# Patient Record
Sex: FEMALE | Race: BLACK OR AFRICAN AMERICAN | NOT HISPANIC OR LATINO | Employment: OTHER | URBAN - METROPOLITAN AREA
[De-identification: names, ages, dates, MRNs, and addresses within clinical notes are randomized per-mention and may not be internally consistent; named-entity substitution may affect disease eponyms.]

---

## 2019-02-27 ENCOUNTER — OFFICE VISIT (OUTPATIENT)
Dept: URGENT CARE | Facility: CLINIC | Age: 84
End: 2019-02-27

## 2019-02-27 VITALS
TEMPERATURE: 98 F | WEIGHT: 156 LBS | RESPIRATION RATE: 18 BRPM | BODY MASS INDEX: 24.48 KG/M2 | OXYGEN SATURATION: 97 % | HEART RATE: 74 BPM | HEIGHT: 67 IN

## 2019-02-27 DIAGNOSIS — B96.89 BACTERIAL SINUSITIS: Primary | ICD-10-CM

## 2019-02-27 DIAGNOSIS — J32.9 BACTERIAL SINUSITIS: Primary | ICD-10-CM

## 2019-02-27 LAB
CTP QC/QA: YES
FLUAV AG NPH QL: NEGATIVE
FLUBV AG NPH QL: NEGATIVE

## 2019-02-27 PROCEDURE — 99203 OFFICE O/P NEW LOW 30 MIN: CPT | Mod: S$GLB,,, | Performed by: NURSE PRACTITIONER

## 2019-02-27 PROCEDURE — 99203 PR OFFICE/OUTPT VISIT, NEW, LEVL III, 30-44 MIN: ICD-10-PCS | Mod: S$GLB,,, | Performed by: NURSE PRACTITIONER

## 2019-02-27 PROCEDURE — 87804 POCT INFLUENZA A/B: ICD-10-PCS | Mod: 59,QW,S$GLB, | Performed by: NURSE PRACTITIONER

## 2019-02-27 PROCEDURE — 87804 INFLUENZA ASSAY W/OPTIC: CPT | Mod: QW,S$GLB,, | Performed by: NURSE PRACTITIONER

## 2019-02-27 RX ORDER — AMLODIPINE BESYLATE 10 MG/1
10 TABLET ORAL DAILY
COMMUNITY
End: 2020-01-14 | Stop reason: SDUPTHER

## 2019-02-27 RX ORDER — VENLAFAXINE 75 MG/1
75 TABLET ORAL 3 TIMES DAILY
COMMUNITY
End: 2019-11-29 | Stop reason: SDUPTHER

## 2019-02-27 RX ORDER — LISINOPRIL 40 MG/1
40 TABLET ORAL DAILY
COMMUNITY
End: 2020-01-14 | Stop reason: SDUPTHER

## 2019-02-27 RX ORDER — METOPROLOL SUCCINATE 100 MG/1
TABLET, EXTENDED RELEASE ORAL
COMMUNITY
End: 2020-01-14 | Stop reason: SDUPTHER

## 2019-02-27 RX ORDER — CLOPIDOGREL BISULFATE 75 MG/1
75 TABLET ORAL DAILY
COMMUNITY
End: 2020-01-14 | Stop reason: SDUPTHER

## 2019-02-27 RX ORDER — FLUTICASONE PROPIONATE 50 MCG
1 SPRAY, SUSPENSION (ML) NASAL DAILY PRN
Status: ON HOLD | COMMUNITY
End: 2022-06-29 | Stop reason: HOSPADM

## 2019-02-27 RX ORDER — ALLOPURINOL 300 MG/1
300 TABLET ORAL DAILY
COMMUNITY
End: 2020-01-14 | Stop reason: SDUPTHER

## 2019-02-27 RX ORDER — ALENDRONATE SODIUM 70 MG/1
70 TABLET ORAL
COMMUNITY
End: 2020-01-14 | Stop reason: SDUPTHER

## 2019-02-27 RX ORDER — ONDANSETRON 4 MG/1
4 TABLET, FILM COATED ORAL EVERY 8 HOURS PRN
Qty: 28 TABLET | Refills: 0 | Status: SHIPPED | OUTPATIENT
Start: 2019-02-27 | End: 2019-03-06

## 2019-02-27 RX ORDER — METFORMIN HYDROCHLORIDE 500 MG/1
500 TABLET ORAL DAILY
COMMUNITY
End: 2020-01-14

## 2019-02-27 RX ORDER — AMOXICILLIN AND CLAVULANATE POTASSIUM 875; 125 MG/1; MG/1
1 TABLET, FILM COATED ORAL 2 TIMES DAILY
Qty: 14 TABLET | Refills: 0 | Status: SHIPPED | OUTPATIENT
Start: 2019-02-27 | End: 2019-03-06

## 2019-02-27 RX ORDER — PRAVASTATIN SODIUM 80 MG/1
80 TABLET ORAL DAILY
COMMUNITY
End: 2019-11-25

## 2019-02-28 NOTE — PATIENT INSTRUCTIONS
Start taking your antibiotics and take for the full duration.     Zyrtec, Claritin, or Allegra OTC as directed for the next 7 days  Add a decongestant to your antihistamine for congestion- like Zyrtec-D, Claritin D, Allegra D-this may increase your blood pressure.   If high blood pressure, an alternative decongestant is Coricidin HBP   Flonase OTC as directed for the next 7 days  [place] Nasal Spray OTC 3x/day for the next 7 days   You can try breathe right strips at night to help you breathe.  A cool mist humidifier in bedroom may help with cough and relieve stuffy nose.      Follow up with Primary Care or ENT if not improved in 7-10 Days  Follow up with your PCP or specialty clinic within the week if not improved or as needed.  You can call [phone] to schedule an appointment with the appropriate provider.  You must understand that you've received an Urgent Care treatment only and that you may be released before all your medical problems are known or treated. You, the patient, will arrange for follow up care as instructed.  If your condition worsens we recommend that you receive another evaluation at the emergency room immediately or contact your primary medical clinics after hours call service to discuss your concerns.  Please return here or go to the Emergency Department for any concerns or worsening of condition.            Sinusitis (Antibiotic Treatment)    The sinuses are air-filled spaces within the bones of the face. They connect to the inside of the nose. Sinusitis is an inflammation of the tissue lining the sinus cavity. Sinus inflammation can occur during a cold. It can also be due to allergies to pollens and other particles in the air. Sinusitis can cause symptoms of sinus congestion and fullness. A sinus infection causes fever, headache and facial pain. There is often green or yellow drainage from the nose or into the back of the throat (post-nasal drip). You have been given antibiotics to  "treat this condition.    Please return here or go to the Emergency Department for any concerns or worsening of condition.    Home care:  · Take the full course of antibiotics as instructed. Do not stop taking them, even if you feel better.  · Get rest!  · Drink plenty of water, hot tea, and other liquids. This may help thin mucus. It also may promote sinus drainage.  · Heat may help soothe painful areas of the face. Use a towel soaked in hot water. Or,  the shower and direct the hot spray onto your face. Using a vaporizer along with a menthol rub at night may also help.   · An expectorant containing guaifenesin may help thin the mucus and promote drainage from the sinuses.  · If you are a female and on birth control pills and take the antibiotics, use additional methods to prevent pregnancy while on the antibiotics and for one cycle after.  · Flonase (fluticasone) is an over the counter nasal spray which may help with your symptoms.  · Zyrtec D, Claritin D, or Allegra D can also help with symptoms of congestion and drainage  · If you have hypertesion, avoid using the "D" which is a decongestant.  · If clear drainage, you can take plain zyrtec, claritin, allegra.  · If congested, you can take pseudoephedrine-you need to ask for this behind the pharmacy counter-do not take if you have high blood pressure. Pheylephrine is on the shelf and is not effective.  · Tylenol or ibuprofen can be used as directed for pain, unless you have allergies. Avoid ibuprofen if medical conditions such as stomach ulcers, kidney or liver disease, or blood thinners  · Afrin is effective if flying in the next few days. Take as directed for the airplane flight upon taking off and landing. Do not continue to use Afrin as it will cause rebound congestion.  · Don't smoke. This can worsen symptoms.  Follow-up care  Follow up with your healthcare provider or our staff if you are not improving within the next week.    When to seek medical " advice  Call your healthcare provider if any of these occur:  · Facial pain or headache becoming more severe  · Stiff neck  · Unusual drowsiness or confusion  · Swelling of the forehead or eyelids  · Vision problems, including blurred or double vision  · Fever of 100.4ºF (38ºC) or higher, or as directed by your healthcare provider  · Seizure  · Breathing problems  · Symptoms not resolving within 10 days

## 2019-02-28 NOTE — PROGRESS NOTES
"Subjective:       Patient ID: Farheen Soares is a 83 y.o. female.    Vitals:  height is 5' 7" (1.702 m) and weight is 70.8 kg (156 lb). Her temperature is 98.4 °F (36.9 °C). Her pulse is 74. Her respiration is 18 and oxygen saturation is 97%.     Chief Complaint: Sinus Problem    Nausea, vomiting, post nasal drip, congestion, and headache for 1 week       Sinus Problem   This is a new problem. The current episode started in the past 7 days. The problem is unchanged. There has been no fever. Associated symptoms include congestion, headaches and sinus pressure. Pertinent negatives include no chills, coughing, diaphoresis, ear pain, shortness of breath or sore throat. Treatments tried: Mucinex  The treatment provided mild relief.       Constitution: Negative for chills, sweating, fatigue and fever.   HENT: Positive for congestion and sinus pressure. Negative for ear pain, sinus pain, sore throat and voice change.    Neck: Negative for painful lymph nodes.   Eyes: Negative for eye redness.   Respiratory: Negative for chest tightness, cough, sputum production, bloody sputum, COPD, shortness of breath, stridor, wheezing and asthma.    Gastrointestinal: Positive for nausea and vomiting.   Musculoskeletal: Negative for muscle ache.   Skin: Negative for rash.   Allergic/Immunologic: Negative for seasonal allergies and asthma.   Neurological: Positive for headaches.   Hematologic/Lymphatic: Negative for swollen lymph nodes.       Objective:      Physical Exam   Constitutional: She is oriented to person, place, and time. Vital signs are normal. She appears well-developed and well-nourished. She is active and cooperative.  Non-toxic appearance. She does not have a sickly appearance. She does not appear ill. No distress.   HENT:   Head: Normocephalic and atraumatic.   Right Ear: Hearing, tympanic membrane, external ear and ear canal normal.   Left Ear: Hearing, tympanic membrane, external ear and ear canal normal.   Nose: " Mucosal edema and rhinorrhea present. No nasal deformity. No epistaxis. Right sinus exhibits maxillary sinus tenderness. Right sinus exhibits no frontal sinus tenderness. Left sinus exhibits maxillary sinus tenderness. Left sinus exhibits no frontal sinus tenderness.   Mouth/Throat: Uvula is midline and mucous membranes are normal. No trismus in the jaw. Normal dentition. No uvula swelling. No oropharyngeal exudate, posterior oropharyngeal edema, posterior oropharyngeal erythema or tonsillar abscesses. No tonsillar exudate.   Eyes: Conjunctivae, EOM and lids are normal. Pupils are equal, round, and reactive to light. No scleral icterus.   Sclera clear bilat   Neck: Trachea normal, full passive range of motion without pain and phonation normal. Neck supple.   Cardiovascular: Normal rate, regular rhythm, normal heart sounds and intact distal pulses. Exam reveals no decreased pulses.   Pulses:       Radial pulses are 2+ on the right side, and 2+ on the left side.   Pulmonary/Chest: Effort normal and breath sounds normal. No respiratory distress.   Abdominal: Soft. Normal appearance and bowel sounds are normal. There is no tenderness. There is no rigidity, no rebound, no guarding, no CVA tenderness, no tenderness at McBurney's point and negative Weber's sign.   Musculoskeletal: Normal range of motion. She exhibits no edema or deformity.   Neurological: She is alert and oriented to person, place, and time. She has normal strength. Gait normal.   Skin: Skin is warm, dry and intact. Capillary refill takes less than 2 seconds. No rash noted. She is not diaphoretic. No pallor.   Psychiatric: She has a normal mood and affect. Her speech is normal and behavior is normal. Judgment and thought content normal. Cognition and memory are normal.   Nursing note and vitals reviewed.      Assessment:       1. Bacterial sinusitis        Results for orders placed or performed in visit on 02/27/19   POCT Influenza A/B   Result Value Ref  Range    Rapid Influenza A Ag Negative Negative    Rapid Influenza B Ag Negative Negative     Acceptable Yes        Plan:         Bacterial sinusitis  -     POCT Influenza A/B  -     amoxicillin-clavulanate 875-125mg (AUGMENTIN) 875-125 mg per tablet; Take 1 tablet by mouth 2 (two) times daily. for 7 days  Dispense: 14 tablet; Refill: 0  -     ondansetron (ZOFRAN) 4 MG tablet; Take 1 tablet (4 mg total) by mouth every 8 (eight) hours as needed for Nausea.  Dispense: 28 tablet; Refill: 0

## 2019-10-10 ENCOUNTER — TELEPHONE (OUTPATIENT)
Dept: PRIMARY CARE CLINIC | Facility: CLINIC | Age: 84
End: 2019-10-10

## 2019-10-10 NOTE — TELEPHONE ENCOUNTER
Spoke with pt daughter and advised per provider;My panel is closed I am not accepting any patients. I will going to part time next year. Pt daughter verbalized understanding.

## 2019-10-10 NOTE — TELEPHONE ENCOUNTER
Please see message and advise     ----- Message from Heather Wolf sent at 10/10/2019  4:03 PM CDT -----  Contact: Sherin Knight/Daughter/[phone]  Sherin called in regards needing to find out if is possible for Dr Graves to accept patient Farheen Soares as her new patient. (unable to find an open appointment). Please call and advise. Thank you!!!

## 2019-11-25 ENCOUNTER — OFFICE VISIT (OUTPATIENT)
Dept: PRIMARY CARE CLINIC | Facility: CLINIC | Age: 84
End: 2019-11-25
Payer: MEDICARE

## 2019-11-25 ENCOUNTER — IMMUNIZATION (OUTPATIENT)
Dept: PHARMACY | Facility: CLINIC | Age: 84
End: 2019-11-25
Payer: MEDICARE

## 2019-11-25 VITALS
DIASTOLIC BLOOD PRESSURE: 70 MMHG | TEMPERATURE: 98 F | BODY MASS INDEX: 25.38 KG/M2 | RESPIRATION RATE: 14 BRPM | OXYGEN SATURATION: 95 % | SYSTOLIC BLOOD PRESSURE: 132 MMHG | HEART RATE: 87 BPM | WEIGHT: 152.31 LBS | HEIGHT: 65 IN

## 2019-11-25 DIAGNOSIS — M54.41 CHRONIC BILATERAL LOW BACK PAIN WITH BILATERAL SCIATICA: ICD-10-CM

## 2019-11-25 DIAGNOSIS — M54.42 CHRONIC BILATERAL LOW BACK PAIN WITH BILATERAL SCIATICA: ICD-10-CM

## 2019-11-25 DIAGNOSIS — I10 HYPERTENSION, UNSPECIFIED TYPE: ICD-10-CM

## 2019-11-25 DIAGNOSIS — K21.9 GASTROESOPHAGEAL REFLUX DISEASE, ESOPHAGITIS PRESENCE NOT SPECIFIED: ICD-10-CM

## 2019-11-25 DIAGNOSIS — M81.0 OSTEOPOROSIS, UNSPECIFIED OSTEOPOROSIS TYPE, UNSPECIFIED PATHOLOGICAL FRACTURE PRESENCE: ICD-10-CM

## 2019-11-25 DIAGNOSIS — E78.5 HYPERLIPIDEMIA, UNSPECIFIED HYPERLIPIDEMIA TYPE: ICD-10-CM

## 2019-11-25 DIAGNOSIS — Z86.73 HISTORY OF TRANSIENT ISCHEMIC ATTACK (TIA): ICD-10-CM

## 2019-11-25 DIAGNOSIS — M10.9 GOUT, UNSPECIFIED CAUSE, UNSPECIFIED CHRONICITY, UNSPECIFIED SITE: ICD-10-CM

## 2019-11-25 DIAGNOSIS — E11.9 TYPE 2 DIABETES MELLITUS WITHOUT COMPLICATION, WITHOUT LONG-TERM CURRENT USE OF INSULIN: Primary | ICD-10-CM

## 2019-11-25 DIAGNOSIS — F32.A DEPRESSION, UNSPECIFIED DEPRESSION TYPE: ICD-10-CM

## 2019-11-25 DIAGNOSIS — D33.2 BENIGN NEOPLASM OF BRAIN, UNSPECIFIED BRAIN REGION: ICD-10-CM

## 2019-11-25 DIAGNOSIS — G89.29 CHRONIC BILATERAL LOW BACK PAIN WITH BILATERAL SCIATICA: ICD-10-CM

## 2019-11-25 DIAGNOSIS — Z01.00 ENCOUNTER FOR VISION SCREENING: ICD-10-CM

## 2019-11-25 DIAGNOSIS — Z23 FLU VACCINE NEED: ICD-10-CM

## 2019-11-25 PROCEDURE — 99499 UNLISTED E&M SERVICE: CPT | Mod: S$GLB,,, | Performed by: INTERNAL MEDICINE

## 2019-11-25 PROCEDURE — 1159F PR MEDICATION LIST DOCUMENTED IN MEDICAL RECORD: ICD-10-PCS | Mod: S$GLB,,, | Performed by: INTERNAL MEDICINE

## 2019-11-25 PROCEDURE — 1125F PR PAIN SEVERITY QUANTIFIED, PAIN PRESENT: ICD-10-PCS | Mod: S$GLB,,, | Performed by: INTERNAL MEDICINE

## 2019-11-25 PROCEDURE — 99204 PR OFFICE/OUTPT VISIT, NEW, LEVL IV, 45-59 MIN: ICD-10-PCS | Mod: S$GLB,,, | Performed by: INTERNAL MEDICINE

## 2019-11-25 PROCEDURE — 99999 PR PBB SHADOW E&M-EST. PATIENT-LVL V: ICD-10-PCS | Mod: PBBFAC,,, | Performed by: INTERNAL MEDICINE

## 2019-11-25 PROCEDURE — 1101F PR PT FALLS ASSESS DOC 0-1 FALLS W/OUT INJ PAST YR: ICD-10-PCS | Mod: CPTII,S$GLB,, | Performed by: INTERNAL MEDICINE

## 2019-11-25 PROCEDURE — 1125F AMNT PAIN NOTED PAIN PRSNT: CPT | Mod: S$GLB,,, | Performed by: INTERNAL MEDICINE

## 2019-11-25 PROCEDURE — 1159F MED LIST DOCD IN RCRD: CPT | Mod: S$GLB,,, | Performed by: INTERNAL MEDICINE

## 2019-11-25 PROCEDURE — 99499 RISK ADDL DX/OHS AUDIT: ICD-10-PCS | Mod: S$GLB,,, | Performed by: INTERNAL MEDICINE

## 2019-11-25 PROCEDURE — 99999 PR PBB SHADOW E&M-EST. PATIENT-LVL V: CPT | Mod: PBBFAC,,, | Performed by: INTERNAL MEDICINE

## 2019-11-25 PROCEDURE — 1101F PT FALLS ASSESS-DOCD LE1/YR: CPT | Mod: CPTII,S$GLB,, | Performed by: INTERNAL MEDICINE

## 2019-11-25 PROCEDURE — 99204 OFFICE O/P NEW MOD 45 MIN: CPT | Mod: S$GLB,,, | Performed by: INTERNAL MEDICINE

## 2019-11-25 RX ORDER — METFORMIN HYDROCHLORIDE 500 MG/1
500 TABLET, EXTENDED RELEASE ORAL
Qty: 90 TABLET | Refills: 3
Start: 2019-11-25 | End: 2020-01-14 | Stop reason: SDUPTHER

## 2019-11-25 RX ORDER — DULOXETIN HYDROCHLORIDE 30 MG/1
30 CAPSULE, DELAYED RELEASE ORAL DAILY
COMMUNITY
End: 2019-12-01

## 2019-11-25 RX ORDER — MINERAL OIL
180 ENEMA (ML) RECTAL DAILY PRN
COMMUNITY
End: 2021-02-08 | Stop reason: SDUPTHER

## 2019-11-25 RX ORDER — ATORVASTATIN CALCIUM 10 MG/1
10 TABLET, FILM COATED ORAL DAILY
Qty: 90 TABLET | Refills: 3 | Status: SHIPPED | OUTPATIENT
Start: 2019-11-25 | End: 2020-01-14 | Stop reason: SDUPTHER

## 2019-11-25 RX ORDER — HYDROCODONE BITARTRATE AND ACETAMINOPHEN 5; 325 MG/1; MG/1
1 TABLET ORAL EVERY 6 HOURS PRN
COMMUNITY
End: 2020-08-14

## 2019-11-25 NOTE — PROGRESS NOTES
[facility] Note    Chief Complaint      Chief Complaint   Patient presents with    Establish Care       History of Present Illness      Farheen Soares is a 84 y.o. AAF with DM - II, HTN, osteoposis, Gout, HLD, Hearing loss, Depression, Chronic ow back pain, GERd, Ar, Benign brain tumor, h/o TIA presents to est PCPA dn to fu chronic issues.    DM - II - Pt on Metformin  mg/d. She does not check her glc.     HTN - controlled Amlodipine 10 mg/d, Lisinopril 40 mg/d, Toprol  mg/d    Osteoporosis - Pt on Fosamax 70 mg/wk.      Gout - Pt is on Allopurinol 300 mg/d..     HLD - Pt was prev on Atorvastatin 10 mg QHS but was having hallucinations and this resolved with stopping it. She did have a UTI at the time. Rec resume.     Hearing loss - [employer] Hearing aids.    Depression - Pt is on Venlafaxine 75 mg TID. Pt was Rx Duloxetine by Pain Mgmnt this AM - I do not rec taking with her current dose of Venlafaxine.     Chronic Low back pain - Pt just saw Pain Mgmt this AM.  She has had prev Back surgery (Lumbar Laminectomy) and has received epidural inj in past.  She was Rx Duloxetine and Norco.  I do not rec she take the Duloxetine on top of her current Venlafaxine.     GERD - Pt is on Ranitidine 150 mg/d.  Rec Pepcid.  Rec Reflux prec.     AR - Pt on allegra daily.     Benign brain mass - Per daughter, it has been stable.  ? Meningioma.  Will get records to review.     H/o TIA - Pt on Plavix 75 mg/d.    Acc by rommel - Sherin Zaldivar - lives in [place] - [phone].  Other daughter she lives with has not acc her today - Colleen Soares - [phone] (she is a nurse).  I have permission to speak to both    HCM - Flu - 11/25/19; Tdap - ?;  PCV 13 - ?;  PVX 23 - ?; Shingrix - ?; MGM - '18?;  DEXA - ?;  C-scope - ?; Prev PCP - Dr. Norman Haddad in [place]; Pain Mgmnt - Dr. Harjinder Rose    Past Medical History:  Past Medical History:   Diagnosis Date    Allergic rhinitis     Chronic low  back pain with bilateral sciatica     Depression     Diabetes mellitus, type 2     Diverticulosis     Gout     Hearing loss     History of TIA (transient ischemic attack)     Hyperlipidemia     Hypertension     Meningioma     Osteoporosis        Past Surgical History:   has a past surgical history that includes Epidural steroid injection into lumbar spine; Lumbar laminectomy (2009); Total abdominal hysterectomy w/ bilateral salpingoophorectomy; and Cataract extraction.    Family History:  family history includes Diabetes in her mother; Hyperlipidemia in her son; Hypertension in her daughter, father, mother, and son; Hypothyroidism in her daughter; Stroke in her father.     Social History:  Social History     Tobacco Use    Smoking status: Never Smoker   Substance Use Topics    Alcohol use: Yes     Frequency: Monthly or less     Drinks per session: 1 or 2    Drug use: Never       Review of Systems   Constitutional: Negative for chills, diaphoresis and fever.   HENT: Positive for hearing loss. Negative for congestion, sore throat and tinnitus.    Eyes: Positive for blurred vision. Negative for double vision.   Respiratory: Negative for cough, shortness of breath and wheezing.    Cardiovascular: Negative for chest pain and palpitations.   Gastrointestinal: Negative for abdominal pain, blood in stool, constipation, diarrhea, heartburn, melena, nausea and vomiting.   Genitourinary: Negative for dysuria and frequency.   Musculoskeletal: Positive for back pain. Negative for joint pain and myalgias.        Chronic low back pain   Skin: Negative for itching and rash.   Neurological: Positive for weakness. Negative for dizziness, tingling and headaches.        In legs when her back hurts   Endo/Heme/Allergies: Negative for polydipsia. Does not bruise/bleed easily.   Psychiatric/Behavioral: Positive for depression. The patient is not nervous/anxious.         Considering weaning Venlafacine BID after the holiaays.   This will be her first holiday without her daugher whom she lived with the last 14 yrs        Medications:  Outpatient Encounter Medications as of 11/25/2019   Medication Sig Dispense Refill    alendronate (FOSAMAX) 70 MG tablet Take 70 mg by mouth every 7 days.      allopurinol (ZYLOPRIM) 300 MG tablet Take 300 mg by mouth once daily.      amLODIPine (NORVASC) 10 MG tablet Take 10 mg by mouth once daily.      clopidogrel (PLAVIX) 75 mg tablet Take 75 mg by mouth once daily.      DULoxetine (CYMBALTA) 30 MG capsule Take 30 mg by mouth once daily.      fexofenadine (ALLEGRA) 180 MG tablet Take 180 mg by mouth daily as needed.      fluticasone (FLONASE) 50 mcg/actuation nasal spray 1 spray by Each Nare route once daily.      HYDROcodone-acetaminophen (NORCO) 5-325 mg per tablet Take 1 tablet by mouth every 6 (six) hours as needed for Pain.      lisinopril (PRINIVIL,ZESTRIL) 40 MG tablet Take 40 mg by mouth once daily.      metFORMIN (GLUCOPHAGE) 500 MG tablet Take 500 mg by mouth once daily.       metoprolol succinate (TOPROL-XL) 100 MG 24 hr tablet Take 1 1/2 daily      ranitidine (ZANTAC) 150 MG tablet Take 150 mg by mouth 2 (two) times daily.      venlafaxine (EFFEXOR) 75 MG tablet Take 75 mg by mouth 3 (three) times daily.       atorvastatin (LIPITOR) 10 MG tablet Take 1 tablet (10 mg total) by mouth once daily. 90 tablet 3    metFORMIN (GLUCOPHAGE-XR) 500 MG 24 hr tablet Take 1 tablet (500 mg total) by mouth daily with breakfast. 90 tablet 3    [DISCONTINUED] pravastatin (PRAVACHOL) 80 MG tablet Take 80 mg by mouth once daily.       No facility-administered encounter medications on file as of 11/25/2019.        Current Outpatient Medications:     alendronate (FOSAMAX) 70 MG tablet, Take 70 mg by mouth every 7 days., Disp: , Rfl:     allopurinol (ZYLOPRIM) 300 MG tablet, Take 300 mg by mouth once daily., Disp: , Rfl:     amLODIPine (NORVASC) 10 MG tablet, Take 10 mg by mouth once daily., Disp: ,  Rfl:     clopidogrel (PLAVIX) 75 mg tablet, Take 75 mg by mouth once daily., Disp: , Rfl:     DULoxetine (CYMBALTA) 30 MG capsule, Take 30 mg by mouth once daily., Disp: , Rfl:     fexofenadine (ALLEGRA) 180 MG tablet, Take 180 mg by mouth daily as needed., Disp: , Rfl:     fluticasone (FLONASE) 50 mcg/actuation nasal spray, 1 spray by Each Nare route once daily., Disp: , Rfl:     HYDROcodone-acetaminophen (NORCO) 5-325 mg per tablet, Take 1 tablet by mouth every 6 (six) hours as needed for Pain., Disp: , Rfl:     lisinopril (PRINIVIL,ZESTRIL) 40 MG tablet, Take 40 mg by mouth once daily., Disp: , Rfl:     metFORMIN (GLUCOPHAGE) 500 MG tablet, Take 500 mg by mouth once daily. , Disp: , Rfl:     metoprolol succinate (TOPROL-XL) 100 MG 24 hr tablet, Take 1 1/2 daily, Disp: , Rfl:     ranitidine (ZANTAC) 150 MG tablet, Take 150 mg by mouth 2 (two) times daily., Disp: , Rfl:     venlafaxine (EFFEXOR) 75 MG tablet, Take 75 mg by mouth 3 (three) times daily. , Disp: , Rfl:     atorvastatin (LIPITOR) 10 MG tablet, Take 1 tablet (10 mg total) by mouth once daily., Disp: 90 tablet, Rfl: 3    flu vacc [id number],65yr up,PF (FLUZONE HIGH-DOSE 2019-20, PF,) 180 mcg/0.5 mL Syrg, Inject 0.5 mLs into the muscle once. for 1 dose, Disp: 0.5 mL, Rfl: 0    metFORMIN (GLUCOPHAGE-XR) 500 MG 24 hr tablet, Take 1 tablet (500 mg total) by mouth daily with breakfast., Disp: 90 tablet, Rfl: 3    Allergies:  Review of patient's allergies indicates:   Allergen Reactions    Baclofen      AMS and distorted dremas       Health Maintenance:  Immunization History   Administered Date(s) Administered    Influenza - High Dose - PF (65 years and older) 11/25/2019      Health Maintenance   Topic Date Due    Lipid Panel  1935    TETANUS VACCINE  03/03/1955    DEXA SCAN  03/03/1977    Pneumococcal Vaccine (65+ Low/Medium Risk) (1 of 2 - PCV13) 03/03/2002      Objective:      Vital Signs  Temp: 97.6 °F (36.4 °C)  Temp src:  "Oral  Pulse: 87  Resp: 14  SpO2: 95 %  BP: 132/70  BP Location: Left arm  Patient Position: Sitting  Pain Score:   8  Pain Loc: Back  Height and Weight  Height: 5' 4.5" (163.8 cm)  Weight: 69.1 kg (152 lb 5.4 oz)  BSA (Calculated - sq m): 1.77 sq meters  BMI (Calculated): 25.8  Weight in (lb) to have BMI = 25: 147.6]    Laboratory:    Physical Exam   Constitutional: She is oriented to person, place, and time. She appears well-developed and well-nourished. No distress.   HENT:   Head: Normocephalic and atraumatic.   Right Ear: External ear normal.   Left Ear: External ear normal.   Mouth/Throat: Oropharynx is clear and moist.   [employer] hearing aids   Eyes: Pupils are equal, round, and reactive to light. Conjunctivae and EOM are normal.   Neck: Normal range of motion. Neck supple. No thyromegaly present.   Cardiovascular: Normal rate, regular rhythm, normal heart sounds and intact distal pulses.   No murmur heard.  Pulmonary/Chest: Effort normal and breath sounds normal. No respiratory distress.   Abdominal: Soft. Bowel sounds are normal. She exhibits distension. She exhibits no mass. There is no tenderness. There is no rebound and no guarding.   Musculoskeletal:   Shuffling wide-based gait; scoliosis   Lymphadenopathy:     She has no cervical adenopathy.   Neurological: She is alert and oriented to person, place, and time.   Skin: Skin is warm and dry. She is not diaphoretic.   Hyperpigmented macules to malar region   Psychiatric: She has a normal mood and affect.   Nursing note and vitals reviewed.          Assessment:       1. Type 2 diabetes mellitus without complication, without long-term current use of insulin    2. Hyperlipidemia, unspecified hyperlipidemia type    3. Osteoporosis, unspecified osteoporosis type, unspecified pathological fracture presence    4. Gout, unspecified cause, unspecified chronicity, unspecified site    5. Chronic bilateral low back pain with bilateral sciatica    6. Depression, unspecified " depression type    7. History of transient ischemic attack (TIA)    8. Encounter for vision screening    9. Flu vaccine need        Farheen Soares is a 84 y.o.female with:    1. Type 2 diabetes mellitus without complication, without long-term current use of insulin  - metFORMIN (GLUCOPHAGE-XR) 500 MG 24 hr tablet; Take 1 tablet (500 mg total) by mouth daily with breakfast.  Dispense: 90 tablet; Refill: 3  - Hemoglobin A1c; Future  - Comprehensive metabolic panel; Future  - Microalbumin/creatinine urine ratio; Future  - CBC auto differential; Future  - T4, free; Future  - TSH; Future  - Ambulatory referral to Ophthalmology  -refer to new Ophtho.  Cont current regimen.  Will repeat labs. rec check glc a few times/wk.    2. Hyperlipidemia, unspecified hyperlipidemia type  - Lipid panel; Future  - CBC auto differential; Future  - T4, free; Future  - TSH; Future  -Repeat FLP. Resume Atorvastatin 10 mg QHS.     3. Osteoporosis, unspecified osteoporosis type, unspecified pathological fracture presence  -Cont Alendronate.  Will get old records to review.     4. Gout, unspecified cause, unspecified chronicity, unspecified site  - Uric acid; Future  -Cont current regimen.    5. Chronic bilateral low back pain with bilateral sciatica  -Pt fu with new Pain Mgmnt.    6. Depression, unspecified depression type  -Cont Venlafaxine at current dose.  Pt will not start Duloxetine on top of current regimen. She prev stopped Duloxetine.    7. Hypertension, unspecified type  -Controlled. Cont current regimen.    8. Benign neoplasm of brain, unspecified brain region  -Will get old records to review.    9. Gastroesophageal reflux disease, esophagitis presence not specified  -Rec Reflux prec.  Change to pepcid prn.     10. History of transient ischemic attack (TIA)  - CBC auto differential; Future  - T4, free; Future  - TSH; Future  -Will check Labs.  Cont Plavix and rec resume Atorvastatin 10 mg QHS.     11. Encounter for vision  screening  - Ambulatory referral to Ophthalmology    12. Flu vaccine need  - Admin today       Chronic conditions status updated as per HPI.  Other than changes above, cont current medications and maintain follow up with specialists.  Return to clinic in 6 wks.    Vonda Chung MD  [facility]

## 2019-11-29 NOTE — TELEPHONE ENCOUNTER
----- Message from Hailey Leger sent at 11/29/2019 12:02 PM CST -----  Contact: [phone]  Type: Rx    Name of medication(s):  venlafaxine (EFFEXOR) 75 MG tablet       Is this a refill? New rx? refill    Who prescribed medication?    Pharmacy Name, Phone, & Location: [facility]/pharmacy #[id number] - [place] - [address] [phone]     Comments: please call and advise, Thanks

## 2019-12-01 RX ORDER — VENLAFAXINE 75 MG/1
75 TABLET ORAL 3 TIMES DAILY
Qty: 270 TABLET | Refills: 0 | Status: SHIPPED | OUTPATIENT
Start: 2019-12-01 | End: 2020-01-14 | Stop reason: SDUPTHER

## 2019-12-11 ENCOUNTER — TELEPHONE (OUTPATIENT)
Dept: PRIMARY CARE CLINIC | Facility: CLINIC | Age: 84
End: 2019-12-11

## 2019-12-11 DIAGNOSIS — E11.9 TYPE 2 DIABETES MELLITUS WITHOUT COMPLICATION, WITHOUT LONG-TERM CURRENT USE OF INSULIN: ICD-10-CM

## 2019-12-11 DIAGNOSIS — E11.9 TYPE 2 DIABETES MELLITUS WITHOUT COMPLICATION, WITHOUT LONG-TERM CURRENT USE OF INSULIN: Primary | ICD-10-CM

## 2019-12-11 RX ORDER — LANCETS
EACH MISCELLANEOUS
Qty: 100 EACH | Refills: 0 | Status: ON HOLD | OUTPATIENT
Start: 2019-12-11 | End: 2022-06-29 | Stop reason: HOSPADM

## 2019-12-11 RX ORDER — INSULIN PUMP SYRINGE, 3 ML
EACH MISCELLANEOUS
Qty: 1 EACH | Refills: 0 | OUTPATIENT
Start: 2019-12-11 | End: 2022-06-29

## 2019-12-11 NOTE — TELEPHONE ENCOUNTER
----- Message from Joanna Ramirez sent at 12/11/2019  8:10 AM CST -----  Contact: Amelie/[facility]/ [phone]  Diabetic or Medical Supplies.  What supplies are needed: Glucometer and testing supplies  What is the brand name of the supplies: True Metrix  Is this a refill or new prescription:  New  Who prescribed the supplies:    Pharmacy or company name, phone # and location:  Fax# [phone]  Comments:

## 2019-12-13 ENCOUNTER — TELEPHONE (OUTPATIENT)
Dept: PRIMARY CARE CLINIC | Facility: CLINIC | Age: 84
End: 2019-12-13

## 2019-12-13 NOTE — TELEPHONE ENCOUNTER
I wanted her to d/w pain management that I did not rec starting Duloxetine on top of her Venlafaxine.  I wasn't chaning her regimen unitl I got records to review but did not understand why she would be taking Venlafaxine TID and/or with Duloxetine. I rec she take 2 QAM and 1 QHS rather than TID. I may possibly change her regimen but need to get her records to review and was not planning to change at our first visit. I apologize for the misunderstanding. Please expalan to jeanna  This is also why she has a fu in another few wks.

## 2019-12-13 NOTE — TELEPHONE ENCOUNTER
----- Message from Laura Trammell sent at 12/13/2019 11:44 AM CST -----  Contact: daughter Sherin [phone]  Patient is returning a phone call.  Who left a message for the patient: Haileej carlos  Does patient know what this is regarding:    Comments: please advise,thanks

## 2019-12-13 NOTE — TELEPHONE ENCOUNTER
Spoke with pt daughter jay. Informed her that Dr. Chung would not want to change dosage of medication at this time until she receive pt old records. Pt she recommend she take the Effexor 2 Qam and 1 QHS. The patient verbalized understanding and had no further questions or concerns.  Toni Gonzalez LPN

## 2019-12-13 NOTE — TELEPHONE ENCOUNTER
Pt daughter Sherin called regarding pt medication Effexor 75 mg. Daughter stated in pt last visit medication discussed would be sent in as extended release. Would like to know if the correct medication picked up from pharmacy , is medication she should be taking.  Please advise and authorize.   Toni Gonzalez LPN

## 2019-12-14 ENCOUNTER — LAB VISIT (OUTPATIENT)
Dept: LAB | Facility: HOSPITAL | Age: 84
End: 2019-12-14
Attending: INTERNAL MEDICINE
Payer: MEDICARE

## 2019-12-14 DIAGNOSIS — M10.9 GOUT, UNSPECIFIED CAUSE, UNSPECIFIED CHRONICITY, UNSPECIFIED SITE: ICD-10-CM

## 2019-12-14 DIAGNOSIS — E78.5 HYPERLIPIDEMIA, UNSPECIFIED HYPERLIPIDEMIA TYPE: ICD-10-CM

## 2019-12-14 DIAGNOSIS — Z86.73 HISTORY OF TRANSIENT ISCHEMIC ATTACK (TIA): ICD-10-CM

## 2019-12-14 DIAGNOSIS — E11.9 TYPE 2 DIABETES MELLITUS WITHOUT COMPLICATION, WITHOUT LONG-TERM CURRENT USE OF INSULIN: ICD-10-CM

## 2019-12-14 LAB
ALBUMIN SERPL BCP-MCNC: 4.3 G/DL (ref 3.5–5.2)
ALP SERPL-CCNC: 56 U/L (ref 55–135)
ALT SERPL W/O P-5'-P-CCNC: 13 U/L (ref 10–44)
ANION GAP SERPL CALC-SCNC: 12 MMOL/L (ref 8–16)
AST SERPL-CCNC: 29 U/L (ref 10–40)
BASOPHILS # BLD AUTO: 0.04 K/UL (ref 0–0.2)
BASOPHILS NFR BLD: 0.6 % (ref 0–1.9)
BILIRUB SERPL-MCNC: 0.6 MG/DL (ref 0.1–1)
BUN SERPL-MCNC: 14 MG/DL (ref 8–23)
CALCIUM SERPL-MCNC: 9.9 MG/DL (ref 8.7–10.5)
CHLORIDE SERPL-SCNC: 106 MMOL/L (ref 95–110)
CHOLEST SERPL-MCNC: 105 MG/DL (ref 120–199)
CHOLEST/HDLC SERPL: 2.8 {RATIO} (ref 2–5)
CO2 SERPL-SCNC: 21 MMOL/L (ref 23–29)
CREAT SERPL-MCNC: 0.9 MG/DL (ref 0.5–1.4)
DIFFERENTIAL METHOD: ABNORMAL
EOSINOPHIL # BLD AUTO: 0.1 K/UL (ref 0–0.5)
EOSINOPHIL NFR BLD: 1.4 % (ref 0–8)
ERYTHROCYTE [DISTWIDTH] IN BLOOD BY AUTOMATED COUNT: 14.3 % (ref 11.5–14.5)
EST. GFR  (AFRICAN AMERICAN): >60 ML/MIN/1.73 M^2
EST. GFR  (NON AFRICAN AMERICAN): 60 ML/MIN/1.73 M^2
ESTIMATED AVG GLUCOSE: 148 MG/DL (ref 68–131)
GLUCOSE SERPL-MCNC: 147 MG/DL (ref 70–110)
HBA1C MFR BLD HPLC: 6.8 % (ref 4–5.6)
HCT VFR BLD AUTO: 42.4 % (ref 37–48.5)
HDLC SERPL-MCNC: 38 MG/DL (ref 40–75)
HDLC SERPL: 36.2 % (ref 20–50)
HGB BLD-MCNC: 14.7 G/DL (ref 12–16)
LDLC SERPL CALC-MCNC: 33.6 MG/DL (ref 63–159)
LYMPHOCYTES # BLD AUTO: 2 K/UL (ref 1–4.8)
LYMPHOCYTES NFR BLD: 30.2 % (ref 18–48)
MCH RBC QN AUTO: 33.4 PG (ref 27–31)
MCHC RBC AUTO-ENTMCNC: 34.7 G/DL (ref 32–36)
MCV RBC AUTO: 96 FL (ref 82–98)
MONOCYTES # BLD AUTO: 0.6 K/UL (ref 0.3–1)
MONOCYTES NFR BLD: 9 % (ref 4–15)
NEUTROPHILS # BLD AUTO: 3.8 K/UL (ref 1.8–7.7)
NEUTROPHILS NFR BLD: 58.8 % (ref 38–73)
NONHDLC SERPL-MCNC: 67 MG/DL
PLATELET # BLD AUTO: 263 K/UL (ref 150–350)
PMV BLD AUTO: 10.1 FL (ref 9.2–12.9)
POTASSIUM SERPL-SCNC: 4.3 MMOL/L (ref 3.5–5.1)
PROT SERPL-MCNC: 7.8 G/DL (ref 6–8.4)
RBC # BLD AUTO: 4.4 M/UL (ref 4–5.4)
SODIUM SERPL-SCNC: 139 MMOL/L (ref 136–145)
T4 FREE SERPL-MCNC: 1.1 NG/DL (ref 0.71–1.51)
TRIGL SERPL-MCNC: 167 MG/DL (ref 30–150)
TSH SERPL DL<=0.005 MIU/L-ACNC: 2.08 UIU/ML (ref 0.4–4)
URATE SERPL-MCNC: 3.1 MG/DL (ref 2.4–5.7)
WBC # BLD AUTO: 6.46 K/UL (ref 3.9–12.7)

## 2019-12-14 PROCEDURE — 80061 LIPID PANEL: CPT

## 2019-12-14 PROCEDURE — 85025 COMPLETE CBC W/AUTO DIFF WBC: CPT

## 2019-12-14 PROCEDURE — 84443 ASSAY THYROID STIM HORMONE: CPT

## 2019-12-14 PROCEDURE — 84550 ASSAY OF BLOOD/URIC ACID: CPT

## 2019-12-14 PROCEDURE — 36415 COLL VENOUS BLD VENIPUNCTURE: CPT

## 2019-12-14 PROCEDURE — 84439 ASSAY OF FREE THYROXINE: CPT

## 2019-12-14 PROCEDURE — 83036 HEMOGLOBIN GLYCOSYLATED A1C: CPT

## 2019-12-14 PROCEDURE — 80053 COMPREHEN METABOLIC PANEL: CPT

## 2019-12-20 ENCOUNTER — TELEPHONE (OUTPATIENT)
Dept: PRIMARY CARE CLINIC | Facility: CLINIC | Age: 84
End: 2019-12-20

## 2019-12-20 NOTE — TELEPHONE ENCOUNTER
Call placed to pt regarding lab results. Left voice message for pt to call the office with any questions or concerns.  Toni Gonzalez LPN

## 2019-12-20 NOTE — PROGRESS NOTES
Please call the patient regarding her labs.  Patient's thyroid functions are within normal limits.  Her kidney function looks good.  Liver function looks good.  Uric acid-3.1-within normal limits on allopurinol.  HGB A1c 6.8-controlled on metformin.  Patient is not anemic.  Patient's cholesterol looks okay.  Her HDL is low but her LDL is also low.  She previously did not tolerate Lipitor due to hallucinations.  No further recommendations at this time.

## 2019-12-20 NOTE — TELEPHONE ENCOUNTER
----- Message from Vonda Chung MD sent at 12/20/2019 12:57 PM CST -----  Please call the patient regarding her labs.  Patient's thyroid functions are within normal limits.  Her kidney function looks good.  Liver function looks good.  Uric acid-3.1-within normal limits on allopurinol.  HGB A1c 6.8-controlled on metformin.  Patient is not anemic.  Patient's cholesterol looks okay.  Her HDL is low but her LDL is also low.  She previously did not tolerate Lipitor due to hallucinations.  No further recommendations at this time.

## 2020-01-13 ENCOUNTER — TELEPHONE (OUTPATIENT)
Dept: PRIMARY CARE CLINIC | Facility: CLINIC | Age: 85
End: 2020-01-13

## 2020-01-13 NOTE — TELEPHONE ENCOUNTER
----- Message from Amelie Holcomb sent at 1/13/2020 10:51 AM CST -----  Contact: pt's daughter-- [phone]  Type:  Test Results    Who Called:  Pt's daughter    Name of Test (Lab/Mammo/Etc): labs and refill of medications(unsure of which ones)    Would the patient rather a call back or a response via [facility]? Call    Best Call Back Number: [phone] or [phone]    Additional Information:  Pt's daughter called to speak with nurse regarding pt's lab results as well as request refills. She is requesting a call back.

## 2020-01-13 NOTE — TELEPHONE ENCOUNTER
Call placed to pt daughter ( Colleen ) regarding lab results and request for medication refill. No answer unable to leave voice message , mailbox full.  Toni Gonzalez LPN

## 2020-01-14 DIAGNOSIS — Z86.73 HISTORY OF TRANSIENT ISCHEMIC ATTACK (TIA): ICD-10-CM

## 2020-01-14 DIAGNOSIS — F32.A DEPRESSION, UNSPECIFIED DEPRESSION TYPE: ICD-10-CM

## 2020-01-14 DIAGNOSIS — M10.9 GOUT, UNSPECIFIED CAUSE, UNSPECIFIED CHRONICITY, UNSPECIFIED SITE: ICD-10-CM

## 2020-01-14 DIAGNOSIS — E78.5 HYPERLIPIDEMIA, UNSPECIFIED HYPERLIPIDEMIA TYPE: ICD-10-CM

## 2020-01-14 DIAGNOSIS — I10 HYPERTENSION, UNSPECIFIED TYPE: ICD-10-CM

## 2020-01-14 DIAGNOSIS — M81.0 OSTEOPOROSIS, UNSPECIFIED OSTEOPOROSIS TYPE, UNSPECIFIED PATHOLOGICAL FRACTURE PRESENCE: Primary | ICD-10-CM

## 2020-01-14 DIAGNOSIS — E11.9 TYPE 2 DIABETES MELLITUS WITHOUT COMPLICATION, WITHOUT LONG-TERM CURRENT USE OF INSULIN: ICD-10-CM

## 2020-01-14 RX ORDER — CLOPIDOGREL BISULFATE 75 MG/1
75 TABLET ORAL DAILY
Qty: 90 TABLET | Refills: 1 | Status: SHIPPED | OUTPATIENT
Start: 2020-01-14 | End: 2020-09-09 | Stop reason: SDUPTHER

## 2020-01-14 RX ORDER — VENLAFAXINE 75 MG/1
TABLET ORAL
Qty: 270 TABLET | Refills: 0 | Status: SHIPPED | OUTPATIENT
Start: 2020-01-14 | End: 2021-02-08 | Stop reason: DRUGHIGH

## 2020-01-14 RX ORDER — METOPROLOL SUCCINATE 100 MG/1
100 TABLET, EXTENDED RELEASE ORAL DAILY
Qty: 135 TABLET | Refills: 1 | Status: SHIPPED | OUTPATIENT
Start: 2020-01-14 | End: 2020-09-09 | Stop reason: SDUPTHER

## 2020-01-14 RX ORDER — METFORMIN HYDROCHLORIDE 500 MG/1
500 TABLET, EXTENDED RELEASE ORAL
Qty: 90 TABLET | Refills: 1
Start: 2020-01-14 | End: 2020-09-09 | Stop reason: SDUPTHER

## 2020-01-14 RX ORDER — LISINOPRIL 40 MG/1
40 TABLET ORAL DAILY
Qty: 90 TABLET | Refills: 1 | Status: SHIPPED | OUTPATIENT
Start: 2020-01-14 | End: 2020-09-09 | Stop reason: SDUPTHER

## 2020-01-14 RX ORDER — ATORVASTATIN CALCIUM 10 MG/1
10 TABLET, FILM COATED ORAL DAILY
Qty: 90 TABLET | Refills: 1 | Status: SHIPPED | OUTPATIENT
Start: 2020-01-14 | End: 2021-01-07 | Stop reason: SDUPTHER

## 2020-01-14 RX ORDER — ALLOPURINOL 300 MG/1
300 TABLET ORAL DAILY
Qty: 90 TABLET | Refills: 1 | Status: SHIPPED | OUTPATIENT
Start: 2020-01-14 | End: 2020-09-09 | Stop reason: SDUPTHER

## 2020-01-14 RX ORDER — AMLODIPINE BESYLATE 10 MG/1
10 TABLET ORAL DAILY
Qty: 90 TABLET | Refills: 1 | Status: SHIPPED | OUTPATIENT
Start: 2020-01-14 | End: 2020-09-09 | Stop reason: SDUPTHER

## 2020-01-14 RX ORDER — ALENDRONATE SODIUM 70 MG/1
70 TABLET ORAL
Qty: 12 TABLET | Refills: 1 | Status: SHIPPED | OUTPATIENT
Start: 2020-01-14 | End: 2020-09-09 | Stop reason: SDUPTHER

## 2020-01-14 NOTE — TELEPHONE ENCOUNTER
Pt daughter mazin returned call regarding lab results. Results reviewed with daughter. Daughter requested medications refill for pt. Daughter stated she started giving pt lipitor , and she has been tolerating it well . No hallucinations has been noted.Please authorize. Medication pended.  Toni Gonzalez LPN

## 2020-01-15 NOTE — TELEPHONE ENCOUNTER
I refilled meds.  Please make sure her Metformin was sent in correctly.  It looks like we sent in Metformin ER and perhaps she was on regular Metformin because I got a pop up      Dr. COHEN

## 2020-03-02 ENCOUNTER — TELEPHONE (OUTPATIENT)
Dept: PRIMARY CARE CLINIC | Facility: CLINIC | Age: 85
End: 2020-03-02

## 2020-03-02 DIAGNOSIS — G89.29 CHRONIC BILATERAL LOW BACK PAIN WITH BILATERAL SCIATICA: Primary | ICD-10-CM

## 2020-03-02 DIAGNOSIS — M54.41 CHRONIC BILATERAL LOW BACK PAIN WITH BILATERAL SCIATICA: Primary | ICD-10-CM

## 2020-03-02 DIAGNOSIS — M54.42 CHRONIC BILATERAL LOW BACK PAIN WITH BILATERAL SCIATICA: Primary | ICD-10-CM

## 2020-03-02 NOTE — TELEPHONE ENCOUNTER
----- Message from Jesica Anthony sent at 3/2/2020 10:11 AM CST -----  Contact:  Sherin Knight (daughter)  [phone]  Patient would like to get a referral.  Referral to what specialty:  Pain Management  Does the patient want the referral with a specific physician:  No  [facility] the [facility]  Reason (be specific):  Back pain   Does the patient already have the specialty clinic appointment scheduled:  No  If yes, what date is the appointment scheduled:   N/a    Is the insurance listed in Epic correct? (this is important for a referral):  Yes       Comments:  Can you please call in an Rx for her pain to  [facility] [phone] (Phone)  [phone] (Fax)

## 2020-03-08 ENCOUNTER — HOSPITAL ENCOUNTER (OUTPATIENT)
Facility: OTHER | Age: 85
Discharge: HOME | End: 2020-03-13
Attending: EMERGENCY MEDICINE | Admitting: EMERGENCY MEDICINE
Payer: MEDICARE

## 2020-03-08 DIAGNOSIS — R50.9 FEVER, UNSPECIFIED FEVER CAUSE: ICD-10-CM

## 2020-03-08 DIAGNOSIS — R50.9 FEVER, UNKNOWN ORIGIN: Primary | ICD-10-CM

## 2020-03-08 DIAGNOSIS — G89.29 CHRONIC BILATERAL LOW BACK PAIN WITH BILATERAL SCIATICA: ICD-10-CM

## 2020-03-08 DIAGNOSIS — R50.9 FEVER: ICD-10-CM

## 2020-03-08 DIAGNOSIS — E78.2 MIXED HYPERLIPIDEMIA: ICD-10-CM

## 2020-03-08 DIAGNOSIS — E11.9 TYPE 2 DIABETES MELLITUS WITHOUT COMPLICATION, WITHOUT LONG-TERM CURRENT USE OF INSULIN: ICD-10-CM

## 2020-03-08 DIAGNOSIS — M54.42 CHRONIC BILATERAL LOW BACK PAIN WITH BILATERAL SCIATICA: ICD-10-CM

## 2020-03-08 DIAGNOSIS — B34.9 VIRAL ILLNESS: ICD-10-CM

## 2020-03-08 DIAGNOSIS — Z86.73 HISTORY OF TRANSIENT ISCHEMIC ATTACK (TIA): ICD-10-CM

## 2020-03-08 DIAGNOSIS — M54.41 CHRONIC BILATERAL LOW BACK PAIN WITH BILATERAL SCIATICA: ICD-10-CM

## 2020-03-08 DIAGNOSIS — I10 ESSENTIAL HYPERTENSION: ICD-10-CM

## 2020-03-08 LAB
ALBUMIN SERPL BCP-MCNC: 4.5 G/DL (ref 3.5–5.2)
ALP SERPL-CCNC: 57 U/L (ref 55–135)
ALT SERPL W/O P-5'-P-CCNC: 15 U/L (ref 10–44)
ANION GAP SERPL CALC-SCNC: 13 MMOL/L (ref 8–16)
AST SERPL-CCNC: 24 U/L (ref 10–40)
BACTERIA #/AREA URNS HPF: NORMAL /HPF
BASOPHILS # BLD AUTO: 0.05 K/UL (ref 0–0.2)
BASOPHILS NFR BLD: 0.3 % (ref 0–1.9)
BILIRUB SERPL-MCNC: 0.5 MG/DL (ref 0.1–1)
BILIRUB UR QL STRIP: NEGATIVE
BUN SERPL-MCNC: 8 MG/DL (ref 8–23)
CALCIUM SERPL-MCNC: 10.3 MG/DL (ref 8.7–10.5)
CHLORIDE SERPL-SCNC: 101 MMOL/L (ref 95–110)
CLARITY UR: CLEAR
CO2 SERPL-SCNC: 22 MMOL/L (ref 23–29)
COLOR UR: YELLOW
CREAT SERPL-MCNC: 0.8 MG/DL (ref 0.5–1.4)
CTP QC/QA: YES
DIFFERENTIAL METHOD: ABNORMAL
EOSINOPHIL # BLD AUTO: 0 K/UL (ref 0–0.5)
EOSINOPHIL NFR BLD: 0 % (ref 0–8)
ERYTHROCYTE [DISTWIDTH] IN BLOOD BY AUTOMATED COUNT: 13.8 % (ref 11.5–14.5)
EST. GFR  (AFRICAN AMERICAN): >60 ML/MIN/1.73 M^2
EST. GFR  (NON AFRICAN AMERICAN): >60 ML/MIN/1.73 M^2
GLUCOSE SERPL-MCNC: 187 MG/DL (ref 70–110)
GLUCOSE UR QL STRIP: NEGATIVE
HCT VFR BLD AUTO: 42.8 % (ref 37–48.5)
HGB BLD-MCNC: 14.6 G/DL (ref 12–16)
HGB UR QL STRIP: ABNORMAL
HYALINE CASTS #/AREA URNS LPF: 0 /LPF
IMM GRANULOCYTES # BLD AUTO: 0.07 K/UL (ref 0–0.04)
IMM GRANULOCYTES NFR BLD AUTO: 0.5 % (ref 0–0.5)
KETONES UR QL STRIP: ABNORMAL
LACTATE SERPL-SCNC: 2.2 MMOL/L (ref 0.5–2.2)
LEUKOCYTE ESTERASE UR QL STRIP: NEGATIVE
LIPASE SERPL-CCNC: 57 U/L (ref 4–60)
LYMPHOCYTES # BLD AUTO: 1 K/UL (ref 1–4.8)
LYMPHOCYTES NFR BLD: 6.3 % (ref 18–48)
MCH RBC QN AUTO: 33.3 PG (ref 27–31)
MCHC RBC AUTO-ENTMCNC: 34.1 G/DL (ref 32–36)
MCV RBC AUTO: 98 FL (ref 82–98)
MICROSCOPIC COMMENT: NORMAL
MONOCYTES # BLD AUTO: 1.1 K/UL (ref 0.3–1)
MONOCYTES NFR BLD: 7.6 % (ref 4–15)
NEUTROPHILS # BLD AUTO: 12.8 K/UL (ref 1.8–7.7)
NEUTROPHILS NFR BLD: 85.3 % (ref 38–73)
NITRITE UR QL STRIP: NEGATIVE
NRBC BLD-RTO: 0 /100 WBC
PH UR STRIP: 6 [PH] (ref 5–8)
PLATELET # BLD AUTO: 234 K/UL (ref 150–350)
PMV BLD AUTO: 10.9 FL (ref 9.2–12.9)
POC MOLECULAR INFLUENZA A AGN: NEGATIVE
POC MOLECULAR INFLUENZA B AGN: NEGATIVE
POTASSIUM SERPL-SCNC: 4 MMOL/L (ref 3.5–5.1)
PROT SERPL-MCNC: 8.3 G/DL (ref 6–8.4)
PROT UR QL STRIP: ABNORMAL
RBC # BLD AUTO: 4.39 M/UL (ref 4–5.4)
RBC #/AREA URNS HPF: 2 /HPF (ref 0–4)
SODIUM SERPL-SCNC: 136 MMOL/L (ref 136–145)
SP GR UR STRIP: 1.02 (ref 1–1.03)
SQUAMOUS #/AREA URNS HPF: 19 /HPF
TROPONIN I SERPL DL<=0.01 NG/ML-MCNC: <0.006 NG/ML (ref 0–0.03)
URN SPEC COLLECT METH UR: ABNORMAL
UROBILINOGEN UR STRIP-ACNC: NEGATIVE EU/DL
WBC # BLD AUTO: 15.05 K/UL (ref 3.9–12.7)
WBC #/AREA URNS HPF: 0 /HPF (ref 0–5)
WBC CLUMPS URNS QL MICRO: NORMAL

## 2020-03-08 PROCEDURE — 96375 TX/PRO/DX INJ NEW DRUG ADDON: CPT

## 2020-03-08 PROCEDURE — 87040 BLOOD CULTURE FOR BACTERIA: CPT

## 2020-03-08 PROCEDURE — 96365 THER/PROPH/DIAG IV INF INIT: CPT | Mod: 59

## 2020-03-08 PROCEDURE — G0378 HOSPITAL OBSERVATION PER HR: HCPCS

## 2020-03-08 PROCEDURE — 99285 EMERGENCY DEPT VISIT HI MDM: CPT | Mod: 25

## 2020-03-08 PROCEDURE — 84484 ASSAY OF TROPONIN QUANT: CPT

## 2020-03-08 PROCEDURE — 93010 EKG 12-LEAD: ICD-10-PCS | Mod: ,,, | Performed by: INTERNAL MEDICINE

## 2020-03-08 PROCEDURE — 96361 HYDRATE IV INFUSION ADD-ON: CPT

## 2020-03-08 PROCEDURE — 93005 ELECTROCARDIOGRAM TRACING: CPT

## 2020-03-08 PROCEDURE — 85025 COMPLETE CBC W/AUTO DIFF WBC: CPT

## 2020-03-08 PROCEDURE — 80053 COMPREHEN METABOLIC PANEL: CPT

## 2020-03-08 PROCEDURE — 93010 ELECTROCARDIOGRAM REPORT: CPT | Mod: ,,, | Performed by: INTERNAL MEDICINE

## 2020-03-08 PROCEDURE — 81000 URINALYSIS NONAUTO W/SCOPE: CPT

## 2020-03-08 PROCEDURE — 63600175 PHARM REV CODE 636 W HCPCS: Performed by: EMERGENCY MEDICINE

## 2020-03-08 PROCEDURE — 25500020 PHARM REV CODE 255: Performed by: EMERGENCY MEDICINE

## 2020-03-08 PROCEDURE — 83605 ASSAY OF LACTIC ACID: CPT

## 2020-03-08 PROCEDURE — 25000003 PHARM REV CODE 250: Performed by: EMERGENCY MEDICINE

## 2020-03-08 PROCEDURE — 83690 ASSAY OF LIPASE: CPT

## 2020-03-08 PROCEDURE — 36415 COLL VENOUS BLD VENIPUNCTURE: CPT

## 2020-03-08 RX ORDER — ACETAMINOPHEN 500 MG
1000 TABLET ORAL
Status: COMPLETED | OUTPATIENT
Start: 2020-03-08 | End: 2020-03-08

## 2020-03-08 RX ADMIN — ACETAMINOPHEN 1000 MG: 500 TABLET ORAL at 11:03

## 2020-03-08 RX ADMIN — PIPERACILLIN AND TAZOBACTAM 4.5 G: 4; .5 INJECTION, POWDER, LYOPHILIZED, FOR SOLUTION INTRAVENOUS; PARENTERAL at 11:03

## 2020-03-08 RX ADMIN — SODIUM CHLORIDE 1974 ML: 0.9 INJECTION, SOLUTION INTRAVENOUS at 07:03

## 2020-03-08 RX ADMIN — IOHEXOL 75 ML: 350 INJECTION, SOLUTION INTRAVENOUS at 09:03

## 2020-03-09 LAB
ALBUMIN SERPL BCP-MCNC: 4.2 G/DL (ref 3.5–5.2)
ALP SERPL-CCNC: 57 U/L (ref 55–135)
ALT SERPL W/O P-5'-P-CCNC: 13 U/L (ref 10–44)
ANION GAP SERPL CALC-SCNC: 11 MMOL/L (ref 8–16)
AST SERPL-CCNC: 23 U/L (ref 10–40)
BASOPHILS # BLD AUTO: 0.04 K/UL (ref 0–0.2)
BASOPHILS NFR BLD: 0.3 % (ref 0–1.9)
BILIRUB SERPL-MCNC: 0.5 MG/DL (ref 0.1–1)
BUN SERPL-MCNC: 6 MG/DL (ref 8–23)
CALCIUM SERPL-MCNC: 10 MG/DL (ref 8.7–10.5)
CHLORIDE SERPL-SCNC: 105 MMOL/L (ref 95–110)
CLARITY CSF: ABNORMAL
CO2 SERPL-SCNC: 24 MMOL/L (ref 23–29)
COLOR CSF: ABNORMAL
CREAT SERPL-MCNC: 0.8 MG/DL (ref 0.5–1.4)
CRP SERPL-MCNC: 77.5 MG/L (ref 0–8.2)
DIFFERENTIAL METHOD: ABNORMAL
EOSINOPHIL # BLD AUTO: 0.1 K/UL (ref 0–0.5)
EOSINOPHIL NFR BLD: 0.3 % (ref 0–8)
ERYTHROCYTE [DISTWIDTH] IN BLOOD BY AUTOMATED COUNT: 13.9 % (ref 11.5–14.5)
ERYTHROCYTE [SEDIMENTATION RATE] IN BLOOD: 24 MM/HR (ref 0–20)
EST. GFR  (AFRICAN AMERICAN): >60 ML/MIN/1.73 M^2
EST. GFR  (NON AFRICAN AMERICAN): >60 ML/MIN/1.73 M^2
GLUCOSE CSF-MCNC: 112 MG/DL (ref 40–70)
GLUCOSE SERPL-MCNC: 157 MG/DL (ref 70–110)
HCT VFR BLD AUTO: 42.8 % (ref 37–48.5)
HGB BLD-MCNC: 14.2 G/DL (ref 12–16)
IMM GRANULOCYTES # BLD AUTO: 0.06 K/UL (ref 0–0.04)
IMM GRANULOCYTES NFR BLD AUTO: 0.4 % (ref 0–0.5)
INR PPP: 1 (ref 0.8–1.2)
LYMPHOCYTES # BLD AUTO: 1.4 K/UL (ref 1–4.8)
LYMPHOCYTES NFR BLD: 9.3 % (ref 18–48)
LYMPHOCYTES NFR CSF MANUAL: 9 % (ref 40–80)
MAGNESIUM SERPL-MCNC: 1.4 MG/DL (ref 1.6–2.6)
MCH RBC QN AUTO: 32.9 PG (ref 27–31)
MCHC RBC AUTO-ENTMCNC: 33.2 G/DL (ref 32–36)
MCV RBC AUTO: 99 FL (ref 82–98)
MONOCYTES # BLD AUTO: 1.5 K/UL (ref 0.3–1)
MONOCYTES NFR BLD: 10.1 % (ref 4–15)
MONOS+MACROS NFR CSF MANUAL: 9 % (ref 15–45)
NEUTROPHILS # BLD AUTO: 12.1 K/UL (ref 1.8–7.7)
NEUTROPHILS NFR BLD: 79.6 % (ref 38–73)
NEUTROPHILS NFR CSF MANUAL: 82 % (ref 0–6)
NRBC BLD-RTO: 0 /100 WBC
PHOSPHATE SERPL-MCNC: 2.5 MG/DL (ref 2.7–4.5)
PLATELET # BLD AUTO: 217 K/UL (ref 150–350)
PMV BLD AUTO: 10.7 FL (ref 9.2–12.9)
POCT GLUCOSE: 175 MG/DL (ref 70–110)
POCT GLUCOSE: 216 MG/DL (ref 70–110)
POTASSIUM SERPL-SCNC: 3.6 MMOL/L (ref 3.5–5.1)
PROCALCITONIN SERPL IA-MCNC: 0.09 NG/ML
PROT CSF-MCNC: 48 MG/DL (ref 15–40)
PROT SERPL-MCNC: 8 G/DL (ref 6–8.4)
PROTHROMBIN TIME: 10.6 SEC (ref 9–12.5)
RBC # BLD AUTO: 4.31 M/UL (ref 4–5.4)
RBC # CSF: ABNORMAL /CU MM
SODIUM SERPL-SCNC: 140 MMOL/L (ref 136–145)
SPECIMEN VOL CSF: 2 ML
WBC # BLD AUTO: 15.22 K/UL (ref 3.9–12.7)
WBC # CSF: 92 /CU MM (ref 0–5)

## 2020-03-09 PROCEDURE — 82945 GLUCOSE OTHER FLUID: CPT

## 2020-03-09 PROCEDURE — 99220 PR INITIAL OBSERVATION CARE,LEVL III: CPT | Mod: ,,, | Performed by: PHYSICIAN ASSISTANT

## 2020-03-09 PROCEDURE — 63600175 PHARM REV CODE 636 W HCPCS: Performed by: PHYSICIAN ASSISTANT

## 2020-03-09 PROCEDURE — 85025 COMPLETE CBC W/AUTO DIFF WBC: CPT

## 2020-03-09 PROCEDURE — G0378 HOSPITAL OBSERVATION PER HR: HCPCS

## 2020-03-09 PROCEDURE — 25000003 PHARM REV CODE 250: Performed by: PHYSICIAN ASSISTANT

## 2020-03-09 PROCEDURE — 87529 HSV DNA AMP PROBE: CPT

## 2020-03-09 PROCEDURE — 87632 RESP VIRUS 6-11 TARGETS: CPT

## 2020-03-09 PROCEDURE — 89051 BODY FLUID CELL COUNT: CPT

## 2020-03-09 PROCEDURE — 84100 ASSAY OF PHOSPHORUS: CPT

## 2020-03-09 PROCEDURE — 86140 C-REACTIVE PROTEIN: CPT

## 2020-03-09 PROCEDURE — 85651 RBC SED RATE NONAUTOMATED: CPT

## 2020-03-09 PROCEDURE — 83615 LACTATE (LD) (LDH) ENZYME: CPT

## 2020-03-09 PROCEDURE — 99220 PR INITIAL OBSERVATION CARE,LEVL III: ICD-10-PCS | Mod: ,,, | Performed by: PHYSICIAN ASSISTANT

## 2020-03-09 PROCEDURE — 36415 COLL VENOUS BLD VENIPUNCTURE: CPT

## 2020-03-09 PROCEDURE — 87798 DETECT AGENT NOS DNA AMP: CPT

## 2020-03-09 PROCEDURE — 83735 ASSAY OF MAGNESIUM: CPT

## 2020-03-09 PROCEDURE — 94761 N-INVAS EAR/PLS OXIMETRY MLT: CPT

## 2020-03-09 PROCEDURE — 99204 OFFICE O/P NEW MOD 45 MIN: CPT | Mod: ,,, | Performed by: INTERNAL MEDICINE

## 2020-03-09 PROCEDURE — 99204 PR OFFICE/OUTPT VISIT, NEW, LEVL IV, 45-59 MIN: ICD-10-PCS | Mod: ,,, | Performed by: INTERNAL MEDICINE

## 2020-03-09 PROCEDURE — 84157 ASSAY OF PROTEIN OTHER: CPT

## 2020-03-09 PROCEDURE — 96372 THER/PROPH/DIAG INJ SC/IM: CPT

## 2020-03-09 PROCEDURE — 85610 PROTHROMBIN TIME: CPT

## 2020-03-09 PROCEDURE — 96361 HYDRATE IV INFUSION ADD-ON: CPT

## 2020-03-09 PROCEDURE — 25000003 PHARM REV CODE 250: Performed by: RADIOLOGY

## 2020-03-09 PROCEDURE — 84145 PROCALCITONIN (PCT): CPT

## 2020-03-09 PROCEDURE — 80053 COMPREHEN METABOLIC PANEL: CPT

## 2020-03-09 RX ORDER — METOPROLOL SUCCINATE 50 MG/1
100 TABLET, EXTENDED RELEASE ORAL DAILY
Status: DISCONTINUED | OUTPATIENT
Start: 2020-03-09 | End: 2020-03-13 | Stop reason: HOSPADM

## 2020-03-09 RX ORDER — CEFTRIAXONE 2 G/50ML
2 INJECTION, SOLUTION INTRAVENOUS
Status: DISCONTINUED | OUTPATIENT
Start: 2020-03-09 | End: 2020-03-09

## 2020-03-09 RX ORDER — ONDANSETRON 2 MG/ML
4 INJECTION INTRAMUSCULAR; INTRAVENOUS EVERY 8 HOURS PRN
Status: DISCONTINUED | OUTPATIENT
Start: 2020-03-09 | End: 2020-03-13 | Stop reason: HOSPADM

## 2020-03-09 RX ORDER — LISINOPRIL 20 MG/1
40 TABLET ORAL DAILY
Status: DISCONTINUED | OUTPATIENT
Start: 2020-03-09 | End: 2020-03-11

## 2020-03-09 RX ORDER — GLUCAGON 1 MG
1 KIT INJECTION
Status: DISCONTINUED | OUTPATIENT
Start: 2020-03-09 | End: 2020-03-13 | Stop reason: HOSPADM

## 2020-03-09 RX ORDER — AMLODIPINE BESYLATE 5 MG/1
10 TABLET ORAL DAILY
Status: DISCONTINUED | OUTPATIENT
Start: 2020-03-09 | End: 2020-03-11

## 2020-03-09 RX ORDER — MAGNESIUM SULFATE HEPTAHYDRATE 40 MG/ML
2 INJECTION, SOLUTION INTRAVENOUS ONCE
Status: COMPLETED | OUTPATIENT
Start: 2020-03-09 | End: 2020-03-09

## 2020-03-09 RX ORDER — INSULIN ASPART 100 [IU]/ML
0-5 INJECTION, SOLUTION INTRAVENOUS; SUBCUTANEOUS
Status: DISCONTINUED | OUTPATIENT
Start: 2020-03-09 | End: 2020-03-13 | Stop reason: HOSPADM

## 2020-03-09 RX ORDER — ACETAMINOPHEN 325 MG/1
650 TABLET ORAL EVERY 4 HOURS PRN
Status: DISCONTINUED | OUTPATIENT
Start: 2020-03-09 | End: 2020-03-13 | Stop reason: HOSPADM

## 2020-03-09 RX ORDER — ATORVASTATIN CALCIUM 10 MG/1
10 TABLET, FILM COATED ORAL DAILY
Status: DISCONTINUED | OUTPATIENT
Start: 2020-03-09 | End: 2020-03-13 | Stop reason: HOSPADM

## 2020-03-09 RX ORDER — SODIUM CHLORIDE 0.9 % (FLUSH) 0.9 %
10 SYRINGE (ML) INJECTION
Status: DISCONTINUED | OUTPATIENT
Start: 2020-03-09 | End: 2020-03-13 | Stop reason: HOSPADM

## 2020-03-09 RX ORDER — VANCOMYCIN HCL IN 5 % DEXTROSE 1.25 G/25
1250 PLASTIC BAG, INJECTION (ML) INTRAVENOUS
Status: DISCONTINUED | OUTPATIENT
Start: 2020-03-09 | End: 2020-03-09

## 2020-03-09 RX ORDER — IBUPROFEN 200 MG
24 TABLET ORAL
Status: DISCONTINUED | OUTPATIENT
Start: 2020-03-09 | End: 2020-03-13 | Stop reason: HOSPADM

## 2020-03-09 RX ORDER — IBUPROFEN 200 MG
16 TABLET ORAL
Status: DISCONTINUED | OUTPATIENT
Start: 2020-03-09 | End: 2020-03-13 | Stop reason: HOSPADM

## 2020-03-09 RX ORDER — HEPARIN SODIUM 5000 [USP'U]/ML
5000 INJECTION, SOLUTION INTRAVENOUS; SUBCUTANEOUS EVERY 8 HOURS
Status: DISCONTINUED | OUTPATIENT
Start: 2020-03-09 | End: 2020-03-09

## 2020-03-09 RX ORDER — ALLOPURINOL 300 MG/1
300 TABLET ORAL DAILY
Status: DISCONTINUED | OUTPATIENT
Start: 2020-03-09 | End: 2020-03-13 | Stop reason: HOSPADM

## 2020-03-09 RX ORDER — CEFTRIAXONE 2 G/50ML
2 INJECTION, SOLUTION INTRAVENOUS
Status: DISCONTINUED | OUTPATIENT
Start: 2020-03-10 | End: 2020-03-11

## 2020-03-09 RX ORDER — VENLAFAXINE 37.5 MG/1
75 TABLET ORAL 2 TIMES DAILY
Status: DISCONTINUED | OUTPATIENT
Start: 2020-03-09 | End: 2020-03-13 | Stop reason: HOSPADM

## 2020-03-09 RX ORDER — CLOPIDOGREL BISULFATE 75 MG/1
75 TABLET ORAL DAILY
Status: DISCONTINUED | OUTPATIENT
Start: 2020-03-09 | End: 2020-03-09

## 2020-03-09 RX ORDER — LIDOCAINE HYDROCHLORIDE 10 MG/ML
INJECTION INFILTRATION; PERINEURAL
Status: DISCONTINUED | OUTPATIENT
Start: 2020-03-09 | End: 2020-03-09 | Stop reason: HOSPADM

## 2020-03-09 RX ORDER — SODIUM CHLORIDE 9 MG/ML
INJECTION, SOLUTION INTRAVENOUS CONTINUOUS
Status: DISCONTINUED | OUTPATIENT
Start: 2020-03-09 | End: 2020-03-10

## 2020-03-09 RX ADMIN — AMLODIPINE BESYLATE 10 MG: 5 TABLET ORAL at 08:03

## 2020-03-09 RX ADMIN — HEPARIN SODIUM 5000 UNITS: 5000 INJECTION, SOLUTION INTRAVENOUS; SUBCUTANEOUS at 05:03

## 2020-03-09 RX ADMIN — VENLAFAXINE 75 MG: 37.5 TABLET ORAL at 08:03

## 2020-03-09 RX ADMIN — METOPROLOL SUCCINATE 100 MG: 50 TABLET, EXTENDED RELEASE ORAL at 08:03

## 2020-03-09 RX ADMIN — LISINOPRIL 40 MG: 20 TABLET ORAL at 08:03

## 2020-03-09 RX ADMIN — SODIUM CHLORIDE: 0.9 INJECTION, SOLUTION INTRAVENOUS at 04:03

## 2020-03-09 RX ADMIN — ACETAMINOPHEN 650 MG: 325 TABLET ORAL at 05:03

## 2020-03-09 RX ADMIN — MAGNESIUM SULFATE 2 G: 2 INJECTION INTRAVENOUS at 11:03

## 2020-03-09 RX ADMIN — VENLAFAXINE 75 MG: 37.5 TABLET ORAL at 09:03

## 2020-03-09 RX ADMIN — ALLOPURINOL 300 MG: 300 TABLET ORAL at 08:03

## 2020-03-09 RX ADMIN — ATORVASTATIN CALCIUM 10 MG: 10 TABLET, FILM COATED ORAL at 08:03

## 2020-03-09 NOTE — SUBJECTIVE & OBJECTIVE
Past Medical History:   Diagnosis Date    Allergic rhinitis     Carotid atherosclerosis, bilateral     Chronic low back pain with bilateral sciatica     Depression     Diabetes mellitus, type 2     Diverticulosis     Facet arthropathy     Gout     Hearing loss     History of TIA (transient ischemic attack)     Hyperlipidemia     Hypertension     IBS (irritable bowel syndrome)     Insomnia     Leg cramps     Lumbar stenosis     Meningioma     Osteoporosis     Paroxysmal SVT (supraventricular tachycardia)     Primary open angle glaucoma (POAG) of both eyes     PVD (peripheral vascular disease)     30% prox stenosis of celiac trunk and 20% stnosis Prox SMA - per Abd/SMA Arteriogram 4/3/08       Past Surgical History:   Procedure Laterality Date    CATARACT EXTRACTION      EPIDURAL STEROID INJECTION INTO LUMBAR SPINE      LUMBAR LAMINECTOMY  2009    TOTAL ABDOMINAL HYSTERECTOMY W/ BILATERAL SALPINGOOPHORECTOMY         Review of patient's allergies indicates:   Allergen Reactions    Baclofen      AMS and distorted dremas       No current facility-administered medications on file prior to encounter.      Current Outpatient Medications on File Prior to Encounter   Medication Sig    alendronate (FOSAMAX) 70 MG tablet Take 1 tablet (70 mg total) by mouth every 7 days.    allopurinol (ZYLOPRIM) 300 MG tablet Take 1 tablet (300 mg total) by mouth once daily.    amLODIPine (NORVASC) 10 MG tablet Take 1 tablet (10 mg total) by mouth once daily.    atorvastatin (LIPITOR) 10 MG tablet Take 1 tablet (10 mg total) by mouth once daily.    clopidogrel (PLAVIX) 75 mg tablet Take 1 tablet (75 mg total) by mouth once daily.    fexofenadine (ALLEGRA) 180 MG tablet Take 180 mg by mouth daily as needed.    fluticasone (FLONASE) 50 mcg/actuation nasal spray 1 spray by Each Nare route once daily.    lisinopril (PRINIVIL,ZESTRIL) 40 MG tablet Take 1 tablet (40 mg total) by mouth once daily.    metFORMIN  (GLUCOPHAGE-XR) 500 MG 24 hr tablet Take 1 tablet (500 mg total) by mouth daily with breakfast.    metoprolol succinate (TOPROL-XL) 100 MG 24 hr tablet Take 1 tablet (100 mg total) by mouth once daily. Take 1 1/2 daily    ranitidine (ZANTAC) 150 MG tablet Take 150 mg by mouth 2 (two) times daily.    venlafaxine (EFFEXOR) 75 MG tablet 2 tabs po QAM and 1 po QHS    blood sugar diagnostic Strp To check BG three times daily, to use with insurance preferred meter    blood-glucose meter kit To check BG three times daily, to use with insurance preferred meter    HYDROcodone-acetaminophen (NORCO) 5-325 mg per tablet Take 1 tablet by mouth every 6 (six) hours as needed for Pain.    lancets Misc To check BG three times daily, to use with insurance preferred meter     Family History     Problem Relation (Age of Onset)    Diabetes Mother    Hyperlipidemia Son    Hypertension Mother, Father, Daughter, Son    Hypothyroidism Daughter    Stroke Father        Tobacco Use    Smoking status: Never Smoker   Substance and Sexual Activity    Alcohol use: Yes     Frequency: Monthly or less     Drinks per session: 1 or 2    Drug use: Never    Sexual activity: Not Currently     Review of Systems   Constitutional: Positive for activity change, appetite change, chills, fatigue and fever.   HENT: Negative for congestion, rhinorrhea and sore throat.    Eyes: Negative.  Negative for photophobia and visual disturbance.   Respiratory: Negative for cough, shortness of breath and wheezing.    Cardiovascular: Negative for chest pain, palpitations and leg swelling.   Gastrointestinal: Negative for abdominal distention, abdominal pain, constipation, diarrhea, nausea and vomiting.   Endocrine: Negative for polydipsia and polyuria.   Genitourinary: Negative for difficulty urinating, frequency and hematuria.   Musculoskeletal: Positive for back pain (chronic). Negative for neck pain and neck stiffness.   Skin: Negative.    Neurological:  Positive for weakness (genaeralized). Negative for dizziness, syncope, light-headedness, numbness and headaches.   Psychiatric/Behavioral: Negative for confusion and decreased concentration.     Objective:     Vital Signs (Most Recent):  Temp: (!) 102 °F (38.9 °C) (03/09/20 0525)  Pulse: 102 (03/09/20 0506)  Resp: 18 (03/09/20 0506)  BP: 130/68 (03/09/20 0506)  SpO2: (!) 94 % (03/09/20 0506) Vital Signs (24h Range):  Temp:  [97.6 °F (36.4 °C)-102.6 °F (39.2 °C)] 102 °F (38.9 °C)  Pulse:  [] 102  Resp:  [16-18] 18  SpO2:  [93 %-98 %] 94 %  BP: (130-192)/(60-87) 130/68     Weight: 67 kg (147 lb 11.3 oz)  Body mass index is 25.35 kg/m².    Physical Exam   Constitutional: She is oriented to person, place, and time. She appears well-developed and well-nourished.   Appears ill, diaphoretic   HENT:   Head: Normocephalic and atraumatic.   Mouth/Throat: Oropharynx is clear and moist.   Eyes: Pupils are equal, round, and reactive to light. Conjunctivae are normal. No scleral icterus.   Neck: Normal range of motion. Neck supple.   Cardiovascular:   No murmur heard.  tachycardic   Pulmonary/Chest: Effort normal and breath sounds normal. No respiratory distress. She has no wheezes.   Abdominal: Soft. Bowel sounds are normal. She exhibits no distension. There is no tenderness. There is no guarding.   Musculoskeletal: Normal range of motion. She exhibits no edema.   Neurological: She is alert and oriented to person, place, and time. No cranial nerve deficit.   Skin: Skin is warm. Capillary refill takes less than 2 seconds.   Psychiatric: She has a normal mood and affect.   Nursing note and vitals reviewed.        CRANIAL NERVES     CN III, IV, VI   Pupils are equal, round, and reactive to light.       Significant Labs:   CBC:   Recent Labs   Lab 03/08/20 2005   WBC 15.05*   HGB 14.6   HCT 42.8        CMP:   Recent Labs   Lab 03/08/20  1934      K 4.0      CO2 22*   *   BUN 8   CREATININE 0.8    CALCIUM 10.3   PROT 8.3   ALBUMIN 4.5   BILITOT 0.5   ALKPHOS 57   AST 24   ALT 15   ANIONGAP 13   EGFRNONAA >60     All pertinent labs within the past 24 hours have been reviewed.    Significant Imaging: I have reviewed all pertinent imaging results/findings within the past 24 hours.   Imaging Results          CT Abdomen Pelvis With Contrast (Final result)  Result time 03/08/20 22:40:25    Final result by Hortensia Anderson MD (03/08/20 22:40:25)                 Impression:      No acute abdominal or pelvic pathology CT of the abdomen and pelvis with contrast.    Advanced vascular calcifications.    Colonic diverticulosis.      Electronically signed by: Hortensia Anderson  Date:    03/08/2020  Time:    22:40             Narrative:    EXAMINATION:  CT OF ABDOMEN PELVIS WITH    CLINICAL HISTORY:  Infection, abdomen-pelvis;    TECHNIQUE:  5 mm enhanced axial images were obtained from the lung bases through the greater trochanters.   mL of Omnipaque 350 was injected.    COMPARISON:  None.    FINDINGS:  The liver, spleen, pancreas, and adrenal glands are unremarkable. The gallbladder contains no calcified gallstones.    There are bilateral too small to characterize low attenuation lesions seen in both renal cortices.    There is no definite evidence for abdominal adenopathy or ascites. Advanced vascular calcifications are present.    There are no pelvic masses or adenopathy.  There is colonic diverticulosis.  There are tiny bilateral fat containing inguinal hernias.  A discrete appendix is not visualized.    There is no free fluid in the pelvis.    There is mild bibasilar atelectasis.    There is moderate dextroscoliosis.  There is severe multilevel degenerative changes spine.                               X-Ray Chest AP Portable (Final result)  Result time 03/08/20 19:49:21    Final result by Hortensia Anderson MD (03/08/20 19:49:21)                 Impression:      No acute intrathoracic abnormality  detected.      Electronically signed by: Hortensia Anderson  Date:    03/08/2020  Time:    19:49             Narrative:    EXAMINATION:  AP PORTABLE CHEST    CLINICAL HISTORY:  Sepsis;    TECHNIQUE:  AP portable chest radiograph was submitted.    COMPARISON:  None.    FINDINGS:  AP portable chest radiograph demonstrates a cardiac silhouette within normal limits.  Vascular calcification is seen in the aortic knob.  There is biapical pleural thickening.  There is no focal consolidation, pneumothorax, or pleural effusion. Asymmetrical elevation the right hemidiaphragm is noted.

## 2020-03-09 NOTE — BRIEF OP NOTE
Radiology Post-Procedure Note    Pre Op Diagnosis: FUO with HA  Post Op Diagnosis: Same    Procedure: Fluoroscopic-guided diagnostic LP    Procedure performed by: Mitch Tillman MD    Written Informed Consent Obtained: Yes  Specimen Removed: YES, bloody tap with 10-cc of blood-tinged CSF progressively clearing from vial #1 to vial #4.  Estimated Blood Loss: Minimal    Findings:   Successful fluoroscopic-guided diagnostic LP with local anesthetic only. Patient tolerated the procedure well. No immediate post-procedural complications noted.     Thank you for considering IR for the care of your patient.     Mitch Tillman MD  Interventional Radiology

## 2020-03-09 NOTE — PLAN OF CARE
SW met with pt's daughter, Colleen to complete discharge assessment, [facility] [address]  Pt lives with disabled son and daughter, Colleen lives down [address] form pt in same [facility] building.  Pt doesn't have a POA or LW.  PT has WC, RW, straight cane and SC.  Pt's daughter will provide transportation home.  Pt has a  once a week.  Daughter requested HH w/ PT upon discharge.  No other needs identified at this time.     03/09/20 1423   Discharge Assessment   Assessment Type Discharge Planning Assessment   Confirmed/corrected address and phone number on facesheet? Yes   Assessment information obtained from? Caregiver   Communicated expected length of stay with patient/caregiver no   Prior to hospitilization cognitive status: Alert/Oriented;Not Oriented to Time   Prior to hospitalization functional status: Independent   Current cognitive status: Alert/Oriented;Not Oriented to Time   Current Functional Status: Needs Assistance   Lives With child(sammy), adult   Able to Return to Prior Arrangements yes   Is patient able to care for self after discharge? Unable to determine at this time (comments)   Who are your caregiver(s) and their phone number(s)? deborah Macias   Readmission Within the Last 30 Days no previous admission in last 30 days   Patient currently being followed by outpatient case management? No   Patient currently receives any other outside agency services? No   Equipment Currently Used at Home wheelchair;walker, rolling;cane, straight;shower chair   Do you have any problems affording any of your prescribed medications? No   Is the patient taking medications as prescribed? yes   Does the patient have transportation home? Yes   Transportation Anticipated family or friend will provide   Does the patient receive services at the [facility]? No   Discharge Plan A Home Health   DME Needed Upon Discharge  none   Patient/Family in Agreement with Plan yes

## 2020-03-09 NOTE — CONSULTS
[facility]  Infectious Disease  Consult Note    Patient Name: Farheen Soares  MRN: [id number]  Admission Date: 3/8/2020  Hospital Length of Stay: 0 days  Attending Physician: Flory Christianson, *  Primary Care Provider: Vonda Chung MD     Isolation Status: No active isolations        Inpatient consult to Infectious Diseases  Consult performed by: Carlota Moreno MD  Consult ordered by: Gege Teague PA-C      Consult received, full consult to follow

## 2020-03-09 NOTE — NURSING
Pt transferred to IR via stretcher aaox4; side rails up x 2; pt denies any pain or discomfort; will continue to monitor.  JOSE R Bautista

## 2020-03-09 NOTE — NURSING
Pt arrived to ER via Stretcher. Pt is non ambulatory, max assist transfer to hospital bed. Pure Wick in place, telle monitor on.

## 2020-03-09 NOTE — H&P
[facility] Medicine  History & Physical    Patient Name: Farheen Soares  MRN: [id number]  Admission Date: 3/8/2020  Attending Physician: Flory Christianson, *   Primary Care Provider: Vonda Chung MD         Patient information was obtained from patient, past medical records and ER records.     Subjective:     Principal Problem:Fever, unknown origin    Chief Complaint:   Chief Complaint   Patient presents with    Fever     with weakness x 14 days and vomiting starting today.         HPI: 84 y.o.  female with PNH with DMII, HTN, osteoposis, Gout, HLD, Hearing loss, Depression, Chronic low back pain, GERD, h/o TIA presents to to ED with c/o fever associated with weakness, HA, generalized body aches, N/V for the last week. Patient reports too weak to eat. Per ED, daughter states patient slumped to the floor as she was too weak to walk to the bathroom. Patient denies cough, sore throat, SOB, abdominal pain, new back pain, numbness, urinary symptoms, neck stiffness, recent travel or toney loss. ED evaluation elevated WBC, febrile, flu negative, otherwise labs unremarkable. CT A/P and CXR unrevealing for an acute process. Patient placed in Observation for further evaluation.     Addendum: 3/9/2020 1315 daughter states symptoms came on acutely 2 days ago    Past Medical History:   Diagnosis Date    Allergic rhinitis     Carotid atherosclerosis, bilateral     Chronic low back pain with bilateral sciatica     Depression     Diabetes mellitus, type 2     Diverticulosis     Facet arthropathy     Gout     Hearing loss     History of TIA (transient ischemic attack)     Hyperlipidemia     Hypertension     IBS (irritable bowel syndrome)     Insomnia     Leg cramps     Lumbar stenosis     Meningioma     Osteoporosis     Paroxysmal SVT (supraventricular tachycardia)     Primary open angle glaucoma (POAG) of both eyes     PVD (peripheral vascular disease)     30% prox  stenosis of celiac trunk and 20% stnosis Prox SMA - per Abd/SMA Arteriogram 4/3/08       Past Surgical History:   Procedure Laterality Date    CATARACT EXTRACTION      EPIDURAL STEROID INJECTION INTO LUMBAR SPINE      LUMBAR LAMINECTOMY  2009    TOTAL ABDOMINAL HYSTERECTOMY W/ BILATERAL SALPINGOOPHORECTOMY         Review of patient's allergies indicates:   Allergen Reactions    Baclofen      AMS and distorted dremas       No current facility-administered medications on file prior to encounter.      Current Outpatient Medications on File Prior to Encounter   Medication Sig    alendronate (FOSAMAX) 70 MG tablet Take 1 tablet (70 mg total) by mouth every 7 days.    allopurinol (ZYLOPRIM) 300 MG tablet Take 1 tablet (300 mg total) by mouth once daily.    amLODIPine (NORVASC) 10 MG tablet Take 1 tablet (10 mg total) by mouth once daily.    atorvastatin (LIPITOR) 10 MG tablet Take 1 tablet (10 mg total) by mouth once daily.    clopidogrel (PLAVIX) 75 mg tablet Take 1 tablet (75 mg total) by mouth once daily.    fexofenadine (ALLEGRA) 180 MG tablet Take 180 mg by mouth daily as needed.    fluticasone (FLONASE) 50 mcg/actuation nasal spray 1 spray by Each Nare route once daily.    lisinopril (PRINIVIL,ZESTRIL) 40 MG tablet Take 1 tablet (40 mg total) by mouth once daily.    metFORMIN (GLUCOPHAGE-XR) 500 MG 24 hr tablet Take 1 tablet (500 mg total) by mouth daily with breakfast.    metoprolol succinate (TOPROL-XL) 100 MG 24 hr tablet Take 1 tablet (100 mg total) by mouth once daily. Take 1 1/2 daily    ranitidine (ZANTAC) 150 MG tablet Take 150 mg by mouth 2 (two) times daily.    venlafaxine (EFFEXOR) 75 MG tablet 2 tabs po QAM and 1 po QHS    blood sugar diagnostic Strp To check BG three times daily, to use with insurance preferred meter    blood-glucose meter kit To check BG three times daily, to use with insurance preferred meter    HYDROcodone-acetaminophen (NORCO) 5-325 mg per tablet Take 1 tablet by  mouth every 6 (six) hours as needed for Pain.    lancets Misc To check BG three times daily, to use with insurance preferred meter     Family History     Problem Relation (Age of Onset)    Diabetes Mother    Hyperlipidemia Son    Hypertension Mother, Father, Daughter, Son    Hypothyroidism Daughter    Stroke Father        Tobacco Use    Smoking status: Never Smoker   Substance and Sexual Activity    Alcohol use: Yes     Frequency: Monthly or less     Drinks per session: 1 or 2    Drug use: Never    Sexual activity: Not Currently     Review of Systems   Constitutional: Positive for activity change, appetite change, chills, fatigue and fever.   HENT: Negative for congestion, rhinorrhea and sore throat.    Eyes: Negative.  Negative for photophobia and visual disturbance.   Respiratory: Negative for cough, shortness of breath and wheezing.    Cardiovascular: Negative for chest pain, palpitations and leg swelling.   Gastrointestinal: Negative for abdominal distention, abdominal pain, constipation, diarrhea, nausea and vomiting.   Endocrine: Negative for polydipsia and polyuria.   Genitourinary: Negative for difficulty urinating, frequency and hematuria.   Musculoskeletal: Positive for back pain (chronic). Negative for neck pain and neck stiffness.   Skin: Negative.    Neurological: Positive for weakness (genaeralized). Negative for dizziness, syncope, light-headedness, numbness and headaches.   Psychiatric/Behavioral: Negative for confusion and decreased concentration.     Objective:     Vital Signs (Most Recent):  Temp: (!) 102 °F (38.9 °C) (03/09/20 0525)  Pulse: 102 (03/09/20 0506)  Resp: 18 (03/09/20 0506)  BP: 130/68 (03/09/20 0506)  SpO2: (!) 94 % (03/09/20 0506) Vital Signs (24h Range):  Temp:  [97.6 °F (36.4 °C)-102.6 °F (39.2 °C)] 102 °F (38.9 °C)  Pulse:  [] 102  Resp:  [16-18] 18  SpO2:  [93 %-98 %] 94 %  BP: (130-192)/(60-87) 130/68     Weight: 67 kg (147 lb 11.3 oz)  Body mass index is 25.35  kg/m².    Physical Exam   Constitutional: She is oriented to person, place, and time. She appears well-developed and well-nourished.   Appears ill, diaphoretic   HENT:   Head: Normocephalic and atraumatic.   Mouth/Throat: Oropharynx is clear and moist.   Eyes: Pupils are equal, round, and reactive to light. Conjunctivae are normal. No scleral icterus.   Neck: Normal range of motion. Neck supple.   Cardiovascular:   No murmur heard.  tachycardic   Pulmonary/Chest: Effort normal and breath sounds normal. No respiratory distress. She has no wheezes.   Abdominal: Soft. Bowel sounds are normal. She exhibits no distension. There is no tenderness. There is no guarding.   Musculoskeletal: Normal range of motion. She exhibits no edema.   Neurological: She is alert and oriented to person, place, and time. No cranial nerve deficit.   Skin: Skin is warm. Capillary refill takes less than 2 seconds.   Psychiatric: She has a normal mood and affect.   Nursing note and vitals reviewed.        CRANIAL NERVES     CN III, IV, VI   Pupils are equal, round, and reactive to light.       Significant Labs:   CBC:   Recent Labs   Lab 03/08/20 2005   WBC 15.05*   HGB 14.6   HCT 42.8        CMP:   Recent Labs   Lab 03/08/20  1934      K 4.0      CO2 22*   *   BUN 8   CREATININE 0.8   CALCIUM 10.3   PROT 8.3   ALBUMIN 4.5   BILITOT 0.5   ALKPHOS 57   AST 24   ALT 15   ANIONGAP 13   EGFRNONAA >60     All pertinent labs within the past 24 hours have been reviewed.    Significant Imaging: I have reviewed all pertinent imaging results/findings within the past 24 hours.   Imaging Results          CT Abdomen Pelvis With Contrast (Final result)  Result time 03/08/20 22:40:25    Final result by Hortensia Anderson MD (03/08/20 22:40:25)                 Impression:      No acute abdominal or pelvic pathology CT of the abdomen and pelvis with contrast.    Advanced vascular calcifications.    Colonic  diverticulosis.      Electronically signed by: Hortensia Anderson  Date:    03/08/2020  Time:    22:40             Narrative:    EXAMINATION:  CT OF ABDOMEN PELVIS WITH    CLINICAL HISTORY:  Infection, abdomen-pelvis;    TECHNIQUE:  5 mm enhanced axial images were obtained from the lung bases through the greater trochanters.   mL of Omnipaque 350 was injected.    COMPARISON:  None.    FINDINGS:  The liver, spleen, pancreas, and adrenal glands are unremarkable. The gallbladder contains no calcified gallstones.    There are bilateral too small to characterize low attenuation lesions seen in both renal cortices.    There is no definite evidence for abdominal adenopathy or ascites. Advanced vascular calcifications are present.    There are no pelvic masses or adenopathy.  There is colonic diverticulosis.  There are tiny bilateral fat containing inguinal hernias.  A discrete appendix is not visualized.    There is no free fluid in the pelvis.    There is mild bibasilar atelectasis.    There is moderate dextroscoliosis.  There is severe multilevel degenerative changes spine.                               X-Ray Chest AP Portable (Final result)  Result time 03/08/20 19:49:21    Final result by Hortensia Anderson MD (03/08/20 19:49:21)                 Impression:      No acute intrathoracic abnormality detected.      Electronically signed by: Hortensia Anderson  Date:    03/08/2020  Time:    19:49             Narrative:    EXAMINATION:  AP PORTABLE CHEST    CLINICAL HISTORY:  Sepsis;    TECHNIQUE:  AP portable chest radiograph was submitted.    COMPARISON:  None.    FINDINGS:  AP portable chest radiograph demonstrates a cardiac silhouette within normal limits.  Vascular calcification is seen in the aortic knob.  There is biapical pleural thickening.  There is no focal consolidation, pneumothorax, or pleural effusion. Asymmetrical elevation the right hemidiaphragm is noted.                                  Assessment/Plan:     *  Fever, unknown origin  - patient appears acutely ill, reports fever, nausea, poor po, body aches, HA, too weak to walk; no travel, no new skin rashes  - CT A/P, CXR no acute findings, mildly elevated WBC, flu neg, lactic normal, UA benign; BC drawn  - unclear etiology was given Vanco and Zosyn in ED  - LP, check CSF labs, culture  - RVP, CRP, ESR  - IVF's  - antiemetics PRN  - ID consulted        Hypertension  - reasonably stable  - cont home meds amlodipine 10 mg, metoprolol 100mg and lisinopril 40 mg daily  - monitor      History of transient ischemic attack (TIA)  - on statin and plavix at home, hold plavix for LP      Type 2 diabetes mellitus without complication, without long-term current use of insulin  -A1c 6.8 12/2019  -SS/accuchecks  - monitor          Hyperlipidemia  - on statin    Chronic low back pain with bilateral sciatica  - on norco at home  PRN      VTE Risk Mitigation (From admission, onward)         Ordered     IP VTE HIGH RISK PATIENT  Once      03/09/20 0209     Place sequential compression device  Until discontinued      03/09/20 0209     Place sequential compression device  Until discontinued      03/09/20 0043                   Gege Teague PA-C  Department of Hospital Medicine   [facility]

## 2020-03-09 NOTE — ED PROVIDER NOTES
Encounter Date: 3/8/2020    SCRIBE #1 NOTE: I, Dr. Watson, am scribing for, and in the presence of, Peterson Delatorre.       History     Chief Complaint   Patient presents with    Fever     with weakness x 14 days and vomiting starting today.      Time seen by provider: 7:21 PM    This is a 85 y.o. female, with a hx of diabetes and HTN, who presents via EMS with complaint of fever. Per daughter, the pt woke up at 10 this morning to use the restroom, but sunk too the floor as she was too weak. Daughter states she has not been hospitalized since 2018 for ulcerative colitis. Per daughter, she does experience reoccurring UTI's. She reports abdominal cramping, nausea, and vomiting. She denies chest pain or generalized body aches. The patient admits that she has obtained an influenza vaccination this year.     The history is provided by the patient, a relative and medical records.     Review of patient's allergies indicates:   Allergen Reactions    Baclofen      AMS and distorted dremas     Past Medical History:   Diagnosis Date    Allergic rhinitis     Carotid atherosclerosis, bilateral     Chronic low back pain with bilateral sciatica     Depression     Diabetes mellitus, type 2     Diverticulosis     Facet arthropathy     Gout     Hearing loss     History of TIA (transient ischemic attack)     Hyperlipidemia     Hypertension     IBS (irritable bowel syndrome)     Insomnia     Leg cramps     Lumbar stenosis     Meningioma     Osteoporosis     Paroxysmal SVT (supraventricular tachycardia)     Primary open angle glaucoma (POAG) of both eyes     PVD (peripheral vascular disease)     30% prox stenosis of celiac trunk and 20% stnosis Prox SMA - per Abd/SMA Arteriogram 4/3/08     Past Surgical History:   Procedure Laterality Date    CATARACT EXTRACTION      EPIDURAL STEROID INJECTION INTO LUMBAR SPINE      LUMBAR LAMINECTOMY  2009    TOTAL ABDOMINAL HYSTERECTOMY W/ BILATERAL SALPINGOOPHORECTOMY        Family History   Problem Relation Age of Onset    Hypertension Mother     Diabetes Mother     Hypertension Father     Stroke Father         60's    Hypertension Daughter     Hypothyroidism Daughter     Hypertension Son     Hyperlipidemia Son      Social History     Tobacco Use    Smoking status: Never Smoker   Substance Use Topics    Alcohol use: Yes     Frequency: Monthly or less     Drinks per session: 1 or 2    Drug use: Never     Review of Systems   Constitutional: Positive for fever. Negative for chills.   HENT: Negative for congestion, rhinorrhea and sore throat.    Eyes: Negative for visual disturbance.   Respiratory: Negative for cough and shortness of breath.    Cardiovascular: Negative for chest pain.   Gastrointestinal: Positive for abdominal pain (cramping), nausea and vomiting. Negative for diarrhea.   Genitourinary: Negative for dysuria.   Musculoskeletal: Negative for back pain.   Skin: Negative for rash.   Neurological: Positive for weakness (generalized). Negative for dizziness and light-headedness.   Psychiatric/Behavioral: Negative for confusion.       Physical Exam     Initial Vitals [03/08/20 1853]   BP Pulse Resp Temp SpO2   (!) 192/87 97 18 97.6 °F (36.4 °C) 97 %      MAP       --         Physical Exam    Nursing note and vitals reviewed.  Constitutional: She appears well-developed and well-nourished. She is not diaphoretic. No distress.   HENT:   Head: Normocephalic and atraumatic.   Eyes: Conjunctivae and EOM are normal. Pupils are equal, round, and reactive to light. No scleral icterus.   Neck: Normal range of motion. Neck supple. No JVD present.   Cardiovascular: Normal rate, regular rhythm and normal heart sounds. Exam reveals no gallop and no friction rub.    No murmur heard.  Pulmonary/Chest: Breath sounds normal. No respiratory distress. She has no wheezes. She has no rhonchi. She has no rales.   Abdominal: Soft. Bowel sounds are normal. She exhibits no distension. There  is no tenderness. There is no rebound and no guarding.   Musculoskeletal: Normal range of motion. She exhibits no edema or tenderness.   No leg edema.   Neurological: She is alert and oriented to person, place, and time.   Skin: Skin is warm and dry.         ED Course   Procedures  Labs Reviewed   COMPREHENSIVE METABOLIC PANEL - Abnormal; Notable for the following components:       Result Value    CO2 22 (*)     Glucose 187 (*)     All other components within normal limits   URINALYSIS, REFLEX TO URINE CULTURE - Abnormal; Notable for the following components:    Protein, UA 1+ (*)     Ketones, UA 1+ (*)     Occult Blood UA Trace (*)     All other components within normal limits    Narrative:     Preferred Collection Type->Urine, Clean Catch   CBC W/ AUTO DIFFERENTIAL - Abnormal; Notable for the following components:    WBC 15.05 (*)     Mean Corpuscular Hemoglobin 33.3 (*)     Gran # (ANC) 12.8 (*)     Immature Grans (Abs) 0.07 (*)     Mono # 1.1 (*)     Gran% 85.3 (*)     Lymph% 6.3 (*)     All other components within normal limits   CULTURE, BLOOD   CULTURE, BLOOD   LACTIC ACID, PLASMA   LIPASE   TROPONIN I   URINALYSIS MICROSCOPIC    Narrative:     Preferred Collection Type->Urine, Clean Catch   LACTIC ACID, PLASMA   POCT INFLUENZA A/B MOLECULAR     EKG Readings: (Independently Interpreted)   Normal Sinus Rhythm at a rate of 98. No ST-T wave changes.       Imaging Results          CT Abdomen Pelvis With Contrast (Final result)  Result time 03/08/20 22:40:25    Final result by Hortensia Anderson MD (03/08/20 22:40:25)                 Impression:      No acute abdominal or pelvic pathology CT of the abdomen and pelvis with contrast.    Advanced vascular calcifications.    Colonic diverticulosis.      Electronically signed by: Hortensia Anderson  Date:    03/08/2020  Time:    22:40             Narrative:    EXAMINATION:  CT OF ABDOMEN PELVIS WITH    CLINICAL HISTORY:  Infection, abdomen-pelvis;    TECHNIQUE:  5 mm  enhanced axial images were obtained from the lung bases through the greater trochanters.   mL of Omnipaque 350 was injected.    COMPARISON:  None.    FINDINGS:  The liver, spleen, pancreas, and adrenal glands are unremarkable. The gallbladder contains no calcified gallstones.    There are bilateral too small to characterize low attenuation lesions seen in both renal cortices.    There is no definite evidence for abdominal adenopathy or ascites. Advanced vascular calcifications are present.    There are no pelvic masses or adenopathy.  There is colonic diverticulosis.  There are tiny bilateral fat containing inguinal hernias.  A discrete appendix is not visualized.    There is no free fluid in the pelvis.    There is mild bibasilar atelectasis.    There is moderate dextroscoliosis.  There is severe multilevel degenerative changes spine.                               X-Ray Chest AP Portable (Final result)  Result time 03/08/20 19:49:21    Final result by Hortensia Anderson MD (03/08/20 19:49:21)                 Impression:      No acute intrathoracic abnormality detected.      Electronically signed by: Hortensia Anderson  Date:    03/08/2020  Time:    19:49             Narrative:    EXAMINATION:  AP PORTABLE CHEST    CLINICAL HISTORY:  Sepsis;    TECHNIQUE:  AP portable chest radiograph was submitted.    COMPARISON:  None.    FINDINGS:  AP portable chest radiograph demonstrates a cardiac silhouette within normal limits.  Vascular calcification is seen in the aortic knob.  There is biapical pleural thickening.  There is no focal consolidation, pneumothorax, or pleural effusion. Asymmetrical elevation the right hemidiaphragm is noted.                              X-Rays:   Independently Interpreted Readings:   Chest X-Ray: No large effusion or consolidations.      Medical Decision Making:   History:   Old Medical Records: I decided to obtain old medical records.  Initial Assessment:   85-year-old female presents with  complaint of generalized weakness since this morning with associated nausea and vomiting. Physical exam unremarkable. Per EMS patient febrile on scene, however, she is afebrile on arrival.  Remainder of vital signs within normal limits.  Independently Interpreted Test(s):   I have ordered and independently interpreted X-rays - see prior notes.  I have ordered and independently interpreted EKG Reading(s) - see prior notes  Clinical Tests:   Lab Tests: Ordered and Reviewed  Radiological Study: Ordered and Reviewed  Medical Tests: Ordered and Reviewed  ED Management:  EKG without significant changes compared to previous.  Troponin negative. Sepsis protocol initiated.  Chest x-ray shows no evidence of an infectious process.  UA negative. Flu swab negative. CBC is significant for leukocytosis with a left shift.  Given history of ulcerative colitis, CT abdomen pelvis with contrast was also obtained and negative for any acute process.  Etiology of fever unclear at this time.  Patient covered with broad-spectrum antibiotics.  Case discussed with the moonlighter patient will be admitted to the hospitalist service.            Scribe Attestation:   Scribe #1: I performed the above scribed service and the documentation accurately describes the services I performed. I attest to the accuracy of the note.    Attending Attestation:           Physician Attestation for Scribe:  Physician Attestation Statement for Scribe #1: I, Dr. Watson, reviewed documentation, as scribed by Peterson Delatorre in my presence, and it is both accurate and complete.                 ED Course as of Mar 08 2300   Sun Mar 08, 2020   7100 Case discussed with hospitalist. Will admit to Dr. Christianson.    [DM]      ED Course User Index  [DM] Cori Delatorre                Clinical Impression:     1. Fever, unknown origin    2. Fever                ED Disposition Condition    Observation                           Caren Watson MD  03/08/20 2300

## 2020-03-09 NOTE — CONSULTS
Inpatient Radiology Pre-procedure Note    History of Present Illness:  Farheen Soares is a 85 y.o. female with pertinent h/o 14 days of fever, HA, generalized body ache, N/V and severe generalized weakness with concerns for CNS infection.     New inpatient IR consult for image-guided diagnostic LP placed.    Admission H&P reviewed.  Past Medical History:   Diagnosis Date    Allergic rhinitis     Carotid atherosclerosis, bilateral     Chronic low back pain with bilateral sciatica     Depression     Diabetes mellitus, type 2     Diverticulosis     Facet arthropathy     Gout     Hearing loss     History of TIA (transient ischemic attack)     Hyperlipidemia     Hypertension     IBS (irritable bowel syndrome)     Insomnia     Leg cramps     Lumbar stenosis     Meningioma     Osteoporosis     Paroxysmal SVT (supraventricular tachycardia)     Primary open angle glaucoma (POAG) of both eyes     PVD (peripheral vascular disease)     30% prox stenosis of celiac trunk and 20% stnosis Prox SMA - per Abd/SMA Arteriogram 4/3/08     Past Surgical History:   Procedure Laterality Date    CATARACT EXTRACTION      EPIDURAL STEROID INJECTION INTO LUMBAR SPINE      LUMBAR LAMINECTOMY  2009    TOTAL ABDOMINAL HYSTERECTOMY W/ BILATERAL SALPINGOOPHORECTOMY       Review of Systems:   As documented in primary team H&P    Home Meds:   Prior to Admission medications    Medication Sig Start Date End Date Taking? Authorizing Provider   alendronate (FOSAMAX) 70 MG tablet Take 1 tablet (70 mg total) by mouth every 7 days. 1/14/20  Yes Vonda Chung MD   allopurinol (ZYLOPRIM) 300 MG tablet Take 1 tablet (300 mg total) by mouth once daily. 1/14/20  Yes Vonda Chung MD   amLODIPine (NORVASC) 10 MG tablet Take 1 tablet (10 mg total) by mouth once daily. 1/14/20  Yes Vonda Chung MD   atorvastatin (LIPITOR) 10 MG tablet Take 1 tablet (10 mg total) by mouth once daily. 1/14/20 1/13/21 Yes Vonda WADDELL  MD Juliet   clopidogrel (PLAVIX) 75 mg tablet Take 1 tablet (75 mg total) by mouth once daily. 1/14/20  Yes Vonda Chung MD   fexofenadine (ALLEGRA) 180 MG tablet Take 180 mg by mouth daily as needed.   Yes Historical Provider, MD   fluticasone (FLONASE) 50 mcg/actuation nasal spray 1 spray by Each Nare route once daily.   Yes Historical Provider, MD   lisinopril (PRINIVIL,ZESTRIL) 40 MG tablet Take 1 tablet (40 mg total) by mouth once daily. 1/14/20  Yes Vonda Chung MD   metFORMIN (GLUCOPHAGE-XR) 500 MG 24 hr tablet Take 1 tablet (500 mg total) by mouth daily with breakfast. 1/14/20 1/13/21 Yes Vonda Chung MD   metoprolol succinate (TOPROL-XL) 100 MG 24 hr tablet Take 1 tablet (100 mg total) by mouth once daily. Take 1 1/2 daily 1/14/20  Yes Vonda Chung MD   ranitidine (ZANTAC) 150 MG tablet Take 150 mg by mouth 2 (two) times daily.   Yes Historical Provider, MD   venlafaxine (EFFEXOR) 75 MG tablet 2 tabs po QAM and 1 po QHS 1/14/20  Yes Vonda Chung MD   blood sugar diagnostic Strp To check BG three times daily, to use with insurance preferred meter 12/11/19   Vonda Chung MD   blood-glucose meter kit To check BG three times daily, to use with insurance preferred meter 12/11/19   Vonda Chung MD   HYDROcodone-acetaminophen (NORCO) 5-325 mg per tablet Take 1 tablet by mouth every 6 (six) hours as needed for Pain.    Historical Provider, MD   lancets Misc To check BG three times daily, to use with insurance preferred meter 12/11/19   Vonda Chung MD     Scheduled Meds:    allopurinoL  300 mg Oral Daily    amLODIPine  10 mg Oral Daily    atorvastatin  10 mg Oral Daily    lisinopril  40 mg Oral Daily    metoprolol succinate  100 mg Oral Daily    venlafaxine  75 mg Oral BID     Continuous Infusions:    sodium chloride 0.9% 125 mL/hr at 03/09/20 0404     PRN Meds:acetaminophen, dextrose 50%, dextrose 50%, glucagon (human recombinant), glucose,  glucose, insulin aspart U-100, ondansetron, sodium chloride 0.9%     Anticoagulants/Antiplatelets: aspirin, Plavix and Heparin    Allergies:   Review of patient's allergies indicates:   Allergen Reactions    Baclofen      AMS and distorted dremas     Sedation Hx: have not been any systemic reactions    Labs:  No results for input(s): INR in the last 168 hours.    Invalid input(s):  PT,  PTT    Recent Labs   Lab 03/09/20 0546   WBC 15.22*   HGB 14.2   HCT 42.8   MCV 99*         Recent Labs   Lab 03/09/20 0546   *      K 3.6      CO2 24   BUN 6*   CREATININE 0.8   CALCIUM 10.0   MG 1.4*   ALT 13   AST 23   ALBUMIN 4.2   BILITOT 0.5     Vitals:  Temp: 98.6 °F (37 °C) (03/09/20 0723)  Pulse: 87 (03/09/20 0800)  Resp: (!) 22 (03/09/20 0723)  BP: (!) 172/73 (03/09/20 0723)  SpO2: (!) 93 % (03/09/20 0723)     Physical Exam:  ASA: III  Mallampati: II    General: no acute distress  Mental Status: alert and oriented to person, place and time  HEENT: normocephalic, atraumatic  Chest: unlabored breathing  Heart: regular heart rate  Abdomen: nondistended  Extremity: moves all extremities    A/P:  85 y.o. female with pertinent h/o 14 days of fever, HA, generalized body ache, N/V and severe generalized weakness with concerns for CNS infection.     1. FUO with HA - Will attempt fluoroscopic-guided diagnostic LP with local anesthetic only. Of note, after personal review of pts available images, anticipate very difficult procedure with significant chance of inability to gain access into the spinal canal given marked scoliotic curvature of spine and advanced degenerative changes with very limited/narrowed intervertebral spaces.     Also, pt on ASA/Plavix and received 5K U of Heparin at 0530 this AM. Given this, procedure will have to be delayed until this afternoon to allow for time for dissipation of heparin.    Risks (including, but not limited to, pain, bleeding, infection, damage to nearby structures,  failure to obtain sufficient material for a diagnosis, the need for additional procedures, and death), benefits, and alternatives were discussed with the patient. All questions were answered to the best of my abilities. The patient wishes to proceed with the procedure. Written informed consent was obtained.    Thank you for considering IR for the care of your patient.     Mitch Tillman MD  Interventional Radiology

## 2020-03-09 NOTE — ASSESSMENT & PLAN NOTE
- patient appears acutely ill, reports fever, nausea, poor po, body aches, HA, too weak to walk; no travel, no new skin rashes  - CT A/P, CXR no acute findings, mildly elevated WBC, flu neg, lactic normal, UA benign; BC drawn  - unclear etiology was given Vanco and Zosyn in ED  - LP, check CSF labs, culture  - RVP, CRP, ESR  - IVF's  - antiemetics PRN  - ID consulted

## 2020-03-09 NOTE — ASSESSMENT & PLAN NOTE
- reasonably stable  - cont home meds amlodipine 10 mg, metoprolol 100mg and lisinopril 40 mg daily  - monitor

## 2020-03-09 NOTE — ED TRIAGE NOTES
Pt arrived by EMS with c/o generalized weakness, n/v, and fever that started today.  Daughter reports pt had diarrhea a couple days ago, but not today.  Pt [place], aao x 4.  Answering questions appropriately.  Denies any pain at this time.

## 2020-03-09 NOTE — HPI
Per REGINE Ervin:  84 y.o. female with PNH with DMII, HTN, osteoposis, Gout, HLD, Hearing loss, Depression, Chronic low back pain, GERD, h/o TIA presents to to ED with c/o fever associated with weakness, HA, generalized body aches, N/V for the last week. Patient reports too weak to eat. Per ED, daughter states patient slumped to the floor as she was too weak to walk to the bathroom. Patient denies cough, sore throat, SOB, abdominal pain, new back pain, numbness, urinary symptoms, neck stiffness, recent travel or toney loss. ED evaluation elevated WBC, febrile, flu negative, otherwise labs unremarkable. CT A/P and CXR unrevealing for an acute process. Patient placed in Observation for further evaluation.

## 2020-03-09 NOTE — PROGRESS NOTES
Pharmacokinetic Initial Assessment: IV Vancomycin    Assessment/Plan:    Initiate intravenous vancomycin with loading dose of 1750 mg once ( previously given)  followed by a maintenance dose of vancomycin 1250 mg IV every 24 hours.  Desired empiric serum trough concentration is 10 to 20 mcg/mL  Draw vancomycin trough level 30 min prior to third dose on 3/10/10 at approximately 2330.   Pharmacy will continue to follow and monitor vancomycin.      Please contact pharmacy at extension [phone] with any questions regarding this assessment.     Thank you for the consult,   Janna Peace       Patient brief summary:  Farheen Soares is a 85 y.o. female initiated on antimicrobial therapy with IV Vancomycin for treatment of suspected bacteremia    Drug Allergies:   Review of patient's allergies indicates:   Allergen Reactions    Baclofen      AMS and distorted dremas       Actual Body Weight:   67 kg    Renal Function:   Estimated Creatinine Clearance: 50.1 mL/min (based on SCr of 0.8 mg/dL).,     Dialysis Method (if applicable):  N/A    CBC (last 72 hours):  Recent Labs   Lab Result Units 03/08/20 2005 03/09/20  0546   WBC K/uL 15.05* 15.22*   Hemoglobin g/dL 14.6 14.2   Hematocrit % 42.8 42.8   Platelets K/uL 234 217   Gran% % 85.3* 79.6*   Lymph% % 6.3* 9.3*   Mono% % 7.6 10.1   Eosinophil% % 0.0 0.3   Basophil% % 0.3 0.3   Differential Method  Automated Automated       Metabolic Panel (last 72 hours):  Recent Labs   Lab Result Units 03/08/20  1934 03/08/20 2031 03/09/20  0546   Sodium mmol/L 136  --  140   Potassium mmol/L 4.0  --  3.6   Chloride mmol/L 101  --  105   CO2 mmol/L 22*  --  24   Glucose mg/dL 187*  --  157*   Glucose, UA   --  Negative  --    BUN, Bld mg/dL 8  --  6*   Creatinine mg/dL 0.8  --  0.8   Albumin g/dL 4.5  --  4.2   Total Bilirubin mg/dL 0.5  --  0.5   Alkaline Phosphatase U/L 57  --  57   AST U/L 24  --  23   ALT U/L 15  --  13   Magnesium mg/dL  --   --  1.4*   Phosphorus mg/dL  --   --   2.5*       Drug levels (last 3 results):  No results for input(s): VANCOMYCINRA, VANCOMYCINPE, VANCOMYCINTR in the last 72 hours.    Microbiologic Results:  Microbiology Results (last 7 days)       Procedure Component Value Units Date/Time    Respiratory Infection Panel, Nasopharyngeal [[id number]]     Order Status:  No result Specimen:  Nasopharyngeal Swab     Blood culture x two cultures. Draw prior to antibiotics. [[id number]] Collected:  03/08/20 1933    Order Status:  Completed Specimen:  Blood from Peripheral, Antecubital, Right Updated:  03/09/20 0745     Blood Culture, Routine No Growth to date    Narrative:       Aerobic and anaerobic    Blood culture x two cultures. Draw prior to antibiotics. [[id number]] Collected:  03/08/20 1945    Order Status:  Completed Specimen:  Blood from Peripheral, Forearm, Left Updated:  03/09/20 0745     Blood Culture, Routine No Growth to date    Narrative:       Aerobic and anaerobic    Respiratory Viral Panel by PCR [facility]; Nasal Swab [[id number]] Collected:  03/09/20 0534    Order Status:  Sent Specimen:  Respiratory Updated:  03/09/20 0551    CSF culture [[id number]]     Order Status:  No result Specimen:  CSF (Spinal Fluid) from CSF Tap, Tube 3     Influenza A & B by Molecular [[id number]]     Order Status:  Canceled Specimen:  Nasopharyngeal Swab

## 2020-03-10 LAB
ADENOVIRUS: NOT DETECTED
ALBUMIN SERPL BCP-MCNC: 3.6 G/DL (ref 3.5–5.2)
ALP SERPL-CCNC: 55 U/L (ref 55–135)
ALT SERPL W/O P-5'-P-CCNC: 14 U/L (ref 10–44)
ANION GAP SERPL CALC-SCNC: 9 MMOL/L (ref 8–16)
AST SERPL-CCNC: 25 U/L (ref 10–40)
BASOPHILS # BLD AUTO: 0.04 K/UL (ref 0–0.2)
BASOPHILS NFR BLD: 0.3 % (ref 0–1.9)
BILIRUB SERPL-MCNC: 0.4 MG/DL (ref 0.1–1)
BORDETELLA PARAPERTUSSIS (IS1001): NOT DETECTED
BORDETELLA PERTUSSIS (PTXP): NOT DETECTED
BUN SERPL-MCNC: 5 MG/DL (ref 8–23)
CALCIUM SERPL-MCNC: 9.5 MG/DL (ref 8.7–10.5)
CHLAMYDIA PNEUMONIAE: NOT DETECTED
CHLORIDE SERPL-SCNC: 104 MMOL/L (ref 95–110)
CO2 SERPL-SCNC: 24 MMOL/L (ref 23–29)
CORONAVIRUS 229E, COMMON COLD VIRUS: NOT DETECTED
CORONAVIRUS HKU1, COMMON COLD VIRUS: NOT DETECTED
CORONAVIRUS NL63, COMMON COLD VIRUS: NOT DETECTED
CORONAVIRUS OC43, COMMON COLD VIRUS: NOT DETECTED
CREAT SERPL-MCNC: 0.7 MG/DL (ref 0.5–1.4)
DIFFERENTIAL METHOD: ABNORMAL
EOSINOPHIL # BLD AUTO: 0 K/UL (ref 0–0.5)
EOSINOPHIL NFR BLD: 0.3 % (ref 0–8)
ERYTHROCYTE [DISTWIDTH] IN BLOOD BY AUTOMATED COUNT: 13.9 % (ref 11.5–14.5)
EST. GFR  (AFRICAN AMERICAN): >60 ML/MIN/1.73 M^2
EST. GFR  (NON AFRICAN AMERICAN): >60 ML/MIN/1.73 M^2
FLUBV RNA NPH QL NAA+NON-PROBE: NOT DETECTED
GLUCOSE SERPL-MCNC: 142 MG/DL (ref 70–110)
HCT VFR BLD AUTO: 39.5 % (ref 37–48.5)
HGB BLD-MCNC: 13.3 G/DL (ref 12–16)
HPIV1 RNA NPH QL NAA+NON-PROBE: NOT DETECTED
HPIV2 RNA NPH QL NAA+NON-PROBE: NOT DETECTED
HPIV3 RNA NPH QL NAA+NON-PROBE: NOT DETECTED
HPIV4 RNA NPH QL NAA+NON-PROBE: NOT DETECTED
HUMAN METAPNEUMOVIRUS: NOT DETECTED
IMM GRANULOCYTES # BLD AUTO: 0.04 K/UL (ref 0–0.04)
IMM GRANULOCYTES NFR BLD AUTO: 0.3 % (ref 0–0.5)
INFLUENZA A (SUBTYPES H1,H1-2009,H3): NOT DETECTED
LYMPHOCYTES # BLD AUTO: 2.1 K/UL (ref 1–4.8)
LYMPHOCYTES NFR BLD: 14.5 % (ref 18–48)
MAGNESIUM SERPL-MCNC: 1.6 MG/DL (ref 1.6–2.6)
MCH RBC QN AUTO: 33.3 PG (ref 27–31)
MCHC RBC AUTO-ENTMCNC: 33.7 G/DL (ref 32–36)
MCV RBC AUTO: 99 FL (ref 82–98)
MONOCYTES # BLD AUTO: 1.3 K/UL (ref 0.3–1)
MONOCYTES NFR BLD: 9.1 % (ref 4–15)
MYCOPLASMA PNEUMONIAE: NOT DETECTED
NEUTROPHILS # BLD AUTO: 11.2 K/UL (ref 1.8–7.7)
NEUTROPHILS NFR BLD: 75.5 % (ref 38–73)
NRBC BLD-RTO: 0 /100 WBC
PLATELET # BLD AUTO: 190 K/UL (ref 150–350)
PMV BLD AUTO: 10.4 FL (ref 9.2–12.9)
POCT GLUCOSE: 147 MG/DL (ref 70–110)
POCT GLUCOSE: 150 MG/DL (ref 70–110)
POCT GLUCOSE: 160 MG/DL (ref 70–110)
POCT GLUCOSE: 166 MG/DL (ref 70–110)
POCT GLUCOSE: 183 MG/DL (ref 70–110)
POTASSIUM SERPL-SCNC: 3.6 MMOL/L (ref 3.5–5.1)
PROT SERPL-MCNC: 7.4 G/DL (ref 6–8.4)
RBC # BLD AUTO: 3.99 M/UL (ref 4–5.4)
RESPIRATORY INFECTION PANEL SOURCE: NORMAL
RSV RNA NPH QL NAA+NON-PROBE: NOT DETECTED
RV+EV RNA NPH QL NAA+NON-PROBE: NOT DETECTED
SODIUM SERPL-SCNC: 137 MMOL/L (ref 136–145)
WBC # BLD AUTO: 14.75 K/UL (ref 3.9–12.7)

## 2020-03-10 PROCEDURE — 85025 COMPLETE CBC W/AUTO DIFF WBC: CPT

## 2020-03-10 PROCEDURE — G0378 HOSPITAL OBSERVATION PER HR: HCPCS

## 2020-03-10 PROCEDURE — 83735 ASSAY OF MAGNESIUM: CPT

## 2020-03-10 PROCEDURE — 94761 N-INVAS EAR/PLS OXIMETRY MLT: CPT

## 2020-03-10 PROCEDURE — 25000003 PHARM REV CODE 250: Performed by: NURSE PRACTITIONER

## 2020-03-10 PROCEDURE — 99226 PR SUBSEQUENT OBSERVATION CARE,LEVEL III: ICD-10-PCS | Mod: ,,, | Performed by: PHYSICIAN ASSISTANT

## 2020-03-10 PROCEDURE — 99226 PR SUBSEQUENT OBSERVATION CARE,LEVEL III: CPT | Mod: ,,, | Performed by: PHYSICIAN ASSISTANT

## 2020-03-10 PROCEDURE — 87081 CULTURE SCREEN ONLY: CPT

## 2020-03-10 PROCEDURE — 63600175 PHARM REV CODE 636 W HCPCS: Performed by: INTERNAL MEDICINE

## 2020-03-10 PROCEDURE — 25000003 PHARM REV CODE 250: Performed by: PHYSICIAN ASSISTANT

## 2020-03-10 PROCEDURE — 96361 HYDRATE IV INFUSION ADD-ON: CPT

## 2020-03-10 PROCEDURE — 36415 COLL VENOUS BLD VENIPUNCTURE: CPT

## 2020-03-10 PROCEDURE — 80053 COMPREHEN METABOLIC PANEL: CPT

## 2020-03-10 PROCEDURE — 96375 TX/PRO/DX INJ NEW DRUG ADDON: CPT

## 2020-03-10 PROCEDURE — 27000221 HC OXYGEN, UP TO 24 HOURS

## 2020-03-10 RX ORDER — TALC
6 POWDER (GRAM) TOPICAL NIGHTLY PRN
Status: DISCONTINUED | OUTPATIENT
Start: 2020-03-10 | End: 2020-03-13 | Stop reason: HOSPADM

## 2020-03-10 RX ADMIN — LISINOPRIL 40 MG: 20 TABLET ORAL at 08:03

## 2020-03-10 RX ADMIN — VENLAFAXINE 75 MG: 37.5 TABLET ORAL at 08:03

## 2020-03-10 RX ADMIN — AMLODIPINE BESYLATE 10 MG: 5 TABLET ORAL at 06:03

## 2020-03-10 RX ADMIN — ATORVASTATIN CALCIUM 10 MG: 10 TABLET, FILM COATED ORAL at 08:03

## 2020-03-10 RX ADMIN — ALLOPURINOL 300 MG: 300 TABLET ORAL at 08:03

## 2020-03-10 RX ADMIN — VENLAFAXINE 75 MG: 37.5 TABLET ORAL at 10:03

## 2020-03-10 RX ADMIN — CEFTRIAXONE 2 G: 2 INJECTION, SOLUTION INTRAVENOUS at 10:03

## 2020-03-10 RX ADMIN — MELATONIN 6 MG: at 10:03

## 2020-03-10 RX ADMIN — METOPROLOL SUCCINATE 100 MG: 50 TABLET, EXTENDED RELEASE ORAL at 08:03

## 2020-03-10 RX ADMIN — ACETAMINOPHEN 650 MG: 325 TABLET ORAL at 06:03

## 2020-03-10 NOTE — PROGRESS NOTES
[facility] Hospital Medicine  Progress Note    Patient Name: Farheen Soares  MRN: [id number]  Patient Class: OP- Observation   Admission Date: 3/8/2020  Length of Stay: 0 days  Attending Physician: Maggie Khan MD  Primary Care Provider: Vonda Chung MD        Subjective:     Principal Problem:Fever        HPI:  84 y.o.  female with PNH with DMII, HTN, osteoposis, Gout, HLD, Hearing loss, Depression, Chronic low back pain, GERD, h/o TIA presents to to ED with c/o fever associated with weakness, HA, generalized body aches, N/V for the last week. Patient reports too weak to eat. Per ED, daughter states patient slumped to the floor as she was too weak to walk to the bathroom. Patient denies cough, sore throat, SOB, abdominal pain, new back pain, numbness, urinary symptoms, neck stiffness, recent travel or toney loss. ED evaluation elevated WBC, febrile, flu negative, otherwise labs unremarkable. CT A/P and CXR unrevealing for an acute process. Patient placed in Observation for further evaluation.     Overview/Hospital Course:  LP performed today.  Infectious Disease consulted.  Etiology of symptomatology appears to be viral.  Patient will be continued on Rocephin.    Interval History: starting to feel better, poor appetite + cough    Review of Systems   Constitutional: Positive for appetite change, fatigue and fever.   HENT: Negative for congestion, rhinorrhea and sore throat.    Eyes: Negative.  Negative for photophobia and visual disturbance.   Respiratory: Negative for cough, shortness of breath and wheezing.    Cardiovascular: Negative for chest pain, palpitations and leg swelling.   Gastrointestinal: Negative for abdominal distention, abdominal pain, constipation, diarrhea, nausea and vomiting.   Endocrine: Negative for polydipsia and polyuria.   Genitourinary: Negative for difficulty urinating, frequency and hematuria.   Musculoskeletal: Positive for back pain (chronic).  Negative for neck pain and neck stiffness.   Skin: Negative.    Neurological: Positive for weakness (genaeralized). Negative for dizziness, syncope, light-headedness, numbness and headaches.   Psychiatric/Behavioral: Negative for confusion and decreased concentration.     Objective:     Vital Signs (Most Recent):  Temp: 98.2 °F (36.8 °C) (03/10/20 1213)  Pulse: 65 (03/10/20 1400)  Resp: 16 (03/10/20 1213)  BP: (!) 126/58 (03/10/20 1213)  SpO2: (!) 92 % (03/10/20 1213) Vital Signs (24h Range):  Temp:  [98.2 °F (36.8 °C)-101.2 °F (38.4 °C)] 98.2 °F (36.8 °C)  Pulse:  [65-92] 65  Resp:  [16-20] 16  SpO2:  [92 %-96 %] 92 %  BP: (126-185)/(58-84) 126/58     Weight: 67 kg (147 lb 11.3 oz)  Body mass index is 25.35 kg/m².    Intake/Output Summary (Last 24 hours) at 3/10/2020 8879  Last data filed at 3/10/2020 0532  Gross per 24 hour   Intake --   Output 1000 ml   Net -1000 ml      Physical Exam   Constitutional: She is oriented to person, place, and time. She appears well-developed and well-nourished.   Appears ill   HENT:   Head: Normocephalic and atraumatic.   Eyes: No scleral icterus.   Neck: Normal range of motion. Neck supple.   Cardiovascular: Normal rate, regular rhythm, normal heart sounds and intact distal pulses.   No murmur heard.  Pulmonary/Chest: Effort normal and breath sounds normal. No respiratory distress. She has no wheezes.   Abdominal: Soft. Bowel sounds are normal. She exhibits no distension. There is no tenderness. There is no guarding.   Musculoskeletal: Normal range of motion. She exhibits no edema.   Neurological: She is alert and oriented to person, place, and time. No cranial nerve deficit.   Skin: Skin is warm. Capillary refill takes less than 2 seconds.   Psychiatric: She has a normal mood and affect.   Nursing note and vitals reviewed.      Significant Labs:   CBC:   Recent Labs   Lab 03/08/20 2005 03/09/20  0546 03/10/20  0512   WBC 15.05* 15.22* 14.75*   HGB 14.6 14.2 13.3   HCT 42.8 42.8  39.5    217 190     CMP:   Recent Labs   Lab 03/08/20  1934 03/09/20  0546 03/10/20  0512    140 137   K 4.0 3.6 3.6    105 104   CO2 22* 24 24   * 157* 142*   BUN 8 6* 5*   CREATININE 0.8 0.8 0.7   CALCIUM 10.3 10.0 9.5   PROT 8.3 8.0 7.4   ALBUMIN 4.5 4.2 3.6   BILITOT 0.5 0.5 0.4   ALKPHOS 57 57 55   AST 24 23 25   ALT 15 13 14   ANIONGAP 13 11 9   EGFRNONAA >60 >60 >60     All pertinent labs within the past 24 hours have been reviewed.    Significant Imaging: I have reviewed all pertinent imaging results/findings within the past 24 hours.   Imaging Results          CT Abdomen Pelvis With Contrast (Final result)  Result time 03/08/20 22:40:25    Final result by Hortensia Anderson MD (03/08/20 22:40:25)                 Impression:      No acute abdominal or pelvic pathology CT of the abdomen and pelvis with contrast.    Advanced vascular calcifications.    Colonic diverticulosis.      Electronically signed by: Hortensia Anderson  Date:    03/08/2020  Time:    22:40             Narrative:    EXAMINATION:  CT OF ABDOMEN PELVIS WITH    CLINICAL HISTORY:  Infection, abdomen-pelvis;    TECHNIQUE:  5 mm enhanced axial images were obtained from the lung bases through the greater trochanters.   mL of Omnipaque 350 was injected.    COMPARISON:  None.    FINDINGS:  The liver, spleen, pancreas, and adrenal glands are unremarkable. The gallbladder contains no calcified gallstones.    There are bilateral too small to characterize low attenuation lesions seen in both renal cortices.    There is no definite evidence for abdominal adenopathy or ascites. Advanced vascular calcifications are present.    There are no pelvic masses or adenopathy.  There is colonic diverticulosis.  There are tiny bilateral fat containing inguinal hernias.  A discrete appendix is not visualized.    There is no free fluid in the pelvis.    There is mild bibasilar atelectasis.    There is moderate dextroscoliosis.  There is severe  multilevel degenerative changes spine.                               X-Ray Chest AP Portable (Final result)  Result time 03/08/20 19:49:21    Final result by Hortensia Anderson MD (03/08/20 19:49:21)                 Impression:      No acute intrathoracic abnormality detected.      Electronically signed by: Hortensia Anderson  Date:    03/08/2020  Time:    19:49             Narrative:    EXAMINATION:  AP PORTABLE CHEST    CLINICAL HISTORY:  Sepsis;    TECHNIQUE:  AP portable chest radiograph was submitted.    COMPARISON:  None.    FINDINGS:  AP portable chest radiograph demonstrates a cardiac silhouette within normal limits.  Vascular calcification is seen in the aortic knob.  There is biapical pleural thickening.  There is no focal consolidation, pneumothorax, or pleural effusion. Asymmetrical elevation the right hemidiaphragm is noted.                                    Assessment/Plan:      * Fever  - patient appears acutely ill, reports fever, nausea, poor po, body aches, HA, too weak to walk; no travel, no new skin rashes  - CT A/P, CXR no acute findings, mildly elevated WBC, flu neg, lactic normal, UA benign; BC drawn  - unclear etiology, suspect viral, was given Vanco and Zosyn in ED  - s/p LP, traumatic tap, culture  - CRP 77.5  - RVP pending  - antiemetics RVP  - appreciate ID recs Start ceftriaxone 2g IV q24 hours  - Throat culture          Hypertension  - reasonably stable  - cont home meds amlodipine 10 mg, metoprolol 100mg and lisinopril 40 mg daily  - monitor      History of transient ischemic attack (TIA)  - on statin and plavix at home, hold plavix for LP      Type 2 diabetes mellitus without complication, without long-term current use of insulin  -A1c 6.8 12/2019  -SS/accuchecks  - monitor          Hyperlipidemia  - on statin    Chronic low back pain with bilateral sciatica  - on norco at home  PRN      VTE Risk Mitigation (From admission, onward)         Ordered     IP VTE HIGH RISK PATIENT  Once       03/09/20 0209     Place sequential compression device  Until discontinued      03/09/20 0209     Place sequential compression device  Until discontinued      03/09/20 0043                      Gege Teague PA-C  Department of Hospital Medicine   [facility]

## 2020-03-10 NOTE — SUBJECTIVE & OBJECTIVE
Past Medical History:   Diagnosis Date    Allergic rhinitis     Carotid atherosclerosis, bilateral     Chronic low back pain with bilateral sciatica     Depression     Diabetes mellitus, type 2     Diverticulosis     Facet arthropathy     Gout     Hearing loss     History of TIA (transient ischemic attack)     Hyperlipidemia     Hypertension     IBS (irritable bowel syndrome)     Insomnia     Leg cramps     Lumbar stenosis     Meningioma     Osteoporosis     Paroxysmal SVT (supraventricular tachycardia)     Primary open angle glaucoma (POAG) of both eyes     PVD (peripheral vascular disease)     30% prox stenosis of celiac trunk and 20% stnosis Prox SMA - per Abd/SMA Arteriogram 4/3/08       Past Surgical History:   Procedure Laterality Date    CATARACT EXTRACTION      EPIDURAL STEROID INJECTION INTO LUMBAR SPINE      LUMBAR LAMINECTOMY  2009    TOTAL ABDOMINAL HYSTERECTOMY W/ BILATERAL SALPINGOOPHORECTOMY         Review of patient's allergies indicates:   Allergen Reactions    Baclofen      AMS and distorted dremas       Medications:  Medications Prior to Admission   Medication Sig    alendronate (FOSAMAX) 70 MG tablet Take 1 tablet (70 mg total) by mouth every 7 days.    allopurinol (ZYLOPRIM) 300 MG tablet Take 1 tablet (300 mg total) by mouth once daily.    amLODIPine (NORVASC) 10 MG tablet Take 1 tablet (10 mg total) by mouth once daily.    atorvastatin (LIPITOR) 10 MG tablet Take 1 tablet (10 mg total) by mouth once daily.    clopidogrel (PLAVIX) 75 mg tablet Take 1 tablet (75 mg total) by mouth once daily.    fexofenadine (ALLEGRA) 180 MG tablet Take 180 mg by mouth daily as needed.    fluticasone (FLONASE) 50 mcg/actuation nasal spray 1 spray by Each Nare route once daily.    lisinopril (PRINIVIL,ZESTRIL) 40 MG tablet Take 1 tablet (40 mg total) by mouth once daily.    metFORMIN (GLUCOPHAGE-XR) 500 MG 24 hr tablet Take 1 tablet (500 mg total) by mouth daily with breakfast.     metoprolol succinate (TOPROL-XL) 100 MG 24 hr tablet Take 1 tablet (100 mg total) by mouth once daily. Take 1 1/2 daily    ranitidine (ZANTAC) 150 MG tablet Take 150 mg by mouth 2 (two) times daily.    venlafaxine (EFFEXOR) 75 MG tablet 2 tabs po QAM and 1 po QHS    blood sugar diagnostic Strp To check BG three times daily, to use with insurance preferred meter    blood-glucose meter kit To check BG three times daily, to use with insurance preferred meter    HYDROcodone-acetaminophen (NORCO) 5-325 mg per tablet Take 1 tablet by mouth every 6 (six) hours as needed for Pain.    lancets Misc To check BG three times daily, to use with insurance preferred meter     Antibiotics (From admission, onward)    Start     Stop Route Frequency Ordered    03/10/20 1615  cefTRIAXone 2 gram/50 mL IVPB 2 g      -- IV Every 24 hours (non-standard times) 03/09/20 1633        Antifungals (From admission, onward)    None        Antivirals (From admission, onward)    None           Immunization History   Administered Date(s) Administered    Influenza - High Dose - PF (65 years and older) 11/25/2019    Pneumococcal Conjugate - 13 Valent 12/23/2014    Pneumococcal Polysaccharide - 23 Valent 01/01/2003    Tdap 02/09/2017    Zoster 10/09/2012       Family History     Problem Relation (Age of Onset)    Diabetes Mother    Hyperlipidemia Son    Hypertension Mother, Father, Daughter, Son    Hypothyroidism Daughter    Stroke Father        Social History     Socioeconomic History    Marital status:      Spouse name: Not on file    Number of children: Not on file    Years of education: Not on file    Highest education level: Not on file   Occupational History    Not on file   Social Needs    Financial resource strain: Not on file    Food insecurity:     Worry: Not on file     Inability: Not on file    Transportation needs:     Medical: Not on file     Non-medical: Not on file   Tobacco Use    Smoking status: Never  Smoker   Substance and Sexual Activity    Alcohol use: Yes     Frequency: Monthly or less     Drinks per session: 1 or 2    Drug use: Never    Sexual activity: Not Currently   Lifestyle    Physical activity:     Days per week: Not on file     Minutes per session: Not on file    Stress: Not on file   Relationships    Social connections:     Talks on phone: Not on file     Gets together: Not on file     Attends [employer] service: Not on file     Active member of club or organization: Not on file     Attends meetings of clubs or organizations: Not on file     Relationship status: Not on file   Other Topics Concern    Not on file   Social History Narrative    Not on file     Review of Systems   Constitutional: Positive for activity change and chills. Negative for diaphoresis and fever.   HENT: Positive for congestion, sinus pain and sore throat. Negative for rhinorrhea.    Respiratory: Negative for cough and shortness of breath.    Cardiovascular: Negative for chest pain and leg swelling.   Gastrointestinal: Positive for abdominal pain, diarrhea and nausea. Negative for vomiting.   Genitourinary: Negative for dysuria and hematuria.   Musculoskeletal: Negative for arthralgias and myalgias.   Skin: Negative for rash.   Neurological: Negative for headaches.     Objective:     Vital Signs (Most Recent):  Temp: 99 °F (37.2 °C) (03/09/20 2000)  Pulse: 80 (03/09/20 2000)  Resp: 20 (03/09/20 2000)  BP: 136/65 (03/09/20 2000)  SpO2: (!) 94 % (03/09/20 2000) Vital Signs (24h Range):  Temp:  [98.6 °F (37 °C)-102.6 °F (39.2 °C)] 99 °F (37.2 °C)  Pulse:  [] 80  Resp:  [16-22] 20  SpO2:  [92 %-96 %] 94 %  BP: (130-181)/(60-79) 136/65     Weight: 67 kg (147 lb 11.3 oz)  Body mass index is 25.35 kg/m².    Estimated Creatinine Clearance: 50.1 mL/min (based on SCr of 0.8 mg/dL).    Physical Exam   Constitutional: She is oriented to person, place, and time. She appears well-developed and well-nourished. No distress.    Elderly female, lying in bed with eyes closed, appears fatigued, hard of hearing   HENT:   Head: Normocephalic and atraumatic.   Mouth/Throat: Oropharynx is clear and moist. No oropharyngeal exudate.   Eyes: Conjunctivae and EOM are normal.   Neck: Normal range of motion. Neck supple.   Cardiovascular: Normal rate, regular rhythm and normal heart sounds.   Pulmonary/Chest: Effort normal and breath sounds normal. No stridor. No respiratory distress. She has no wheezes.   Abdominal: Soft. Bowel sounds are normal. She exhibits no distension and no mass. There is no guarding.   Musculoskeletal: Normal range of motion. She exhibits no edema.   Neurological: She is alert and oriented to person, place, and time.   Skin: Skin is warm and dry. No rash noted. She is not diaphoretic. No erythema.   Psychiatric: She has a normal mood and affect. Her behavior is normal.       Significant Labs: All pertinent labs within the past 24 hours have been reviewed.    Significant Imaging: I have reviewed all pertinent imaging results/findings within the past 24 hours.

## 2020-03-10 NOTE — HOSPITAL COURSE
LP performed today.  Infectious Disease consulted.  Etiology of symptomatology appears to be viral.  Patient was initially treated with intraventous ceftriaxone.  Infectious Disease consulted and agree with transition to oral antibiotics given clinical improvement.  The patient was started on doxycyline 100 mg twice daily for now.  Physical and occupational therapy recommended skilled nursing.  Daughter made aware and appreciate case management involvement of for discharge planning.

## 2020-03-10 NOTE — HPI
85-year-old female with history of DM2, HTN, HLD, gout, presents with fevers and generalized weakness. On 3/6/2020, patient had an episode of diarrhea. Over the weekend, patient started feeling generalized weakness. She reported associated sinus pain, sore throat, nausea, stomach cramps, and headache.  Patient denied any photophobia, noted neck stiffness.  Patient was having difficulty with getting to the bathroom due to weakness, therefore, daughter called EMS.  Patient denied any rhinorrhea, cough, SOB, CP, vomiting, hematuria, dysuria. Per daughter, patient's family came to visit about 14 days ago. Patient lives with son, neither patient nor son have left the apartment in a month.  No pets, travel, outdoor hobbies, new foods. In ED, patient was febrile with leukocytosis. CXR negative, CT abd/pelvis negative for acute process, UA bland, influenza screen negative.  Patient was given a dose of vanc and pip-tazo.  Patient reports feeling improved compared to admission.  LP performed today.

## 2020-03-10 NOTE — ASSESSMENT & PLAN NOTE
85-year-old female with history of DM2, HTN, HLD, gout, presents with generalized weakness associated with nausea, abdominal cramping, diarrhea, sinus pressure, and sore throat.  On admission, found to be febrile with leukocytosis. Extensive work-up has been negative including influenza screen, urinalysis, CXR, CT abd/pelvis, blood cultures.  Traumatic LP with 40K RBC - adjusted WBC within normal range    Given constellation of symptoms, likely viral syndrome, also consider sinusitis.  Patient with visitors about 2 weeks ago, otherwise no recent travel, pets, outdoor hobbies.    Recommendations:  - Start ceftriaxone 2g IV q24 hours  - Resp infection panel sent  - Throat culture / strep A sent

## 2020-03-10 NOTE — CONSULTS
[facility]  Infectious Disease  Consult Note    Patient Name: Farheen Soares  MRN: [id number]  Admission Date: 3/8/2020  Hospital Length of Stay: 0 days  Attending Physician: Flory Christianson, *  Primary Care Provider: Vonda Chung MD     Isolation Status: No active isolations    Patient information was obtained from patient, relative(s), past medical records and ER records.      Consults  Assessment/Plan:     * Fever  85-year-old female with history of DM2, HTN, HLD, gout, presents with generalized weakness associated with nausea, abdominal cramping, diarrhea, sinus pressure, and sore throat.  On admission, found to be febrile with leukocytosis. Extensive work-up has been negative including influenza screen, urinalysis, CXR, CT abd/pelvis, blood cultures.  Traumatic LP with 40K RBC - adjusted WBC within normal range    Given constellation of symptoms, likely viral syndrome, also consider sinusitis.  Patient with visitors about 2 weeks ago, otherwise no recent travel, pets, outdoor hobbies.    Recommendations:  - Start ceftriaxone 2g IV q24 hours  - Resp infection panel sent  - Throat culture / strep A sent        Thank you for your consult. I will follow-up with patient. Please contact us if you have any additional questions.    Carlota Moreno MD  Infectious Disease  [facility]    Subjective:     Principal Problem: Fever    HPI: 85-year-old female with history of DM2, HTN, HLD, gout, presents with fevers and generalized weakness. On 3/6/2020, patient had an episode of diarrhea. Over the weekend, patient started feeling generalized weakness. She reported associated sinus pain, sore throat, nausea, stomach cramps, and headache.  Patient denied any photophobia, noted neck stiffness.  Patient was having difficulty with getting to the bathroom due to weakness, therefore, daughter called EMS.  Patient denied any rhinorrhea, cough, SOB, CP, vomiting, hematuria,  dysuria. Per daughter, patient's family came to visit about 14 days ago. Patient lives with son, neither patient nor son have left the apartment in a month.  No pets, travel, outdoor hobbies, new foods. In ED, patient was febrile with leukocytosis. CXR negative, CT abd/pelvis negative for acute process, UA bland, influenza screen negative.  Patient was given a dose of vanc and pip-tazo.  Patient reports feeling improved compared to admission.  LP performed today.    Past Medical History:   Diagnosis Date    Allergic rhinitis     Carotid atherosclerosis, bilateral     Chronic low back pain with bilateral sciatica     Depression     Diabetes mellitus, type 2     Diverticulosis     Facet arthropathy     Gout     Hearing loss     History of TIA (transient ischemic attack)     Hyperlipidemia     Hypertension     IBS (irritable bowel syndrome)     Insomnia     Leg cramps     Lumbar stenosis     Meningioma     Osteoporosis     Paroxysmal SVT (supraventricular tachycardia)     Primary open angle glaucoma (POAG) of both eyes     PVD (peripheral vascular disease)     30% prox stenosis of celiac trunk and 20% stnosis Prox SMA - per Abd/SMA Arteriogram 4/3/08       Past Surgical History:   Procedure Laterality Date    CATARACT EXTRACTION      EPIDURAL STEROID INJECTION INTO LUMBAR SPINE      LUMBAR LAMINECTOMY  2009    TOTAL ABDOMINAL HYSTERECTOMY W/ BILATERAL SALPINGOOPHORECTOMY         Review of patient's allergies indicates:   Allergen Reactions    Baclofen      AMS and distorted dremas       Medications:  Medications Prior to Admission   Medication Sig    alendronate (FOSAMAX) 70 MG tablet Take 1 tablet (70 mg total) by mouth every 7 days.    allopurinol (ZYLOPRIM) 300 MG tablet Take 1 tablet (300 mg total) by mouth once daily.    amLODIPine (NORVASC) 10 MG tablet Take 1 tablet (10 mg total) by mouth once daily.    atorvastatin (LIPITOR) 10 MG tablet Take 1 tablet (10 mg total) by mouth once  daily.    clopidogrel (PLAVIX) 75 mg tablet Take 1 tablet (75 mg total) by mouth once daily.    fexofenadine (ALLEGRA) 180 MG tablet Take 180 mg by mouth daily as needed.    fluticasone (FLONASE) 50 mcg/actuation nasal spray 1 spray by Each Nare route once daily.    lisinopril (PRINIVIL,ZESTRIL) 40 MG tablet Take 1 tablet (40 mg total) by mouth once daily.    metFORMIN (GLUCOPHAGE-XR) 500 MG 24 hr tablet Take 1 tablet (500 mg total) by mouth daily with breakfast.    metoprolol succinate (TOPROL-XL) 100 MG 24 hr tablet Take 1 tablet (100 mg total) by mouth once daily. Take 1 1/2 daily    ranitidine (ZANTAC) 150 MG tablet Take 150 mg by mouth 2 (two) times daily.    venlafaxine (EFFEXOR) 75 MG tablet 2 tabs po QAM and 1 po QHS    blood sugar diagnostic Strp To check BG three times daily, to use with insurance preferred meter    blood-glucose meter kit To check BG three times daily, to use with insurance preferred meter    HYDROcodone-acetaminophen (NORCO) 5-325 mg per tablet Take 1 tablet by mouth every 6 (six) hours as needed for Pain.    lancets Misc To check BG three times daily, to use with insurance preferred meter     Antibiotics (From admission, onward)    Start     Stop Route Frequency Ordered    03/10/20 1615  cefTRIAXone 2 gram/50 mL IVPB 2 g      -- IV Every 24 hours (non-standard times) 03/09/20 1633        Antifungals (From admission, onward)    None        Antivirals (From admission, onward)    None           Immunization History   Administered Date(s) Administered    Influenza - High Dose - PF (65 years and older) 11/25/2019    Pneumococcal Conjugate - 13 Valent 12/23/2014    Pneumococcal Polysaccharide - 23 Valent 01/01/2003    Tdap 02/09/2017    Zoster 10/09/2012       Family History     Problem Relation (Age of Onset)    Diabetes Mother    Hyperlipidemia Son    Hypertension Mother, Father, Daughter, Son    Hypothyroidism Daughter    Stroke Father        Social History      Socioeconomic History    Marital status:      Spouse name: Not on file    Number of children: Not on file    Years of education: Not on file    Highest education level: Not on file   Occupational History    Not on file   Social Needs    Financial resource strain: Not on file    Food insecurity:     Worry: Not on file     Inability: Not on file    Transportation needs:     Medical: Not on file     Non-medical: Not on file   Tobacco Use    Smoking status: Never Smoker   Substance and Sexual Activity    Alcohol use: Yes     Frequency: Monthly or less     Drinks per session: 1 or 2    Drug use: Never    Sexual activity: Not Currently   Lifestyle    Physical activity:     Days per week: Not on file     Minutes per session: Not on file    Stress: Not on file   Relationships    Social connections:     Talks on phone: Not on file     Gets together: Not on file     Attends [employer] service: Not on file     Active member of club or organization: Not on file     Attends meetings of clubs or organizations: Not on file     Relationship status: Not on file   Other Topics Concern    Not on file   Social History Narrative    Not on file     Review of Systems   Constitutional: Positive for activity change and chills. Negative for diaphoresis and fever.   HENT: Positive for congestion, sinus pain and sore throat. Negative for rhinorrhea.    Respiratory: Negative for cough and shortness of breath.    Cardiovascular: Negative for chest pain and leg swelling.   Gastrointestinal: Positive for abdominal pain, diarrhea and nausea. Negative for vomiting.   Genitourinary: Negative for dysuria and hematuria.   Musculoskeletal: Negative for arthralgias and myalgias.   Skin: Negative for rash.   Neurological: Negative for headaches.     Objective:     Vital Signs (Most Recent):  Temp: 99 °F (37.2 °C) (03/09/20 2000)  Pulse: 80 (03/09/20 2000)  Resp: 20 (03/09/20 2000)  BP: 136/65 (03/09/20 2000)  SpO2: (!) 94 %  (03/09/20 2000) Vital Signs (24h Range):  Temp:  [98.6 °F (37 °C)-102.6 °F (39.2 °C)] 99 °F (37.2 °C)  Pulse:  [] 80  Resp:  [16-22] 20  SpO2:  [92 %-96 %] 94 %  BP: (130-181)/(60-79) 136/65     Weight: 67 kg (147 lb 11.3 oz)  Body mass index is 25.35 kg/m².    Estimated Creatinine Clearance: 50.1 mL/min (based on SCr of 0.8 mg/dL).    Physical Exam   Constitutional: She is oriented to person, place, and time. She appears well-developed and well-nourished. No distress.   Elderly female, lying in bed with eyes closed, appears fatigued, hard of hearing   HENT:   Head: Normocephalic and atraumatic.   Mouth/Throat: Oropharynx is clear and moist. No oropharyngeal exudate.   Eyes: Conjunctivae and EOM are normal.   Neck: Normal range of motion. Neck supple.   Cardiovascular: Normal rate, regular rhythm and normal heart sounds.   Pulmonary/Chest: Effort normal and breath sounds normal. No stridor. No respiratory distress. She has no wheezes.   Abdominal: Soft. Bowel sounds are normal. She exhibits no distension and no mass. There is no guarding.   Musculoskeletal: Normal range of motion. She exhibits no edema.   Neurological: She is alert and oriented to person, place, and time.   Skin: Skin is warm and dry. No rash noted. She is not diaphoretic. No erythema.   Psychiatric: She has a normal mood and affect. Her behavior is normal.       Significant Labs: All pertinent labs within the past 24 hours have been reviewed.    Significant Imaging: I have reviewed all pertinent imaging results/findings within the past 24 hours.

## 2020-03-10 NOTE — SUBJECTIVE & OBJECTIVE
Interval History: starting to feel better, poor appetite + cough    Review of Systems   Constitutional: Positive for appetite change, fatigue and fever.   HENT: Negative for congestion, rhinorrhea and sore throat.    Eyes: Negative.  Negative for photophobia and visual disturbance.   Respiratory: Negative for cough, shortness of breath and wheezing.    Cardiovascular: Negative for chest pain, palpitations and leg swelling.   Gastrointestinal: Negative for abdominal distention, abdominal pain, constipation, diarrhea, nausea and vomiting.   Endocrine: Negative for polydipsia and polyuria.   Genitourinary: Negative for difficulty urinating, frequency and hematuria.   Musculoskeletal: Positive for back pain (chronic). Negative for neck pain and neck stiffness.   Skin: Negative.    Neurological: Positive for weakness (genaeralized). Negative for dizziness, syncope, light-headedness, numbness and headaches.   Psychiatric/Behavioral: Negative for confusion and decreased concentration.     Objective:     Vital Signs (Most Recent):  Temp: 98.2 °F (36.8 °C) (03/10/20 1213)  Pulse: 65 (03/10/20 1400)  Resp: 16 (03/10/20 1213)  BP: (!) 126/58 (03/10/20 1213)  SpO2: (!) 92 % (03/10/20 1213) Vital Signs (24h Range):  Temp:  [98.2 °F (36.8 °C)-101.2 °F (38.4 °C)] 98.2 °F (36.8 °C)  Pulse:  [65-92] 65  Resp:  [16-20] 16  SpO2:  [92 %-96 %] 92 %  BP: (126-185)/(58-84) 126/58     Weight: 67 kg (147 lb 11.3 oz)  Body mass index is 25.35 kg/m².    Intake/Output Summary (Last 24 hours) at 3/10/2020 8482  Last data filed at 3/10/2020 0532  Gross per 24 hour   Intake --   Output 1000 ml   Net -1000 ml      Physical Exam   Constitutional: She is oriented to person, place, and time. She appears well-developed and well-nourished.   Appears ill   HENT:   Head: Normocephalic and atraumatic.   Eyes: No scleral icterus.   Neck: Normal range of motion. Neck supple.   Cardiovascular: Normal rate, regular rhythm, normal heart sounds and intact  distal pulses.   No murmur heard.  Pulmonary/Chest: Effort normal and breath sounds normal. No respiratory distress. She has no wheezes.   Abdominal: Soft. Bowel sounds are normal. She exhibits no distension. There is no tenderness. There is no guarding.   Musculoskeletal: Normal range of motion. She exhibits no edema.   Neurological: She is alert and oriented to person, place, and time. No cranial nerve deficit.   Skin: Skin is warm. Capillary refill takes less than 2 seconds.   Psychiatric: She has a normal mood and affect.   Nursing note and vitals reviewed.      Significant Labs:   CBC:   Recent Labs   Lab 03/08/20  2005 03/09/20  0546 03/10/20  0512   WBC 15.05* 15.22* 14.75*   HGB 14.6 14.2 13.3   HCT 42.8 42.8 39.5    217 190     CMP:   Recent Labs   Lab 03/08/20  1934 03/09/20  0546 03/10/20  0512    140 137   K 4.0 3.6 3.6    105 104   CO2 22* 24 24   * 157* 142*   BUN 8 6* 5*   CREATININE 0.8 0.8 0.7   CALCIUM 10.3 10.0 9.5   PROT 8.3 8.0 7.4   ALBUMIN 4.5 4.2 3.6   BILITOT 0.5 0.5 0.4   ALKPHOS 57 57 55   AST 24 23 25   ALT 15 13 14   ANIONGAP 13 11 9   EGFRNONAA >60 >60 >60     All pertinent labs within the past 24 hours have been reviewed.    Significant Imaging: I have reviewed all pertinent imaging results/findings within the past 24 hours.   Imaging Results          CT Abdomen Pelvis With Contrast (Final result)  Result time 03/08/20 22:40:25    Final result by Hortensia Anderson MD (03/08/20 22:40:25)                 Impression:      No acute abdominal or pelvic pathology CT of the abdomen and pelvis with contrast.    Advanced vascular calcifications.    Colonic diverticulosis.      Electronically signed by: Hortnesia Anderson  Date:    03/08/2020  Time:    22:40             Narrative:    EXAMINATION:  CT OF ABDOMEN PELVIS WITH    CLINICAL HISTORY:  Infection, abdomen-pelvis;    TECHNIQUE:  5 mm enhanced axial images were obtained from the lung bases through the greater  trochanters.   mL of Omnipaque 350 was injected.    COMPARISON:  None.    FINDINGS:  The liver, spleen, pancreas, and adrenal glands are unremarkable. The gallbladder contains no calcified gallstones.    There are bilateral too small to characterize low attenuation lesions seen in both renal cortices.    There is no definite evidence for abdominal adenopathy or ascites. Advanced vascular calcifications are present.    There are no pelvic masses or adenopathy.  There is colonic diverticulosis.  There are tiny bilateral fat containing inguinal hernias.  A discrete appendix is not visualized.    There is no free fluid in the pelvis.    There is mild bibasilar atelectasis.    There is moderate dextroscoliosis.  There is severe multilevel degenerative changes spine.                               X-Ray Chest AP Portable (Final result)  Result time 03/08/20 19:49:21    Final result by Hortensia Anderson MD (03/08/20 19:49:21)                 Impression:      No acute intrathoracic abnormality detected.      Electronically signed by: Hortensia Anderson  Date:    03/08/2020  Time:    19:49             Narrative:    EXAMINATION:  AP PORTABLE CHEST    CLINICAL HISTORY:  Sepsis;    TECHNIQUE:  AP portable chest radiograph was submitted.    COMPARISON:  None.    FINDINGS:  AP portable chest radiograph demonstrates a cardiac silhouette within normal limits.  Vascular calcification is seen in the aortic knob.  There is biapical pleural thickening.  There is no focal consolidation, pneumothorax, or pleural effusion. Asymmetrical elevation the right hemidiaphragm is noted.

## 2020-03-11 PROBLEM — B34.9 VIRAL ILLNESS: Status: ACTIVE | Noted: 2020-03-11

## 2020-03-11 PROBLEM — R50.9 FEVER: Status: RESOLVED | Noted: 2020-03-08 | Resolved: 2020-03-11

## 2020-03-11 LAB
ALBUMIN SERPL BCP-MCNC: 3.4 G/DL (ref 3.5–5.2)
ALP SERPL-CCNC: 60 U/L (ref 55–135)
ALT SERPL W/O P-5'-P-CCNC: 14 U/L (ref 10–44)
ANION GAP SERPL CALC-SCNC: 10 MMOL/L (ref 8–16)
AST SERPL-CCNC: 29 U/L (ref 10–40)
BASOPHILS # BLD AUTO: 0.03 K/UL (ref 0–0.2)
BASOPHILS NFR BLD: 0.4 % (ref 0–1.9)
BILIRUB SERPL-MCNC: 0.3 MG/DL (ref 0.1–1)
BUN SERPL-MCNC: 9 MG/DL (ref 8–23)
CALCIUM SERPL-MCNC: 9.8 MG/DL (ref 8.7–10.5)
CHLORIDE SERPL-SCNC: 105 MMOL/L (ref 95–110)
CO2 SERPL-SCNC: 24 MMOL/L (ref 23–29)
CREAT SERPL-MCNC: 0.7 MG/DL (ref 0.5–1.4)
DIFFERENTIAL METHOD: ABNORMAL
EOSINOPHIL # BLD AUTO: 0.1 K/UL (ref 0–0.5)
EOSINOPHIL NFR BLD: 1.6 % (ref 0–8)
ERYTHROCYTE [DISTWIDTH] IN BLOOD BY AUTOMATED COUNT: 13.8 % (ref 11.5–14.5)
EST. GFR  (AFRICAN AMERICAN): >60 ML/MIN/1.73 M^2
EST. GFR  (NON AFRICAN AMERICAN): >60 ML/MIN/1.73 M^2
GLUCOSE SERPL-MCNC: 142 MG/DL (ref 70–110)
HCT VFR BLD AUTO: 42.1 % (ref 37–48.5)
HGB BLD-MCNC: 14.1 G/DL (ref 12–16)
HSV1, PCR, CSF: NEGATIVE
HSV2, PCR, CSF: NEGATIVE
IMM GRANULOCYTES # BLD AUTO: 0.03 K/UL (ref 0–0.04)
IMM GRANULOCYTES NFR BLD AUTO: 0.4 % (ref 0–0.5)
LYMPHOCYTES # BLD AUTO: 2.1 K/UL (ref 1–4.8)
LYMPHOCYTES NFR BLD: 28.3 % (ref 18–48)
MCH RBC QN AUTO: 33.2 PG (ref 27–31)
MCHC RBC AUTO-ENTMCNC: 33.5 G/DL (ref 32–36)
MCV RBC AUTO: 99 FL (ref 82–98)
MONOCYTES # BLD AUTO: 0.7 K/UL (ref 0.3–1)
MONOCYTES NFR BLD: 9.3 % (ref 4–15)
NEUTROPHILS # BLD AUTO: 4.5 K/UL (ref 1.8–7.7)
NEUTROPHILS NFR BLD: 60 % (ref 38–73)
NRBC BLD-RTO: 0 /100 WBC
PLATELET # BLD AUTO: 188 K/UL (ref 150–350)
PMV BLD AUTO: 10.9 FL (ref 9.2–12.9)
POCT GLUCOSE: 152 MG/DL (ref 70–110)
POCT GLUCOSE: 189 MG/DL (ref 70–110)
POCT GLUCOSE: 194 MG/DL (ref 70–110)
POTASSIUM SERPL-SCNC: 4 MMOL/L (ref 3.5–5.1)
PROT SERPL-MCNC: 7.4 G/DL (ref 6–8.4)
RBC # BLD AUTO: 4.25 M/UL (ref 4–5.4)
SODIUM SERPL-SCNC: 139 MMOL/L (ref 136–145)
WBC # BLD AUTO: 7.45 K/UL (ref 3.9–12.7)

## 2020-03-11 PROCEDURE — 25000003 PHARM REV CODE 250: Performed by: NURSE PRACTITIONER

## 2020-03-11 PROCEDURE — 99226 PR SUBSEQUENT OBSERVATION CARE,LEVEL III: CPT | Mod: ,,, | Performed by: NURSE PRACTITIONER

## 2020-03-11 PROCEDURE — 99213 OFFICE O/P EST LOW 20 MIN: CPT | Mod: ,,, | Performed by: INTERNAL MEDICINE

## 2020-03-11 PROCEDURE — 80053 COMPREHEN METABOLIC PANEL: CPT

## 2020-03-11 PROCEDURE — 96374 THER/PROPH/DIAG INJ IV PUSH: CPT | Mod: 59

## 2020-03-11 PROCEDURE — 99213 PR OFFICE/OUTPT VISIT, EST, LEVL III, 20-29 MIN: ICD-10-PCS | Mod: ,,, | Performed by: INTERNAL MEDICINE

## 2020-03-11 PROCEDURE — 85025 COMPLETE CBC W/AUTO DIFF WBC: CPT

## 2020-03-11 PROCEDURE — 99226 PR SUBSEQUENT OBSERVATION CARE,LEVEL III: ICD-10-PCS | Mod: ,,, | Performed by: NURSE PRACTITIONER

## 2020-03-11 PROCEDURE — 97530 THERAPEUTIC ACTIVITIES: CPT

## 2020-03-11 PROCEDURE — G0378 HOSPITAL OBSERVATION PER HR: HCPCS

## 2020-03-11 PROCEDURE — 97166 OT EVAL MOD COMPLEX 45 MIN: CPT

## 2020-03-11 PROCEDURE — 97162 PT EVAL MOD COMPLEX 30 MIN: CPT

## 2020-03-11 PROCEDURE — 36415 COLL VENOUS BLD VENIPUNCTURE: CPT

## 2020-03-11 PROCEDURE — 25000003 PHARM REV CODE 250: Performed by: PHYSICIAN ASSISTANT

## 2020-03-11 PROCEDURE — 63600175 PHARM REV CODE 636 W HCPCS: Performed by: INTERNAL MEDICINE

## 2020-03-11 RX ORDER — AMLODIPINE BESYLATE 5 MG/1
10 TABLET ORAL DAILY
Status: DISCONTINUED | OUTPATIENT
Start: 2020-03-11 | End: 2020-03-13 | Stop reason: HOSPADM

## 2020-03-11 RX ORDER — CEFTRIAXONE 2 G/50ML
2 INJECTION, SOLUTION INTRAVENOUS
Status: DISCONTINUED | OUTPATIENT
Start: 2020-03-11 | End: 2020-03-12

## 2020-03-11 RX ORDER — DOXYCYCLINE HYCLATE 100 MG
100 TABLET ORAL EVERY 12 HOURS
Status: DISCONTINUED | OUTPATIENT
Start: 2020-03-12 | End: 2020-03-13 | Stop reason: HOSPADM

## 2020-03-11 RX ORDER — LISINOPRIL 20 MG/1
40 TABLET ORAL DAILY
Status: DISCONTINUED | OUTPATIENT
Start: 2020-03-11 | End: 2020-03-13 | Stop reason: HOSPADM

## 2020-03-11 RX ADMIN — METOPROLOL SUCCINATE 100 MG: 50 TABLET, EXTENDED RELEASE ORAL at 10:03

## 2020-03-11 RX ADMIN — MELATONIN 6 MG: at 10:03

## 2020-03-11 RX ADMIN — VENLAFAXINE 75 MG: 37.5 TABLET ORAL at 10:03

## 2020-03-11 RX ADMIN — AMLODIPINE BESYLATE 10 MG: 5 TABLET ORAL at 06:03

## 2020-03-11 RX ADMIN — ATORVASTATIN CALCIUM 10 MG: 10 TABLET, FILM COATED ORAL at 10:03

## 2020-03-11 RX ADMIN — LISINOPRIL 40 MG: 20 TABLET ORAL at 06:03

## 2020-03-11 RX ADMIN — ACETAMINOPHEN 650 MG: 325 TABLET ORAL at 10:03

## 2020-03-11 RX ADMIN — ALLOPURINOL 300 MG: 300 TABLET ORAL at 10:03

## 2020-03-11 RX ADMIN — CEFTRIAXONE 2 G: 2 INJECTION, SOLUTION INTRAVENOUS at 10:03

## 2020-03-11 NOTE — PLAN OF CARE
Problem: Physical Therapy Goal  Goal: Physical Therapy Goal  Description  Goals to be met by: 20    Patient will increase functional independence with mobility by performin. Sit<>stand with supervision with RW.  2. Gait x 100 feet with supervision with RW.  3. Ascend/descend 3 step(s) with least restrictive assistive device and BHR with CGA.   4. Activity x5 min with SpO2 >90% on appropriate supplemental O2.      Outcome: Ongoing, Progressing     Patient evaluated by PT and goals established. Patient found on bed pan and assisted off and with ezequiel hygiene. Sit<>stand with no Ad with modA, sit<>stand with RW with CGA. Gait x10 ft with RW and CGA, cues for RW management. Supine<>sit with minimal use of hospital bed features and SBA. PT will continue to follow and progress as tolerated. White board updated with rec for stand step transfer to commode with RW and assist. Please see progress note for detailed plan of care and recommendations.

## 2020-03-11 NOTE — PT/OT/SLP EVAL
Occupational Therapy   Evaluation    Name: Farheen Soares  MRN: [id number]  Admitting Diagnosis:  Viral illness 2 Days Post-Op    Recommendations:     Discharge Recommendations: nursing facility, skilled  Discharge Equipment Recommendations:  3-in-1 commode  Barriers to discharge:  Decreased caregiver support    Assessment:     Farheen Soares is a 85 y.o. female with a medical diagnosis of Viral illness.  Pt has a history of DMII, HTN, chronic low back pain, TIA, lumbar stenosis, gout, osteoporosis, gout and significant hearing loss.  She presents agreeable to OT evaluation and stating she is hoping to go home tomorrow.  Pt declined getting OOB for transfers.   Performance deficits affecting function: weakness, impaired endurance, impaired self care skills, impaired functional mobilty, gait instability, impaired balance, decreased safety awareness, impaired cardiopulmonary response to activity, decreased coordination.   Recommend SNF with OT and PT.     Rehab Prognosis: Good to return to PLOF; patient would benefit from acute skilled OT services to address these deficits and reach maximum level of function.       Plan:     Patient to be seen 5 x/week to address the above listed problems via self-care/home management, therapeutic activities, therapeutic exercises  · Plan of Care Expires: 04/10/20  · Plan of Care Reviewed with: patient    Subjective     Chief Complaint: Pt reports that when she blew her nose, so much green stuff came out  Patient/Family Comments/goals: Pt is hoping to go home tomorrow.    Occupational Profile:  Living Environment: Lives in single story home with son who has MS. Pt has 3-4 steps to enter and bilateral handrails.    Previous level of function: Ambulates with RW and has a shower chair.    Roles and Routines: Pt reports she has a  that comes 1-2 x's week  Equipment Used at Home:  shower chair, walker, rolling  Assistance upon Discharge: Pt's son that she lives with  has MS, her daughter is a nurse at [employer] and she has a  1-2 x's a week.    Pain/Comfort:  · Pain Rating 1: 0/10  · Pain Rating Post-Intervention 1: 0/10    Patients cultural, spiritual, Yazidi conflicts given the current situation: (None stated)    Objective:     Communicated with: Wendy mcclelland prior to session.  Patient found HOB elevated with telemetry, peripheral IV upon OT entry to room.    General Precautions: Standard, fall, diabetic, droplet, hearing impaired   Orthopedic Precautions:N/A   Braces: N/A     Occupational Performance:    Bed Mobility:    · Patient completed Supine to Sit with stand by assistance  · Patient completed Sit to Supine with stand by assistance    Functional Mobility/Transfers: Pt declined getting OOB    Activities of Daily Living:  · Grooming: Set up     · Lower Body Dressing: Doff/don socks at SBA sitting EOB; crosses foot over opposite knee to reach feet.      Cognitive/Visual Perceptual:  Cognitive/Psychosocial Skills:     -       Oriented to: Person, Place and Situation; Pt not oriented to day of week or date.   -       Follows Commands/attention: Follows one-step commands with cues and repeated cues due to being very Lummi  -       Communication: clear/fluent  -       Memory: Needs further assessement  -       Safety awareness/insight to disability: Impaired - pt asking why she needs to call for assistance when needing to get OOB to use BSC   -       Mood/Affect/Coping skills/emotional control: Cooperative    Physical Exam:  Upper Extremity Range of Motion:  WFL for self care tasks  Upper Extremity Strength: Shoulders 3+/5; Elbow <>Hand 4/5  Fine Motor Coordination: WFL for self care tasks  Gross motor coordination: WFL for self care tasks    AMPAC 6 Click ADL:  AMPAC Total Score: 16    Treatment & Education:  Role of OT, POC, safety strategies to reduce fall risk, use of call button  Education:    Patient left HOB elevated with all lines intact, call button  in reach and nurse notified    GOALS:   Multidisciplinary Problems     Occupational Therapy Goals        Problem: Occupational Therapy Goal    Goal Priority Disciplines Outcome Interventions   Occupational Therapy Goal     OT, PT/OT Ongoing, Progressing    Description:  Goals to be met by: 3/18/20    Patient will increase functional independence with ADLs by performing:    UB dressing at Set up level.  LB dressing at SBA level without LOB.  Participate in toilet/BSC transfer evaluation.  Toileting at SBA level.  Grooming standing at sink at SBA level.                      History:     Past Medical History:   Diagnosis Date    Allergic rhinitis     Carotid atherosclerosis, bilateral     Chronic low back pain with bilateral sciatica     Depression     Diabetes mellitus, type 2     Diverticulosis     Facet arthropathy     Gout     Hearing loss     History of TIA (transient ischemic attack)     Hyperlipidemia     Hypertension     IBS (irritable bowel syndrome)     Insomnia     Leg cramps     Lumbar stenosis     Meningioma     Osteoporosis     Paroxysmal SVT (supraventricular tachycardia)     Primary open angle glaucoma (POAG) of both eyes     PVD (peripheral vascular disease)     30% prox stenosis of celiac trunk and 20% stnosis Prox SMA - per Abd/SMA Arteriogram 4/3/08       Past Surgical History:   Procedure Laterality Date    CATARACT EXTRACTION      EPIDURAL STEROID INJECTION INTO LUMBAR SPINE      LUMBAR LAMINECTOMY  2009    TOTAL ABDOMINAL HYSTERECTOMY W/ BILATERAL SALPINGOOPHORECTOMY         Time Tracking:     OT Date of Treatment: 03/11/20  OT Start Time: 1458  OT Stop Time: 1525  OT Total Time (min): 27 min    Billable Minutes:Evaluation 27    TORI Jean  3/11/2020

## 2020-03-11 NOTE — PLAN OF CARE
Problem: Occupational Therapy Goal  Goal: Occupational Therapy Goal  Description  Goals to be met by: 3/18/20    Patient will increase functional independence with ADLs by performing:    UB dressing at Set up level.  LB dressing at SBA level without LOB.  Participate in toilet/BSC transfer evaluation.  Toileting at SBA level.  Grooming standing at sink at SBA level.     Outcome: Ongoing, Progressing   Evaluation complete and goals established.  Recommend SNF with OT and PT.

## 2020-03-11 NOTE — PT/OT/SLP EVAL
Physical Therapy Evaluation and Treatment    Patient Name:  Farheen Soares   MRN:  [id number]    Recommendations:     Discharge Recommendations:  nursing facility, skilled   Discharge Equipment Recommendations: (TBD, may require 3-in-1 commode)   Barriers to discharge: Inaccessible home    Assessment:     Farheen Soares is a 85 y.o. female admitted with a medical diagnosis of Fever. Pmhx significant for DM-T2, HTN, osteoporosis, gout, hearing loss, chronic LBP, h/o TIA, and lumbar stenosis. She presents with the following impairments/functional limitations:  weakness, impaired endurance, impaired self care skills, impaired functional mobilty, gait instability, impaired balance, decreased coordination, impaired cardiopulmonary response to activity.    Patient evaluated by PT and goals established. Due to the above impairments, the patient is limited in her ability to modified independently ambulate, transfer, and participate in her chosen activities.    Patient found on bed pan and assisted off and with ezequiel hygiene. Sit<>stand with no Ad with modA, sit<>stand with RW with CGA. Gait x10 ft with RW and CGA, cues for RW management. Supine<>sit with minimal use of hospital bed features and SBA. PT will continue to follow and progress as tolerated. White board updated with rec for stand step transfer to commode with RW and assist.     Rehab Prognosis: Good; patient would benefit from acute skilled PT services to address these deficits and reach maximum level of function.    Recent Surgery: Procedure(s) (LRB):  LUMBAR PUNCTURE (N/A) 2 Days Post-Op    Plan:     During this hospitalization, patient to be seen 5 x/week to address the identified rehab impairments via gait training, therapeutic activities, therapeutic exercises, neuromuscular re-education and progress toward the following goals:    · Plan of Care Expires:  04/01/20    Subjective     Chief Complaint: Can't hear, feeling tired  Patient/Family  Comments/goals: To get a good nights sleep; Patient agreeable to evaluation.  Pain/Comfort:  · Pain Rating 1: 0/10  · Pain Rating Post-Intervention 1: 0/10    Patients cultural, spiritual, Taoism conflicts given the current situation: no    Living Environment:  · Pt lives with her son in a single story home with 3-4 steps to enter and B handrail(s).   · Until 6 months ago, was living alone in apartment  · Pt's son has MS and is unable to provide physical assistance  · Pt has a walk in shower with a regular toilet.   · Upon discharge, patient will have limited assistance from son (has MS) and  2x/2k (for house cleaning). Daughter is nurse at [employer] and is unable to assist during day.  Prior level of function:  · Ambulation: Mod(I) with use of RW for household mobility  · Pt unclear how long she has been using RW   · ADL's: [place] seated on shower chair  · IADLs: Has assistance from son and   · Falls: Denies in past 6 months   Equipment used at home: walker, rolling, shower chair.      Objective:     Communicated with RN Wendy prior to session.  Patient found on bed pan with telemetry, peripheral IV, oxygen(1.5 L NC)  upon PT entry to room.    General Precautions: Standard, fall, diabetic, hearing impaired, droplet   Orthopedic Precautions:N/A   Braces: N/A     Patient donned red socks and gait belt for OOB mobility.    Exams:  · Cognition:   · Patient is oriented to person, , place, general situation.  · Pt follows approximately 80% of one and two step commands.   · Limited in command following d/t [place]   · Mood: Pleasant and cooperative.  · Musculoskeletal:  · Posture:  Forward head, rounded shoulders  · LE ROM/Strength:   · R ROM: WFL  · L ROM: WFL  · R Strength:   · Hip flexion: 4/5  · Knee extension: 5/5  · Dorsiflexion: 5/5   · L Strength:   · Hip flexion: 4/5  · Knee extension: 5/5  · Dorsiflexion: 5/5   · Neuromuscular:  · Sensation: Pt denies paresthesias.   · Tone/Reflexes: No  impairments identified with functional mobility. No formal testing performed.   · Coordination:  · Heel to shin: Impaired, unable to maintain contact on shin   · Balance:   · Static sitting: Good  · Static standing: Fair(-), without UE support requires min-modA to achieve upright upstanding due to retropulsion  · Dynamic standing: Fair(-), requires use of BUE support and [place]reezy for ambulation  · Visual-vestibular: No impairments identified with functional mobility. No formal testing performed.  · Integument: Visible skin intact  · Cardiopulmonary:  · On 1.5 L NC, SpO2 monitor not present in isolation room to assess SpO2  · Edema: No pitting edema noted      Functional Mobility:  · Bed Mobility:     · Rolling Right: supervision, pt uses bed rail (I)  · Rolled to get off bed pan, PT assisted with ezequiel hygiene after loose BM  · Supine to Sit: stand by assistance. HOB slightly elevated and use of bed rails  · Sit to Supine: stand by assistance, HOB slightly elevated  · Transfers:     · Sit to Stand:  contact guard assistance and moderate assistance with no AD and rolling walker  · With no AD modA with pt maintaining posterior LE support on EOB   · With RW CGA  · Cues for safe placement of hands  · Gait: x10 ft with RW and CGA-Breezy.  · Gait deviations noted: decreased agnes, increased time in double stance and decreased stride length   · Notably slow gait speed with increased forward flexion of trunk      Therapeutic Activities and Exercises:  ·  PT educated patient re:   · PT plan of care/role of PT  · Safety with OOB mobility  · Use of RW and bedside commode  · Discharge disposition    · Pt verbalized understanding       AM-PAC 6 CLICK MOBILITY  Total Score:18     Patient left HOB elevated with all lines intact, call button in reach, RN Wendy notified and whote board updated. GEMS 3 with use of RW and BSC.    GOALS:   Multidisciplinary Problems     Physical Therapy Goals        Problem: Physical Therapy Goal    Goal  Priority Disciplines Outcome Goal Variances Interventions   Physical Therapy Goal     PT, PT/OT Ongoing, Progressing     Description:  Goals to be met by: 20    Patient will increase functional independence with mobility by performin. Sit<>stand with supervision with RW.  2. Gait x 100 feet with supervision with RW.  3. Ascend/descend 3 step(s) with least restrictive assistive device and BHR with CGA.   4. Activity x5 min with SpO2 >90% on appropriate supplemental O2.                       History:     Past Medical History:   Diagnosis Date    Allergic rhinitis     Carotid atherosclerosis, bilateral     Chronic low back pain with bilateral sciatica     Depression     Diabetes mellitus, type 2     Diverticulosis     Facet arthropathy     Gout     Hearing loss     History of TIA (transient ischemic attack)     Hyperlipidemia     Hypertension     IBS (irritable bowel syndrome)     Insomnia     Leg cramps     Lumbar stenosis     Meningioma     Osteoporosis     Paroxysmal SVT (supraventricular tachycardia)     Primary open angle glaucoma (POAG) of both eyes     PVD (peripheral vascular disease)     30% prox stenosis of celiac trunk and 20% stnosis Prox SMA - per Abd/SMA Arteriogram 4/3/08       Past Surgical History:   Procedure Laterality Date    CATARACT EXTRACTION      EPIDURAL STEROID INJECTION INTO LUMBAR SPINE      LUMBAR LAMINECTOMY      TOTAL ABDOMINAL HYSTERECTOMY W/ BILATERAL SALPINGOOPHORECTOMY         Time Tracking:     PT Received On: 20  PT Start Time: 1341     PT Stop Time: 1407  PT Total Time (min): 26 min     Billable Minutes: Evaluation 15 and Therapeutic Activity 10      Wendy Marino, PT  2020

## 2020-03-11 NOTE — PT/OT/SLP PROGRESS
Physical Therapy  Not Seen    Patient Name:  Farheen Soares   MRN:  [id number]    Patient not seen today secondary to Patient fatigue - pt reports many interruptions to sleep over night. Will follow-up in PM with JOSE R Nguyen to minimize interruptions.    Wendy Marino, PT, DPT

## 2020-03-11 NOTE — PLAN OF CARE
Pt remains free of falls and injury this shift. Pt denies any pains or discomfort this shift. Incontinent of bowel and bladder. Purewick in place. Cardiac monitoring in place, sinus rhythm on monitor. Continued droplet precautions in place and maintained. No distress noted. Will continue to monitor.

## 2020-03-11 NOTE — SUBJECTIVE & OBJECTIVE
Interval History:  Feels somewhat better today. Afebrile and WBC trend down from 14.75K to 7.45K.  No clear source for initial fevers or elevated WBC.   Lumbar puncture negative. Viral respiratory and Strep A negative.  Blood cultures without growth to date.     Review of Systems   Constitutional: Positive for fatigue (feels somewhat better). Negative for appetite change and fever.   HENT: Negative for congestion, rhinorrhea, sore throat and trouble swallowing.    Eyes: Negative.  Negative for photophobia and visual disturbance.   Respiratory: Negative for cough and shortness of breath.    Cardiovascular: Negative for chest pain and palpitations.   Gastrointestinal: Negative for abdominal pain, nausea and vomiting.   Endocrine: Negative.    Genitourinary: Negative for difficulty urinating, frequency and hematuria.   Musculoskeletal: Negative for back pain (chronic), myalgias and neck pain.   Skin: Negative.    Neurological: Positive for weakness (genaeralized). Negative for dizziness, syncope, light-headedness and headaches.   Hematological: Does not bruise/bleed easily.   Psychiatric/Behavioral: Negative.      Objective:     Vital Signs (Most Recent):  Temp: 97.1 °F (36.2 °C) (03/11/20 1226)  Pulse: 69 (03/11/20 1600)  Resp: 18 (03/11/20 1226)  BP: (!) 174/74 (03/11/20 1226)  SpO2: (!) 94 % (03/11/20 1226) Vital Signs (24h Range):  Temp:  [97.1 °F (36.2 °C)-98.6 °F (37 °C)] 97.1 °F (36.2 °C)  Pulse:  [65-82] 69  Resp:  [16-19] 18  SpO2:  [94 %-96 %] 94 %  BP: (159-185)/(73-84) 174/74     Weight: 67 kg (147 lb 11.3 oz)  Body mass index is 25.35 kg/m².    Intake/Output Summary (Last 24 hours) at 3/11/2020 1736  Last data filed at 3/11/2020 0700  Gross per 24 hour   Intake 320 ml   Output 250 ml   Net 70 ml      Physical Exam   Constitutional: She is oriented to person, place, and time. She appears well-developed and well-nourished.   Appears ill   HENT:   Head: Normocephalic and atraumatic.   Eyes: No scleral  icterus.   Neck: Normal range of motion. Neck supple.   Cardiovascular: Normal rate, regular rhythm, normal heart sounds and intact distal pulses.   No murmur heard.  Pulmonary/Chest: Effort normal and breath sounds normal. No respiratory distress. She has no wheezes.   Abdominal: Soft. Bowel sounds are normal. She exhibits no distension. There is no tenderness. There is no guarding.   Musculoskeletal: Normal range of motion. She exhibits no edema.   Neurological: She is alert and oriented to person, place, and time. No cranial nerve deficit.   Skin: Skin is warm. Capillary refill takes less than 2 seconds.   Psychiatric: She has a normal mood and affect.   Nursing note and vitals reviewed.      Significant Labs:   CBC:   Recent Labs   Lab 03/10/20  0512 03/11/20  0535   WBC 14.75* 7.45   HGB 13.3 14.1   HCT 39.5 42.1    188     CMP:   Recent Labs   Lab 03/10/20  0512 03/11/20  0535    139   K 3.6 4.0    105   CO2 24 24   * 142*   BUN 5* 9   CREATININE 0.7 0.7   CALCIUM 9.5 9.8   PROT 7.4 7.4   ALBUMIN 3.6 3.4*   BILITOT 0.4 0.3   ALKPHOS 55 60   AST 25 29   ALT 14 14   ANIONGAP 9 10   EGFRNONAA >60 >60     All pertinent labs within the past 24 hours have been reviewed.    Significant Imaging: I have reviewed all pertinent imaging results/findings within the past 24 hours.     Imaging Results          CT Abdomen Pelvis With Contrast (Final result)  Result time 03/08/20 22:40:25    Final result by Hortensia Anderson MD (03/08/20 22:40:25)                 Impression:      No acute abdominal or pelvic pathology CT of the abdomen and pelvis with contrast.    Advanced vascular calcifications.    Colonic diverticulosis.      Electronically signed by: Hortensia Anderson  Date:    03/08/2020  Time:    22:40             Narrative:    EXAMINATION:  CT OF ABDOMEN PELVIS WITH    CLINICAL HISTORY:  Infection, abdomen-pelvis;    TECHNIQUE:  5 mm enhanced axial images were obtained from the lung bases  through the greater trochanters.   mL of Omnipaque 350 was injected.    COMPARISON:  None.    FINDINGS:  The liver, spleen, pancreas, and adrenal glands are unremarkable. The gallbladder contains no calcified gallstones.    There are bilateral too small to characterize low attenuation lesions seen in both renal cortices.    There is no definite evidence for abdominal adenopathy or ascites. Advanced vascular calcifications are present.    There are no pelvic masses or adenopathy.  There is colonic diverticulosis.  There are tiny bilateral fat containing inguinal hernias.  A discrete appendix is not visualized.    There is no free fluid in the pelvis.    There is mild bibasilar atelectasis.    There is moderate dextroscoliosis.  There is severe multilevel degenerative changes spine.                               X-Ray Chest AP Portable (Final result)  Result time 03/08/20 19:49:21    Final result by Hortensia Anderson MD (03/08/20 19:49:21)                 Impression:      No acute intrathoracic abnormality detected.      Electronically signed by: Hortensia Anderson  Date:    03/08/2020  Time:    19:49             Narrative:    EXAMINATION:  AP PORTABLE CHEST    CLINICAL HISTORY:  Sepsis;    TECHNIQUE:  AP portable chest radiograph was submitted.    COMPARISON:  None.    FINDINGS:  AP portable chest radiograph demonstrates a cardiac silhouette within normal limits.  Vascular calcification is seen in the aortic knob.  There is biapical pleural thickening.  There is no focal consolidation, pneumothorax, or pleural effusion. Asymmetrical elevation the right hemidiaphragm is noted.

## 2020-03-12 PROBLEM — R50.9 FEVER: Status: RESOLVED | Noted: 2020-03-08 | Resolved: 2020-03-12

## 2020-03-12 LAB
ALBUMIN SERPL BCP-MCNC: 3.4 G/DL (ref 3.5–5.2)
ALP SERPL-CCNC: 66 U/L (ref 55–135)
ALT SERPL W/O P-5'-P-CCNC: 20 U/L (ref 10–44)
ANION GAP SERPL CALC-SCNC: 12 MMOL/L (ref 8–16)
AST SERPL-CCNC: 43 U/L (ref 10–40)
BACTERIA THROAT CULT: NORMAL
BASOPHILS # BLD AUTO: 0.03 K/UL (ref 0–0.2)
BASOPHILS NFR BLD: 0.4 % (ref 0–1.9)
BILIRUB SERPL-MCNC: 0.3 MG/DL (ref 0.1–1)
BUN SERPL-MCNC: 15 MG/DL (ref 8–23)
CALCIUM SERPL-MCNC: 9.9 MG/DL (ref 8.7–10.5)
CHLORIDE SERPL-SCNC: 105 MMOL/L (ref 95–110)
CO2 SERPL-SCNC: 23 MMOL/L (ref 23–29)
CREAT SERPL-MCNC: 0.7 MG/DL (ref 0.5–1.4)
DIFFERENTIAL METHOD: ABNORMAL
ENTEROVIRUS: NOT DETECTED
EOSINOPHIL # BLD AUTO: 0.1 K/UL (ref 0–0.5)
EOSINOPHIL NFR BLD: 1.5 % (ref 0–8)
ERYTHROCYTE [DISTWIDTH] IN BLOOD BY AUTOMATED COUNT: 13.6 % (ref 11.5–14.5)
EST. GFR  (AFRICAN AMERICAN): >60 ML/MIN/1.73 M^2
EST. GFR  (NON AFRICAN AMERICAN): >60 ML/MIN/1.73 M^2
GLUCOSE SERPL-MCNC: 154 MG/DL (ref 70–110)
HCT VFR BLD AUTO: 39.6 % (ref 37–48.5)
HGB BLD-MCNC: 13.4 G/DL (ref 12–16)
HUMAN BOCAVIRUS: NOT DETECTED
HUMAN CORONAVIRUS, COMMON COLD VIRUS: NOT DETECTED
IMM GRANULOCYTES # BLD AUTO: 0.02 K/UL (ref 0–0.04)
IMM GRANULOCYTES NFR BLD AUTO: 0.3 % (ref 0–0.5)
INFLUENZA A - H1N1-09: NOT DETECTED
LDH FLD L TO P-CCNC: 20 U/L
LYMPHOCYTES # BLD AUTO: 2.6 K/UL (ref 1–4.8)
LYMPHOCYTES NFR BLD: 34.5 % (ref 18–48)
MCH RBC QN AUTO: 33 PG (ref 27–31)
MCHC RBC AUTO-ENTMCNC: 33.8 G/DL (ref 32–36)
MCV RBC AUTO: 98 FL (ref 82–98)
MONOCYTES # BLD AUTO: 0.9 K/UL (ref 0.3–1)
MONOCYTES NFR BLD: 12 % (ref 4–15)
NEUTROPHILS # BLD AUTO: 3.8 K/UL (ref 1.8–7.7)
NEUTROPHILS NFR BLD: 51.3 % (ref 38–73)
NRBC BLD-RTO: 0 /100 WBC
PARAINFLUENZA: NOT DETECTED
PLATELET # BLD AUTO: 234 K/UL (ref 150–350)
PMV BLD AUTO: 10.3 FL (ref 9.2–12.9)
POCT GLUCOSE: 152 MG/DL (ref 70–110)
POCT GLUCOSE: 192 MG/DL (ref 70–110)
POCT GLUCOSE: 228 MG/DL (ref 70–110)
POTASSIUM SERPL-SCNC: 3.7 MMOL/L (ref 3.5–5.1)
PROT SERPL-MCNC: 7.3 G/DL (ref 6–8.4)
RBC # BLD AUTO: 4.06 M/UL (ref 4–5.4)
RVP - ADENOVIRUS: NOT DETECTED
RVP - HUMAN METAPNEUMOVIRUS (HMPV): NOT DETECTED
RVP - INFLUENZA A: NOT DETECTED
RVP - INFLUENZA B: NOT DETECTED
RVP - RESPIRATORY SYNCTIAL VIRUS (RSV) A: NOT DETECTED
RVP - RESPIRATORY VIRAL PANEL, SOURCE: NORMAL
RVP - RHINOVIRUS: NOT DETECTED
SODIUM SERPL-SCNC: 140 MMOL/L (ref 136–145)
SPECIMEN SOURCE: NORMAL
WBC # BLD AUTO: 7.42 K/UL (ref 3.9–12.7)

## 2020-03-12 PROCEDURE — G0378 HOSPITAL OBSERVATION PER HR: HCPCS

## 2020-03-12 PROCEDURE — 99226 PR SUBSEQUENT OBSERVATION CARE,LEVEL III: CPT | Mod: ,,, | Performed by: NURSE PRACTITIONER

## 2020-03-12 PROCEDURE — 85025 COMPLETE CBC W/AUTO DIFF WBC: CPT

## 2020-03-12 PROCEDURE — 63600175 PHARM REV CODE 636 W HCPCS: Performed by: PHYSICIAN ASSISTANT

## 2020-03-12 PROCEDURE — 25000003 PHARM REV CODE 250: Performed by: NURSE PRACTITIONER

## 2020-03-12 PROCEDURE — 25000003 PHARM REV CODE 250: Performed by: PHYSICIAN ASSISTANT

## 2020-03-12 PROCEDURE — 96372 THER/PROPH/DIAG INJ SC/IM: CPT

## 2020-03-12 PROCEDURE — 36415 COLL VENOUS BLD VENIPUNCTURE: CPT

## 2020-03-12 PROCEDURE — 94761 N-INVAS EAR/PLS OXIMETRY MLT: CPT

## 2020-03-12 PROCEDURE — 27000221 HC OXYGEN, UP TO 24 HOURS

## 2020-03-12 PROCEDURE — 97116 GAIT TRAINING THERAPY: CPT

## 2020-03-12 PROCEDURE — 99226 PR SUBSEQUENT OBSERVATION CARE,LEVEL III: ICD-10-PCS | Mod: ,,, | Performed by: NURSE PRACTITIONER

## 2020-03-12 PROCEDURE — 80053 COMPREHEN METABOLIC PANEL: CPT

## 2020-03-12 PROCEDURE — 97530 THERAPEUTIC ACTIVITIES: CPT

## 2020-03-12 RX ORDER — CLOPIDOGREL BISULFATE 75 MG/1
75 TABLET ORAL DAILY
Status: DISCONTINUED | OUTPATIENT
Start: 2020-03-13 | End: 2020-03-13 | Stop reason: HOSPADM

## 2020-03-12 RX ADMIN — INSULIN ASPART 2 UNITS: 100 INJECTION, SOLUTION INTRAVENOUS; SUBCUTANEOUS at 12:03

## 2020-03-12 RX ADMIN — DOXYCYCLINE HYCLATE 100 MG: 100 TABLET, COATED ORAL at 09:03

## 2020-03-12 RX ADMIN — LISINOPRIL 40 MG: 20 TABLET ORAL at 09:03

## 2020-03-12 RX ADMIN — VENLAFAXINE 75 MG: 37.5 TABLET ORAL at 09:03

## 2020-03-12 RX ADMIN — ALLOPURINOL 300 MG: 300 TABLET ORAL at 09:03

## 2020-03-12 RX ADMIN — AMLODIPINE BESYLATE 10 MG: 5 TABLET ORAL at 09:03

## 2020-03-12 RX ADMIN — VENLAFAXINE 75 MG: 37.5 TABLET ORAL at 10:03

## 2020-03-12 RX ADMIN — METOPROLOL SUCCINATE 100 MG: 50 TABLET, EXTENDED RELEASE ORAL at 09:03

## 2020-03-12 RX ADMIN — DOXYCYCLINE HYCLATE 100 MG: 100 TABLET, COATED ORAL at 10:03

## 2020-03-12 RX ADMIN — ATORVASTATIN CALCIUM 10 MG: 10 TABLET, FILM COATED ORAL at 09:03

## 2020-03-12 NOTE — PT/OT/SLP PROGRESS
Physical Therapy Treatment    Patient Name:  Farheen Soares   MRN:  [id number]    Recommendations:     Discharge Recommendations:  nursing facility, skilled   Discharge Equipment Recommendations: 3-in-1 commode   Barriers to discharge: Inaccessible home and Decreased caregiver support    Assessment:     Farheen Soares is a 85 y.o. female admitted with a medical diagnosis of Viral illness.  She presents with the following impairments/functional limitations:  weakness, impaired endurance, impaired self care skills, impaired functional mobilty, gait instability, impaired balance, decreased safety awareness, decreased coordination.    Patient found with O2 doffed, SpO2 96% supine. Pt kept on RA for further mobility with SpO2 remaining 93-98% >10 min activity. Supine<.sit with SBA, sit<>stand with SBA with RW, and amb x25 ft with RW and CGA-SBA. Patient reports feeling much better and stronger, rec for SNF but with further progress and increased assistance at home after initial d/c may be appropriate for home with HH PT/OT. White board updated with rec for amb and use of RW/BSC.     Oxygen Assessment: SpO2 on room air: 95% at rest, 94% with activity, 97% recovery after 2 minutes      Rehab Prognosis: Good; patient would benefit from acute skilled PT services to address these deficits and reach maximum level of function.    Recent Surgery: Procedure(s) (LRB):  LUMBAR PUNCTURE (N/A) 3 Days Post-Op    Plan:     During this hospitalization, patient to be seen 5 x/week to address the identified rehab impairments via gait training, therapeutic activities, therapeutic exercises, neuromuscular re-education and progress toward the following goals:    · Plan of Care Expires:  04/01/20    Subjective     Patient/Family Comments/goals: Somewhat tired, but feeling better and stronger today  Pain/Comfort:  · Pain Rating 1: 0/10  · Pain Rating Post-Intervention 1: 0/10      Objective:     Communicated with JOSE R Travis prior  "to session.  Patient found HOB elevated with telemetry, peripheral IV upon PT entry to room.     General Precautions: Standard, diabetic, fall, droplet, hearing impaired   Orthopedic Precautions:N/A   Braces: N/A     Patient donned yellow socks and gait belt for OOB mobility.    Functional Mobility:  · Bed Mobility:     · Bridging: stand by assistance  · Supine to Sit: stand by assistance  · Sit to Supine: stand by assistance  · Transfers:     · Sit to Stand:  stand by assistance with rolling walker  · Demo's placement of hands on RW to pull to standing  Five Times Sit to Stand Test:  How long the patient takes to completed 5 sit to stand form chair with arms folded across chest: 24 sec (maintains UE on RW)  "Inability to perform test in less than 13.6 seconds indicates increased disability and Morbidity." (Lynette 2000).  Normative values for community dwelling elderly:   60-70y/o: 11.4 seconds  70-78y/o: 12.6 seconds  80-90y/o: 14.8 seconds  · Gait: x25 ft with RW and CGA-SBA.   · Continued decreased foot clearance with slow gait speed      AM-PAC 6 CLICK MOBILITY  Turning over in bed (including adjusting bedclothes, sheets and blankets)?: 4  Sitting down on and standing up from a chair with arms (e.g., wheelchair, bedside commode, etc.): 3  Moving from lying on back to sitting on the side of the bed?: 3  Moving to and from a bed to a chair (including a wheelchair)?: 3  Need to walk in hospital room?: 3  Climbing 3-5 steps with a railing?: 3  Basic Mobility Total Score: 19       Therapeutic Activities and Exercises:  ·  Gait training with SpO2 monitoring  SpO2 on room air: 95% at rest, 94% with activity, 97% recovery after 2 minutes    Patient left HOB elevated with all lines intact, call button in reach, RN Angy notified and NP Asha present. RN notified of wet linens d/t uninary incontinence and pt deferring changing of sheets at this time.     GOALS:   Multidisciplinary Problems     Physical Therapy Goals  "       Problem: Physical Therapy Goal    Goal Priority Disciplines Outcome Goal Variances Interventions   Physical Therapy Goal     PT, PT/OT Ongoing, Progressing     Description:  Goals to be met by: 20    Patient will increase functional independence with mobility by performin. Sit<>stand with supervision with RW.  2. Gait x 100 feet with supervision with RW.  3. Ascend/descend 3 step(s) with least restrictive assistive device and BHR with CGA.   4. Activity x5 min with SpO2 >90% on appropriate supplemental O2. MET 3/12                       Time Tracking:     PT Received On: 20  PT Start Time: 1051     PT Stop Time: 1114  PT Total Time (min): 23 min     Billable Minutes: Gait Training 10 and Therapeutic Activity 13    Treatment Type: Treatment  PT/PTA: PT     PTA Visit Number: 0     Wedny Marino, PT  2020

## 2020-03-12 NOTE — PROGRESS NOTES
[facility]  Infectious Disease  Progress Note    Patient Name: Farheen Soares  MRN: [id number]  Admission Date: 3/8/2020  Length of Stay: 0 days  Attending Physician: Maggie Khan MD  Primary Care Provider: Vonda Chung MD    Isolation Status: Droplet  Assessment/Plan:      Fever  85-year-old female with history of DM2, HTN, HLD, gout, presents with generalized weakness associated with nausea, abdominal cramping, diarrhea, sinus pressure, and sore throat.  On admission, found to be febrile with leukocytosis. Extensive work-up has been negative including influenza screen, urinalysis, CXR, CT abd/pelvis, blood cultures.  Traumatic LP with 40K RBC - adjusted WBC within normal range    Given constellation of symptoms, likely viral syndrome, also consider sinusitis.  Patient with visitors about 2 weeks ago, otherwise no recent travel, pets, outdoor hobbies.  Resp infection panel negative.    Recommendations:  - Continue ceftriaxone 2g IV q24 hours while inpatient  - On discharge, doxycycline 100 mg PO BID, plan for total 7 day course antibiotics, last day 3/15/2020        Thank you for your consult. I will sign off. Please contact us if you have any additional questions.    Carlota Moreno MD  Infectious Disease  [facility]    Subjective:     Principal Problem:Viral illness    HPI: 85-year-old female with history of DM2, HTN, HLD, gout, presents with fevers and generalized weakness. On 3/6/2020, patient had an episode of diarrhea. Over the weekend, patient started feeling generalized weakness. She reported associated sinus pain, sore throat, nausea, stomach cramps, and headache.  Patient denied any photophobia, noted neck stiffness.  Patient was having difficulty with getting to the bathroom due to weakness, therefore, daughter called EMS.  Patient denied any rhinorrhea, cough, SOB, CP, vomiting, hematuria, dysuria. Per daughter, patient's family came to visit about  14 days ago. Patient lives with son, neither patient nor son have left the apartment in a month.  No pets, travel, outdoor hobbies, new foods. In ED, patient was febrile with leukocytosis. CXR negative, CT abd/pelvis negative for acute process, UA bland, influenza screen negative.  Patient was given a dose of vanc and pip-tazo.  Patient reports feeling improved compared to admission.  LP performed today.  Interval History:  No fevers  Patient reports feeling better and stronger compared to on admission  Main complaint is congestion and sore throat  Still feels weak on her feet.    Review of Systems   Constitutional: Positive for activity change and fatigue. Negative for chills, diaphoresis and fever.   HENT: Positive for congestion, sinus pain and sore throat. Negative for rhinorrhea.    Respiratory: Negative for cough and shortness of breath.    Cardiovascular: Negative for chest pain and leg swelling.   Gastrointestinal: Negative for abdominal pain, diarrhea, nausea and vomiting.   Genitourinary: Negative for dysuria and hematuria.   Musculoskeletal: Negative for arthralgias and myalgias.   Skin: Negative for rash.   Neurological: Negative for headaches.     Objective:     Vital Signs (Most Recent):  Temp: 98.6 °F (37 °C) (03/11/20 2018)  Pulse: 82 (03/11/20 2018)  Resp: 20 (03/11/20 2018)  BP: (!) 161/71 (03/11/20 2018)  SpO2: 96 % (03/11/20 2018) Vital Signs (24h Range):  Temp:  [97.1 °F (36.2 °C)-98.6 °F (37 °C)] 98.6 °F (37 °C)  Pulse:  [65-82] 82  Resp:  [16-20] 20  SpO2:  [92 %-96 %] 96 %  BP: (161-185)/(71-84) 161/71     Weight: 67 kg (147 lb 11.3 oz)  Body mass index is 25.35 kg/m².    Estimated Creatinine Clearance: 57.3 mL/min (based on SCr of 0.7 mg/dL).    Physical Exam   Constitutional: She is oriented to person, place, and time. She appears well-developed and well-nourished. No distress.   Elderly female, hard of hearing, more alert compared to prior visit   HENT:   Head: Normocephalic and  atraumatic.   Mouth/Throat: Oropharynx is clear and moist. No oropharyngeal exudate.   Eyes: Conjunctivae and EOM are normal.   Neck: Normal range of motion. Neck supple.   Cardiovascular: Normal rate, regular rhythm and normal heart sounds.   Pulmonary/Chest: Effort normal and breath sounds normal. No stridor. No respiratory distress. She has no wheezes.   Abdominal: Soft. Bowel sounds are normal. She exhibits no distension and no mass. There is no guarding.   Musculoskeletal: Normal range of motion. She exhibits no edema.   Neurological: She is alert and oriented to person, place, and time.   Skin: Skin is warm and dry. No rash noted. She is not diaphoretic. No erythema.   Psychiatric: She has a normal mood and affect. Her behavior is normal.       Significant Labs: All pertinent labs within the past 24 hours have been reviewed.    Significant Imaging: I have reviewed all pertinent imaging results/findings within the past 24 hours.

## 2020-03-12 NOTE — SUBJECTIVE & OBJECTIVE
Interval History:  No fevers  Patient reports feeling better and stronger compared to on admission  Main complaint is congestion and sore throat  Still feels weak on her feet.    Review of Systems   Constitutional: Positive for activity change and fatigue. Negative for chills, diaphoresis and fever.   HENT: Positive for congestion, sinus pain and sore throat. Negative for rhinorrhea.    Respiratory: Negative for cough and shortness of breath.    Cardiovascular: Negative for chest pain and leg swelling.   Gastrointestinal: Negative for abdominal pain, diarrhea, nausea and vomiting.   Genitourinary: Negative for dysuria and hematuria.   Musculoskeletal: Negative for arthralgias and myalgias.   Skin: Negative for rash.   Neurological: Negative for headaches.     Objective:     Vital Signs (Most Recent):  Temp: 98.6 °F (37 °C) (03/11/20 2018)  Pulse: 82 (03/11/20 2018)  Resp: 20 (03/11/20 2018)  BP: (!) 161/71 (03/11/20 2018)  SpO2: 96 % (03/11/20 2018) Vital Signs (24h Range):  Temp:  [97.1 °F (36.2 °C)-98.6 °F (37 °C)] 98.6 °F (37 °C)  Pulse:  [65-82] 82  Resp:  [16-20] 20  SpO2:  [92 %-96 %] 96 %  BP: (161-185)/(71-84) 161/71     Weight: 67 kg (147 lb 11.3 oz)  Body mass index is 25.35 kg/m².    Estimated Creatinine Clearance: 57.3 mL/min (based on SCr of 0.7 mg/dL).    Physical Exam   Constitutional: She is oriented to person, place, and time. She appears well-developed and well-nourished. No distress.   Elderly female, hard of hearing, more alert compared to prior visit   HENT:   Head: Normocephalic and atraumatic.   Mouth/Throat: Oropharynx is clear and moist. No oropharyngeal exudate.   Eyes: Conjunctivae and EOM are normal.   Neck: Normal range of motion. Neck supple.   Cardiovascular: Normal rate, regular rhythm and normal heart sounds.   Pulmonary/Chest: Effort normal and breath sounds normal. No stridor. No respiratory distress. She has no wheezes.   Abdominal: Soft. Bowel sounds are normal. She exhibits no  distension and no mass. There is no guarding.   Musculoskeletal: Normal range of motion. She exhibits no edema.   Neurological: She is alert and oriented to person, place, and time.   Skin: Skin is warm and dry. No rash noted. She is not diaphoretic. No erythema.   Psychiatric: She has a normal mood and affect. Her behavior is normal.       Significant Labs: All pertinent labs within the past 24 hours have been reviewed.    Significant Imaging: I have reviewed all pertinent imaging results/findings within the past 24 hours.

## 2020-03-12 NOTE — SUBJECTIVE & OBJECTIVE
Interval History: ***    Review of Systems  Objective:     Vital Signs (Most Recent):  Temp: 98.6 °F (37 °C) (03/11/20 2018)  Pulse: 82 (03/11/20 2018)  Resp: 20 (03/11/20 2018)  BP: (!) 161/71 (03/11/20 2018)  SpO2: 96 % (03/11/20 2018) Vital Signs (24h Range):  Temp:  [97.1 °F (36.2 °C)-98.6 °F (37 °C)] 98.6 °F (37 °C)  Pulse:  [65-82] 82  Resp:  [16-20] 20  SpO2:  [92 %-96 %] 96 %  BP: (161-185)/(71-84) 161/71     Weight: 67 kg (147 lb 11.3 oz)  Body mass index is 25.35 kg/m².    Estimated Creatinine Clearance: 57.3 mL/min (based on SCr of 0.7 mg/dL).    Physical Exam    Significant Labs: {Results:62552}    Significant Imaging: {Imaging Review:51967}

## 2020-03-12 NOTE — PLAN OF CARE
Problem: Physical Therapy Goal  Goal: Physical Therapy Goal  Description  Goals to be met by: 20    Patient will increase functional independence with mobility by performin. Sit<>stand with supervision with RW.  2. Gait x 100 feet with supervision with RW.  3. Ascend/descend 3 step(s) with least restrictive assistive device and BHR with CGA.   4. Activity x5 min with SpO2 >90% on appropriate supplemental O2. MET 3/12      Outcome: Ongoing, Progressing     Patient found with O2 doffed, SpO2 96% supine. Pt kept on RA for further mobility with SpO2 remaining 93-98% >10 min activity. Supine<.sit with SBA, sit<>stand with SBA with RW, and amb x25 ft with RW and CGA-SBA. Patient reports feeling much better and stronger, rec for SNF but with further progress and increased assistance at home after initial d/c may be appropriate for home with HH PT/OT.

## 2020-03-12 NOTE — ASSESSMENT & PLAN NOTE
85-year-old female with history of DM2, HTN, HLD, gout, presents with generalized weakness associated with nausea, abdominal cramping, diarrhea, sinus pressure, and sore throat.  On admission, found to be febrile with leukocytosis. Extensive work-up has been negative including influenza screen, urinalysis, CXR, CT abd/pelvis, blood cultures.  Traumatic LP with 40K RBC - adjusted WBC within normal range    Given constellation of symptoms, likely viral syndrome, also consider sinusitis.  Patient with visitors about 2 weeks ago, otherwise no recent travel, pets, outdoor hobbies.  Resp infection panel negative.    Recommendations:  - Continue ceftriaxone 2g IV q24 hours while inpatient  - On discharge, doxycycline 100 mg PO BID, plan for total 7 day course antibiotics, last day 3/15/2020

## 2020-03-12 NOTE — PROGRESS NOTES
[facility] Hospital Medicine  Progress Note    Patient Name: Farheen Soares  MRN: [id number]  Patient Class: OP- Observation   Admission Date: 3/8/2020  Length of Stay: 0 days  Attending Physician: Maggie Khan MD  Primary Care Provider: Vonda Chung MD        Subjective:     Principal Problem:Viral illness        HPI:  Per CLAUDIA Teague, PA:  84 y.o.  female with PNH with DMII, HTN, osteoposis, Gout, HLD, Hearing loss, Depression, Chronic low back pain, GERD, h/o TIA presents to to ED with c/o fever associated with weakness, HA, generalized body aches, N/V for the last week. Patient reports too weak to eat. Per ED, daughter states patient slumped to the floor as she was too weak to walk to the bathroom. Patient denies cough, sore throat, SOB, abdominal pain, new back pain, numbness, urinary symptoms, neck stiffness, recent travel or toney loss. ED evaluation elevated WBC, febrile, flu negative, otherwise labs unremarkable. CT A/P and CXR unrevealing for an acute process. Patient placed in Observation for further evaluation.     Overview/Hospital Course:  LP performed today.  Infectious Disease consulted.  Etiology of symptomatology appears to be viral.  Patient will be continued on Rocephin.    Interval History:  Feels somewhat better today. Afebrile and WBC trend down from 14.75K to 7.45K.  No clear source for initial fevers or elevated WBC.   Lumbar puncture negative. Viral respiratory and Strep A negative.  Blood cultures without growth to date.     Review of Systems   Constitutional: Positive for fatigue (feels somewhat better). Negative for appetite change and fever.   HENT: Negative for congestion, rhinorrhea, sore throat and trouble swallowing.    Eyes: Negative.  Negative for photophobia and visual disturbance.   Respiratory: Negative for cough and shortness of breath.    Cardiovascular: Negative for chest pain and palpitations.   Gastrointestinal: Negative for abdominal  pain, nausea and vomiting.   Endocrine: Negative.    Genitourinary: Negative for difficulty urinating, frequency and hematuria.   Musculoskeletal: Negative for back pain (chronic), myalgias and neck pain.   Skin: Negative.    Neurological: Positive for weakness (genaeralized). Negative for dizziness, syncope, light-headedness and headaches.   Hematological: Does not bruise/bleed easily.   Psychiatric/Behavioral: Negative.      Objective:     Vital Signs (Most Recent):  Temp: 97.1 °F (36.2 °C) (03/11/20 1226)  Pulse: 69 (03/11/20 1600)  Resp: 18 (03/11/20 1226)  BP: (!) 174/74 (03/11/20 1226)  SpO2: (!) 94 % (03/11/20 1226) Vital Signs (24h Range):  Temp:  [97.1 °F (36.2 °C)-98.6 °F (37 °C)] 97.1 °F (36.2 °C)  Pulse:  [65-82] 69  Resp:  [16-19] 18  SpO2:  [94 %-96 %] 94 %  BP: (159-185)/(73-84) 174/74     Weight: 67 kg (147 lb 11.3 oz)  Body mass index is 25.35 kg/m².    Intake/Output Summary (Last 24 hours) at 3/11/2020 1736  Last data filed at 3/11/2020 0700  Gross per 24 hour   Intake 320 ml   Output 250 ml   Net 70 ml      Physical Exam   Constitutional: She is oriented to person, place, and time. She appears well-developed and well-nourished.   Appears ill   HENT:   Head: Normocephalic and atraumatic.   Eyes: No scleral icterus.   Neck: Normal range of motion. Neck supple.   Cardiovascular: Normal rate, regular rhythm, normal heart sounds and intact distal pulses.   No murmur heard.  Pulmonary/Chest: Effort normal and breath sounds normal. No respiratory distress. She has no wheezes.   Abdominal: Soft. Bowel sounds are normal. She exhibits no distension. There is no tenderness. There is no guarding.   Musculoskeletal: Normal range of motion. She exhibits no edema.   Neurological: She is alert and oriented to person, place, and time. No cranial nerve deficit.   Skin: Skin is warm. Capillary refill takes less than 2 seconds.   Psychiatric: She has a normal mood and affect.   Nursing note and vitals  reviewed.      Significant Labs:   CBC:   Recent Labs   Lab 03/10/20  0512 03/11/20  0535   WBC 14.75* 7.45   HGB 13.3 14.1   HCT 39.5 42.1    188     CMP:   Recent Labs   Lab 03/10/20  0512 03/11/20  0535    139   K 3.6 4.0    105   CO2 24 24   * 142*   BUN 5* 9   CREATININE 0.7 0.7   CALCIUM 9.5 9.8   PROT 7.4 7.4   ALBUMIN 3.6 3.4*   BILITOT 0.4 0.3   ALKPHOS 55 60   AST 25 29   ALT 14 14   ANIONGAP 9 10   EGFRNONAA >60 >60     All pertinent labs within the past 24 hours have been reviewed.    Significant Imaging: I have reviewed all pertinent imaging results/findings within the past 24 hours.     Imaging Results          CT Abdomen Pelvis With Contrast (Final result)  Result time 03/08/20 22:40:25    Final result by Hortensia Anderson MD (03/08/20 22:40:25)                 Impression:      No acute abdominal or pelvic pathology CT of the abdomen and pelvis with contrast.    Advanced vascular calcifications.    Colonic diverticulosis.      Electronically signed by: Hortensia Anderson  Date:    03/08/2020  Time:    22:40             Narrative:    EXAMINATION:  CT OF ABDOMEN PELVIS WITH    CLINICAL HISTORY:  Infection, abdomen-pelvis;    TECHNIQUE:  5 mm enhanced axial images were obtained from the lung bases through the greater trochanters.   mL of Omnipaque 350 was injected.    COMPARISON:  None.    FINDINGS:  The liver, spleen, pancreas, and adrenal glands are unremarkable. The gallbladder contains no calcified gallstones.    There are bilateral too small to characterize low attenuation lesions seen in both renal cortices.    There is no definite evidence for abdominal adenopathy or ascites. Advanced vascular calcifications are present.    There are no pelvic masses or adenopathy.  There is colonic diverticulosis.  There are tiny bilateral fat containing inguinal hernias.  A discrete appendix is not visualized.    There is no free fluid in the pelvis.    There is mild bibasilar  atelectasis.    There is moderate dextroscoliosis.  There is severe multilevel degenerative changes spine.                               X-Ray Chest AP Portable (Final result)  Result time 03/08/20 19:49:21    Final result by Hortensia Anderson MD (03/08/20 19:49:21)                 Impression:      No acute intrathoracic abnormality detected.      Electronically signed by: Hortensia Anderson  Date:    03/08/2020  Time:    19:49             Narrative:    EXAMINATION:  AP PORTABLE CHEST    CLINICAL HISTORY:  Sepsis;    TECHNIQUE:  AP portable chest radiograph was submitted.    COMPARISON:  None.    FINDINGS:  AP portable chest radiograph demonstrates a cardiac silhouette within normal limits.  Vascular calcification is seen in the aortic knob.  There is biapical pleural thickening.  There is no focal consolidation, pneumothorax, or pleural effusion. Asymmetrical elevation the right hemidiaphragm is noted.                                    Assessment/Plan:      Fever  - patient appears acutely ill, reports fever, nausea, poor po, body aches, HA, too weak to walk; no travel, no new skin rashes  - CT A/P, CXR no acute findings, mildly elevated WBC, flu neg, lactic normal, UA benign; BC drawn  - unclear etiology, suspect viral, was given Vanco and Zosyn in ED  - s/p LP, traumatic tap, culture  - CRP 77.5  - RVP pending  - antiemetics RVP  - appreciate ID recs Start ceftriaxone 2g IV q24 hours  - Throat culture          Hypertension  - reasonably stable  - cont home meds amlodipine 10 mg, metoprolol 100mg and lisinopril 40 mg daily  - monitor      History of transient ischemic attack (TIA)  - on statin and plavix at home, hold plavix for LP      Type 2 diabetes mellitus without complication, without long-term current use of insulin  -A1c 6.8 12/2019  -SS/accuchecks  - monitor          Hyperlipidemia  - on statin    Chronic low back pain with bilateral sciatica  - on norco at home  PRN        VTE Risk Mitigation (From  admission, onward)         Ordered     IP VTE HIGH RISK PATIENT  Once      03/09/20 0209     Place sequential compression device  Until discontinued      03/09/20 0043                      Asha Reed NP  Department of Hospital Medicine   [facility]

## 2020-03-13 VITALS
BODY MASS INDEX: 25.21 KG/M2 | WEIGHT: 147.69 LBS | HEART RATE: 76 BPM | OXYGEN SATURATION: 97 % | DIASTOLIC BLOOD PRESSURE: 70 MMHG | RESPIRATION RATE: 16 BRPM | TEMPERATURE: 98 F | SYSTOLIC BLOOD PRESSURE: 150 MMHG | HEIGHT: 64 IN

## 2020-03-13 LAB
ALBUMIN SERPL BCP-MCNC: 3.5 G/DL (ref 3.5–5.2)
ALP SERPL-CCNC: 67 U/L (ref 55–135)
ALT SERPL W/O P-5'-P-CCNC: 19 U/L (ref 10–44)
ANION GAP SERPL CALC-SCNC: 10 MMOL/L (ref 8–16)
AST SERPL-CCNC: 38 U/L (ref 10–40)
BASOPHILS # BLD AUTO: 0.04 K/UL (ref 0–0.2)
BASOPHILS NFR BLD: 0.5 % (ref 0–1.9)
BILIRUB SERPL-MCNC: 0.3 MG/DL (ref 0.1–1)
BUN SERPL-MCNC: 12 MG/DL (ref 8–23)
CALCIUM SERPL-MCNC: 9.8 MG/DL (ref 8.7–10.5)
CHLORIDE SERPL-SCNC: 104 MMOL/L (ref 95–110)
CO2 SERPL-SCNC: 23 MMOL/L (ref 23–29)
CREAT SERPL-MCNC: 0.7 MG/DL (ref 0.5–1.4)
DIFFERENTIAL METHOD: ABNORMAL
EOSINOPHIL # BLD AUTO: 0.1 K/UL (ref 0–0.5)
EOSINOPHIL NFR BLD: 1.7 % (ref 0–8)
ERYTHROCYTE [DISTWIDTH] IN BLOOD BY AUTOMATED COUNT: 13.4 % (ref 11.5–14.5)
EST. GFR  (AFRICAN AMERICAN): >60 ML/MIN/1.73 M^2
EST. GFR  (NON AFRICAN AMERICAN): >60 ML/MIN/1.73 M^2
GLUCOSE SERPL-MCNC: 176 MG/DL (ref 70–110)
HCT VFR BLD AUTO: 40.7 % (ref 37–48.5)
HGB BLD-MCNC: 13.5 G/DL (ref 12–16)
IMM GRANULOCYTES # BLD AUTO: 0.02 K/UL (ref 0–0.04)
IMM GRANULOCYTES NFR BLD AUTO: 0.3 % (ref 0–0.5)
LYMPHOCYTES # BLD AUTO: 3 K/UL (ref 1–4.8)
LYMPHOCYTES NFR BLD: 39.7 % (ref 18–48)
MCH RBC QN AUTO: 32.5 PG (ref 27–31)
MCHC RBC AUTO-ENTMCNC: 33.2 G/DL (ref 32–36)
MCV RBC AUTO: 98 FL (ref 82–98)
MONOCYTES # BLD AUTO: 0.7 K/UL (ref 0.3–1)
MONOCYTES NFR BLD: 9.3 % (ref 4–15)
NEUTROPHILS # BLD AUTO: 3.7 K/UL (ref 1.8–7.7)
NEUTROPHILS NFR BLD: 48.5 % (ref 38–73)
NRBC BLD-RTO: 0 /100 WBC
PLATELET # BLD AUTO: 244 K/UL (ref 150–350)
PMV BLD AUTO: 10.6 FL (ref 9.2–12.9)
POCT GLUCOSE: 174 MG/DL (ref 70–110)
POTASSIUM SERPL-SCNC: 4.1 MMOL/L (ref 3.5–5.1)
PROT SERPL-MCNC: 7.5 G/DL (ref 6–8.4)
RBC # BLD AUTO: 4.15 M/UL (ref 4–5.4)
SODIUM SERPL-SCNC: 137 MMOL/L (ref 136–145)
WBC # BLD AUTO: 7.55 K/UL (ref 3.9–12.7)

## 2020-03-13 PROCEDURE — 36415 COLL VENOUS BLD VENIPUNCTURE: CPT

## 2020-03-13 PROCEDURE — G0378 HOSPITAL OBSERVATION PER HR: HCPCS

## 2020-03-13 PROCEDURE — 99225 PR SUBSEQUENT OBSERVATION CARE,LEVEL II: CPT | Mod: ,,, | Performed by: INTERNAL MEDICINE

## 2020-03-13 PROCEDURE — 80053 COMPREHEN METABOLIC PANEL: CPT

## 2020-03-13 PROCEDURE — 25000003 PHARM REV CODE 250: Performed by: NURSE PRACTITIONER

## 2020-03-13 PROCEDURE — 99225 PR SUBSEQUENT OBSERVATION CARE,LEVEL II: ICD-10-PCS | Mod: ,,, | Performed by: INTERNAL MEDICINE

## 2020-03-13 PROCEDURE — 85025 COMPLETE CBC W/AUTO DIFF WBC: CPT

## 2020-03-13 PROCEDURE — 25000003 PHARM REV CODE 250: Performed by: PHYSICIAN ASSISTANT

## 2020-03-13 RX ORDER — DOXYCYCLINE HYCLATE 100 MG
100 TABLET ORAL EVERY 12 HOURS
Qty: 14 TABLET | Refills: 0 | Status: SHIPPED | OUTPATIENT
Start: 2020-03-13 | End: 2020-03-20

## 2020-03-13 RX ORDER — LOPERAMIDE HYDROCHLORIDE 2 MG/1
2 CAPSULE ORAL 4 TIMES DAILY PRN
Status: DISCONTINUED | OUTPATIENT
Start: 2020-03-13 | End: 2020-03-13 | Stop reason: HOSPADM

## 2020-03-13 RX ADMIN — AMLODIPINE BESYLATE 10 MG: 5 TABLET ORAL at 10:03

## 2020-03-13 RX ADMIN — CLOPIDOGREL BISULFATE 75 MG: 75 TABLET ORAL at 10:03

## 2020-03-13 RX ADMIN — LISINOPRIL 40 MG: 20 TABLET ORAL at 10:03

## 2020-03-13 RX ADMIN — DOXYCYCLINE HYCLATE 100 MG: 100 TABLET, COATED ORAL at 10:03

## 2020-03-13 RX ADMIN — ATORVASTATIN CALCIUM 10 MG: 10 TABLET, FILM COATED ORAL at 10:03

## 2020-03-13 RX ADMIN — ALLOPURINOL 300 MG: 300 TABLET ORAL at 10:03

## 2020-03-13 RX ADMIN — VENLAFAXINE 75 MG: 37.5 TABLET ORAL at 10:03

## 2020-03-13 RX ADMIN — METOPROLOL SUCCINATE 100 MG: 50 TABLET, EXTENDED RELEASE ORAL at 10:03

## 2020-03-13 NOTE — SUBJECTIVE & OBJECTIVE
Interval History:  Slowly improving but states she still feels too weak to go home.  She continues to report sore throat and generalized achiness.  Afebrile and no elevation to WBC.  No clear source for initial fevers or elevated WBC.   Lumbar puncture negative. Viral respiratory and Strep A negative.  Blood cultures without growth to date.     Review of Systems   Constitutional: Negative for appetite change, fatigue and fever.   HENT: Negative for congestion, rhinorrhea, sore throat and trouble swallowing.    Eyes: Negative.  Negative for photophobia and visual disturbance.   Respiratory: Negative for cough and shortness of breath.    Cardiovascular: Negative for chest pain and palpitations.   Gastrointestinal: Negative for abdominal pain, nausea and vomiting.   Endocrine: Negative.    Genitourinary: Negative for difficulty urinating, frequency and hematuria.   Musculoskeletal: Negative for back pain (chronic), myalgias and neck pain.   Skin: Negative.    Neurological: Negative for dizziness, syncope, weakness (generalized ), light-headedness and headaches.   Hematological: Does not bruise/bleed easily.   Psychiatric/Behavioral: Negative.      Objective:     Vital Signs (Most Recent):  Temp: 98.4 °F (36.9 °C) (03/13/20 0331)  Pulse: 81 (03/13/20 0331)  Resp: 18 (03/13/20 0331)  BP: (!) 170/78 (03/13/20 0331)  SpO2: 95 % (03/13/20 0331) Vital Signs (24h Range):  Temp:  [98 °F (36.7 °C)-98.9 °F (37.2 °C)] 98.4 °F (36.9 °C)  Pulse:  [71-81] 81  Resp:  [16-18] 18  SpO2:  [95 %-97 %] 95 %  BP: (125-173)/(78-79) 170/78     Weight: 67 kg (147 lb 11.3 oz)  Body mass index is 25.35 kg/m².  No intake or output data in the 24 hours ending 03/13/20 0836   Physical Exam   Constitutional: She is oriented to person, place, and time. She appears well-developed and well-nourished. No distress.   Appears ill   HENT:   Head: Normocephalic and atraumatic.   Mouth/Throat: Oropharynx is clear and moist.   Eyes: No scleral icterus.    Neck: Normal range of motion. Neck supple.   Cardiovascular: Normal rate, regular rhythm, normal heart sounds and intact distal pulses.   No murmur heard.  Pulmonary/Chest: Effort normal and breath sounds normal. She has no wheezes.   Abdominal: Soft. Bowel sounds are normal. There is no tenderness.   Musculoskeletal: Normal range of motion. She exhibits no edema.   Neurological: She is alert and oriented to person, place, and time. No cranial nerve deficit.   Skin: Skin is warm. Capillary refill takes less than 2 seconds.   Psychiatric: She has a normal mood and affect.   Nursing note and vitals reviewed.      Significant Labs:   CBC:   Recent Labs   Lab 03/12/20  0612 03/13/20  0433   WBC 7.42 7.55   HGB 13.4 13.5   HCT 39.6 40.7    244     CMP:   Recent Labs   Lab 03/12/20  0612 03/13/20  0433    137   K 3.7 4.1    104   CO2 23 23   * 176*   BUN 15 12   CREATININE 0.7 0.7   CALCIUM 9.9 9.8   PROT 7.3 7.5   ALBUMIN 3.4* 3.5   BILITOT 0.3 0.3   ALKPHOS 66 67   AST 43* 38   ALT 20 19   ANIONGAP 12 10   EGFRNONAA >60 >60     All pertinent labs within the past 24 hours have been reviewed.    Significant Imaging: I have reviewed all pertinent imaging results/findings within the past 24 hours.

## 2020-03-13 NOTE — ASSESSMENT & PLAN NOTE
- Pain controlled with home dose of norco  PRN  - PT/OT evaluated patient and recommended skilled nursing facility  - Case Management involvement appreciated

## 2020-03-13 NOTE — SUBJECTIVE & OBJECTIVE
Interval History:  Patient continues to improve. Afebrile and no elevation to WBC.  No clear source for initial fevers or elevated WBC.   Lumbar puncture negative. Viral respiratory and Strep A negative.  Blood cultures without growth to date.     Review of Systems   Constitutional: Negative for appetite change, fatigue and fever.   HENT: Negative for congestion, rhinorrhea, sore throat and trouble swallowing.    Eyes: Negative.  Negative for photophobia and visual disturbance.   Respiratory: Negative for cough and shortness of breath.    Cardiovascular: Negative for chest pain and palpitations.   Gastrointestinal: Negative for abdominal pain, nausea and vomiting.   Endocrine: Negative.    Genitourinary: Negative for difficulty urinating, frequency and hematuria.   Musculoskeletal: Negative for back pain (chronic), myalgias and neck pain.   Skin: Negative.    Neurological: Negative for dizziness, syncope, weakness (generalized ), light-headedness and headaches.   Hematological: Does not bruise/bleed easily.   Psychiatric/Behavioral: Negative.      Objective:     Vital Signs (Most Recent):  Temp: 98 °F (36.7 °C) (03/12/20 1710)  Pulse: 76 (03/12/20 1710)  Resp: 18 (03/12/20 1710)  BP: 125/79 (03/12/20 1710)  SpO2: 97 % (03/12/20 1710) Vital Signs (24h Range):  Temp:  [97.7 °F (36.5 °C)-98.7 °F (37.1 °C)] 98 °F (36.7 °C)  Pulse:  [70-82] 76  Resp:  [16-20] 18  SpO2:  [94 %-97 %] 97 %  BP: (125-184)/(69-81) 125/79     Weight: 67 kg (147 lb 11.3 oz)  Body mass index is 25.35 kg/m².  No intake or output data in the 24 hours ending 03/12/20 2330   Physical Exam   Constitutional: She is oriented to person, place, and time. She appears well-developed and well-nourished. No distress.   Appears ill   HENT:   Head: Normocephalic and atraumatic.   Mouth/Throat: Oropharynx is clear and moist.   Eyes: No scleral icterus.   Neck: Normal range of motion. Neck supple.   Cardiovascular: Normal rate, regular rhythm, normal heart  sounds and intact distal pulses.   No murmur heard.  Pulmonary/Chest: Effort normal and breath sounds normal. She has no wheezes.   Abdominal: Soft. Bowel sounds are normal. There is no tenderness.   Musculoskeletal: Normal range of motion. She exhibits no edema.   Neurological: She is alert and oriented to person, place, and time. No cranial nerve deficit.   Skin: Skin is warm. Capillary refill takes less than 2 seconds.   Psychiatric: She has a normal mood and affect.   Nursing note and vitals reviewed.      Significant Labs:   CBC:   Recent Labs   Lab 03/11/20  0535 03/12/20  0612   WBC 7.45 7.42   HGB 14.1 13.4   HCT 42.1 39.6    234     CMP:   Recent Labs   Lab 03/11/20  0535 03/12/20  0612    140   K 4.0 3.7    105   CO2 24 23   * 154*   BUN 9 15   CREATININE 0.7 0.7   CALCIUM 9.8 9.9   PROT 7.4 7.3   ALBUMIN 3.4* 3.4*   BILITOT 0.3 0.3   ALKPHOS 60 66   AST 29 43*   ALT 14 20   ANIONGAP 10 12   EGFRNONAA >60 >60     All pertinent labs within the past 24 hours have been reviewed.    Significant Imaging: I have reviewed all pertinent imaging results/findings within the past 24 hours.

## 2020-03-13 NOTE — PLAN OF CARE
03/13/20 1543   Final Note   Assessment Type Final Discharge Note   Anticipated Discharge Disposition Home-Health  ([facility])   What phone number can be called within the next 1-3 days to see how you are doing after discharge?   ([phone])   Hospital Follow Up  Appt(s) scheduled? No   Discharge plans and expectations educations in teach back method with documentation complete? No

## 2020-03-13 NOTE — PLAN OF CARE
[facility]    HOME HEALTH ORDERS  FACE TO FACE ENCOUNTER    Patient Name: Farheen Soares  YOB: 1935    PCP: Vonda Chung MD   PCP Address: [address] [facility] / [facility] [address]  PCP Phone Number: [phone]  PCP Fax: [phone]    Encounter Date: 03/13/2020    Admit to Home Health    Diagnoses:  Active Hospital Problems    Diagnosis  POA    *Viral illness [B34.9]  Yes    Type 2 diabetes mellitus without complication, without long-term current use of insulin [E11.9]  Yes    Hyperlipidemia [E78.5]  Yes    Hypertension [I10]  Yes    History of transient ischemic attack (TIA) [Z86.73]  Not Applicable    Chronic low back pain with bilateral sciatica [M54.41, M54.42, G89.29]  Yes      Resolved Hospital Problems    Diagnosis Date Resolved POA    Fever [R50.9] 03/12/2020 Yes       Future Appointments   Date Time Provider Department Center   3/20/2020  3:30 PM Johnathan Gonzalez Jr., MD [facility]           I have seen and examined this patient face to face today. My clinical findings that support the need for the home health skilled services and home bound status are the following:  Weakness/numbness causing balance and gait disturbance due to infection making it taxing to leave home.    Allergies:  Review of patient's allergies indicates:   Allergen Reactions    Baclofen      AMS and distorted dremas       Diet: diabetic diet: 2000 calorie    Activities: activity as tolerated    Nursing:   SN to complete comprehensive assessment including routine vital signs. Instruct on disease process and s/s of complications to report to MD. Review/verify medication list sent home with the patient at time of discharge  and instruct patient/caregiver as needed. Frequency may be adjusted depending on start of care date.    Notify MD if SBP > 160 or < 90; DBP > 90 or < 50; HR > 120 or < 50; Temp > 101      CONSULTS:    Physical Therapy to evaluate and treat.  Evaluate for home safety and equipment needs; Establish/upgrade home exercise program. Perform / instruct on therapeutic exercises, gait training, transfer training, and Range of Motion.  Occupational Therapy to evaluate and treat. Evaluate home environment for safety and equipment needs. Perform/Instruct on transfers, ADL training, ROM, and therapeutic exercises.  Aide to provide assistance with personal care, ADLs, and vital signs.    MISCELLANEOUS CARE:  N/A    WOUND CARE ORDERS  n/a      Medications: Review discharge medications with patient and family and provide education.      Current Discharge Medication List      START taking these medications    Details   doxycycline (VIBRA-TABS) 100 MG tablet Take 1 tablet (100 mg total) by mouth every 12 (twelve) hours. for 7 days  Qty: 14 tablet, Refills: 0         CONTINUE these medications which have NOT CHANGED    Details   alendronate (FOSAMAX) 70 MG tablet Take 1 tablet (70 mg total) by mouth every 7 days.  Qty: 12 tablet, Refills: 1    Associated Diagnoses: Osteoporosis, unspecified osteoporosis type, unspecified pathological fracture presence      allopurinol (ZYLOPRIM) 300 MG tablet Take 1 tablet (300 mg total) by mouth once daily.  Qty: 90 tablet, Refills: 1    Associated Diagnoses: Gout, unspecified cause, unspecified chronicity, unspecified site      amLODIPine (NORVASC) 10 MG tablet Take 1 tablet (10 mg total) by mouth once daily.  Qty: 90 tablet, Refills: 1    Associated Diagnoses: Hypertension, unspecified type      atorvastatin (LIPITOR) 10 MG tablet Take 1 tablet (10 mg total) by mouth once daily.  Qty: 90 tablet, Refills: 1    Associated Diagnoses: Hyperlipidemia, unspecified hyperlipidemia type      clopidogrel (PLAVIX) 75 mg tablet Take 1 tablet (75 mg total) by mouth once daily.  Qty: 90 tablet, Refills: 1    Associated Diagnoses: History of transient ischemic attack (TIA)      fexofenadine (ALLEGRA) 180 MG tablet Take 180 mg by mouth daily as  needed.      fluticasone (FLONASE) 50 mcg/actuation nasal spray 1 spray by Each Nare route once daily.      lisinopril (PRINIVIL,ZESTRIL) 40 MG tablet Take 1 tablet (40 mg total) by mouth once daily.  Qty: 90 tablet, Refills: 1    Associated Diagnoses: Hypertension, unspecified type      metFORMIN (GLUCOPHAGE-XR) 500 MG 24 hr tablet Take 1 tablet (500 mg total) by mouth daily with breakfast.  Qty: 90 tablet, Refills: 1    Associated Diagnoses: Type 2 diabetes mellitus without complication, without long-term current use of insulin      metoprolol succinate (TOPROL-XL) 100 MG 24 hr tablet Take 1 tablet (100 mg total) by mouth once daily. Take 1 1/2 daily  Qty: 135 tablet, Refills: 1    Associated Diagnoses: Hypertension, unspecified type      ranitidine (ZANTAC) 150 MG tablet Take 150 mg by mouth 2 (two) times daily.      venlafaxine (EFFEXOR) 75 MG tablet 2 tabs po QAM and 1 po QHS  Qty: 270 tablet, Refills: 0    Associated Diagnoses: Depression, unspecified depression type      blood sugar diagnostic Strp To check BG three times daily, to use with insurance preferred meter  Qty: 100 strip, Refills: 0    Associated Diagnoses: Type 2 diabetes mellitus without complication, without long-term current use of insulin      blood-glucose meter kit To check BG three times daily, to use with insurance preferred meter  Qty: 1 each, Refills: 0    Associated Diagnoses: Type 2 diabetes mellitus without complication, without long-term current use of insulin      HYDROcodone-acetaminophen (NORCO) 5-325 mg per tablet Take 1 tablet by mouth every 6 (six) hours as needed for Pain.    Comments: Quantity prescribed more than 7 day supply? Press F2 and select one:[id number]        lancets [facility] To check BG three times daily, to use with insurance preferred meter  Qty: 100 each, Refills: 0    Associated Diagnoses: Type 2 diabetes mellitus without complication, without long-term current use of insulin             I certify that this patient is  confined to her home and needs intermittent skilled nursing care, physical therapy and occupational therapy.

## 2020-03-13 NOTE — ASSESSMENT & PLAN NOTE
No clear source for infection, but suspect viral source  - respiratory panel negative, strep A pneumonia negative  - clinical improvement after treatment with ceftriaxone   - transition to doxycyline for total three days

## 2020-03-13 NOTE — PROGRESS NOTES
Pt's daughter reported that pt had [facility] previously.  SW informed her that [facility] no longer accepts PH[facility].  Daughter will allow placement with first available provider.      SW sent HH orders and medical records to [facility] via [facility].

## 2020-03-13 NOTE — PROGRESS NOTES
[facility] Hospital Medicine  Progress Note    Patient Name: Farheen Soares  MRN: [id number]  Patient Class: OP- Observation   Admission Date: 3/8/2020  Length of Stay: 0 days  Attending Physician: Maggie Khan MD  Primary Care Provider: Vonda Chung MD        Subjective:     Principal Problem:Viral illness        HPI:  Per CLAUDIA Teague, PA:  84 y.o.  female with PNH with DMII, HTN, osteoposis, Gout, HLD, Hearing loss, Depression, Chronic low back pain, GERD, h/o TIA presents to to ED with c/o fever associated with weakness, HA, generalized body aches, N/V for the last week. Patient reports too weak to eat. Per ED, daughter states patient slumped to the floor as she was too weak to walk to the bathroom. Patient denies cough, sore throat, SOB, abdominal pain, new back pain, numbness, urinary symptoms, neck stiffness, recent travel or toney loss. ED evaluation elevated WBC, febrile, flu negative, otherwise labs unremarkable. CT A/P and CXR unrevealing for an acute process. Patient placed in Observation for further evaluation.     Overview/Hospital Course:  LP performed today.  Infectious Disease consulted.  Etiology of symptomatology appears to be viral.  Patient was initially treated with intraventous ceftriaxone.  Infectious Disease consulted and agree with transition to oral antibiotics given clinical improvement.  The patient was started on doxycyline 100 mg twice daily for now.  Physical and occupational therapy recommended skilled nursing.  Daughter made aware and appreciate case management involvement of for discharge planning.       Interval History:  Patient continues to improve. Afebrile and no elevation to WBC.  No clear source for initial fevers or elevated WBC.   Lumbar puncture negative. Viral respiratory and Strep A negative.  Blood cultures without growth to date.     Review of Systems   Constitutional: Negative for appetite change, fatigue and fever.   HENT: Negative  for congestion, rhinorrhea, sore throat and trouble swallowing.    Eyes: Negative.  Negative for photophobia and visual disturbance.   Respiratory: Negative for cough and shortness of breath.    Cardiovascular: Negative for chest pain and palpitations.   Gastrointestinal: Negative for abdominal pain, nausea and vomiting.   Endocrine: Negative.    Genitourinary: Negative for difficulty urinating, frequency and hematuria.   Musculoskeletal: Negative for back pain (chronic), myalgias and neck pain.   Skin: Negative.    Neurological: Negative for dizziness, syncope, weakness (generalized ), light-headedness and headaches.   Hematological: Does not bruise/bleed easily.   Psychiatric/Behavioral: Negative.      Objective:     Vital Signs (Most Recent):  Temp: 98 °F (36.7 °C) (03/12/20 1710)  Pulse: 76 (03/12/20 1710)  Resp: 18 (03/12/20 1710)  BP: 125/79 (03/12/20 1710)  SpO2: 97 % (03/12/20 1710) Vital Signs (24h Range):  Temp:  [97.7 °F (36.5 °C)-98.7 °F (37.1 °C)] 98 °F (36.7 °C)  Pulse:  [70-82] 76  Resp:  [16-20] 18  SpO2:  [94 %-97 %] 97 %  BP: (125-184)/(69-81) 125/79     Weight: 67 kg (147 lb 11.3 oz)  Body mass index is 25.35 kg/m².  No intake or output data in the 24 hours ending 03/12/20 2330   Physical Exam   Constitutional: She is oriented to person, place, and time. She appears well-developed and well-nourished. No distress.   Appears ill   HENT:   Head: Normocephalic and atraumatic.   Mouth/Throat: Oropharynx is clear and moist.   Eyes: No scleral icterus.   Neck: Normal range of motion. Neck supple.   Cardiovascular: Normal rate, regular rhythm, normal heart sounds and intact distal pulses.   No murmur heard.  Pulmonary/Chest: Effort normal and breath sounds normal. She has no wheezes.   Abdominal: Soft. Bowel sounds are normal. There is no tenderness.   Musculoskeletal: Normal range of motion. She exhibits no edema.   Neurological: She is alert and oriented to person, place, and time. No cranial nerve  deficit.   Skin: Skin is warm. Capillary refill takes less than 2 seconds.   Psychiatric: She has a normal mood and affect.   Nursing note and vitals reviewed.      Significant Labs:   CBC:   Recent Labs   Lab 03/11/20  0535 03/12/20  0612   WBC 7.45 7.42   HGB 14.1 13.4   HCT 42.1 39.6    234     CMP:   Recent Labs   Lab 03/11/20  0535 03/12/20  0612    140   K 4.0 3.7    105   CO2 24 23   * 154*   BUN 9 15   CREATININE 0.7 0.7   CALCIUM 9.8 9.9   PROT 7.4 7.3   ALBUMIN 3.4* 3.4*   BILITOT 0.3 0.3   ALKPHOS 60 66   AST 29 43*   ALT 14 20   ANIONGAP 10 12   EGFRNONAA >60 >60     All pertinent labs within the past 24 hours have been reviewed.    Significant Imaging: I have reviewed all pertinent imaging results/findings within the past 24 hours.             Assessment/Plan:      * Viral illness  No clear source for infection, but suspect viral source  - respiratory panel negative, strep A pneumonia negative  - clinical improvement after treatment with ceftriaxone   - transition to doxycyline for total three days      Hypertension  - reasonably stable  - cont home meds amlodipine 10 mg, metoprolol 100mg and lisinopril 40 mg daily  - monitor      History of transient ischemic attack (TIA)  - on statin and plavix at home      Type 2 diabetes mellitus without complication, without long-term current use of insulin  - Most recent A1c 6.8in December 2019   - Continue cardiac and diabetic diet  - Continue low-dose sliding scale  - Glucose goal during hospitalization between 140 -180, monitor and adjust insulin as needed        Hyperlipidemia  - appears stable on home dose of atorvastatin    Chronic low back pain with bilateral sciatica  - Pain controlled with home dose of norco  PRN  - PT/OT evaluated patient and recommended skilled nursing facility  - Case Management involvement appreciated    VTE Risk Mitigation (From admission, onward)         Ordered     IP VTE HIGH RISK PATIENT  Once       03/09/20 0209     Place sequential compression device  Until discontinued      03/09/20 0043                      Asha Reed NP  Department of Hospital Medicine   [facility]

## 2020-03-13 NOTE — NURSING
During my shift, pt AAOx3, NAD. Pt denies pain, SOB, n/v, dizziness or lightheadedness. VS stable. Pt remains afebrile. Glucose monitored. Pt tolerated diabetic diet. Incontinent care provided. Medications and discharge instructions reviewed with pt and pt's daughter. Pt's daughter voiced understanding. Pt stable for discharge to home. Pt transported off of unit via wheelchair to 2nd floor of [facility]. Pt's daughter will drive her home.

## 2020-03-13 NOTE — ASSESSMENT & PLAN NOTE
- Most recent A1c 6.8in December 2019   - Continue cardiac and diabetic diet  - Continue low-dose sliding scale  - Glucose goal during hospitalization between 140 -180, monitor and adjust insulin as needed

## 2020-03-13 NOTE — PROGRESS NOTES
[facility] Hospital Medicine  Progress Note    Patient Name: Farheen Soares  MRN: [id number]  Patient Class: OP- Observation   Admission Date: 3/8/2020  Length of Stay: 0 days  Attending Physician: Kvng Jacobs MD  Primary Care Provider: Vonda Chung MD        Subjective:     Principal Problem:Viral illness        HPI:  Per CLAUDIA Teague, PA:  84 y.o.  female with PNH with DMII, HTN, osteoposis, Gout, HLD, Hearing loss, Depression, Chronic low back pain, GERD, h/o TIA presents to to ED with c/o fever associated with weakness, HA, generalized body aches, N/V for the last week. Patient reports too weak to eat. Per ED, daughter states patient slumped to the floor as she was too weak to walk to the bathroom. Patient denies cough, sore throat, SOB, abdominal pain, new back pain, numbness, urinary symptoms, neck stiffness, recent travel or toney loss. ED evaluation elevated WBC, febrile, flu negative, otherwise labs unremarkable. CT A/P and CXR unrevealing for an acute process. Patient placed in Observation for further evaluation.     Overview/Hospital Course:  LP performed today.  Infectious Disease consulted.  Etiology of symptomatology appears to be viral.  Patient was initially treated with intraventous ceftriaxone.  Infectious Disease consulted and agree with transition to oral antibiotics given clinical improvement.  The patient was started on doxycyline 100 mg twice daily for now.  Physical and occupational therapy recommended skilled nursing.  Daughter made aware and appreciate case management involvement of for discharge planning.       Interval History:  Slowly improving but states she still feels too weak to go home.  She continues to report sore throat and generalized achiness.  Afebrile and no elevation to WBC.  No clear source for initial fevers or elevated WBC.   Lumbar puncture negative. Viral respiratory and Strep A negative.  Blood cultures without growth to date.      Review of Systems   Constitutional: Negative for appetite change, fatigue and fever.   HENT: Negative for congestion, rhinorrhea, sore throat and trouble swallowing.    Eyes: Negative.  Negative for photophobia and visual disturbance.   Respiratory: Negative for cough and shortness of breath.    Cardiovascular: Negative for chest pain and palpitations.   Gastrointestinal: Negative for abdominal pain, nausea and vomiting.   Endocrine: Negative.    Genitourinary: Negative for difficulty urinating, frequency and hematuria.   Musculoskeletal: Negative for back pain (chronic), myalgias and neck pain.   Skin: Negative.    Neurological: Negative for dizziness, syncope, weakness (generalized ), light-headedness and headaches.   Hematological: Does not bruise/bleed easily.   Psychiatric/Behavioral: Negative.      Objective:     Vital Signs (Most Recent):  Temp: 98.4 °F (36.9 °C) (03/13/20 0331)  Pulse: 81 (03/13/20 0331)  Resp: 18 (03/13/20 0331)  BP: (!) 170/78 (03/13/20 0331)  SpO2: 95 % (03/13/20 0331) Vital Signs (24h Range):  Temp:  [98 °F (36.7 °C)-98.9 °F (37.2 °C)] 98.4 °F (36.9 °C)  Pulse:  [71-81] 81  Resp:  [16-18] 18  SpO2:  [95 %-97 %] 95 %  BP: (125-173)/(78-79) 170/78     Weight: 67 kg (147 lb 11.3 oz)  Body mass index is 25.35 kg/m².  No intake or output data in the 24 hours ending 03/13/20 0836   Physical Exam   Constitutional: She is oriented to person, place, and time. She appears well-developed and well-nourished. No distress.   Appears ill   HENT:   Head: Normocephalic and atraumatic.   Mouth/Throat: Oropharynx is clear and moist.   Eyes: No scleral icterus.   Neck: Normal range of motion. Neck supple.   Cardiovascular: Normal rate, regular rhythm, normal heart sounds and intact distal pulses.   No murmur heard.  Pulmonary/Chest: Effort normal and breath sounds normal. She has no wheezes.   Abdominal: Soft. Bowel sounds are normal. There is no tenderness.   Musculoskeletal: Normal range of motion.  She exhibits no edema.   Neurological: She is alert and oriented to person, place, and time. No cranial nerve deficit.   Skin: Skin is warm. Capillary refill takes less than 2 seconds.   Psychiatric: She has a normal mood and affect.   Nursing note and vitals reviewed.      Significant Labs:   CBC:   Recent Labs   Lab 03/12/20  0612 03/13/20  0433   WBC 7.42 7.55   HGB 13.4 13.5   HCT 39.6 40.7    244     CMP:   Recent Labs   Lab 03/12/20 0612 03/13/20  0433    137   K 3.7 4.1    104   CO2 23 23   * 176*   BUN 15 12   CREATININE 0.7 0.7   CALCIUM 9.9 9.8   PROT 7.3 7.5   ALBUMIN 3.4* 3.5   BILITOT 0.3 0.3   ALKPHOS 66 67   AST 43* 38   ALT 20 19   ANIONGAP 12 10   EGFRNONAA >60 >60     All pertinent labs within the past 24 hours have been reviewed.    Significant Imaging: I have reviewed all pertinent imaging results/findings within the past 24 hours.             Assessment/Plan:      * Viral illness  No clear source for infection, but suspect viral source  - respiratory panel negative, strep A pneumonia negative  - clinical improvement after treatment with ceftriaxone   - transition to doxycyline for total three days      Hypertension  - reasonably stable  - cont home meds amlodipine 10 mg, metoprolol 100mg and lisinopril 40 mg daily  - monitor      History of transient ischemic attack (TIA)  - on statin and plavix at home      Type 2 diabetes mellitus without complication, without long-term current use of insulin  - Most recent A1c 6.8in December 2019   - Continue cardiac and diabetic diet  - Continue low-dose sliding scale  - Glucose goal during hospitalization between 140 -180, monitor and adjust insulin as needed        Hyperlipidemia  - appears stable on home dose of atorvastatin    Chronic low back pain with bilateral sciatica  - Pain controlled with home dose of norco  PRN  - PT/OT evaluated patient and recommended skilled nursing facility  - Case Management involvement  appreciated      VTE Risk Mitigation (From admission, onward)         Ordered     IP VTE HIGH RISK PATIENT  Once      03/09/20 0209     Place sequential compression device  Until discontinued      03/09/20 0043              Continue ceftriaxone 2g IV q24 hours while inpatient  - On discharge, doxycycline 100 mg PO BID, plan for total 7 day course antibiotics, last day 3/15/2020    Pt recommending SNF        Kvng Jacobs MD  Department of Hospital Medicine   [facility]

## 2020-03-13 NOTE — PLAN OF CARE
PT is AAOX4, no acute changes noted during shift, pt is up with moderate asst and is repositioned with asst, no skin breakdown noted, hourly rounds made during shift,  VSS. No falls noted. Fall precautions remain. Pain assessed. No pain noted. Pt resting comfortably in bed. Call light in reach. Will continue to monitor.

## 2020-03-13 NOTE — PLAN OF CARE
Plan of care reviewed with pt. Pt AAOx3, NAD. Pt denies current pain. VS stable. Pt remains afebrile. Glucose monitored and managed with insulin. Pt tolerating diabetic diet. Incontinent care provided. Pt remains free from falls or injury. Safety measures implemented. Bed is lowered and locked. Bed alarm set. Call light is within reach. Will continue to monitor pt.

## 2020-03-13 NOTE — PROGRESS NOTES
TIMI called pt's daughter, Colleen, [phone] and informed of PT/OT's SNF recommendation.  Daughter stated because of the coronavirus, doesn't want pt to go to a SNF, would like to take pt home today, but would like HH w/ PT, OT.    TIMI notified Dr. aJcobs of conversation with daughter.

## 2020-03-14 LAB
BACTERIA BLD CULT: NORMAL
BACTERIA BLD CULT: NORMAL

## 2020-03-14 NOTE — PT/OT/SLP DISCHARGE
Occupational Therapy Discharge Summary    Farheen Soares  MRN: [id number]   Principal Problem: Viral illness      Patient Discharged from acute Occupational Therapy on 3/13/20.  Please refer to prior OT note dated 3/11/20 for functional status.    Assessment:      Patient appropriate for care in another setting. Patient has not met goals. Evaluation only    Objective:     GOALS:   Multidisciplinary Problems     Occupational Therapy Goals     Not on file          Multidisciplinary Problems (Resolved)        Problem: Occupational Therapy Goal    Goal Priority Disciplines Outcome Interventions   Occupational Therapy Goal   (Resolved)     OT, PT/OT Met    Description:  Goals to be met by: 3/18/20    Patient will increase functional independence with ADLs by performing:    UB dressing at Set up level.  LB dressing at SBA level without LOB.  Participate in toilet/BSC transfer evaluation.  Toileting at SBA level.  Grooming standing at sink at SBA level.                      Reasons for Discontinuation of Therapy Services  Transfer to alternate level of care.      Plan:     Patient Discharged to: Home with Home Health Service and SNF placement recommened    Juliana Kaplan, [place], LOTR  3/14/2020

## 2020-03-19 ENCOUNTER — TELEPHONE (OUTPATIENT)
Dept: PAIN MEDICINE | Facility: CLINIC | Age: 85
End: 2020-03-19

## 2020-03-19 NOTE — TELEPHONE ENCOUNTER
Left two messages for pt's daughter Colleen to call back to either set up video visit or move pt's appt up to the morning on 3/20.

## 2020-03-20 ENCOUNTER — TELEPHONE (OUTPATIENT)
Dept: PAIN MEDICINE | Facility: CLINIC | Age: 85
End: 2020-03-20

## 2020-03-20 ENCOUNTER — TELEPHONE (OUTPATIENT)
Dept: NEUROLOGY | Facility: CLINIC | Age: 85
End: 2020-03-20

## 2020-03-20 NOTE — TELEPHONE ENCOUNTER
----- Message from Fina Harper sent at 3/20/2020  3:32 PM CDT -----  Needs Advice    Reason for call:--appointment--        Communication Preference:--Colleen--[phone]--    Additional Information:Colleen is calling to let the nurse know that she received the message and that she will try to schedule video visit on my chart.

## 2020-03-20 NOTE — TELEPHONE ENCOUNTER
I called all numbers including Colleen Rodger and left a message to follow protocol for Coronavirus. I left a message for a returned call.

## 2020-08-13 NOTE — PROGRESS NOTES
[facility] New Patient Evaluation    Referred by: Dr Vonda Chung  Reason for referral: Back pain    CC:   Chief Complaint   Patient presents with    Back Pain     No flowsheet data found.    HPI:   Farheen Soares is a 85 y.o. female who complains of back pain radiating into both legs.  Pain has been present for years and has responded well to ESIs in the past.  Patient recently moved to this area and has been unable to establsih care with a pain provider for these injections due to COVID.  She prefers to avoid opioid medications.  She is here today with her daughter, who is her caregiver, and they expressed interest in physical rehabilitation as well.    Location: Back   Onset: several years  Current Pain Score: 7/10  Daily Pain of Range: 7-10/10  Quality: Aching, Shooting and spasms  Radiation: bilateral legs  Worsened by: nothing in particular  Improved by: laying down    Previous Therapies:  PT/OT: Yes, remote Hx of  HEP: Yes, but none recently leading to rapid decline in function  Interventions: Yes, ESIs in the past, but unsure of the exact levels  Surgery: Yes x2 in the 90's and 2000's  Medications:   - NSAIDS:   - MSK Relaxants:   - TCAs:   - SNRIs:   - Topicals:   - Anticonvulsants:  - Opioids:     Current Pain Medications:  1. Effexor 75 mg  2.      Review of Systems:  Review of Systems   Constitutional: Negative for chills and fever.   HENT: Negative for nosebleeds.    Eyes: Negative for blurred vision.   Respiratory: Negative for hemoptysis.    Cardiovascular: Negative for chest pain and palpitations.   Gastrointestinal: Negative for heartburn and vomiting.   Genitourinary: Negative for hematuria.   Musculoskeletal: Positive for back pain, joint pain, myalgias and neck pain. Negative for falls.   Skin: Negative for rash.   Neurological: Positive for tingling and weakness. Negative for seizures and loss of consciousness.   Endo/Heme/Allergies: Does not bruise/bleed easily.    Psychiatric/Behavioral: Positive for depression. Negative for hallucinations. The patient has insomnia.        History:    Current Outpatient Medications:     alendronate (FOSAMAX) 70 MG tablet, Take 1 tablet (70 mg total) by mouth every 7 days., Disp: 12 tablet, Rfl: 1    allopurinol (ZYLOPRIM) 300 MG tablet, Take 1 tablet (300 mg total) by mouth once daily., Disp: 90 tablet, Rfl: 1    amLODIPine (NORVASC) 10 MG tablet, Take 1 tablet (10 mg total) by mouth once daily., Disp: 90 tablet, Rfl: 1    atorvastatin (LIPITOR) 10 MG tablet, Take 1 tablet (10 mg total) by mouth once daily., Disp: 90 tablet, Rfl: 1    blood sugar diagnostic Strp, To check BG three times daily, to use with insurance preferred meter, Disp: 100 strip, Rfl: 0    blood-glucose meter kit, To check BG three times daily, to use with insurance preferred meter, Disp: 1 each, Rfl: 0    clopidogrel (PLAVIX) 75 mg tablet, Take 1 tablet (75 mg total) by mouth once daily., Disp: 90 tablet, Rfl: 1    fexofenadine (ALLEGRA) 180 MG tablet, Take 180 mg by mouth daily as needed., Disp: , Rfl:     fluticasone (FLONASE) 50 mcg/actuation nasal spray, 1 spray by Each Nare route once daily., Disp: , Rfl:     HYDROcodone-acetaminophen (NORCO) 5-325 mg per tablet, Take 1 tablet by mouth every 6 (six) hours as needed for Pain., Disp: , Rfl:     lancets Misc, To check BG three times daily, to use with insurance preferred meter, Disp: 100 each, Rfl: 0    lisinopril (PRINIVIL,ZESTRIL) 40 MG tablet, Take 1 tablet (40 mg total) by mouth once daily., Disp: 90 tablet, Rfl: 1    metFORMIN (GLUCOPHAGE-XR) 500 MG 24 hr tablet, Take 1 tablet (500 mg total) by mouth daily with breakfast., Disp: 90 tablet, Rfl: 1    metoprolol succinate (TOPROL-XL) 100 MG 24 hr tablet, Take 1 tablet (100 mg total) by mouth once daily. Take 1 1/2 daily, Disp: 135 tablet, Rfl: 1    ranitidine (ZANTAC) 150 MG tablet, Take 150 mg by mouth 2 (two) times daily., Disp: , Rfl:      venlafaxine (EFFEXOR) 75 MG tablet, 2 tabs po QAM and 1 po QHS, Disp: 270 tablet, Rfl: 0    Past Medical History:   Diagnosis Date    Allergic rhinitis     Carotid atherosclerosis, bilateral     Chronic low back pain with bilateral sciatica     Depression     Diabetes mellitus, type 2     Diverticulosis     Facet arthropathy     Gout     Hearing loss     History of TIA (transient ischemic attack)     Hyperlipidemia     Hypertension     IBS (irritable bowel syndrome)     Insomnia     Leg cramps     Lumbar stenosis     Meningioma     Osteoporosis     Paroxysmal SVT (supraventricular tachycardia)     Primary open angle glaucoma (POAG) of both eyes     PVD (peripheral vascular disease)     30% prox stenosis of celiac trunk and 20% stnosis Prox SMA - per Abd/SMA Arteriogram 4/3/08       Past Surgical History:   Procedure Laterality Date    CATARACT EXTRACTION      EPIDURAL STEROID INJECTION INTO LUMBAR SPINE      LUMBAR LAMINECTOMY  2009    TOTAL ABDOMINAL HYSTERECTOMY W/ BILATERAL SALPINGOOPHORECTOMY         Family History   Problem Relation Age of Onset    Hypertension Mother     Diabetes Mother     Hypertension Father     Stroke Father         60's    Hypertension Daughter     Hypothyroidism Daughter     Hypertension Son     Hyperlipidemia Son        Social History     Socioeconomic History    Marital status:      Spouse name: Not on file    Number of children: Not on file    Years of education: Not on file    Highest education level: Not on file   Occupational History    Not on file   Social Needs    Financial resource strain: Not on file    Food insecurity     Worry: Not on file     Inability: Not on file    Transportation needs     Medical: Not on file     Non-medical: Not on file   Tobacco Use    Smoking status: Never Smoker   Substance and Sexual Activity    Alcohol use: Yes     Frequency: Monthly or less     Drinks per session: 1 or 2    Drug use: Never     Sexual activity: Not Currently   Lifestyle    Physical activity     Days per week: Not on file     Minutes per session: Not on file    Stress: Not on file   Relationships    Social connections     Talks on phone: Not on file     Gets together: Not on file     Attends Restorationist service: Not on file     Active member of club or organization: Not on file     Attends meetings of clubs or organizations: Not on file     Relationship status: Not on file   Other Topics Concern    Not on file   Social History Narrative    Not on file       Review of patient's allergies indicates:   Allergen Reactions    Baclofen      AMS and distorted dremas       Physical Exam:  There were no vitals filed for this visit.  General    Nursing note and vitals reviewed.  Constitutional: She is oriented to person, place, and time. She appears well-developed and well-nourished. No distress.   HENT:   Head: Normocephalic and atraumatic.   Nose: Nose normal.   Eyes: Conjunctivae and EOM are normal. Pupils are equal, round, and reactive to light. Right eye exhibits no discharge. Left eye exhibits no discharge. No scleral icterus.   Neck: No JVD present.   Cardiovascular: Intact distal pulses.    Pulmonary/Chest: Effort normal. No respiratory distress.   Abdominal: She exhibits no distension.   Neurological: She is alert and oriented to person, place, and time. Coordination normal.   Psychiatric: She has a normal mood and affect. Her behavior is normal. Judgment and thought content normal.     General Musculoskeletal Exam   Gait: normal     Back (L-Spine & T-Spine) / Neck (C-Spine) Exam     Tenderness Right paramedian tenderness of the Lower L-Spine. Left paramedian tenderness of the Lower L-Spine.     Back (L-Spine & T-Spine) Range of Motion   Back extension: facet loading is positive and exacerabtes/reproduces the patient's typical low back pain    Back flexion: limited ROM but partial relief of low back pain noted.     Spinal Sensation    Right Side Sensation  L-Spine Level: normal  Left Side Sensation  L-Spine Level: normal    Other She has no scoliosis .      Muscle Strength   Right Lower Extremity   Hip Flexion: 5/5   Hip Extensors: 5/5  Quadriceps:  5/5   Hamstrin/5   Gastrocsoleus:  5/5/5  Left Lower Extremity   Hip Flexion: 5/5   Hip Extensors: 5/5  Quadriceps:  5/5   Hamstrin/5   Gastrocsoleus:  5/5/5    Reflexes     Left Side  Quadriceps:  2+  Achilles:  2+    Right Side   Quadriceps:  2+  Achilles:  2+      Imaging:  CT Abd/Pelvis 2020    While not intended to capture the lumbar spine, it does.  There is significant and advanced degenerative disease of the thoracolumbar spine.  A combination of degenerative disc disease, scoliosis, and facet hypertrophy all contribute to central canal and neural foraminal stenosis of nearly every level.    Labs:  BMP  Lab Results   Component Value Date     2020    K 4.1 2020     2020    CO2 23 2020    BUN 12 2020    CREATININE 0.7 2020    CALCIUM 9.8 2020    ANIONGAP 10 2020    ESTGFRAFRICA >60 2020    EGFRNONAA >60 2020     Lab Results   Component Value Date    ALT 19 2020    AST 38 2020    ALKPHOS 67 2020    BILITOT 0.3 2020       Assessment:  Problem List Items Addressed This Visit     Chronic low back pain with bilateral sciatica - Primary    Relevant Medications    HYDROcodone-acetaminophen (NORCO) 7.5-325 mg per tablet    Lumbar spondylosis    DDD (degenerative disc disease), lumbar    Lumbar radiculopathy          20 - Farheen Bjorn Soares is a 85 y.o. female with chronic low back pain and progressive debility related to sedentary lifestyle.  There is underlying, significant degenerative disease in her lumbar spine; however, this is best treated by maintaining her functional status.  Restoration of her functional status will require physical therapy and home health.  I will place  referral to home health physical therapy today.  They would like to move forward with a caudal epidural steroid injection, which I believe to be the safest way to administer epidural steroids given the severity of her degenerative spine disease.  I will also continue her current dose of Norco 7.5 once daily, which is providing her excellent relief toward improved function    : Reviewed and consistent with medication use as prescribed.    Treatment Plan:   Procedures: Caudal RONNI with catheter  PT/OT/HEP: Referral to  placed today for PT and OT  Medications:    - Cont norco 7.5 once daily #30/month  Labs: reviewed and medications are appropriately dosed for current hepatorenal function.  Imaging: No additional recommended at this time.    Follow Up: RTC 30 weeks    Johnathan Gonzalez Jr, MD  Interventional Pain Medicine / Anesthesiology    Disclaimer: This note was partly generated using dictation software which may occasionally result in transcription errors.

## 2020-08-13 NOTE — H&P (VIEW-ONLY)
[facility] New Patient Evaluation    Referred by: Dr Vonda Chung  Reason for referral: Back pain    CC:   Chief Complaint   Patient presents with    Back Pain     No flowsheet data found.    HPI:   Farheen Soares is a 85 y.o. female who complains of back pain radiating into both legs.  Pain has been present for years and has responded well to ESIs in the past.  Patient recently moved to this area and has been unable to establsih care with a pain provider for these injections due to COVID.  She prefers to avoid opioid medications.  She is here today with her daughter, who is her caregiver, and they expressed interest in physical rehabilitation as well.    Location: Back   Onset: several years  Current Pain Score: 7/10  Daily Pain of Range: 7-10/10  Quality: Aching, Shooting and spasms  Radiation: bilateral legs  Worsened by: nothing in particular  Improved by: laying down    Previous Therapies:  PT/OT: Yes, remote Hx of  HEP: Yes, but none recently leading to rapid decline in function  Interventions: Yes, ESIs in the past, but unsure of the exact levels  Surgery: Yes x2 in the 90's and 2000's  Medications:   - NSAIDS:   - MSK Relaxants:   - TCAs:   - SNRIs:   - Topicals:   - Anticonvulsants:  - Opioids:     Current Pain Medications:  1. Effexor 75 mg  2.      Review of Systems:  Review of Systems   Constitutional: Negative for chills and fever.   HENT: Negative for nosebleeds.    Eyes: Negative for blurred vision.   Respiratory: Negative for hemoptysis.    Cardiovascular: Negative for chest pain and palpitations.   Gastrointestinal: Negative for heartburn and vomiting.   Genitourinary: Negative for hematuria.   Musculoskeletal: Positive for back pain, joint pain, myalgias and neck pain. Negative for falls.   Skin: Negative for rash.   Neurological: Positive for tingling and weakness. Negative for seizures and loss of consciousness.   Endo/Heme/Allergies: Does not bruise/bleed easily.    Psychiatric/Behavioral: Positive for depression. Negative for hallucinations. The patient has insomnia.        History:    Current Outpatient Medications:     alendronate (FOSAMAX) 70 MG tablet, Take 1 tablet (70 mg total) by mouth every 7 days., Disp: 12 tablet, Rfl: 1    allopurinol (ZYLOPRIM) 300 MG tablet, Take 1 tablet (300 mg total) by mouth once daily., Disp: 90 tablet, Rfl: 1    amLODIPine (NORVASC) 10 MG tablet, Take 1 tablet (10 mg total) by mouth once daily., Disp: 90 tablet, Rfl: 1    atorvastatin (LIPITOR) 10 MG tablet, Take 1 tablet (10 mg total) by mouth once daily., Disp: 90 tablet, Rfl: 1    blood sugar diagnostic Strp, To check BG three times daily, to use with insurance preferred meter, Disp: 100 strip, Rfl: 0    blood-glucose meter kit, To check BG three times daily, to use with insurance preferred meter, Disp: 1 each, Rfl: 0    clopidogrel (PLAVIX) 75 mg tablet, Take 1 tablet (75 mg total) by mouth once daily., Disp: 90 tablet, Rfl: 1    fexofenadine (ALLEGRA) 180 MG tablet, Take 180 mg by mouth daily as needed., Disp: , Rfl:     fluticasone (FLONASE) 50 mcg/actuation nasal spray, 1 spray by Each Nare route once daily., Disp: , Rfl:     HYDROcodone-acetaminophen (NORCO) 5-325 mg per tablet, Take 1 tablet by mouth every 6 (six) hours as needed for Pain., Disp: , Rfl:     lancets Misc, To check BG three times daily, to use with insurance preferred meter, Disp: 100 each, Rfl: 0    lisinopril (PRINIVIL,ZESTRIL) 40 MG tablet, Take 1 tablet (40 mg total) by mouth once daily., Disp: 90 tablet, Rfl: 1    metFORMIN (GLUCOPHAGE-XR) 500 MG 24 hr tablet, Take 1 tablet (500 mg total) by mouth daily with breakfast., Disp: 90 tablet, Rfl: 1    metoprolol succinate (TOPROL-XL) 100 MG 24 hr tablet, Take 1 tablet (100 mg total) by mouth once daily. Take 1 1/2 daily, Disp: 135 tablet, Rfl: 1    ranitidine (ZANTAC) 150 MG tablet, Take 150 mg by mouth 2 (two) times daily., Disp: , Rfl:      venlafaxine (EFFEXOR) 75 MG tablet, 2 tabs po QAM and 1 po QHS, Disp: 270 tablet, Rfl: 0    Past Medical History:   Diagnosis Date    Allergic rhinitis     Carotid atherosclerosis, bilateral     Chronic low back pain with bilateral sciatica     Depression     Diabetes mellitus, type 2     Diverticulosis     Facet arthropathy     Gout     Hearing loss     History of TIA (transient ischemic attack)     Hyperlipidemia     Hypertension     IBS (irritable bowel syndrome)     Insomnia     Leg cramps     Lumbar stenosis     Meningioma     Osteoporosis     Paroxysmal SVT (supraventricular tachycardia)     Primary open angle glaucoma (POAG) of both eyes     PVD (peripheral vascular disease)     30% prox stenosis of celiac trunk and 20% stnosis Prox SMA - per Abd/SMA Arteriogram 4/3/08       Past Surgical History:   Procedure Laterality Date    CATARACT EXTRACTION      EPIDURAL STEROID INJECTION INTO LUMBAR SPINE      LUMBAR LAMINECTOMY  2009    TOTAL ABDOMINAL HYSTERECTOMY W/ BILATERAL SALPINGOOPHORECTOMY         Family History   Problem Relation Age of Onset    Hypertension Mother     Diabetes Mother     Hypertension Father     Stroke Father         60's    Hypertension Daughter     Hypothyroidism Daughter     Hypertension Son     Hyperlipidemia Son        Social History     Socioeconomic History    Marital status:      Spouse name: Not on file    Number of children: Not on file    Years of education: Not on file    Highest education level: Not on file   Occupational History    Not on file   Social Needs    Financial resource strain: Not on file    Food insecurity     Worry: Not on file     Inability: Not on file    Transportation needs     Medical: Not on file     Non-medical: Not on file   Tobacco Use    Smoking status: Never Smoker   Substance and Sexual Activity    Alcohol use: Yes     Frequency: Monthly or less     Drinks per session: 1 or 2    Drug use: Never     Sexual activity: Not Currently   Lifestyle    Physical activity     Days per week: Not on file     Minutes per session: Not on file    Stress: Not on file   Relationships    Social connections     Talks on phone: Not on file     Gets together: Not on file     Attends Episcopal service: Not on file     Active member of club or organization: Not on file     Attends meetings of clubs or organizations: Not on file     Relationship status: Not on file   Other Topics Concern    Not on file   Social History Narrative    Not on file       Review of patient's allergies indicates:   Allergen Reactions    Baclofen      AMS and distorted dremas       Physical Exam:  There were no vitals filed for this visit.  General    Nursing note and vitals reviewed.  Constitutional: She is oriented to person, place, and time. She appears well-developed and well-nourished. No distress.   HENT:   Head: Normocephalic and atraumatic.   Nose: Nose normal.   Eyes: Conjunctivae and EOM are normal. Pupils are equal, round, and reactive to light. Right eye exhibits no discharge. Left eye exhibits no discharge. No scleral icterus.   Neck: No JVD present.   Cardiovascular: Intact distal pulses.    Pulmonary/Chest: Effort normal. No respiratory distress.   Abdominal: She exhibits no distension.   Neurological: She is alert and oriented to person, place, and time. Coordination normal.   Psychiatric: She has a normal mood and affect. Her behavior is normal. Judgment and thought content normal.     General Musculoskeletal Exam   Gait: normal     Back (L-Spine & T-Spine) / Neck (C-Spine) Exam     Tenderness Right paramedian tenderness of the Lower L-Spine. Left paramedian tenderness of the Lower L-Spine.     Back (L-Spine & T-Spine) Range of Motion   Back extension: facet loading is positive and exacerabtes/reproduces the patient's typical low back pain    Back flexion: limited ROM but partial relief of low back pain noted.     Spinal Sensation    Right Side Sensation  L-Spine Level: normal  Left Side Sensation  L-Spine Level: normal    Other She has no scoliosis .      Muscle Strength   Right Lower Extremity   Hip Flexion: 5/5   Hip Extensors: 5/5  Quadriceps:  5/5   Hamstrin/5   Gastrocsoleus:  5/5/5  Left Lower Extremity   Hip Flexion: 5/5   Hip Extensors: 5/5  Quadriceps:  5/5   Hamstrin/5   Gastrocsoleus:  5/5/5    Reflexes     Left Side  Quadriceps:  2+  Achilles:  2+    Right Side   Quadriceps:  2+  Achilles:  2+      Imaging:  CT Abd/Pelvis 2020    While not intended to capture the lumbar spine, it does.  There is significant and advanced degenerative disease of the thoracolumbar spine.  A combination of degenerative disc disease, scoliosis, and facet hypertrophy all contribute to central canal and neural foraminal stenosis of nearly every level.    Labs:  BMP  Lab Results   Component Value Date     2020    K 4.1 2020     2020    CO2 23 2020    BUN 12 2020    CREATININE 0.7 2020    CALCIUM 9.8 2020    ANIONGAP 10 2020    ESTGFRAFRICA >60 2020    EGFRNONAA >60 2020     Lab Results   Component Value Date    ALT 19 2020    AST 38 2020    ALKPHOS 67 2020    BILITOT 0.3 2020       Assessment:  Problem List Items Addressed This Visit     Chronic low back pain with bilateral sciatica - Primary    Relevant Medications    HYDROcodone-acetaminophen (NORCO) 7.5-325 mg per tablet    Lumbar spondylosis    DDD (degenerative disc disease), lumbar    Lumbar radiculopathy          20 - Fraheen Bjorn Soares is a 85 y.o. female with chronic low back pain and progressive debility related to sedentary lifestyle.  There is underlying, significant degenerative disease in her lumbar spine; however, this is best treated by maintaining her functional status.  Restoration of her functional status will require physical therapy and home health.  I will place  referral to home health physical therapy today.  They would like to move forward with a caudal epidural steroid injection, which I believe to be the safest way to administer epidural steroids given the severity of her degenerative spine disease.  I will also continue her current dose of Norco 7.5 once daily, which is providing her excellent relief toward improved function    : Reviewed and consistent with medication use as prescribed.    Treatment Plan:   Procedures: Caudal RONNI with catheter  PT/OT/HEP: Referral to  placed today for PT and OT  Medications:    - Cont norco 7.5 once daily #30/month  Labs: reviewed and medications are appropriately dosed for current hepatorenal function.  Imaging: No additional recommended at this time.    Follow Up: RTC 30 weeks    Johnathan Gonzalez Jr, MD  Interventional Pain Medicine / Anesthesiology    Disclaimer: This note was partly generated using dictation software which may occasionally result in transcription errors.

## 2020-08-14 ENCOUNTER — OFFICE VISIT (OUTPATIENT)
Dept: PAIN MEDICINE | Facility: CLINIC | Age: 85
End: 2020-08-14
Payer: MEDICARE

## 2020-08-14 DIAGNOSIS — M47.816 LUMBAR SPONDYLOSIS: ICD-10-CM

## 2020-08-14 DIAGNOSIS — M54.42 CHRONIC BILATERAL LOW BACK PAIN WITH BILATERAL SCIATICA: Primary | ICD-10-CM

## 2020-08-14 DIAGNOSIS — M54.16 LUMBAR RADICULOPATHY: ICD-10-CM

## 2020-08-14 DIAGNOSIS — M51.36 DDD (DEGENERATIVE DISC DISEASE), LUMBAR: ICD-10-CM

## 2020-08-14 DIAGNOSIS — M54.41 CHRONIC BILATERAL LOW BACK PAIN WITH BILATERAL SCIATICA: Primary | ICD-10-CM

## 2020-08-14 DIAGNOSIS — G89.29 CHRONIC BILATERAL LOW BACK PAIN WITH BILATERAL SCIATICA: Primary | ICD-10-CM

## 2020-08-14 PROCEDURE — 1101F PT FALLS ASSESS-DOCD LE1/YR: CPT | Mod: CPTII,S$GLB,, | Performed by: PAIN MEDICINE

## 2020-08-14 PROCEDURE — 99999 PR PBB SHADOW E&M-EST. PATIENT-LVL III: CPT | Mod: PBBFAC,,, | Performed by: PAIN MEDICINE

## 2020-08-14 PROCEDURE — 99999 PR PBB SHADOW E&M-EST. PATIENT-LVL III: ICD-10-PCS | Mod: PBBFAC,,, | Performed by: PAIN MEDICINE

## 2020-08-14 PROCEDURE — 1159F MED LIST DOCD IN RCRD: CPT | Mod: S$GLB,,, | Performed by: PAIN MEDICINE

## 2020-08-14 PROCEDURE — 1125F AMNT PAIN NOTED PAIN PRSNT: CPT | Mod: S$GLB,,, | Performed by: PAIN MEDICINE

## 2020-08-14 PROCEDURE — 99204 PR OFFICE/OUTPT VISIT, NEW, LEVL IV, 45-59 MIN: ICD-10-PCS | Mod: S$GLB,,, | Performed by: PAIN MEDICINE

## 2020-08-14 PROCEDURE — 99204 OFFICE O/P NEW MOD 45 MIN: CPT | Mod: S$GLB,,, | Performed by: PAIN MEDICINE

## 2020-08-14 PROCEDURE — 1101F PR PT FALLS ASSESS DOC 0-1 FALLS W/OUT INJ PAST YR: ICD-10-PCS | Mod: CPTII,S$GLB,, | Performed by: PAIN MEDICINE

## 2020-08-14 PROCEDURE — 1159F PR MEDICATION LIST DOCUMENTED IN MEDICAL RECORD: ICD-10-PCS | Mod: S$GLB,,, | Performed by: PAIN MEDICINE

## 2020-08-14 PROCEDURE — 1125F PR PAIN SEVERITY QUANTIFIED, PAIN PRESENT: ICD-10-PCS | Mod: S$GLB,,, | Performed by: PAIN MEDICINE

## 2020-08-14 RX ORDER — HYDROCODONE BITARTRATE AND ACETAMINOPHEN 7.5; 325 MG/1; MG/1
1 TABLET ORAL
Qty: 30 TABLET | Refills: 0 | Status: SHIPPED | OUTPATIENT
Start: 2020-08-14 | End: 2020-09-13

## 2020-08-14 NOTE — LETTER
August 17, 2020      Vonda Chung MD  [address] [facility] [address]           [facility]  [address] [facility] [address]  Phone: [phone]  Fax: [phone]          Patient: Farheen Soares   MR Number: [id number]   YOB: 1935   Date of Visit: 8/14/2020       Dear Dr. Vonda Chung:    Thank you for referring Farheen Soares to me for evaluation. Attached you will find relevant portions of my assessment and plan of care.    If you have questions, please do not hesitate to call me. I look forward to following Farheen Soares along with you.    Sincerely,    Johnathan Gonzalez Jr., MD    Enclosure  CC:  No Recipients    If you would like to receive this communication electronically, please contact [email or web address] or [phone] to request more information on [employer] access.    For providers and/or their staff who would like to refer a patient to [facility], please contact us through our one-stop-shop provider referral line, [facility], at [phone].    If you feel you have received this communication in error or would no longer like to receive these types of communications, please e-mail [email or web address]

## 2020-08-17 ENCOUNTER — TELEPHONE (OUTPATIENT)
Dept: PAIN MEDICINE | Facility: CLINIC | Age: 85
End: 2020-08-17

## 2020-08-17 PROBLEM — M51.36 DDD (DEGENERATIVE DISC DISEASE), LUMBAR: Status: ACTIVE | Noted: 2020-08-17

## 2020-08-17 PROBLEM — M54.16 LUMBAR RADICULOPATHY: Status: ACTIVE | Noted: 2020-08-17

## 2020-08-17 PROBLEM — M51.369 DDD (DEGENERATIVE DISC DISEASE), LUMBAR: Status: ACTIVE | Noted: 2020-08-17

## 2020-08-17 PROBLEM — M47.816 LUMBAR SPONDYLOSIS: Status: ACTIVE | Noted: 2020-08-17

## 2020-08-17 NOTE — TELEPHONE ENCOUNTER
----- Message from Oralia Cuello MA sent at 8/14/2020  3:41 PM CDT -----  Regarding: Procedure date  Chidi MASTERS,     Patient's daughter, Colleen Soares wanted another date for her procedure. She needs more time for work purposes. Her phone number is [phone].   Epidural Steroid Injection caudal with catheter- No sedation. Lumbar radiculopathy.       Thanks,

## 2020-08-19 ENCOUNTER — TELEPHONE (OUTPATIENT)
Dept: PAIN MEDICINE | Facility: CLINIC | Age: 85
End: 2020-08-19

## 2020-08-19 DIAGNOSIS — M54.16 LUMBAR RADICULOPATHY: Primary | ICD-10-CM

## 2020-08-19 NOTE — TELEPHONE ENCOUNTER
Pt scheduled for Caudal Epidural Injection on 8/27/20 with Dr. Gonzalez. Requesting medication clearance to hold Plavix 7 days prior to procedure. Please advise.

## 2020-08-19 NOTE — TELEPHONE ENCOUNTER
Pt's daughter aware pt can hold Plavix 7 days prior to her procedure and restart 24 hours after her procedure.

## 2020-08-19 NOTE — TELEPHONE ENCOUNTER
Per our conversation, please see 's note regarding patient holding her Plavix. Let me know if you need anything additional.    Thanks  Dipti

## 2020-08-19 NOTE — TELEPHONE ENCOUNTER
Pt scheduled for 8/27/20 at 0915 am for Caudal RONNI. Pt aware to check in at registration desk on the first floor of the hospital for 0815 am. Pt is taking Plavix. Sent med clearance request to Dr. Chung. Pt aware we will contact her to advise her when to hold medications.

## 2020-08-21 PROCEDURE — G0180 MD CERTIFICATION HHA PATIENT: HCPCS | Mod: ,,, | Performed by: PAIN MEDICINE

## 2020-08-21 PROCEDURE — G0180 PR HOME HEALTH MD CERTIFICATION: ICD-10-PCS | Mod: ,,, | Performed by: PAIN MEDICINE

## 2020-08-24 ENCOUNTER — TELEPHONE (OUTPATIENT)
Dept: PRIMARY CARE CLINIC | Facility: CLINIC | Age: 85
End: 2020-08-24

## 2020-08-26 NOTE — DISCHARGE INSTRUCTIONS
Home Care Instructions Pain Management:    1.  DIET:    You may resume your normal diet today.    2.  BATHING:    You may shower with luke warm water.    3.  DRESSING:    You may remove your bandage today.    4.  ACTIVITY LEVEL:      You may resume your normal activities 24 hours after your procedure.    5.  MEDICATIONS:    You may resume your normal medications today.    6.  SPECIAL INSTRUCTIONS:    No heat to the injection site for 24 hours including bath or shower, heating pad, moist heat or hot tubs.    Use an ice pack to the injection site for any pain or discomfort.  Apply ice packs for 20 minute intervals as needed.    If you have received any sedatives by mouth today, you can not drive for 12 hours.    If you have received sedation through an IV, you can not drive for 24 hours.    PLEASE CALL YOUR DOCTOR FOR THE FOLLOWIN.  Redness or swelling around the injection site.  2.  Fever of 101 degrees.  3.  Drainage (pus) from the injection site.  4.  For any continuous bleeding (some dried blood over the incision is normal.)    FOR EMERGENCIES:    If any unusual problems or difficulties occur during clinic hours, call [phone] or dial [phone].    Follow up with with your physician in 2-3 weeks.

## 2020-08-27 ENCOUNTER — HOSPITAL ENCOUNTER (OUTPATIENT)
Facility: HOSPITAL | Age: 85
Discharge: HOME | End: 2020-08-27
Attending: PAIN MEDICINE | Admitting: PAIN MEDICINE
Payer: MEDICARE

## 2020-08-27 VITALS
DIASTOLIC BLOOD PRESSURE: 77 MMHG | HEART RATE: 66 BPM | BODY MASS INDEX: 26.58 KG/M2 | HEIGHT: 63 IN | WEIGHT: 150 LBS | RESPIRATION RATE: 16 BRPM | OXYGEN SATURATION: 95 % | TEMPERATURE: 98 F | SYSTOLIC BLOOD PRESSURE: 177 MMHG

## 2020-08-27 DIAGNOSIS — G89.29 CHRONIC PAIN: ICD-10-CM

## 2020-08-27 DIAGNOSIS — M54.16 LUMBAR RADICULOPATHY: Primary | ICD-10-CM

## 2020-08-27 LAB — POCT GLUCOSE: 163 MG/DL (ref 70–110)

## 2020-08-27 PROCEDURE — 25500020 PHARM REV CODE 255: Performed by: PAIN MEDICINE

## 2020-08-27 PROCEDURE — 62323 NJX INTERLAMINAR LMBR/SAC: CPT | Performed by: PAIN MEDICINE

## 2020-08-27 PROCEDURE — 62323 PR INJ LUMBAR/SACRAL, W/IMAGING GUIDANCE: ICD-10-PCS | Mod: ,,, | Performed by: PAIN MEDICINE

## 2020-08-27 PROCEDURE — 63600175 PHARM REV CODE 636 W HCPCS: Performed by: PAIN MEDICINE

## 2020-08-27 PROCEDURE — 62321 NJX INTERLAMINAR CRV/THRC: CPT | Performed by: PAIN MEDICINE

## 2020-08-27 PROCEDURE — 62323 NJX INTERLAMINAR LMBR/SAC: CPT | Mod: ,,, | Performed by: PAIN MEDICINE

## 2020-08-27 PROCEDURE — 25000003 PHARM REV CODE 250: Performed by: PAIN MEDICINE

## 2020-08-27 RX ORDER — INDOMETHACIN 25 MG/1
CAPSULE ORAL
Status: DISCONTINUED | OUTPATIENT
Start: 2020-08-27 | End: 2020-08-27 | Stop reason: HOSPADM

## 2020-08-27 RX ORDER — LIDOCAINE HYDROCHLORIDE 10 MG/ML
INJECTION INFILTRATION; PERINEURAL
Status: DISCONTINUED | OUTPATIENT
Start: 2020-08-27 | End: 2020-08-27 | Stop reason: HOSPADM

## 2020-08-27 RX ORDER — BUPIVACAINE HYDROCHLORIDE 2.5 MG/ML
INJECTION, SOLUTION EPIDURAL; INFILTRATION; INTRACAUDAL
Status: DISCONTINUED | OUTPATIENT
Start: 2020-08-27 | End: 2020-08-27 | Stop reason: HOSPADM

## 2020-08-27 RX ORDER — DEXAMETHASONE SODIUM PHOSPHATE 10 MG/ML
INJECTION INTRAMUSCULAR; INTRAVENOUS
Status: DISCONTINUED | OUTPATIENT
Start: 2020-08-27 | End: 2020-08-27 | Stop reason: HOSPADM

## 2020-08-27 NOTE — OP NOTE
"Procedure Note    Pre-operative Diagnosis: Lumbar radiculopathy  Post-operative Diagnosis: Lumbar radiculopathy  Procedure Date: 08/27/2020  Procedure:  (1) Caudal Epidural Steroid Injection with Epidural Catheter    (2) Intraoperative fluoroscopy      Indications: To alleviate pain and suffering, and reduce functional impairment associated with lumbar radiculopathy.      The patients history and physical exam were reviewed. The risks, benefits and alternatives to the procedure were discussed, and all questions were answered to the patients satisfaction. Special attention was given to her bleeding risk during the consenting process due to her use of warfarin, which was not stopped for this procedure.  The patient agreed to proceed, and written informed consent was verified.    Procedure in Detail: The patient was brought into the procedure room and placed in the prone position on the fluoroscopy table. The area of the sacrum and coccyx was prepped with Chloraprep and draped in a sterile manner. The sacral hiatus was identified by palpation and lateral fluoroscopy.  The skin and subcutaneous tissues were anesthetized with 1 mL of Lidocaine 1% using a 25G 1.25" needle.  A 16G 3.5" Touhy needle was inserted inferiorly to the sacral hiatus with cephalad angulation and advanced into through the sacro-coxxygeal ligament.  Entry into the epidural space was confirmed on latera fluoroscopy by advancement of a 19G styletted catheter through the needle and into the epidural space.  Live and intermittent AP fluoroscopy was then used to guide the catheter to the level of S1.  Injection of 1 mL of iodinated contrast revealed appropriate epidurogram without evidence of direct vascular uptake.    A 5 mL containing Dexamethasone 10 mg (1 mL), saline (2 mL) and Bupivacaine 0.25% (2 mL) was injected slowly and without resistance.  The needle and catheter were removed and a bandage applied to puncture site.    Disposition: The " patient tolerated the procedure well, and there were no apparent complications. Vital signs remained stable throughout the procedure. The patient was taken to the recovery area where written discharge instructions for the procedure were given.     EBL: nil    Findings: Catheter and needle placement were consistent with successful caudal epidural steroid injection with catheter.    Follow-up: in clinic as scheduled      Johnathan Gonzalez Jr, MD  Interventional Pain Medicine / Anesthesiology

## 2020-08-27 NOTE — PLAN OF CARE
Patient is in Recovery.  Patient is awake and alert.  Bandaid X1 clean, dry and intact to the lower back.   Drinking Sprite at this time.  Family member called for .  Safety is maintained with stretcher at the lowest, wheels locked and side rails up.  Will continue to monitor.

## 2020-08-27 NOTE — INTERVAL H&P NOTE
No significant changes were noted from the H&P or last clinic note.    The risks and benefits of this intervention, and alternative therapies were discussed with the patient.  The discussion of risks included infection, bleeding, need for additional procedures or surgery, nerve damage, paralysis, adverse medication reaction(s), stroke, and/or death.  Questions regarding the procedure, risks, expected outcome, and possible side effects were solicited and answered to the patient's satisfaction.  Isis Soares wishes to proceed with the injection or procedure.  Written consent was obtained.    Johnathan Gonzalez Jr, MD  Interventional Pain Medicine / Anesthesiology

## 2020-08-27 NOTE — DISCHARGE SUMMARY
[facility]  Discharge Note  Short Stay     Admit Date: 8/27/2020    Discharge Date: 8/27/2020     Attending Physician: Johnathan Gonzalez Jr, MD    Diagnoses:  Active Hospital Problems    Diagnosis  POA    Chronic pain [G89.29]  Yes      Resolved Hospital Problems   No resolved problems to display.     Discharged Condition: Good     Hospital Course: Patient was admitted for an outpatient interventional pain management procedure and tolerated the procedure well with no complications.     Final Diagnoses: Same as principal problem.     Disposition: Home or Self Care     Follow up/Patient Instructions:    Follow-up Information     Johnathan Gonzalez Jr, MD. Go in 2 weeks.    Specialty: Pain Medicine  Why: Post-procedural Follow Up As Scheduled, To Report Your Pain Scores from the Pain Diary  Contact information:  [address]  [address]  [place] [address] [phone]                   Reconciled Medications:     Medication List      CONTINUE taking these medications    alendronate 70 MG tablet  Commonly known as: FOSAMAX  Take 1 tablet (70 mg total) by mouth every 7 days.     allopurinoL 300 MG tablet  Commonly known as: ZYLOPRIM  Take 1 tablet (300 mg total) by mouth once daily.     amLODIPine 10 MG tablet  Commonly known as: NORVASC  Take 1 tablet (10 mg total) by mouth once daily.     atorvastatin 10 MG tablet  Commonly known as: LIPITOR  Take 1 tablet (10 mg total) by mouth once daily.     blood sugar diagnostic Strp  To check BG three times daily, to use with insurance preferred meter     blood-glucose meter kit  To check BG three times daily, to use with insurance preferred meter     clopidogreL 75 mg tablet  Commonly known as: PLAVIX  Take 1 tablet (75 mg total) by mouth once daily.     fexofenadine 180 MG tablet  Commonly known as: ALLEGRA  Take 180 mg by mouth daily as needed.     fluticasone propionate 50 mcg/actuation nasal spray  Commonly known as: FLONASE  1 spray by Each Nare route once  daily.     HYDROcodone-acetaminophen 7.5-325 mg per tablet  Commonly known as: NORCO  Take 1 tablet by mouth every 24 hours as needed for Pain.     lancets Misc  To check BG three times daily, to use with insurance preferred meter     lisinopriL 40 MG tablet  Commonly known as: PRINIVIL,ZESTRIL  Take 1 tablet (40 mg total) by mouth once daily.     metFORMIN 500 MG ER 24hr tablet  Commonly known as: GLUCOPHAGE-XR  Take 1 tablet (500 mg total) by mouth daily with breakfast.     metoprolol succinate 100 MG 24 hr tablet  Commonly known as: TOPROL-XL  Take 1 tablet (100 mg total) by mouth once daily. Take 1 1/2 daily     ranitidine 150 MG tablet  Commonly known as: ZANTAC  Take 150 mg by mouth 2 (two) times daily.     venlafaxine 75 MG tablet  Commonly known as: EFFEXOR  2 tabs po QAM and 1 po QHS           Discharge Procedure Orders (must include Diet, Follow-up, Activity)   Call MD for:  temperature >100.4     Call MD for:  severe uncontrolled pain     Call MD for:  redness, tenderness, or signs of infection (pain, swelling, redness, odor or green/yellow discharge around incision site)     Call MD for:  difficulty breathing or increased cough     Call MD for:  severe persistent headache     Call MD for:  worsening rash     Remove dressing in 24 hours       Johnathan Gonzalez Jr, MD  Interventional Pain Medicine / Anesthesiology

## 2020-08-27 NOTE — PLAN OF CARE
Patient is discharged.  Discharge instructions will be given to the daughter who is picking up the patient.  Patient's daughter verbalized complete understanding.  Patient is wheeled to the Exit per Pain Management staff.  Voiced no concerns.  Safety maintained.

## 2020-09-07 ENCOUNTER — NURSE TRIAGE (OUTPATIENT)
Dept: ADMINISTRATIVE | Facility: CLINIC | Age: 85
End: 2020-09-07

## 2020-09-07 NOTE — TELEPHONE ENCOUNTER
RN contacted pt through the Covid Home Symptom Monitoring Program.  Pt's caregiver/daughter answered the call and spoke for pt.  Pt had a procedure performed on 8/27 and developed a cough today.  The cough is nonproductive at this time and is not coupled with any other symptoms. Pt denies fever and sob at this time.  Home care advised.  Pt instructed to increase her fluid and vitamin C intake, drink warm liquids, take some OTC cough medicine, and use a humidifier and cough drops to assist with the cough.  Pt and daughter encouraged to take pt's temperature and to look out for any new or worsening symptoms each day.  Pt and daughter advised to call [facility] if she has any further questions/concerns or contact her provider.  If pt develops any type of distress then she should report to the nearest ED.  Pt's daughter/caregiver verbalized understanding to all.      Reason for Disposition   Cough    Additional Information   Negative: Severe difficulty breathing (e.g., struggling for each breath, speaks in single words)   Negative: Bluish (or gray) lips or face now   Negative: [1] Rapid onset of cough AND [2] has hives   Negative: Coughing started suddenly after medicine, an allergic food or bee sting   Negative: [1] Difficulty breathing AND [2] exposure to flames, smoke, or fumes   Negative: [1] Stridor AND [2] difficulty breathing   Negative: Sounds like a life-threatening emergency to the triager   Negative: Choked on object of food that could be caught in the throat   Negative: Chest pain is main symptom   Negative: [1] Previous asthma attacks AND [2] this feels like asthma attack   Negative: Cough lasts > 3 weeks   Negative: Wet (productive) cough (i.e., white-yellow, yellow, green, or opal colored sputum)   Negative: [1] Dry (non-productive) cough AND [2] within 14 days of COVID-19 Exposure   Negative: Chest pain  (Exception: MILD central chest pain, present only when coughing)   Negative: Difficulty  breathing   Negative: Patient sounds very sick or weak to the triager   Negative: [1] Coughed up blood AND [2] > 1 tablespoon (15 ml) (Exception: blood-tinged sputum)   Negative: Fever > 103 F (39.4 C)   Negative: [1] Fever > 101 F (38.3 C) AND [2] age > 60   Negative: [1] Fever > 100.0 F (37.8 C) AND [2] bedridden (e.g., nursing home patient, CVA, chronic illness, recovering from surgery)   Negative: [1] Fever > 100.0 F (37.8 C) AND [2] diabetes mellitus or weak immune system (e.g., HIV positive, cancer chemo, splenectomy, organ transplant, chronic steroids)   Negative: Wheezing is present   Negative: [1] Ankle swelling AND [2] swelling is increasing   Negative: SEVERE coughing spells (e.g., whooping sound after coughing, vomiting after coughing)   Negative: [1] Continuous (nonstop) coughing interferes with work or school AND [2] no improvement using cough treatment per protocol   Negative: Fever present > 3 days (72 hours)   Negative: [1] Fever returns after gone for over 24 hours AND [2] symptoms worse or not improved   Negative: [1] Using nasal washes and pain medicine > 24 hours AND [2] sinus pain (around cheekbone or eye) persists   Negative: Earache is present   Negative: Cough has been present for > 3 weeks   Negative: [1] Nasal discharge AND [2] present > 10 days   Negative: [1] Coughed up blood-tinged sputum AND [2] more than once   Negative: [1] Patient also has allergy symptoms (e.g., itchy eyes, clear nasal discharge, postnasal drip) AND [2] they are acting up   Negative: Taking an ACE Inhibitor medication  (e.g., benazepril/LOTENSIN, captopril/CAPOTEN, enalapril/VASOTEC, lisinopril/ZESTRIL)   Negative: Exposure to TB (Tuberculosis)    Protocols used: COUGH - ACUTE NON-PRODUCTIVE-A-AH

## 2020-09-09 DIAGNOSIS — Z86.73 HISTORY OF TRANSIENT ISCHEMIC ATTACK (TIA): ICD-10-CM

## 2020-09-09 DIAGNOSIS — M81.0 OSTEOPOROSIS, UNSPECIFIED OSTEOPOROSIS TYPE, UNSPECIFIED PATHOLOGICAL FRACTURE PRESENCE: ICD-10-CM

## 2020-09-09 DIAGNOSIS — I10 HYPERTENSION, UNSPECIFIED TYPE: ICD-10-CM

## 2020-09-09 DIAGNOSIS — M10.9 GOUT, UNSPECIFIED CAUSE, UNSPECIFIED CHRONICITY, UNSPECIFIED SITE: ICD-10-CM

## 2020-09-09 DIAGNOSIS — E11.9 TYPE 2 DIABETES MELLITUS WITHOUT COMPLICATION, WITHOUT LONG-TERM CURRENT USE OF INSULIN: ICD-10-CM

## 2020-09-09 RX ORDER — METFORMIN HYDROCHLORIDE 500 MG/1
500 TABLET, EXTENDED RELEASE ORAL
Qty: 90 TABLET | Refills: 0
Start: 2020-09-09 | End: 2020-12-29 | Stop reason: SDUPTHER

## 2020-09-09 RX ORDER — METOPROLOL SUCCINATE 100 MG/1
TABLET, EXTENDED RELEASE ORAL
Qty: 135 TABLET | Refills: 0 | Status: ON HOLD | OUTPATIENT
Start: 2020-09-09 | End: 2020-11-24 | Stop reason: SDUPTHER

## 2020-09-09 RX ORDER — ALLOPURINOL 300 MG/1
300 TABLET ORAL DAILY
Qty: 90 TABLET | Refills: 0 | Status: SHIPPED | OUTPATIENT
Start: 2020-09-09 | End: 2020-12-29 | Stop reason: SDUPTHER

## 2020-09-09 RX ORDER — AMLODIPINE BESYLATE 10 MG/1
10 TABLET ORAL DAILY
Qty: 90 TABLET | Refills: 0 | Status: SHIPPED | OUTPATIENT
Start: 2020-09-09 | End: 2020-12-20

## 2020-09-09 RX ORDER — ALENDRONATE SODIUM 70 MG/1
70 TABLET ORAL
Qty: 12 TABLET | Refills: 0 | Status: SHIPPED | OUTPATIENT
Start: 2020-09-09 | End: 2020-11-30

## 2020-09-09 RX ORDER — CLOPIDOGREL BISULFATE 75 MG/1
75 TABLET ORAL DAILY
Qty: 90 TABLET | Refills: 0 | Status: SHIPPED | OUTPATIENT
Start: 2020-09-09 | End: 2020-12-20

## 2020-09-09 RX ORDER — LISINOPRIL 40 MG/1
40 TABLET ORAL DAILY
Qty: 90 TABLET | Refills: 0 | Status: SHIPPED | OUTPATIENT
Start: 2020-09-09 | End: 2020-12-20

## 2020-09-09 NOTE — TELEPHONE ENCOUNTER
----- Message from Jesica Anthony sent at 9/9/2020  3:10 PM CDT -----  Contact: Colleen (daughter)  [phone]  Patient has her appointment scheduled for 11/27/2020.     Requesting an RX refill or new RX.  Is this a refill or new RX:  refill  RX name and strength: amLODIPine (NORVASC) 10 MG tablet  Directions (copy/paste from chart): Take 1 tablet (10 mg total) by mouth once daily. - Oral   Is this a 30 day or 90 day RX:  90  Local pharmacy or mail order pharmacy:  local   Pharmacy name and phone # (copy/paste from chart):[facility]  [phone] (Phone)  [phone] (Fax)    Comments:         Requesting an RX refill or new RX.  Is this a refill or new RX:  Refill  RX name and strength: metoprolol succinate (TOPROL-XL) 100 MG 24 hr tablet  Directions (copy/paste from chart):   1 tablet (100 mg total) by mouth once daily. Take 1 1/2 daily - Oral  Is this a 30 day or 90 day RX:  90  Local pharmacy or mail order pharmacy: local   Pharmacy name and phone # (copy/paste from chart):  [facility]  [phone] (Phone)  [phone] (Fax)     Comments:         Requesting an RX refill or new RX.  Is this a refill or new RX:  refill  RX name and strength:lisinopril (PRINIVIL,ZESTRIL) 40 MG tablet   Directions (copy/paste from chart):  Take 1 tablet (40 mg total) by mouth once daily. - Oral  Is this a 30 day or 90 day RX: 90   Local pharmacy or mail order pharmacy: local    Pharmacy name and phone # (copy/paste from chart): [facility]  [phone] (Phone)  [phone] (Fax)       Comments:         Requesting an RX refill or new RX.  Is this a refill or new RX:  refill  RX name and strength: metoprolol succinate (TOPROL-XL) 100 MG 24 hr tablet  Directions (copy/paste from chart):   Take 1 tablet (500 mg total) by mouth daily with breakfast. - Oral  Is this a 30 day or 90 day RX:  90  Local pharmacy or mail order pharmacy: local   Pharmacy name and phone # (copy/paste from chart):  [facility]  [phone] (Phone)  [phone] (Fax)        Comments:          Requesting an RX refill or new RX.  Is this a refill or new RX: Refill   RX name and strength: clopidogrel (PLAVIX) 75 mg tablet  Directions (copy/paste from chart):  Take 1 tablet (75 mg total) by mouth once daily. - Oral  Is this a 30 day or 90 day RX:  90  Local pharmacy or mail order pharmacy: local    Pharmacy name and phone # (copy/paste from chart): [facility]  [phone] (Phone)  [phone] (Fax)      Comments:        Requesting an RX refill or new RX.  Is this a refill or new RX:  Refill  RX name and strength:allopurinol (ZYLOPRIM) 300 MG tablet   Directions (copy/paste from chart):  Take 1 tablet (300 mg total) by mouth once daily. - Oral  Is this a 30 day or 90 day RX: 90  Local pharmacy or mail order pharmacy: local   Pharmacy name and phone # (copy/paste from chart): [facility]  [phone] (Phone)  [phone] (Fax)        Comments:        Requesting an RX refill or new RX.  Is this a refill or new RX:  Refill  RX name and strength: alendronate (FOSAMAX) 70 MG tablet  Directions (copy/paste from chart): Take 1 tablet (70 mg total) by mouth every 7 days. - Oral   Is this a 30 day or 90 day RX:  90  Local pharmacy or mail order pharmacy:  local  Pharmacy name and phone # (copy/paste from chart): [facility]  [phone] (Phone)  [phone] (Fax)     Comments:

## 2020-09-16 ENCOUNTER — EXTERNAL HOME HEALTH (OUTPATIENT)
Dept: HOME HEALTH SERVICES | Facility: HOSPITAL | Age: 85
End: 2020-09-16
Payer: MEDICARE

## 2020-09-21 ENCOUNTER — DOCUMENT SCAN (OUTPATIENT)
Dept: HOME HEALTH SERVICES | Facility: HOSPITAL | Age: 85
End: 2020-09-21
Payer: MEDICARE

## 2020-10-19 ENCOUNTER — DOCUMENT SCAN (OUTPATIENT)
Dept: HOME HEALTH SERVICES | Facility: HOSPITAL | Age: 85
End: 2020-10-19
Payer: MEDICARE

## 2020-11-20 ENCOUNTER — HOSPITAL ENCOUNTER (INPATIENT)
Facility: HOSPITAL | Age: 85
LOS: 4 days | Discharge: NURSING, REHAB OR LONG-TERM CARE | DRG: 482 | End: 2020-11-24
Attending: EMERGENCY MEDICINE | Admitting: INTERNAL MEDICINE
Payer: MEDICARE

## 2020-11-20 DIAGNOSIS — S72.092D: ICD-10-CM

## 2020-11-20 DIAGNOSIS — S72.002A CLOSED DISPLACED FRACTURE OF LEFT FEMORAL NECK: ICD-10-CM

## 2020-11-20 DIAGNOSIS — M1A.09X0 CHRONIC GOUT OF MULTIPLE SITES, UNSPECIFIED CAUSE: ICD-10-CM

## 2020-11-20 DIAGNOSIS — S72.002A CLOSED FRACTURE OF LEFT HIP, INITIAL ENCOUNTER: ICD-10-CM

## 2020-11-20 DIAGNOSIS — S72.002A LEFT DISPLACED FEMORAL NECK FRACTURE: ICD-10-CM

## 2020-11-20 DIAGNOSIS — I10 ESSENTIAL HYPERTENSION: ICD-10-CM

## 2020-11-20 DIAGNOSIS — E11.9 TYPE 2 DIABETES MELLITUS WITHOUT COMPLICATION, WITHOUT LONG-TERM CURRENT USE OF INSULIN: ICD-10-CM

## 2020-11-20 DIAGNOSIS — F33.42 RECURRENT MAJOR DEPRESSIVE DISORDER, IN FULL REMISSION: ICD-10-CM

## 2020-11-20 DIAGNOSIS — D32.9 MENINGIOMA: ICD-10-CM

## 2020-11-20 DIAGNOSIS — I10 HYPERTENSION, UNSPECIFIED TYPE: ICD-10-CM

## 2020-11-20 DIAGNOSIS — M80.00XD AGE-RELATED OSTEOPOROSIS WITH CURRENT PATHOLOGICAL FRACTURE WITH ROUTINE HEALING: ICD-10-CM

## 2020-11-20 DIAGNOSIS — E55.9 VITAMIN D DEFICIENCY: ICD-10-CM

## 2020-11-20 DIAGNOSIS — Z01.811 PRE-OP CHEST EXAM: ICD-10-CM

## 2020-11-20 DIAGNOSIS — Z86.73 HISTORY OF TRANSIENT ISCHEMIC ATTACK (TIA): ICD-10-CM

## 2020-11-20 DIAGNOSIS — S72.092D: Primary | ICD-10-CM

## 2020-11-20 DIAGNOSIS — E78.00 PURE HYPERCHOLESTEROLEMIA: ICD-10-CM
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
## 2020-11-20 LAB
ABO + RH BLD: NORMAL
ALBUMIN SERPL BCP-MCNC: 4.8 G/DL (ref 3.5–5.2)
ALP SERPL-CCNC: 69 U/L (ref 55–135)
ALT SERPL W/O P-5'-P-CCNC: 16 U/L (ref 10–44)
ANION GAP SERPL CALC-SCNC: 14 MMOL/L (ref 8–16)
AST SERPL-CCNC: 38 U/L (ref 10–40)
BACTERIA #/AREA URNS AUTO: NORMAL /HPF
BASOPHILS # BLD AUTO: 0.03 K/UL (ref 0–0.2)
BASOPHILS NFR BLD: 0.2 % (ref 0–1.9)
BILIRUB SERPL-MCNC: 0.5 MG/DL (ref 0.1–1)
BILIRUB UR QL STRIP: NEGATIVE
BLD GP AB SCN CELLS X3 SERPL QL: NORMAL
BUN SERPL-MCNC: 15 MG/DL (ref 8–23)
CALCIUM SERPL-MCNC: 10.3 MG/DL (ref 8.7–10.5)
CHLORIDE SERPL-SCNC: 102 MMOL/L (ref 95–110)
CK SERPL-CCNC: 76 U/L (ref 20–180)
CLARITY UR REFRACT.AUTO: CLEAR
CO2 SERPL-SCNC: 20 MMOL/L (ref 23–29)
COLOR UR AUTO: YELLOW
CREAT SERPL-MCNC: 0.9 MG/DL (ref 0.5–1.4)
CTP QC/QA: YES
DIFFERENTIAL METHOD: ABNORMAL
EOSINOPHIL # BLD AUTO: 0 K/UL (ref 0–0.5)
EOSINOPHIL NFR BLD: 0.1 % (ref 0–8)
ERYTHROCYTE [DISTWIDTH] IN BLOOD BY AUTOMATED COUNT: 14.3 % (ref 11.5–14.5)
EST. GFR  (AFRICAN AMERICAN): >60 ML/MIN/1.73 M^2
EST. GFR  (NON AFRICAN AMERICAN): 59.3 ML/MIN/1.73 M^2
ESTIMATED AVG GLUCOSE: 140 MG/DL (ref 68–131)
GLUCOSE SERPL-MCNC: 177 MG/DL (ref 70–110)
GLUCOSE UR QL STRIP: NEGATIVE
HBA1C MFR BLD HPLC: 6.5 % (ref 4–5.6)
HCT VFR BLD AUTO: 47.2 % (ref 37–48.5)
HGB BLD-MCNC: 15.6 G/DL (ref 12–16)
HGB UR QL STRIP: ABNORMAL
HYALINE CASTS UR QL AUTO: 0 /LPF
IMM GRANULOCYTES # BLD AUTO: 0.09 K/UL (ref 0–0.04)
IMM GRANULOCYTES NFR BLD AUTO: 0.7 % (ref 0–0.5)
INR PPP: 1 (ref 0.8–1.2)
KETONES UR QL STRIP: NEGATIVE
LEUKOCYTE ESTERASE UR QL STRIP: NEGATIVE
LYMPHOCYTES # BLD AUTO: 1.8 K/UL (ref 1–4.8)
LYMPHOCYTES NFR BLD: 13.4 % (ref 18–48)
MAGNESIUM SERPL-MCNC: 1.5 MG/DL (ref 1.6–2.6)
MCH RBC QN AUTO: 33.3 PG (ref 27–31)
MCHC RBC AUTO-ENTMCNC: 33.1 G/DL (ref 32–36)
MCV RBC AUTO: 101 FL (ref 82–98)
MICROSCOPIC COMMENT: NORMAL
MONOCYTES # BLD AUTO: 0.7 K/UL (ref 0.3–1)
MONOCYTES NFR BLD: 5 % (ref 4–15)
NEUTROPHILS # BLD AUTO: 10.9 K/UL (ref 1.8–7.7)
NEUTROPHILS NFR BLD: 80.6 % (ref 38–73)
NITRITE UR QL STRIP: NEGATIVE
NRBC BLD-RTO: 0 /100 WBC
PH UR STRIP: 5 [PH] (ref 5–8)
PHOSPHATE SERPL-MCNC: 2.9 MG/DL (ref 2.7–4.5)
PLATELET # BLD AUTO: 234 K/UL (ref 150–350)
PMV BLD AUTO: 10.5 FL (ref 9.2–12.9)
POTASSIUM SERPL-SCNC: 4.1 MMOL/L (ref 3.5–5.1)
PREALB SERPL-MCNC: 25 MG/DL (ref 20–43)
PROT SERPL-MCNC: 9.1 G/DL (ref 6–8.4)
PROT UR QL STRIP: ABNORMAL
PROTHROMBIN TIME: 10.9 SEC (ref 9–12.5)
RBC # BLD AUTO: 4.68 M/UL (ref 4–5.4)
RBC #/AREA URNS AUTO: 1 /HPF (ref 0–4)
SARS-COV-2 RDRP RESP QL NAA+PROBE: NEGATIVE
SODIUM SERPL-SCNC: 136 MMOL/L (ref 136–145)
SP GR UR STRIP: 1.01 (ref 1–1.03)
SQUAMOUS #/AREA URNS AUTO: 0 /HPF
TRANSFERRIN SERPL-MCNC: 299 MG/DL (ref 200–375)
URN SPEC COLLECT METH UR: ABNORMAL
WBC # BLD AUTO: 13.5 K/UL (ref 3.9–12.7)
WBC #/AREA URNS AUTO: 0 /HPF (ref 0–5)

## 2020-11-20 PROCEDURE — 99285 PR EMERGENCY DEPT VISIT,LEVEL V: ICD-10-PCS | Mod: CS,,, | Performed by: EMERGENCY MEDICINE

## 2020-11-20 PROCEDURE — 99285 EMERGENCY DEPT VISIT HI MDM: CPT | Mod: 25

## 2020-11-20 PROCEDURE — 99223 1ST HOSP IP/OBS HIGH 75: CPT | Mod: ,,, | Performed by: HOSPITALIST

## 2020-11-20 PROCEDURE — 93005 ELECTROCARDIOGRAM TRACING: CPT

## 2020-11-20 PROCEDURE — 83036 HEMOGLOBIN GLYCOSYLATED A1C: CPT

## 2020-11-20 PROCEDURE — 93010 ELECTROCARDIOGRAM REPORT: CPT | Mod: ,,, | Performed by: INTERNAL MEDICINE

## 2020-11-20 PROCEDURE — 84100 ASSAY OF PHOSPHORUS: CPT

## 2020-11-20 PROCEDURE — 85025 COMPLETE CBC W/AUTO DIFF WBC: CPT

## 2020-11-20 PROCEDURE — U0002 COVID-19 LAB TEST NON-CDC: HCPCS | Performed by: STUDENT IN AN ORGANIZED HEALTH CARE EDUCATION/TRAINING PROGRAM

## 2020-11-20 PROCEDURE — 84466 ASSAY OF TRANSFERRIN: CPT

## 2020-11-20 PROCEDURE — 82550 ASSAY OF CK (CPK): CPT

## 2020-11-20 PROCEDURE — 25000003 PHARM REV CODE 250: Performed by: STUDENT IN AN ORGANIZED HEALTH CARE EDUCATION/TRAINING PROGRAM

## 2020-11-20 PROCEDURE — 93010 EKG 12-LEAD: ICD-10-PCS | Mod: ,,, | Performed by: INTERNAL MEDICINE

## 2020-11-20 PROCEDURE — 99223 PR INITIAL HOSPITAL CARE,LEVL III: ICD-10-PCS | Mod: ,,, | Performed by: HOSPITALIST

## 2020-11-20 PROCEDURE — 84134 ASSAY OF PREALBUMIN: CPT

## 2020-11-20 PROCEDURE — 85610 PROTHROMBIN TIME: CPT

## 2020-11-20 PROCEDURE — 83735 ASSAY OF MAGNESIUM: CPT

## 2020-11-20 PROCEDURE — 81001 URINALYSIS AUTO W/SCOPE: CPT

## 2020-11-20 PROCEDURE — 12000002 HC ACUTE/MED SURGE SEMI-PRIVATE ROOM

## 2020-11-20 PROCEDURE — 80053 COMPREHEN METABOLIC PANEL: CPT

## 2020-11-20 PROCEDURE — 99285 EMERGENCY DEPT VISIT HI MDM: CPT | Mod: CS,,, | Performed by: EMERGENCY MEDICINE

## 2020-11-20 PROCEDURE — 86850 RBC ANTIBODY SCREEN: CPT

## 2020-11-20 RX ORDER — OXYCODONE AND ACETAMINOPHEN 5; 325 MG/1; MG/1
1 TABLET ORAL
Status: COMPLETED | OUTPATIENT
Start: 2020-11-20 | End: 2020-11-20

## 2020-11-20 RX ADMIN — OXYCODONE HYDROCHLORIDE AND ACETAMINOPHEN 1 TABLET: 5; 325 TABLET ORAL at 07:11

## 2020-11-21 ENCOUNTER — ANESTHESIA (OUTPATIENT)
Dept: SURGERY | Facility: HOSPITAL | Age: 85
DRG: 482 | End: 2020-11-21
Payer: MEDICARE

## 2020-11-21 ENCOUNTER — ANESTHESIA EVENT (OUTPATIENT)
Dept: SURGERY | Facility: HOSPITAL | Age: 85
DRG: 482 | End: 2020-11-21
Payer: MEDICARE

## 2020-11-21 PROBLEM — D33.2 BENIGN NEOPLASM OF BRAIN: Status: RESOLVED | Noted: 2019-11-25 | Resolved: 2020-11-21

## 2020-11-21 PROBLEM — S72.092D: Status: ACTIVE | Noted: 2020-11-20

## 2020-11-21 PROBLEM — B34.9 VIRAL ILLNESS: Status: RESOLVED | Noted: 2020-03-11 | Resolved: 2020-11-21

## 2020-11-21 PROBLEM — E78.00 PURE HYPERCHOLESTEROLEMIA: Status: ACTIVE | Noted: 2019-11-25

## 2020-11-21 LAB
POCT GLUCOSE: 168 MG/DL (ref 70–110)
POCT GLUCOSE: 205 MG/DL (ref 70–110)

## 2020-11-21 PROCEDURE — 25000003 PHARM REV CODE 250: Performed by: STUDENT IN AN ORGANIZED HEALTH CARE EDUCATION/TRAINING PROGRAM

## 2020-11-21 PROCEDURE — 63600175 PHARM REV CODE 636 W HCPCS: Performed by: HOSPITALIST

## 2020-11-21 PROCEDURE — 63600175 PHARM REV CODE 636 W HCPCS: Performed by: STUDENT IN AN ORGANIZED HEALTH CARE EDUCATION/TRAINING PROGRAM

## 2020-11-21 PROCEDURE — 36000711: Performed by: ORTHOPAEDIC SURGERY

## 2020-11-21 PROCEDURE — 82962 GLUCOSE BLOOD TEST: CPT | Performed by: ORTHOPAEDIC SURGERY

## 2020-11-21 PROCEDURE — 27235 TREAT THIGH FRACTURE: CPT | Mod: LT,,, | Performed by: ORTHOPAEDIC SURGERY

## 2020-11-21 PROCEDURE — 37000009 HC ANESTHESIA EA ADD 15 MINS: Performed by: ORTHOPAEDIC SURGERY

## 2020-11-21 PROCEDURE — 27201423 OPTIME MED/SURG SUP & DEVICES STERILE SUPPLY: Performed by: ORTHOPAEDIC SURGERY

## 2020-11-21 PROCEDURE — 94761 N-INVAS EAR/PLS OXIMETRY MLT: CPT

## 2020-11-21 PROCEDURE — 99223 PR INITIAL HOSPITAL CARE,LEVL III: ICD-10-PCS | Mod: 57,,, | Performed by: ORTHOPAEDIC SURGERY

## 2020-11-21 PROCEDURE — D9220A PRA ANESTHESIA: Mod: CRNA,,, | Performed by: STUDENT IN AN ORGANIZED HEALTH CARE EDUCATION/TRAINING PROGRAM

## 2020-11-21 PROCEDURE — 94799 UNLISTED PULMONARY SVC/PX: CPT

## 2020-11-21 PROCEDURE — 76942 ECHO GUIDE FOR BIOPSY: CPT | Performed by: ANESTHESIOLOGY

## 2020-11-21 PROCEDURE — 71000016 HC POSTOP RECOV ADDL HR: Performed by: ORTHOPAEDIC SURGERY

## 2020-11-21 PROCEDURE — 64448 NJX AA&/STRD FEM NRV NFS IMG: CPT | Mod: 59,LT,, | Performed by: ANESTHESIOLOGY

## 2020-11-21 PROCEDURE — 71000015 HC POSTOP RECOV 1ST HR: Performed by: ORTHOPAEDIC SURGERY

## 2020-11-21 PROCEDURE — D9220A PRA ANESTHESIA: Mod: ANES,,, | Performed by: STUDENT IN AN ORGANIZED HEALTH CARE EDUCATION/TRAINING PROGRAM

## 2020-11-21 PROCEDURE — 25000003 PHARM REV CODE 250: Performed by: HOSPITALIST

## 2020-11-21 PROCEDURE — 64448 LEFT SIFI CATHETER: ICD-10-PCS | Mod: 59,LT,, | Performed by: ANESTHESIOLOGY

## 2020-11-21 PROCEDURE — 11000001 HC ACUTE MED/SURG PRIVATE ROOM

## 2020-11-21 PROCEDURE — 27235 PR PERCUT FIX PROX/NECK FEMUR FX: ICD-10-PCS | Mod: LT,,, | Performed by: ORTHOPAEDIC SURGERY

## 2020-11-21 PROCEDURE — 37000008 HC ANESTHESIA 1ST 15 MINUTES: Performed by: ORTHOPAEDIC SURGERY

## 2020-11-21 PROCEDURE — 36000710: Performed by: ORTHOPAEDIC SURGERY

## 2020-11-21 PROCEDURE — 76942 LEFT SIFI CATHETER: ICD-10-PCS | Mod: 26,,, | Performed by: ANESTHESIOLOGY

## 2020-11-21 PROCEDURE — D9220A PRA ANESTHESIA: ICD-10-PCS | Mod: ANES,,, | Performed by: STUDENT IN AN ORGANIZED HEALTH CARE EDUCATION/TRAINING PROGRAM

## 2020-11-21 PROCEDURE — 71000033 HC RECOVERY, INTIAL HOUR: Performed by: ORTHOPAEDIC SURGERY

## 2020-11-21 PROCEDURE — C1769 GUIDE WIRE: HCPCS | Performed by: ORTHOPAEDIC SURGERY

## 2020-11-21 PROCEDURE — C1713 ANCHOR/SCREW BN/BN,TIS/BN: HCPCS | Performed by: ORTHOPAEDIC SURGERY

## 2020-11-21 PROCEDURE — 27000221 HC OXYGEN, UP TO 24 HOURS

## 2020-11-21 PROCEDURE — D9220A PRA ANESTHESIA: ICD-10-PCS | Mod: CRNA,,, | Performed by: STUDENT IN AN ORGANIZED HEALTH CARE EDUCATION/TRAINING PROGRAM

## 2020-11-21 PROCEDURE — 99223 1ST HOSP IP/OBS HIGH 75: CPT | Mod: 57,,, | Performed by: ORTHOPAEDIC SURGERY

## 2020-11-21 DEVICE — SCREW CANNULATED 7.3MM X 90MM: Type: IMPLANTABLE DEVICE | Site: FEMUR | Status: FUNCTIONAL

## 2020-11-21 DEVICE — WIRE DRILL TIP: Type: IMPLANTABLE DEVICE | Site: FEMUR | Status: FUNCTIONAL

## 2020-11-21 DEVICE — SCREW CANN 32MM THRD 7.3X 85MM: Type: IMPLANTABLE DEVICE | Site: FEMUR | Status: FUNCTIONAL

## 2020-11-21 RX ORDER — SODIUM CHLORIDE 9 MG/ML
INJECTION, SOLUTION INTRAVENOUS CONTINUOUS
Status: DISCONTINUED | OUTPATIENT
Start: 2020-11-21 | End: 2020-11-21

## 2020-11-21 RX ORDER — SODIUM CHLORIDE 9 MG/ML
INJECTION, SOLUTION INTRAVENOUS CONTINUOUS PRN
Status: DISCONTINUED | OUTPATIENT
Start: 2020-11-21 | End: 2020-11-21

## 2020-11-21 RX ORDER — ROPIVACAINE HYDROCHLORIDE 2 MG/ML
10 INJECTION, SOLUTION EPIDURAL; INFILTRATION; PERINEURAL CONTINUOUS
Status: DISCONTINUED | OUTPATIENT
Start: 2020-11-21 | End: 2020-11-24

## 2020-11-21 RX ORDER — LISINOPRIL 20 MG/1
40 TABLET ORAL DAILY
Status: DISCONTINUED | OUTPATIENT
Start: 2020-11-21 | End: 2020-11-24 | Stop reason: HOSPADM

## 2020-11-21 RX ORDER — OXYCODONE HYDROCHLORIDE 5 MG/1
5 TABLET ORAL
Status: DISCONTINUED | OUTPATIENT
Start: 2020-11-21 | End: 2020-11-23

## 2020-11-21 RX ORDER — POLYETHYLENE GLYCOL 3350 17 G/17G
17 POWDER, FOR SOLUTION ORAL DAILY
Status: DISCONTINUED | OUTPATIENT
Start: 2020-11-21 | End: 2020-11-24 | Stop reason: HOSPADM

## 2020-11-21 RX ORDER — METOPROLOL SUCCINATE 100 MG/1
100 TABLET, EXTENDED RELEASE ORAL DAILY
Status: DISCONTINUED | OUTPATIENT
Start: 2020-11-21 | End: 2020-11-24 | Stop reason: HOSPADM

## 2020-11-21 RX ORDER — SODIUM CHLORIDE 0.9 % (FLUSH) 0.9 %
10 SYRINGE (ML) INJECTION
Status: DISCONTINUED | OUTPATIENT
Start: 2020-11-21 | End: 2020-11-24 | Stop reason: HOSPADM

## 2020-11-21 RX ORDER — EPHEDRINE SULFATE 50 MG/ML
INJECTION, SOLUTION INTRAVENOUS
Status: DISCONTINUED | OUTPATIENT
Start: 2020-11-21 | End: 2020-11-21

## 2020-11-21 RX ORDER — NEOSTIGMINE METHYLSULFATE 1 MG/ML
INJECTION, SOLUTION INTRAVENOUS
Status: DISCONTINUED | OUTPATIENT
Start: 2020-11-21 | End: 2020-11-21

## 2020-11-21 RX ORDER — METHOCARBAMOL 500 MG/1
500 TABLET, FILM COATED ORAL EVERY 6 HOURS PRN
Status: DISCONTINUED | OUTPATIENT
Start: 2020-11-21 | End: 2020-11-24 | Stop reason: HOSPADM

## 2020-11-21 RX ORDER — CLOPIDOGREL BISULFATE 75 MG/1
75 TABLET ORAL DAILY
Status: DISCONTINUED | OUTPATIENT
Start: 2020-11-22 | End: 2020-11-24 | Stop reason: HOSPADM

## 2020-11-21 RX ORDER — MUPIROCIN 20 MG/G
1 OINTMENT TOPICAL
Status: COMPLETED | OUTPATIENT
Start: 2020-11-21 | End: 2020-11-21

## 2020-11-21 RX ORDER — AMLODIPINE BESYLATE 10 MG/1
10 TABLET ORAL DAILY
Status: DISCONTINUED | OUTPATIENT
Start: 2020-11-21 | End: 2020-11-24 | Stop reason: HOSPADM

## 2020-11-21 RX ORDER — PREGABALIN 75 MG/1
75 CAPSULE ORAL NIGHTLY
Status: DISCONTINUED | OUTPATIENT
Start: 2020-11-21 | End: 2020-11-24 | Stop reason: HOSPADM

## 2020-11-21 RX ORDER — ATORVASTATIN CALCIUM 10 MG/1
10 TABLET, FILM COATED ORAL DAILY
Status: DISCONTINUED | OUTPATIENT
Start: 2020-11-21 | End: 2020-11-24 | Stop reason: HOSPADM

## 2020-11-21 RX ORDER — MUPIROCIN 20 MG/G
OINTMENT TOPICAL
Status: DISCONTINUED | OUTPATIENT
Start: 2020-11-21 | End: 2020-11-21 | Stop reason: HOSPADM

## 2020-11-21 RX ORDER — MIDAZOLAM HYDROCHLORIDE 1 MG/ML
0.5 INJECTION INTRAMUSCULAR; INTRAVENOUS
Status: DISCONTINUED | OUTPATIENT
Start: 2020-11-21 | End: 2020-11-21 | Stop reason: HOSPADM

## 2020-11-21 RX ORDER — ONDANSETRON 2 MG/ML
4 INJECTION INTRAMUSCULAR; INTRAVENOUS EVERY 12 HOURS PRN
Status: DISCONTINUED | OUTPATIENT
Start: 2020-11-21 | End: 2020-11-24 | Stop reason: HOSPADM

## 2020-11-21 RX ORDER — PHENYLEPHRINE HYDROCHLORIDE 10 MG/ML
INJECTION INTRAVENOUS
Status: DISCONTINUED | OUTPATIENT
Start: 2020-11-21 | End: 2020-11-21

## 2020-11-21 RX ORDER — HYDRALAZINE HYDROCHLORIDE 25 MG/1
25 TABLET, FILM COATED ORAL EVERY 8 HOURS PRN
Status: DISCONTINUED | OUTPATIENT
Start: 2020-11-21 | End: 2020-11-24 | Stop reason: HOSPADM

## 2020-11-21 RX ORDER — MUPIROCIN 20 MG/G
1 OINTMENT TOPICAL 2 TIMES DAILY
Status: DISCONTINUED | OUTPATIENT
Start: 2020-11-21 | End: 2020-11-24 | Stop reason: HOSPADM

## 2020-11-21 RX ORDER — CEFAZOLIN SODIUM 1 G/3ML
2 INJECTION, POWDER, FOR SOLUTION INTRAMUSCULAR; INTRAVENOUS
Status: DISCONTINUED | OUTPATIENT
Start: 2020-11-21 | End: 2020-11-21

## 2020-11-21 RX ORDER — DEXAMETHASONE SODIUM PHOSPHATE 4 MG/ML
INJECTION, SOLUTION INTRA-ARTICULAR; INTRALESIONAL; INTRAMUSCULAR; INTRAVENOUS; SOFT TISSUE
Status: DISCONTINUED | OUTPATIENT
Start: 2020-11-21 | End: 2020-11-21

## 2020-11-21 RX ORDER — FENTANYL CITRATE 50 UG/ML
25 INJECTION, SOLUTION INTRAMUSCULAR; INTRAVENOUS EVERY 5 MIN PRN
Status: DISCONTINUED | OUTPATIENT
Start: 2020-11-21 | End: 2020-11-21 | Stop reason: HOSPADM

## 2020-11-21 RX ORDER — LIDOCAINE HYDROCHLORIDE 10 MG/ML
1 INJECTION, SOLUTION EPIDURAL; INFILTRATION; INTRACAUDAL; PERINEURAL
Status: DISCONTINUED | OUTPATIENT
Start: 2020-11-21 | End: 2020-11-21 | Stop reason: HOSPADM

## 2020-11-21 RX ORDER — ONDANSETRON HYDROCHLORIDE 2 MG/ML
INJECTION, SOLUTION INTRAMUSCULAR; INTRAVENOUS
Status: DISCONTINUED | OUTPATIENT
Start: 2020-11-21 | End: 2020-11-21

## 2020-11-21 RX ORDER — CEFAZOLIN SODIUM 1 G/3ML
2 INJECTION, POWDER, FOR SOLUTION INTRAMUSCULAR; INTRAVENOUS
Status: COMPLETED | OUTPATIENT
Start: 2020-11-21 | End: 2020-11-22

## 2020-11-21 RX ORDER — TALC
6 POWDER (GRAM) TOPICAL NIGHTLY PRN
Status: DISCONTINUED | OUTPATIENT
Start: 2020-11-21 | End: 2020-11-24 | Stop reason: HOSPADM

## 2020-11-21 RX ORDER — ENOXAPARIN SODIUM 100 MG/ML
40 INJECTION SUBCUTANEOUS EVERY 24 HOURS
Status: DISCONTINUED | OUTPATIENT
Start: 2020-11-21 | End: 2020-11-24 | Stop reason: HOSPADM

## 2020-11-21 RX ORDER — MAGNESIUM SULFATE HEPTAHYDRATE 40 MG/ML
2 INJECTION, SOLUTION INTRAVENOUS ONCE
Status: COMPLETED | OUTPATIENT
Start: 2020-11-21 | End: 2020-11-21

## 2020-11-21 RX ORDER — ALLOPURINOL 300 MG/1
300 TABLET ORAL DAILY
Status: DISCONTINUED | OUTPATIENT
Start: 2020-11-21 | End: 2020-11-24 | Stop reason: HOSPADM

## 2020-11-21 RX ORDER — CEFAZOLIN SODIUM 1 G/3ML
2 INJECTION, POWDER, FOR SOLUTION INTRAMUSCULAR; INTRAVENOUS
Status: COMPLETED | OUTPATIENT
Start: 2020-11-21 | End: 2020-11-21

## 2020-11-21 RX ORDER — BUPIVACAINE HYDROCHLORIDE 2.5 MG/ML
INJECTION, SOLUTION EPIDURAL; INFILTRATION; INTRACAUDAL
Status: DISPENSED
Start: 2020-11-21 | End: 2020-11-21

## 2020-11-21 RX ORDER — VENLAFAXINE 75 MG/1
75 TABLET ORAL 2 TIMES DAILY
Status: DISCONTINUED | OUTPATIENT
Start: 2020-11-21 | End: 2020-11-24 | Stop reason: HOSPADM

## 2020-11-21 RX ORDER — CALCIUM CARBONATE 500(1250)
500 TABLET ORAL 2 TIMES DAILY
Status: DISCONTINUED | OUTPATIENT
Start: 2020-11-21 | End: 2020-11-24 | Stop reason: HOSPADM

## 2020-11-21 RX ORDER — DEXMEDETOMIDINE HYDROCHLORIDE 100 UG/ML
INJECTION, SOLUTION INTRAVENOUS
Status: DISCONTINUED | OUTPATIENT
Start: 2020-11-21 | End: 2020-11-21

## 2020-11-21 RX ORDER — ROCURONIUM BROMIDE 10 MG/ML
INJECTION, SOLUTION INTRAVENOUS
Status: DISCONTINUED | OUTPATIENT
Start: 2020-11-21 | End: 2020-11-21

## 2020-11-21 RX ORDER — ACETAMINOPHEN 500 MG
1000 TABLET ORAL EVERY 8 HOURS
Status: DISCONTINUED | OUTPATIENT
Start: 2020-11-21 | End: 2020-11-22

## 2020-11-21 RX ORDER — CHOLECALCIFEROL (VITAMIN D3) 25 MCG
1000 TABLET ORAL DAILY
Status: DISCONTINUED | OUTPATIENT
Start: 2020-11-21 | End: 2020-11-23

## 2020-11-21 RX ORDER — LIDOCAINE HCL/PF 100 MG/5ML
SYRINGE (ML) INTRAVENOUS
Status: DISCONTINUED | OUTPATIENT
Start: 2020-11-21 | End: 2020-11-21

## 2020-11-21 RX ORDER — ROPIVACAINE HYDROCHLORIDE 2 MG/ML
8 INJECTION, SOLUTION EPIDURAL; INFILTRATION; PERINEURAL CONTINUOUS
Status: DISCONTINUED | OUTPATIENT
Start: 2020-11-21 | End: 2020-11-21

## 2020-11-21 RX ORDER — PROPOFOL 10 MG/ML
VIAL (ML) INTRAVENOUS
Status: DISCONTINUED | OUTPATIENT
Start: 2020-11-21 | End: 2020-11-21

## 2020-11-21 RX ORDER — BISACODYL 10 MG
10 SUPPOSITORY, RECTAL RECTAL DAILY PRN
Status: DISCONTINUED | OUTPATIENT
Start: 2020-11-21 | End: 2020-11-24 | Stop reason: HOSPADM

## 2020-11-21 RX ORDER — OXYCODONE HYDROCHLORIDE 10 MG/1
10 TABLET ORAL
Status: DISCONTINUED | OUTPATIENT
Start: 2020-11-21 | End: 2020-11-23

## 2020-11-21 RX ORDER — MORPHINE SULFATE 2 MG/ML
2 INJECTION, SOLUTION INTRAMUSCULAR; INTRAVENOUS
Status: DISCONTINUED | OUTPATIENT
Start: 2020-11-21 | End: 2020-11-22

## 2020-11-21 RX ADMIN — ENOXAPARIN SODIUM 40 MG: 40 INJECTION SUBCUTANEOUS at 04:11

## 2020-11-21 RX ADMIN — PHENYLEPHRINE HYDROCHLORIDE 200 MCG: 10 INJECTION INTRAVENOUS at 09:11

## 2020-11-21 RX ADMIN — Medication 1000 UNITS: at 01:11

## 2020-11-21 RX ADMIN — ALLOPURINOL 300 MG: 300 TABLET ORAL at 01:11

## 2020-11-21 RX ADMIN — PROPOFOL 40 MG: 10 INJECTION, EMULSION INTRAVENOUS at 08:11

## 2020-11-21 RX ADMIN — AMLODIPINE BESYLATE 10 MG: 10 TABLET ORAL at 10:11

## 2020-11-21 RX ADMIN — ROCURONIUM BROMIDE 10 MG: 10 INJECTION, SOLUTION INTRAVENOUS at 09:11

## 2020-11-21 RX ADMIN — EPHEDRINE SULFATE 10 MG: 50 INJECTION INTRAVENOUS at 09:11

## 2020-11-21 RX ADMIN — PHENYLEPHRINE HYDROCHLORIDE 100 MCG: 10 INJECTION INTRAVENOUS at 09:11

## 2020-11-21 RX ADMIN — POLYETHYLENE GLYCOL 3350 17 G: 17 POWDER, FOR SOLUTION ORAL at 10:11

## 2020-11-21 RX ADMIN — FENTANYL CITRATE 50 MCG: 50 INJECTION INTRAMUSCULAR; INTRAVENOUS at 07:11

## 2020-11-21 RX ADMIN — MUPIROCIN 1 G: 20 OINTMENT TOPICAL at 07:11

## 2020-11-21 RX ADMIN — ACETAMINOPHEN 1000 MG: 500 TABLET ORAL at 01:11

## 2020-11-21 RX ADMIN — MUPIROCIN 1 G: 20 OINTMENT TOPICAL at 10:11

## 2020-11-21 RX ADMIN — LISINOPRIL 40 MG: 10 TABLET ORAL at 10:11

## 2020-11-21 RX ADMIN — DEXMEDETOMIDINE HYDROCHLORIDE 4 MCG: 100 INJECTION, SOLUTION, CONCENTRATE INTRAVENOUS at 09:11

## 2020-11-21 RX ADMIN — DEXAMETHASONE SODIUM PHOSPHATE 4 MG: 4 INJECTION, SOLUTION INTRAMUSCULAR; INTRAVENOUS at 08:11

## 2020-11-21 RX ADMIN — SODIUM CHLORIDE: 9 INJECTION, SOLUTION INTRAVENOUS at 08:11

## 2020-11-21 RX ADMIN — LIDOCAINE HYDROCHLORIDE 100 MG: 20 INJECTION, SOLUTION INTRAVENOUS at 08:11

## 2020-11-21 RX ADMIN — CEFAZOLIN 2 G: 330 INJECTION, POWDER, FOR SOLUTION INTRAMUSCULAR; INTRAVENOUS at 08:11

## 2020-11-21 RX ADMIN — ROCURONIUM BROMIDE 40 MG: 10 INJECTION, SOLUTION INTRAVENOUS at 08:11

## 2020-11-21 RX ADMIN — MAGNESIUM SULFATE 2 G: 2 INJECTION INTRAVENOUS at 03:11

## 2020-11-21 RX ADMIN — NEOSTIGMINE METHYLSULFATE 3 MG: 1 INJECTION INTRAVENOUS at 09:11

## 2020-11-21 RX ADMIN — PHENYLEPHRINE HYDROCHLORIDE 100 MCG: 10 INJECTION INTRAVENOUS at 08:11

## 2020-11-21 RX ADMIN — PROPOFOL 120 MG: 10 INJECTION, EMULSION INTRAVENOUS at 08:11

## 2020-11-21 RX ADMIN — ATORVASTATIN CALCIUM 10 MG: 10 TABLET, FILM COATED ORAL at 10:11

## 2020-11-21 RX ADMIN — ONDANSETRON 4 MG: 2 INJECTION, SOLUTION INTRAMUSCULAR; INTRAVENOUS at 08:11

## 2020-11-21 RX ADMIN — SODIUM CHLORIDE: 0.9 INJECTION, SOLUTION INTRAVENOUS at 02:11

## 2020-11-21 RX ADMIN — PROPOFOL 40 MG: 10 INJECTION, EMULSION INTRAVENOUS at 09:11

## 2020-11-21 RX ADMIN — PHENYLEPHRINE HYDROCHLORIDE 200 MCG: 10 INJECTION INTRAVENOUS at 08:11

## 2020-11-21 RX ADMIN — SODIUM CHLORIDE: 0.9 INJECTION, SOLUTION INTRAVENOUS at 10:11

## 2020-11-21 RX ADMIN — MORPHINE SULFATE 2 MG: 2 INJECTION, SOLUTION INTRAMUSCULAR; INTRAVENOUS at 02:11

## 2020-11-21 RX ADMIN — METOPROLOL SUCCINATE 100 MG: 100 TABLET, EXTENDED RELEASE ORAL at 10:11

## 2020-11-21 RX ADMIN — CEFAZOLIN 2 G: 1 INJECTION, POWDER, FOR SOLUTION INTRAMUSCULAR; INTRAVENOUS at 04:11

## 2020-11-21 RX ADMIN — ROPIVACAINE HYDROCHLORIDE 10 ML/HR: 2 INJECTION, SOLUTION EPIDURAL; INFILTRATION at 10:11

## 2020-11-21 NOTE — ANESTHESIA PREPROCEDURE EVALUATION
[facility]  Anesthesia Pre-Operative Evaluation         Patient Name: Farheen Soares  YOB: 1935  MRN: [id number]    SUBJECTIVE:     Pre-operative evaluation for Procedure(s) (LRB):  Left- Percutaneous Screws- Large  arm Clock side (Left)     11/21/2020    Farheen Soares is a 85 y.o. female w/ a significant PMHx of HTN, HLD, DM2, Depression who presents after a mechanical fall at home. Found to have Left Femoral Neck Fracture. Will undergo Left Femoral Pinning in AM by Dr. White.     No hx of anesthetic complications in past  NPO overnight  *Patient did not understand concept of Regional Anesthesia*    Patient now presents for the above procedure(s).    LDA:       Peripheral IV - Single Lumen 11/20/20 1759 20 G Right Antecubital (Active)   Site Assessment Clean;Dry;Intact 11/20/20 1759   Number of days: 0            Urethral Catheter 11/20/20 2123 Straight-tip 16 Fr. (Active)   Number of days: 0       Prev airway: None documented.    Drips:    sodium chloride 0.9%         Patient Active Problem List   Diagnosis    Osteoporosis    Gout    Chronic low back pain with bilateral sciatica    Depression    Hyperlipidemia    Type 2 diabetes mellitus without complication, without long-term current use of insulin    History of transient ischemic attack (TIA)    Hypertension    Benign neoplasm of brain    GERD (gastroesophageal reflux disease)    Viral illness    Lumbar spondylosis    DDD (degenerative disc disease), lumbar    Lumbar radiculopathy    Chronic pain    Left displaced femoral neck fracture    Fall       Review of patient's allergies indicates:   Allergen Reactions    Baclofen      AMS and distorted dremas       Current Inpatient Medications:   acetaminophen  1,000 mg Oral Q8H    allopurinoL  300 mg Oral Daily    amLODIPine  10 mg Oral Daily    atorvastatin  10 mg Oral Daily    [START ON  11/22/2020] clopidogreL  75 mg Oral Daily    lisinopriL  40 mg Oral Daily    magnesium sulfate IVPB  2 g Intravenous Once    metoprolol succinate  100 mg Oral Daily    polyethylene glycol  17 g Oral Daily    pregabalin  75 mg Oral QHS    [START ON 11/22/2020] tuberculin  5 Units Intradermal Once    venlafaxine  75 mg Oral BID       No current facility-administered medications on file prior to encounter.      Current Outpatient Medications on File Prior to Encounter   Medication Sig Dispense Refill    alendronate (FOSAMAX) 70 MG tablet Take 1 tablet (70 mg total) by mouth every 7 days. 12 tablet 0    allopurinoL (ZYLOPRIM) 300 MG tablet Take 1 tablet (300 mg total) by mouth once daily. 90 tablet 0    amLODIPine (NORVASC) 10 MG tablet Take 1 tablet (10 mg total) by mouth once daily. 90 tablet 0    atorvastatin (LIPITOR) 10 MG tablet Take 1 tablet (10 mg total) by mouth once daily. 90 tablet 1    blood sugar diagnostic Strp To check BG three times daily, to use with insurance preferred meter 100 strip 0    blood-glucose meter kit To check BG three times daily, to use with insurance preferred meter 1 each 0    clopidogreL (PLAVIX) 75 mg tablet Take 1 tablet (75 mg total) by mouth once daily. 90 tablet 0    fexofenadine (ALLEGRA) 180 MG tablet Take 180 mg by mouth daily as needed.      fluticasone (FLONASE) 50 mcg/actuation nasal spray 1 spray by Each Nare route once daily.      lancets Misc To check BG three times daily, to use with insurance preferred meter 100 each 0    lisinopriL (PRINIVIL,ZESTRIL) 40 MG tablet Take 1 tablet (40 mg total) by mouth once daily. 90 tablet 0    metFORMIN (GLUCOPHAGE-XR) 500 MG ER 24hr tablet Take 1 tablet (500 mg total) by mouth daily with breakfast. 90 tablet 0    metoprolol succinate (TOPROL-XL) 100 MG 24 hr tablet Take 1 1/2 daily 135 tablet 0    ranitidine (ZANTAC) 150 MG tablet Take 150 mg by mouth 2 (two) times daily.      venlafaxine (EFFEXOR) 75 MG tablet 2  tabs po QAM and 1 po  tablet 0       Past Surgical History:   Procedure Laterality Date    CATARACT EXTRACTION      CAUDAL EPIDURAL STEROID INJECTION N/A 8/27/2020    Procedure: Injection-steroid-epidural-caudal with catheter;  Surgeon: Johnathan Gonzalez Jr., MD;  Location: [facility];  Service: Pain Management;  Laterality: N/A;    EPIDURAL STEROID INJECTION INTO LUMBAR SPINE      LUMBAR LAMINECTOMY  2009    TOTAL ABDOMINAL HYSTERECTOMY W/ BILATERAL SALPINGOOPHORECTOMY         Social History     Socioeconomic History    Marital status:      Spouse name: Not on file    Number of children: Not on file    Years of education: Not on file    Highest education level: Not on file   Occupational History    Not on file   Social Needs    Financial resource strain: Not on file    Food insecurity     Worry: Not on file     Inability: Not on file    Transportation needs     Medical: Not on file     Non-medical: Not on file   Tobacco Use    Smoking status: Never Smoker   Substance and Sexual Activity    Alcohol use: Yes     Frequency: Monthly or less     Drinks per session: 1 or 2    Drug use: Never    Sexual activity: Not Currently   Lifestyle    Physical activity     Days per week: Not on file     Minutes per session: Not on file    Stress: Not on file   Relationships    Social connections     Talks on phone: Not on file     Gets together: Not on file     Attends Orthodox service: Not on file     Active member of club or organization: Not on file     Attends meetings of clubs or organizations: Not on file     Relationship status: Not on file   Other Topics Concern    Not on file   Social History Narrative    Not on file       OBJECTIVE:     Vital Signs Range (Last 24H):  Temp:  [37.3 °C (99.2 °F)]   Pulse:  [85-90]   Resp:  [16-20]   BP: (168-196)/(77-92)   SpO2:  [93 %-96 %]       Significant Labs:  Lab Results   Component Value Date    WBC 13.50 (H) 11/20/2020    HGB 15.6 11/20/2020     HCT 47.2 11/20/2020     11/20/2020    CHOL 105 (L) 12/14/2019    TRIG 167 (H) 12/14/2019    HDL 38 (L) 12/14/2019    ALT 16 11/20/2020    AST 38 11/20/2020     11/20/2020    K 4.1 11/20/2020     11/20/2020    CREATININE 0.9 11/20/2020    BUN 15 11/20/2020    CO2 20 (L) 11/20/2020    TSH 2.082 12/14/2019    INR 1.0 11/20/2020    HGBA1C 6.5 (H) 11/20/2020       Diagnostic Studies: No relevant studies.    EKG:   Results for orders placed or performed during the hospital encounter of 03/08/20   EKG 12-lead    Collection Time: 03/08/20  7:30 PM    Narrative    Test Reason : R50.9,    Vent. Rate : 098 BPM     Atrial Rate : 098 BPM     P-R Int : 144 ms          QRS Dur : 094 ms      QT Int : 352 ms       P-R-T Axes : 059 -14 077 degrees     QTc Int : 449 ms    Normal sinus rhythm  LVH with repolarization abnormality  Abnormal ECG    Confirmed by Emanuel Parker MD ([id number]) on 3/11/2020 1:01:49 PM    Referred By: [email or web address]   SELF           Confirmed By:Emanuel Parker MD       2D ECHO:  TTE:  No results found for this or any previous visit.    TIM:  No results found for this or any previous visit.    ASSESSMENT/PLAN:       Anesthesia Evaluation    I have reviewed the Patient Summary Reports.    I have reviewed the Nursing Notes. I have reviewed the NPO Status.      Review of Systems  Anesthesia Hx:  History of prior surgery of interest to airway management or planning: Denies Family Hx of Anesthesia complications.   Denies Personal Hx of Anesthesia complications.       Physical Exam  General:  Well nourished, Obesity    Airway/Jaw/Neck:  Airway Findings: Mouth Opening: Normal Tongue: Normal  General Airway Assessment: Adult  Mallampati: II  Improves to II with phonation.      Dental:  Dental Findings: Edentulous, Upper Dentures, Lower Dentures        Mental Status:  Mental Status Findings:  Cooperative, Somnolent         Anesthesia Plan  Type of Anesthesia, risks & benefits discussed:  Anesthesia  Type:  general, regional  Patient's Preference:   Intra-op Monitoring Plan:   Intra-op Monitoring Plan Comments:   Post Op Pain Control Plan: multimodal analgesia, IV/PO Opioids PRN and per primary service following discharge from PACU  Post Op Pain Control Plan Comments:   Induction:   IV  Beta Blocker:  Patient is on a Beta-Blocker and has received one dose within the past 24 hours (No further documentation required).       Informed Consent: Patient understands risks and agrees with Anesthesia plan.  Questions answered. Anesthesia consent signed with patient.  ASA Score: 3     Day of Surgery Review of History & Physical:            Ready For Surgery From Anesthesia Perspective.

## 2020-11-21 NOTE — ED NOTES
Pt found in room soiled with feces and urine. Pt cleaned and changed, linens changed as well. Pt updated on POC. Daughter at bedside. Will continue to monitor pt

## 2020-11-21 NOTE — CONSULTS
[facility]  Orthopedics  Consult Note    Patient Name: Farheen Soares  MRN: [id number]  Admission Date: 11/20/2020  Hospital Length of Stay: 1 days  Attending Provider: Jenny Olsen MD  Primary Care Provider: Vonda Chung MD      Inpatient consult to Orthopedic Surgery  Consult performed by: Kailash Luna MD  Consult ordered by: Munira Martinez MD        Subjective:     Principal Problem:Left displaced femoral neck fracture    Chief Complaint:   Chief Complaint   Patient presents with    Fall     Pt fell while reaching for the doorknob. Having left hip pain and right leg shortening. Denies hitting head        HPI: Patient is an 85 year old female with history DM2 (A1C 6.5), TIA, HLD, HTN, osteoporosis who presents with left hip  pain and difficulty to bear weight after a ground level fall from standing.  Patient was in her usual health when she slipped and fell.  She did hit her head but did not lose consciousness.  Patient takes plavix due to history of TIA.  Last taken this morning.   Denies other MSK pains    Patient ambulates with walker at home      Past Medical History:   Diagnosis Date    Allergic rhinitis     Carotid atherosclerosis, bilateral     Chronic low back pain with bilateral sciatica     Depression     Diabetes mellitus, type 2     Diverticulosis     Facet arthropathy     Gout     Hearing loss     History of TIA (transient ischemic attack)     Hyperlipidemia     Hypertension     IBS (irritable bowel syndrome)     Insomnia     Leg cramps     Lumbar stenosis     Meningioma     Osteoporosis     Paroxysmal SVT (supraventricular tachycardia)     Primary open angle glaucoma (POAG) of both eyes     PVD (peripheral vascular disease)     30% prox stenosis of celiac trunk and 20% stnosis Prox SMA - per Abd/SMA Arteriogram 4/3/08       Past Surgical History:   Procedure Laterality Date    CATARACT EXTRACTION      CAUDAL EPIDURAL STEROID  INJECTION N/A 8/27/2020    Procedure: Injection-steroid-epidural-caudal with catheter;  Surgeon: Johnathan Gonzalez Jr., MD;  Location: [facility];  Service: Pain Management;  Laterality: N/A;    EPIDURAL STEROID INJECTION INTO LUMBAR SPINE      LUMBAR LAMINECTOMY  2009    TOTAL ABDOMINAL HYSTERECTOMY W/ BILATERAL SALPINGOOPHORECTOMY         Review of patient's allergies indicates:   Allergen Reactions    Baclofen      AMS and distorted dremas       No current facility-administered medications for this encounter.      Current Outpatient Medications   Medication Sig    alendronate (FOSAMAX) 70 MG tablet Take 1 tablet (70 mg total) by mouth every 7 days.    allopurinoL (ZYLOPRIM) 300 MG tablet Take 1 tablet (300 mg total) by mouth once daily.    amLODIPine (NORVASC) 10 MG tablet Take 1 tablet (10 mg total) by mouth once daily.    atorvastatin (LIPITOR) 10 MG tablet Take 1 tablet (10 mg total) by mouth once daily.    blood sugar diagnostic Strp To check BG three times daily, to use with insurance preferred meter    blood-glucose meter kit To check BG three times daily, to use with insurance preferred meter    clopidogreL (PLAVIX) 75 mg tablet Take 1 tablet (75 mg total) by mouth once daily.    fexofenadine (ALLEGRA) 180 MG tablet Take 180 mg by mouth daily as needed.    fluticasone (FLONASE) 50 mcg/actuation nasal spray 1 spray by Each Nare route once daily.    lancets Misc To check BG three times daily, to use with insurance preferred meter    lisinopriL (PRINIVIL,ZESTRIL) 40 MG tablet Take 1 tablet (40 mg total) by mouth once daily.    metFORMIN (GLUCOPHAGE-XR) 500 MG ER 24hr tablet Take 1 tablet (500 mg total) by mouth daily with breakfast.    metoprolol succinate (TOPROL-XL) 100 MG 24 hr tablet Take 1 1/2 daily    ranitidine (ZANTAC) 150 MG tablet Take 150 mg by mouth 2 (two) times daily.    venlafaxine (EFFEXOR) 75 MG tablet 2 tabs po QAM and 1 po QHS     Family History     Problem Relation  (Age of Onset)    Diabetes Mother    Hyperlipidemia Son    Hypertension Mother, Father, Daughter, Son    Hypothyroidism Daughter    Stroke Father        Tobacco Use    Smoking status: Never Smoker   Substance and Sexual Activity    Alcohol use: Yes     Frequency: Monthly or less     Drinks per session: 1 or 2    Drug use: Never    Sexual activity: Not Currently     ROS     Per ED note    Objective:     Vital Signs (Most Recent):  Temp: 99.2 °F (37.3 °C) (11/20/20 1719)  Pulse: 85 (11/20/20 1719)  Resp: 20 (11/20/20 1934)  BP: (!) 196/92 (11/20/20 1719)  SpO2: (!) 94 % (11/20/20 1719) Vital Signs (24h Range):  Temp:  [99.2 °F (37.3 °C)] 99.2 °F (37.3 °C)  Pulse:  [85] 85  Resp:  [16-20] 20  SpO2:  [94 %] 94 %  BP: (196)/(92) 196/92           There is no height or weight on file to calculate BMI.    No intake or output data in the 24 hours ending 11/20/20 2137    Ortho/SPM Exam  PE:  Gen:  No acute distress  CV:  Peripherally well-perfused.    Lungs:  Normal respiratory effort.  Head/Neck:  Normocephalic.  Atraumatic.     RUE:  Skin intact, no deformity noted  No open wounds/abrasions/laceration  No bony TTP  FROM shoulder, elbow and wrist  SILT M/U/R  Motor intact AIN/PIN/M/U/R   Cap refill < 2s  2+ RP    LUE:  Skin intact, no deformity noted  No open wounds/abrasions/laceration  No bony TTP  FROM shoulder, elbow and wrist  SILT M/U/R  Motor intact AIN/PIN/M/U/R   Cap refill < 2s  2+ RP    RLE:  Skin intact  No edema/erythema/signs of infection  No TTP  Compartments soft  FROM  SILT Sa/Cadet/DP/SP/T  Motor intact EHL/FHL/TA/Gastroc  2+ DP, 2+ PT    LLE:  Skin intact  No edema/erythema/signs of infection  TTP left hip, non TTP left knee, ankle  + log roll  Compartments soft  Motion of knee and hip without pain  SILT Sa/Cadet/DP/SP/T  Motor intact EHL/FHL/TA/Gastroc  2+ DP, 2+ PT      Significant Labs:   CBC:   Recent Labs   Lab 11/20/20  1820   WBC 13.50*   HGB 15.6   HCT 47.2        CMP:   Recent Labs   Lab  11/20/20  1820      K 4.1      CO2 20*   *   BUN 15   CREATININE 0.9   CALCIUM 10.3   PROT 9.1*   ALBUMIN 4.8   BILITOT 0.5   ALKPHOS 69   AST 38   ALT 16   ANIONGAP 14   EGFRNONAA 59.3*     All pertinent labs within the past 24 hours have been reviewed.    Significant Imaging: CT and radiograph of left hip demonstrate Garden 1 valgus impacted subcapital femoral neck fracture.  No other fractures or dislocations seen. Mild OA of both hips noted.     CT Head noted 3.2 cm calcified extra-axial lesion which appears to arise from the right tentorial leaflet favored to represent a meningioma.  Associated mild localized mass effect and vasogenic edema within the subjacent brain parenchyma without significant midline shift or evidence of herniation.    Assessment/Plan:     * Left displaced femoral neck fracture  Farheen Soares is a 85 y.o. female with left hip pain after a ground level fall from standing.  She has a left femoral neck fracture.   CT head was performed which demonstrated a calcified mass diagnosed as meningioma.   Conversation had with neurosurgery and at this time they are not concerned about a head bleed and state that it is ok to proceed with surgery for her hip. Thus, patient marked, consented, and booked for surgery tomorrow AM.  Patient and daughter agreeable with plan.    - OR Tomorrow 11/21 for percutaneous screw fixation of right hip  - NPO at midnight  - Hold Plavix   - Preoperative labs completed (A1C 6.5, glucose 177), UA no evidence of UTI   - CXR, EKG ordered for medical clearance  - NWB LLE  - Folley in place  - IV ordered for contralateral UE      The risks, benefits and alternatives to surgery were discussed with the patient at great length.  These include pain, bleeding, infection, vessel/nerve damage, numbness, tingling, complex regional pain syndrome, recurrent infection need for further surgery, scarring, malunion, nonunion,hardware failure, hardware breakage,  iatrogenic fracture, periprosthetic fracture, heterotopic ossification, osteonecrosis, wound healing complications requiring further procedure for soft tissue coverage including flap coverage, cartilage damage,  DVT, PE, arthritis and death.  Patient states an understanding and wishes to proceed with surgery.   All questions were answered.  No guarantees were implied or stated.  Informed consent was obtained.     Ze Robins MD  Orthopedics  [facility]

## 2020-11-21 NOTE — TRANSFER OF CARE
"Anesthesia Transfer of Care Note    Patient: Farheen Soares    Procedure(s) Performed: Procedure(s) (LRB):  Left- Percutaneous Screws- Large  arm Clock side (Left)    Patient location: PACU    Anesthesia Type: general    Transport from OR: Transported from OR on 6-10 L/min O2 by face mask with adequate spontaneous ventilation    Post pain: adequate analgesia    Post assessment: no apparent anesthetic complications and tolerated procedure well    Post vital signs: stable    Level of consciousness: awake    Nausea/Vomiting: no nausea/vomiting    Complications: none    Transfer of care protocol was followed      Last vitals:   Visit Vitals  BP (!) 144/87   Pulse 81   Temp 36.4 °C (97.5 °F) (Temporal)   Resp 19   Ht 5' 3" (1.6 m)   Wt 68 kg (150 lb)   SpO2 100%   Breastfeeding No   BMI 26.57 kg/m²     "

## 2020-11-21 NOTE — H&P
"Hospital Medicine  History and Physical Exam    Team: [facility] HOSP MED H Hitesh Teague MD  Admit Date: 11/20/2020  Principal Problem:  Left displaced femoral neck fracture   Patient information was obtained from patient, past medical records and ER records.   Primary care Physician: Vonda Chung MD  Code status: Full Code    HPI: 86 yo F with PMHx DMII, HTN, osteoposis, Gout, HLD, Depression, Chronic low back pain, GERD, and h/o TIA who presents to the ED after fall. Pt. Reports that she was walking to the bathroom when her leg "gave out" and she fell forward striking her head and then her L hip. She denies any preceding lightheadedness, SOB, palpitations, or chest pain and states that she was in her USOH until the fall occurred. At baseline, the patient reports that she uses a walker at baseline. She lives in a two story house and climb stairs with assistance. She reports that is limited by arthritis pains and unsteadiness, but she denies any chest pains or significant SOB with exertion    Hemoglobin A1C   Date Value Ref Range Status   11/20/2020 6.5 (H) 4.0 - 5.6 % Final     Comment:     ADA Screening Guidelines:  5.7-6.4%  Consistent with prediabetes  >or=6.5%  Consistent with diabetes  High levels of fetal hemoglobin interfere with the HbA1C  assay. Heterozygous hemoglobin variants (HbS, HgC, etc)do  not significantly interfere with this assay.   However, presence of multiple variants may affect accuracy.     12/14/2019 6.8 (H) 4.0 - 5.6 % Final     Comment:     ADA Screening Guidelines:  5.7-6.4%  Consistent with prediabetes  >or=6.5%  Consistent with diabetes  High levels of fetal hemoglobin interfere with the HbA1C  assay. Heterozygous hemoglobin variants (HbS, HgC, etc)do  not significantly interfere with this assay.   However, presence of multiple variants may affect accuracy.         Past Medical History: Patient has a past medical history of Allergic rhinitis, Carotid atherosclerosis, bilateral, Chronic " low back pain with bilateral sciatica, Depression, Diabetes mellitus, type 2, Diverticulosis, Facet arthropathy, Gout, Hearing loss, History of TIA (transient ischemic attack), Hyperlipidemia, Hypertension, IBS (irritable bowel syndrome), Insomnia, Leg cramps, Lumbar stenosis, Meningioma, Osteoporosis, Paroxysmal SVT (supraventricular tachycardia), Primary open angle glaucoma (POAG) of both eyes, and PVD (peripheral vascular disease).    Past Surgical History: Patient has a past surgical history that includes Epidural steroid injection into lumbar spine; Lumbar laminectomy (2009); Total abdominal hysterectomy w/ bilateral salpingoophorectomy; Cataract extraction; and Caudal epidural steroid injection (N/A, 8/27/2020).    Social History: Patient reports that she has never smoked. She does not have any smokeless tobacco history on file. She reports current alcohol use. She reports that she does not use drugs.    Family History: family history includes Diabetes in her mother; Hyperlipidemia in her son; Hypertension in her daughter, father, mother, and son; Hypothyroidism in her daughter; Stroke in her father.    Medications: reviewed     Allergies: Patient is allergic to baclofen.    ROS  Pain Scale: 7 /10   Constitutional: no fever or chills  Respiratory: no cough or shortness of breath  Cardiovascular: no chest pain or palpitations  Gastrointestinal: no nausea or vomiting, no abdominal pain or change in bowel habits  Genitourinary: no hematuria or dysuria  Integument/Breast: no rash or pruritis  Hematologic/Lymphatic: no easy bruising or lymphadenopathy  Musculoskeletal: Positive for L hip pain  Neurological: no seizures or tremors  Behavioral/Psych: no depression or anxiety    PEx  Temp:  [99.2 °F (37.3 °C)]   Pulse:  [85-90]   Resp:  [16-20]   BP: (178-196)/(81-92)   SpO2:  [94 %-96 %]   There is no height or weight on file to calculate BMI.   No intake or output data in the 24 hours ending 11/20/20  2358      General appearance: no distress, pt. Resting comfortably  Mental status: Alert and oriented x 3  HEENT:  conjunctivae/corneas clear, PERRL  Neck: supple, thyroid not enlarged  Pulm:   normal respiratory effort, CTA B, no c/w/r  Card: RRR, S1, S2 normal, no murmur, click, rub or gallop  Abd: soft, NT, ND, BS present; no masses, no organomegaly  Ext: no c/c/e  Pulses: 2+, symmetric  Skin: color, texture, turgor normal. No rashes or lesions  Neuro: CN II-XII grossly intact, no focal numbness or weakness, normal strength and tone     Recent Results (from the past 24 hour(s))   CBC auto differential    Collection Time: 11/20/20  6:20 PM   Result Value Ref Range    WBC 13.50 (H) 3.90 - 12.70 K/uL    RBC 4.68 4.00 - 5.40 M/uL    Hemoglobin 15.6 12.0 - 16.0 g/dL    Hematocrit 47.2 37.0 - 48.5 %     (H) 82 - 98 fL    MCH 33.3 (H) 27.0 - 31.0 pg    MCHC 33.1 32.0 - 36.0 g/dL    RDW 14.3 11.5 - 14.5 %    Platelets 234 150 - 350 K/uL    MPV 10.5 9.2 - 12.9 fL    Immature Granulocytes 0.7 (H) 0.0 - 0.5 %    Gran # (ANC) 10.9 (H) 1.8 - 7.7 K/uL    Immature Grans (Abs) 0.09 (H) 0.00 - 0.04 K/uL    Lymph # 1.8 1.0 - 4.8 K/uL    Mono # 0.7 0.3 - 1.0 K/uL    Eos # 0.0 0.0 - 0.5 K/uL    Baso # 0.03 0.00 - 0.20 K/uL    nRBC 0 0 /100 WBC    Gran % 80.6 (H) 38.0 - 73.0 %    Lymph % 13.4 (L) 18.0 - 48.0 %    Mono % 5.0 4.0 - 15.0 %    Eosinophil % 0.1 0.0 - 8.0 %    Basophil % 0.2 0.0 - 1.9 %    Differential Method Automated    Comprehensive metabolic panel    Collection Time: 11/20/20  6:20 PM   Result Value Ref Range    Sodium 136 136 - 145 mmol/L    Potassium 4.1 3.5 - 5.1 mmol/L    Chloride 102 95 - 110 mmol/L    CO2 20 (L) 23 - 29 mmol/L    Glucose 177 (H) 70 - 110 mg/dL    BUN 15 8 - 23 mg/dL    Creatinine 0.9 0.5 - 1.4 mg/dL    Calcium 10.3 8.7 - 10.5 mg/dL    Total Protein 9.1 (H) 6.0 - 8.4 g/dL    Albumin 4.8 3.5 - 5.2 g/dL    Total Bilirubin 0.5 0.1 - 1.0 mg/dL    Alkaline Phosphatase 69 55 - 135 U/L    AST 38  10 - 40 U/L    ALT 16 10 - 44 U/L    Anion Gap 14 8 - 16 mmol/L    eGFR if African American >60.0 >60 mL/min/1.73 m^2    eGFR if non  59.3 (A) >60 mL/min/1.73 m^2   Protime-INR    Collection Time: 11/20/20  6:20 PM   Result Value Ref Range    Prothrombin Time 10.9 9.0 - 12.5 sec    INR 1.0 0.8 - 1.2   Type & Screen    Collection Time: 11/20/20  6:20 PM   Result Value Ref Range    Group & Rh A NEG     Indirect Javier NEG    CK    Collection Time: 11/20/20  6:20 PM   Result Value Ref Range    CPK 76 20 - 180 U/L   Urinalysis, Reflex to Urine Culture Urine, Clean Catch    Collection Time: 11/20/20  9:22 PM    Specimen: Urine   Result Value Ref Range    Specimen UA Urine, Clean Catch     Color, UA Yellow Yellow, Straw, Dayna    Appearance, UA Clear Clear    pH, UA 5.0 5.0 - 8.0    Specific Gravity, UA 1.015 1.005 - 1.030    Protein, UA 1+ (A) Negative    Glucose, UA Negative Negative    Ketones, UA Negative Negative    Bilirubin (UA) Negative Negative    Occult Blood UA 1+ (A) Negative    Nitrite, UA Negative Negative    Leukocytes, UA Negative Negative   Urinalysis Microscopic    Collection Time: 11/20/20  9:22 PM   Result Value Ref Range    RBC, UA 1 0 - 4 /hpf    WBC, UA 0 0 - 5 /hpf    Bacteria Occasional None-Occ /hpf    Squam Epithel, UA 0 /hpf    Hyaline Casts, UA 0 0-1/lpf /lpf    Microscopic Comment SEE COMMENT    POCT COVID-19 Rapid Screening    Collection Time: 11/20/20  9:30 PM   Result Value Ref Range    POC Rapid COVID Negative Negative     Acceptable Yes    Transferrin    Collection Time: 11/20/20  9:34 PM   Result Value Ref Range    Transferrin 299 200 - 375 mg/dL   Hemoglobin A1C    Collection Time: 11/20/20  9:34 PM   Result Value Ref Range    Hemoglobin A1C 6.5 (H) 4.0 - 5.6 %    Estimated Avg Glucose 140 (H) 68 - 131 mg/dL   Magnesium    Collection Time: 11/20/20 10:33 PM   Result Value Ref Range    Magnesium 1.5 (L) 1.6 - 2.6 mg/dL   Phosphorus    Collection Time:  11/20/20 10:33 PM   Result Value Ref Range    Phosphorus 2.9 2.7 - 4.5 mg/dL   Prealbumin    Collection Time: 11/20/20 10:33 PM   Result Value Ref Range    Prealbumin 25 20 - 43 mg/dL       No results for input(s): POCTGLUCOSE in the last 168 hours.    Active Hospital Problems    Diagnosis  POA    Left displaced femoral neck fracture [S72.002A]  Unknown     Farheen Soares is a 85 y.o. female with left hip pain after a ground level fall from standing.  She has a left femoral neck fracture.   CT head was performed which demonstrated a calcified diagnosed as meningioma.  Conversation had with neurosurgery and at this time they are not concerned about a head bleed.  Thus, patient marked, consented, and booked for surgery tomorrow AM.   - OR Tomorrow 11/21 for percutaneous screw fixation of right hip  - NPO at midnight  - Hold Plavix          Fall [W19.XXXA]  Yes      Resolved Hospital Problems   No resolved problems to display.         Assessment and Plan:  L Displaced Femoral Neck Fracture  -Orthopedic surgery consulted and plan to take patient to the OR tomorrow. Pt. NPO at midnight for surgical repair of hip fracture so will   -Pain control as per Hip Fracture Pathway with multimodal pain regimen with scheduled Tylenol and -Lyrica 75 mg po nightly and Robaxin 500 mg po 4 times daily  -DVT prophylaxis pre-op with TEDs/SCDs.   -Check Vitamin D level to assess for Vitamin D deficiency due to concern for osteoporotic fracture.   Plan to monitor daily electrolytes and H/H post-op.   -Start Lovenox 40 mg subcutaneous daily post-op after surgery for DVT prophylaxis and will need for a total of 28 days after hip fracture surgery  -Consult PT/OT post-op for gait training and strengthening and restoration of ADLs.   -Consult  and case management to assist with discharge planning for this patient after surgery.      Preoperative Cardiac evaluation  Cardiovascular Risk Assessment:  Non-emergent surgery.  No  active cardiac problems (such as unstable angina, decompensated heart failure, significant uncontrolled arrhythmias or severe valvular disease).  Intermediate risk surgery.  Functional Status: She IS able to climb a flight of stairs (> 4 METS) with no CP or SOB  Her revised cardiac risk index is 1 (cerebrovascuar disease)    1 pt Each: Ischemic Heart Disease, Cerebrovascular Disease,                     CHF, DM, Creatinine > 2           Other Issues: None    Hypertension  -Hypertensive on admit, hydralazine PO ordered PRN. Will also achieve pain control  -cont home meds in am, amlodipine 10 mg, metoprolol 100mg and lisinopril 40 mg daily  - monitor BP     History of transient ischemic attack (TIA)  - on statin and plavix at home, resume plavix after surgery     Type 2 diabetes mellitus without complication, without long-term current use of insulin  -A1c 6.8 12/2019  -SS/accuchecks  - monitor     Hyperlipidemia  -Continue statin    Depression  -Continue effexor    DVT PPx: SCDs    Hitesh Teague MD  Hospital Medicine Staff  [phone] pager

## 2020-11-21 NOTE — HPI
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 Pt. Reports that she was walking to the bathroom when her leg "gave out" and she fell forward striking her head and then her left hip. She denies any preceding lightheadedness, SOB, palpitations, or chest pain and states that she was in her USOH until the fall occurred. At baseline, the patient reports that she uses a walker at baseline. She lives in a two story house and climb stairs with assistance. She reports that is limited by arthritis pains and unsteadiness, but she denies any chest pains or significant SOB with exertion  "

## 2020-11-21 NOTE — NURSING TRANSFER
Nursing Transfer Note      11/21/2020     Transfer to room 503 from PACu    Transfer via bed    Transfer with cardiac monitoring    Transported by Patient Escort    Medicines sent:     Chart send with patient: Yes    Notified: daughter

## 2020-11-21 NOTE — SUBJECTIVE & OBJECTIVE
Past Medical History:   Diagnosis Date    Allergic rhinitis     Carotid atherosclerosis, bilateral     Chronic low back pain with bilateral sciatica     Depression     Diabetes mellitus, type 2     Diverticulosis     Facet arthropathy     Gout     Hearing loss     History of TIA (transient ischemic attack)     Hyperlipidemia     Hypertension     IBS (irritable bowel syndrome)     Insomnia     Leg cramps     Lumbar stenosis     Meningioma     Osteoporosis     Paroxysmal SVT (supraventricular tachycardia)     Primary open angle glaucoma (POAG) of both eyes     PVD (peripheral vascular disease)     30% prox stenosis of celiac trunk and 20% stnosis Prox SMA - per Abd/SMA Arteriogram 4/3/08       Past Surgical History:   Procedure Laterality Date    CATARACT EXTRACTION      CAUDAL EPIDURAL STEROID INJECTION N/A 8/27/2020    Procedure: Injection-steroid-epidural-caudal with catheter;  Surgeon: Johnathan Gonzalez Jr., MD;  [facility];  Service: Pain Management;  Laterality: N/A;    EPIDURAL STEROID INJECTION INTO LUMBAR SPINE      LUMBAR LAMINECTOMY  2009    TOTAL ABDOMINAL HYSTERECTOMY W/ BILATERAL SALPINGOOPHORECTOMY         Review of patient's allergies indicates:   Allergen Reactions    Baclofen      AMS and distorted dremas       No current facility-administered medications for this encounter.      Current Outpatient Medications   Medication Sig    alendronate (FOSAMAX) 70 MG tablet Take 1 tablet (70 mg total) by mouth every 7 days.    allopurinoL (ZYLOPRIM) 300 MG tablet Take 1 tablet (300 mg total) by mouth once daily.    amLODIPine (NORVASC) 10 MG tablet Take 1 tablet (10 mg total) by mouth once daily.    atorvastatin (LIPITOR) 10 MG tablet Take 1 tablet (10 mg total) by mouth once daily.    blood sugar diagnostic Strp To check BG three times daily, to use with insurance preferred meter    blood-glucose meter kit To check BG three times daily, to use with insurance  preferred meter    clopidogreL (PLAVIX) 75 mg tablet Take 1 tablet (75 mg total) by mouth once daily.    fexofenadine (ALLEGRA) 180 MG tablet Take 180 mg by mouth daily as needed.    fluticasone (FLONASE) 50 mcg/actuation nasal spray 1 spray by Each Nare route once daily.    lancets Misc To check BG three times daily, to use with insurance preferred meter    lisinopriL (PRINIVIL,ZESTRIL) 40 MG tablet Take 1 tablet (40 mg total) by mouth once daily.    metFORMIN (GLUCOPHAGE-XR) 500 MG ER 24hr tablet Take 1 tablet (500 mg total) by mouth daily with breakfast.    metoprolol succinate (TOPROL-XL) 100 MG 24 hr tablet Take 1 1/2 daily    ranitidine (ZANTAC) 150 MG tablet Take 150 mg by mouth 2 (two) times daily.    venlafaxine (EFFEXOR) 75 MG tablet 2 tabs po QAM and 1 po QHS     Family History     Problem Relation (Age of Onset)    Diabetes Mother    Hyperlipidemia Son    Hypertension Mother, Father, Daughter, Son    Hypothyroidism Daughter    Stroke Father        Tobacco Use    Smoking status: Never Smoker   Substance and Sexual Activity    Alcohol use: Yes     Frequency: Monthly or less     Drinks per session: 1 or 2    Drug use: Never    Sexual activity: Not Currently     ROS     Per ED note    Objective:     Vital Signs (Most Recent):  Temp: 99.2 °F (37.3 °C) (11/20/20 1719)  Pulse: 85 (11/20/20 1719)  Resp: 20 (11/20/20 1934)  BP: (!) 196/92 (11/20/20 1719)  SpO2: (!) 94 % (11/20/20 1719) Vital Signs (24h Range):  Temp:  [99.2 °F (37.3 °C)] 99.2 °F (37.3 °C)  Pulse:  [85] 85  Resp:  [16-20] 20  SpO2:  [94 %] 94 %  BP: (196)/(92) 196/92           There is no height or weight on file to calculate BMI.    No intake or output data in the 24 hours ending 11/20/20 2137    Ortho/SPM Exam  PE:  Gen:  No acute distress  CV:  Peripherally well-perfused.    Lungs:  Normal respiratory effort.  Head/Neck:  Normocephalic.  Atraumatic.     RUE:  Skin intact, no deformity noted  No open wounds/abrasions/laceration  No  bony TTP  FROM shoulder, elbow and wrist  SILT M/U/R  Motor intact AIN/PIN/M/U/R   Cap refill < 2s  2+ RP    LUE:  Skin intact, no deformity noted  No open wounds/abrasions/laceration  No bony TTP  FROM shoulder, elbow and wrist  SILT M/U/R  Motor intact AIN/PIN/M/U/R   Cap refill < 2s  2+ RP    RLE:  Skin intact  No edema/erythema/signs of infection  No TTP  Compartments soft  FROM  SILT Sa/Cadet/DP/SP/T  Motor intact EHL/FHL/TA/Gastroc  2+ DP, 2+ PT    LLE:  Skin intact  No edema/erythema/signs of infection  TTP left hip, non TTP left knee, ankle  + log roll  Compartments soft  Motion of knee and hip without pain  SILT Sa/Cadet/DP/SP/T  Motor intact EHL/FHL/TA/Gastroc  2+ DP, 2+ PT      Significant Labs:   CBC:   Recent Labs   Lab 11/20/20  1820   WBC 13.50*   HGB 15.6   HCT 47.2        CMP:   Recent Labs   Lab 11/20/20  1820      K 4.1      CO2 20*   *   BUN 15   CREATININE 0.9   CALCIUM 10.3   PROT 9.1*   ALBUMIN 4.8   BILITOT 0.5   ALKPHOS 69   AST 38   ALT 16   ANIONGAP 14   EGFRNONAA 59.3*     All pertinent labs within the past 24 hours have been reviewed.    Significant Imaging: CT and radiograph of left hip demonstrate Garden 2 subcapital femoral neck fracture.  No other fractures or dislocations seen. Mild OA of both hips noted.     CT Head noted 3.2 cm calcified extra-axial lesion which appears to arise from the right tentorial leaflet favored to represent a meningioma.  Associated mild localized mass effect and vasogenic edema within the subjacent brain parenchyma without significant midline shift or evidence of herniation.

## 2020-11-21 NOTE — HPI
Patient is an 85 year old female with history DM2 (A1C___), TIA, HLD, HTN, osteoporosis who presents with left hip pain after a ground level fall from standing.  Patient was in her usual health when she slipped and fell.  She did hit her head but did not lose consciousness.  Patient takes plavix due to history of TIA.  Last taken this morning.

## 2020-11-21 NOTE — HOSPITAL COURSE
Patient admitted to [facility] Medicine Team H: Hip Fracture team and started on Hip Fracture Pathway with Orthopedic surgery consult for hip fracture. Patient was seen and evaluated by Orthopedic surgery who recommended operative repair of hip fracture. Patient was medically optimized prior to surgery and was taken to OR after optimization on 11/21/2020. Patient underwent left hip percutaneous screw fixation by Dr. Leandro White. Post-op patient WBAT to the left lower extremity as per Orthopedics recommendation. Patient placed on Lovenox 40 mg subcutaneous daily and CHRISTOPHE/SCD's for DVT prophylaxis post-op and will need for total of 28 days. Perineural pain catheter placed by Anesthesia Pain Service with continuous infusion of Ropivacaine to help with pain control post-op and Anesthesia Pain Service managing while patient in the hospital. Patient placed on multimodal pain management with scheduled Tylenol 1000 mg po every 8 hours and Lyrica 75 mg po nightly post-op for pain control. PT/OT consulted post-op and recommending SNF and CM/SW sent multiple referrals for patient. Pain controlled post-op. Patent accepted and discharged to [facility] on 11/24. Perineural pain catheter removed by Anesthesia prior to discharge and pain well controlled on Tylenol alone. Patient discharged on Lovenox 40 mg subcutaneous daily for DVT prophylaxis. Surgical bandage to remain in place until Orthopedic clinic follow-up. Patient WBAT to left lower extremity on discharge.

## 2020-11-21 NOTE — ANESTHESIA POSTPROCEDURE EVALUATION
Anesthesia Post Evaluation    Patient: Farheen Soares    Procedure(s) Performed: Procedure(s) (LRB):  Left- Percutaneous Screws- Large  arm Clock side (Left)    Final Anesthesia Type: general    Patient location during evaluation: PACU  Patient participation: Yes- Able to Participate  Level of consciousness: awake and alert  Post-procedure vital signs: reviewed and stable  Pain management: adequate  Airway patency: patent    PONV status at discharge: No PONV  Anesthetic complications: no      Cardiovascular status: blood pressure returned to baseline  Respiratory status: unassisted  Hydration status: euvolemic  Follow-up not needed.          Vitals Value Taken Time   /68 11/21/20 1031   Temp 36.4 °C (97.5 °F) 11/21/20 1009   Pulse 79 11/21/20 1040   Resp 17 11/21/20 1040   SpO2 91 % 11/21/20 1040   Vitals shown include unvalidated device data.      No case tracking events are documented in the log.      Pain/Jacob Score: Pain Rating Prior to Med Admin: 0 (11/21/2020 10:22 AM)

## 2020-11-21 NOTE — PLAN OF CARE
Patient resting in bed comfortably. Iv intact and infusing free of irration, fall precautions maintained no falls noted. Call light in reach bed locked and in lowest position. Non skid socks on while out of bed. Patient instructed to call for assistance. Kline intact, scds on,pt using I.s, pnc intact, Skin integrity maintained as patient is independent with frequent positioning, C/o pain managed with scheduled meds, No other complaints or concerns. Will continue to monitor and follow careplan of care.

## 2020-11-21 NOTE — ASSESSMENT & PLAN NOTE
Farheen Soares is a 85 y.o. female with a minimally dispaced left femoral neck fracture.      -Bed rest  -NWB LLE  -OR today 11/21 for percutaneous screw fixation of right hip  -NPO  -Hold Plavix     The risks, benefits and alternatives to surgery were discussed with the patient at great length.  These include pain, bleeding, infection, vessel/nerve damage, numbness, tingling, complex regional pain syndrome, recurrent infection need for further surgery, scarring, malunion, nonunion,hardware failure, hardware breakage, iatrogenic fracture, periprosthetic fracture, heterotopic ossification, osteonecrosis, wound healing complications requiring further procedure for soft tissue coverage including flap coverage, cartilage damage,  DVT, PE, arthritis and death.  Patient states an understanding and wishes to proceed with surgery.   All questions were answered.  No guarantees were implied or stated.  Informed consent was obtained.

## 2020-11-21 NOTE — PLAN OF CARE
Transferred to [facility]. Easily arousable. Denies pain. Denies nausea. Appears calm and comfortable. Daughter updated to patient status.

## 2020-11-21 NOTE — OP NOTE
DATE OF PROCEDURE: 11/21/2020    PREOPERATIVE DIAGNOSIS:   1. Valgus impacted left femoral neck fracture    POSTOPERATIVE DIAGNOSIS:  1. Valgus impacted left femoral neck fracture    PROCEDURE PERFORMED:  1. Screw fixation left femoral neck fracture    SURGEON: Leandro White MD    ASSISTANT:  MD Ze Terrazas MD Hunter Bohlen, MD    ANESTHESIA: General     ESTIMATED BLOOD LOSS: 15 cc    IMPLANTS:   Implant Name Type Inv. Item Serial No.  Lot No. LRB No. Used Action   SCREW LUIS ENRIQUE 32MM THRD 7.3X 85MM - [id number]  SCREW LUIS ENRIQUE 32MM THRD 7.3X 85MM  [employer]  Left 2 Implanted   SCREW CANNULATED 7.3MM X 90MM - [id number]  SCREW CANNULATED 7.3MM X 90MM  SYNTHES  Left 1 Implanted          INDICATIONS FOR PROCEDURE: The patient is an 85F who presented with left valgus impacted femoral neck fracture  s/p mechanical FFS. Discussed R/B/A. All questions answered and pt would like to proceed with percutaneous screw fixation.    PROCEDURE IN DETAIL: The patient was identified in the preoperative holding area and the site was marked.  General anesthesia was induced.  The patient wasplaced onto the [place] fracture table. Reduction was confirmed under fluoroscopic guidance. All bony prominences well padded.The left hip was prepped and draped in a sterile fashion. Pre-operative antibiotics were administered. Timeout was undertaken to confirm patient, site, surgery, and surgeon. All agreed and we proceeded.     A 6 cm incision was made just below the vastus ridge. 3 wires were placed for the 7.3 mm cannulated screws in an inverted triangular pattern making sure that the   inferior screw was above the lesser trochanter.  After confirming proper wire placement, I then measured, drilled and   placed three cannulated screws inferior, followed by anterior superior and then posterior superior with good purchase.     The wound was copiously irrigated with normal saline solution.  Deep fascia was   closed with 0  Vicryl suture, subcutaneous tissue with inverted 3-0 Vicryl suture   and the skin with 3-0 nylon suture.  An Aquacel dressing was placed.        Dr. Leandro White was present for all critical portions of the procedure    All instrument and sponge counts were reported correct at the end of the case. There were no complications. The patient was awakened and taken to Recovery Room in stable condition.    PLAN FOR THE PATIENT:  WBAT with a walker. Followup in 2 weeks for wound check.

## 2020-11-21 NOTE — ASSESSMENT & PLAN NOTE
Farheen Soares is a 85 y.o. female with left hip pain after a ground level fall from standing.  She has a left femoral neck fracture.   CT head was performed which demonstrated a calcified diagnosed as meningioma.  Conversation had with neurosurgery and at this time they are not concerned about a head bleed.  Thus, patient marked, consented, and booked for surgery tomorrow AM.   - OR Tomorrow 11/21 for percutaneous screw fixation of right hip  - NPO at midnight  - Hold Plavix

## 2020-11-21 NOTE — CONSULTS
Consult Note  Neurosurgery    Admit Date: 11/20/2020  LOS: 1    Code Status: Prior     CC: Left displaced femoral neck fracture    SUBJECTIVE:     History of Present Illness: 86 yo female with pmh of TIA  And DM2 on clopidogrel presenting [facility] s/p mechanical fall found to have left femoral neck fracture with plan for operative intervention in morning per orthopedic team. Pt struck head during fall.     On exam pt has pain limited weakness of LLE, hip and knee flexion/extension not tested on left. Pt otherwise full strength in all groups with exception of right hand intrinsics (4/5) which she says is chronic in nature. Pt endorses long standing difficulty with fine motor movements in right hand and gait instability. No recent bowel or bladder symptoms (gonzalez in pace at time of exam). No sensory loss. Patrick and clonus negative bilaterally.    GCS 15.    No midline tenderness to palpation of cervical spine. No pain on active movement of neck in all directions.    Pt struck head and underwent imaging of head and cervical spine as well. 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 Pt says this is known disease for nearly a decade. Cervical spine with kyphotic deformity suspected to be degenerative.               OBJECTIVE:   Vital Signs (Most Recent):   Temp: 99.2 °F (37.3 °C) (11/20/20 1719)  Pulse: 90 (11/20/20 2136)  Resp: 18 (11/20/20 2136)  BP: (!) 178/81 (11/20/20 2136)  SpO2: 96 % (11/20/20 2136)    Vital Signs (24h Range):   Temp:  [99.2 °F (37.3 °C)] 99.2 °F (37.3 °C)  Pulse:  [85-90] 90  Resp:  [16-20] 18  SpO2:  [94 %-96 %] 96 %  BP: (178-196)/(81-92) 178/81    ICP/CPP (Last 24h):        I & O (Last 24h):  No intake or output data in the 24 hours ending 11/21/20 0039      Physical Exam:  General: well developed, well nourished, no distress.   Head: normocephalic, atraumatic  Cervical Spine: No midline tenderness to palpation.  Thoracolumbosacral Spine: No  midline tenderness to palpation.  GCS: Motor: 6/Verbal: 5/Eyes: 4 GCS Total: 15  Mental Status: Awake, Alert, Oriented x 4  Language: No aphasia  Speech: No dysarthria  Facial Droop: None   Cranial nerves: CN III-XII grossly intact.  Visual Fields: Intact.   Eyes: Pupils equal and reactive to light. Intact Conjugate horizontal and vertical pursuit. No nystagmus. No gaze deviation.   Pulmonary: No distress.  Sensory: No deficit.  Propioception: No deficit in 1st digit of toe bilaterally.  Rectal Tone: Not tested.  Drift: None.  Upper Extremity Ataxia: No Dysmetria Bilaterally  Lower Extremity Ataxia: Not tested.  Dysdiadochokinesia: Not tested.  Reflexes: 2+ patellar bilaterally.  Patrick: Absent  Clonus: Absent  Babinski: Absent  Romberg: Not Tested  Pulses: Brisk and symmetric radial, DP and tibial pulses.  Motor Strength:    Strength  Shoulder Abduction Elbow Extension Elbow Flexion Wrist Extension Wrist Flexion Finger Opposition Finger Add Finger Abd   Upper: R 5/5 5/5 5/5 5/5 5/5 4/5 4/5 4/5    L 5/5 5/5 5/5 5/5 5/5 5/5 5/5 5/5     Hip Flexion Knee Extension Knee  Flexion Ankle Dflexion Ankle Pflexion EHL     Lower: R NA NA NA 5/5 5/5 5/5      L 5/5 5/5 5/5 5/5 5/5 5/5                 Lines/Drains/Airway:              Urethral Catheter 11/20/20 2123 Straight-tip 16 Fr. (Active)     Nutrition/Tube Feeds:   Current Diet Order   Procedures    Diet NPO       Labs:  ABG: No results for input(s): PH, PO2, PCO2, HCO3, POCSATURATED, BE in the last 24 hours.  BMP:  Recent Labs   Lab 11/20/20  1820 11/20/20  2233     --    K 4.1  --      --    CO2 20*  --    BUN 15  --    CREATININE 0.9  --    *  --    MG  --  1.5*   PHOS  --  2.9     LFT:   Lab Results   Component Value Date    AST 38 11/20/2020    ALT 16 11/20/2020    ALKPHOS 69 11/20/2020    BILITOT 0.5 11/20/2020    ALBUMIN 4.8 11/20/2020    PROT 9.1 (H) 11/20/2020     CBC:   Lab Results   Component Value Date    WBC 13.50 (H) 11/20/2020    HGB  15.6 11/20/2020    HCT 47.2 11/20/2020     (H) 11/20/2020     11/20/2020     Microbiology x 7d:   Microbiology Results (last 7 days)     ** No results found for the last 168 hours. 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 Operative clearance requested from orthopedic surgery in regards to neurosurgical pathology.     Pt with symptoms suggestive of chronic spondylytic myelopathy. Meningioma reportedly stable for 10 years.    --No acute neurosurgical intervention required.  --Pt may continue interval follow up with treating physician for known meningioma.  --Pt with cervical kyphotic deformity and symptoms suggestive of long standing myelopathy without cervical tenderness or pain on movement.  --Given presence of distracting injury, recommend MRI cervical spine w/o lalitha to rule out traumatic injury.   --Should above show acute changes, pt may require fiber optic intubation with cervical spine precautions during planned morning procedure.  --Begin rigid cervical orthosis until above imaging completed, further recommendations on done.  --We will continue to monitor closely, please contact us with any questions or concerns.    Tony Brunner

## 2020-11-21 NOTE — ANESTHESIA PROCEDURE NOTES
Intubation  Performed by: Avelina Knight CRNA  Authorized by: Maco Whitney MD     Intubation:     Induction:  Intravenous    Intubated:  Postinduction    Mask Ventilation:  Easy mask    Attempts:  1    Attempted By:  CRNA    Method of Intubation:  Direct    Blade:  Bower 2    Laryngeal View Grade: Grade I - full view of chords      Difficult Airway Encountered?: No      Complications:  None    Airway Device:  Oral endotracheal tube    Airway Device Size:  7.0    Style/Cuff Inflation:  Cuffed (inflated to minimal occlusive pressure)    Tube secured:  23    Placement Verified By:  Capnometry    Complicating Factors:  None    Findings Post-Intubation:  BS equal bilateral  Notes:      Unable to remove dentures prior to intubation.  Dentures remain within pt's mouth. No damage to dentures noted post intubation.

## 2020-11-21 NOTE — PROGRESS NOTES
[facility]  Orthopedics  Progress Note    Patient Name: Farheen Soares  MRN: [id number]  Admission Date: 11/20/2020  Hospital Length of Stay: 1 days  Attending Provider: Jenny Olsen MD  Primary Care Provider: Vonda Chung MD  Follow-up For: Procedure(s) (LRB):  Left- Percutaneous Screws- Large  arm Clock side (Left)    Post-Operative Day: Day of Surgery  Subjective:     Principal Problem:Left displaced femoral neck fracture    Principal Orthopedic Problem: same    Interval History: Pt seen and examined at bedside. NAEO. She reports pain is controlled. C collar cleared by neurosurgery. Plan for surgery this morning for left femoral neck fracture.       Review of patient's allergies indicates:   Allergen Reactions    Baclofen      AMS and distorted dremas       Current Facility-Administered Medications   Medication    0.9%  NaCl infusion    acetaminophen tablet 1,000 mg    allopurinoL tablet 300 mg    amLODIPine tablet 10 mg    atorvastatin tablet 10 mg    bisacodyL suppository 10 mg    [START ON 11/22/2020] clopidogreL tablet 75 mg    fentaNYL injection 25 mcg    hydrALAZINE tablet 25 mg    lidocaine (PF) 10 mg/ml (1%) injection 10 mg    lisinopriL tablet 40 mg    melatonin tablet 6 mg    methocarbamoL tablet 500 mg    metoprolol succinate (TOPROL-XL) 24 hr tablet 100 mg    midazolam (VERSED) 1 mg/mL injection 0.5 mg    morphine injection 2 mg    mupirocin 2 % ointment 1 g    ondansetron injection 4 mg    oxyCODONE immediate release tablet 10 mg    oxyCODONE immediate release tablet 5 mg    polyethylene glycol packet 17 g    pregabalin capsule 75 mg    sodium chloride 0.9% flush 10 mL    tranexamic acid (CYKLOKAPRON) 3,000 mg in sodium chloride 0.9% 100 mL    [START ON 11/22/2020] tuberculin injection 5 Units    venlafaxine tablet 75 mg     Objective:     Vital Signs (Most Recent):  Temp: 98.7 °F (37.1 °C) (11/21/20 0200)  Pulse: 86 (11/21/20  "0300)  Resp: 15 (11/21/20 0300)  BP: (!) 168/82 (11/21/20 0300)  SpO2: 95 % (11/21/20 0300) Vital Signs (24h Range):  Temp:  [98.7 °F (37.1 °C)-99.2 °F (37.3 °C)] 98.7 °F (37.1 °C)  Pulse:  [85-90] 86  Resp:  [15-20] 15  SpO2:  [93 %-96 %] 95 %  BP: (168-196)/(77-92) 168/82     Weight: 68 kg (150 lb)  Height: 5' 3" (160 cm)  Body mass index is 26.57 kg/m².    No intake or output data in the 24 hours ending 11/21/20 0617    Ortho/SPM Exam    LLE:  Skin intact  No edema/erythema/signs of infection  TTP left hip, non TTP left knee, ankle  + log roll  Compartments soft  Motion of knee and hip without pain  SILT Sa/Cadet/DP/SP/T  Motor intact EHL/FHL/TA/Gastroc  2+ DP, 2+ PT      Significant Labs: All pertinent labs within the past 24 hours have been reviewed.    Significant Imaging: I have reviewed and interpreted all pertinent imaging results/findings.    Assessment/Plan:     * Left displaced femoral neck fracture  Farheen Soares is a 85 y.o. female with a minimally dispaced left femoral neck fracture.      -Bed rest  -NWB LLE  -OR today 11/21 for percutaneous screw fixation of right hip  -NPO  -Hold Plavix     The risks, benefits and alternatives to surgery were discussed with the patient at great length.  These include pain, bleeding, infection, vessel/nerve damage, numbness, tingling, complex regional pain syndrome, recurrent infection need for further surgery, scarring, malunion, nonunion,hardware failure, hardware breakage, iatrogenic fracture, periprosthetic fracture, heterotopic ossification, osteonecrosis, wound healing complications requiring further procedure for soft tissue coverage including flap coverage, cartilage damage,  DVT, PE, arthritis and death.  Patient states an understanding and wishes to proceed with surgery.   All questions were answered.  No guarantees were implied or stated.  Informed consent was obtained.             Ze Robins MD  Orthopedics  [facility]  "

## 2020-11-21 NOTE — ED PROVIDER NOTES
Encounter Date: 11/20/2020       History     Chief Complaint   Patient presents with    Fall     Pt fell while reaching for the doorknob. Having left hip pain and right leg shortening. Denies hitting head     Farheen Soares is a 85 y.o. female with history of HTN, T2DM, chronic back pain, osteoporosis, TIA (on Plavix) presents to the ED due to left hip pain after a fall. She states she fell when walking from her bathroom this afternoon. She fell forward and hit her head (left temporal region). She had immediate pain to the left groin region. She denies LOC and states she felt like her leg just gave out on her. Binding of the hips was performed by EMS in the field. She denies any other injuries at this time. She reports taking Plavix this morning.    The history is provided by the patient. No  was used.     Review of patient's allergies indicates:   Allergen Reactions    Baclofen      AMS and distorted dremas     Past Medical History:   Diagnosis Date    Allergic rhinitis     Carotid atherosclerosis, bilateral     Chronic low back pain with bilateral sciatica     Depression     Diabetes mellitus, type 2     Diverticulosis     Facet arthropathy     Gout     Hearing loss     History of TIA (transient ischemic attack)     Hyperlipidemia     Hypertension     IBS (irritable bowel syndrome)     Insomnia     Leg cramps     Lumbar stenosis     Meningioma     Osteoporosis     Paroxysmal SVT (supraventricular tachycardia)     Primary open angle glaucoma (POAG) of both eyes     PVD (peripheral vascular disease)     30% prox stenosis of celiac trunk and 20% stnosis Prox SMA - per Abd/SMA Arteriogram 4/3/08     Past Surgical History:   Procedure Laterality Date    CATARACT EXTRACTION      CAUDAL EPIDURAL STEROID INJECTION N/A 8/27/2020    Procedure: Injection-steroid-epidural-caudal with catheter;  Surgeon: Johnathan Gonzalez Jr., MD;  Location: [facility];  Service: Pain  Management;  Laterality: N/A;    EPIDURAL STEROID INJECTION INTO LUMBAR SPINE      LUMBAR LAMINECTOMY  2009    TOTAL ABDOMINAL HYSTERECTOMY W/ BILATERAL SALPINGOOPHORECTOMY       Family History   Problem Relation Age of Onset    Hypertension Mother     Diabetes Mother     Hypertension Father     Stroke Father         60's    Hypertension Daughter     Hypothyroidism Daughter     Hypertension Son     Hyperlipidemia Son      Social History     Tobacco Use    Smoking status: Never Smoker   Substance Use Topics    Alcohol use: Yes     Frequency: Monthly or less     Drinks per session: 1 or 2    Drug use: Never     Review of Systems   Constitutional: Negative for appetite change, chills and fever.   HENT: Negative for congestion and trouble swallowing.    Eyes: Negative for pain and redness.   Respiratory: Negative for cough and shortness of breath.    Cardiovascular: Negative for chest pain and palpitations.   Gastrointestinal: Negative for abdominal pain, constipation, diarrhea, nausea and vomiting.   Genitourinary: Negative for difficulty urinating and dysuria.   Musculoskeletal: Positive for arthralgias. Negative for back pain and neck pain.        Left hip pain   Skin: Negative for rash.   Neurological: Negative for dizziness, light-headedness and headaches.       Physical Exam     Initial Vitals [11/20/20 1719]   BP Pulse Resp Temp SpO2   (!) 196/92 85 16 99.2 °F (37.3 °C) (!) 94 %      MAP       --         Physical Exam    Nursing note and vitals reviewed.  Constitutional: She appears well-developed and well-nourished. Breathing: Normal. Circulation: Normal. Pulses:Femoral, Carotid and Radial palpable.   HENT:   Head: Normocephalic and atraumatic.   Eyes: Pupils: Normal pupils. EOM are normal. Pupils are equal, round, and reactive to light.   Neck: Normal range of motion. Neck supple.   Cardiovascular: Normal rate, regular rhythm, normal heart sounds and intact distal pulses.   Pulmonary/Chest: Breath  sounds normal. No respiratory distress. She exhibits no tenderness.   Abdominal: Soft. Bowel sounds are normal.   Musculoskeletal: Normal range of motion. No edema.      Cervical back: She exhibits no bony tenderness.      Thoracic back: She exhibits no bony tenderness.      Lumbar back: She exhibits no bony tenderness.      Right upper leg: She exhibits no bony tenderness.      Left upper leg: She exhibits bony tenderness.      Right lower leg: She exhibits no bony tenderness.      Left lower leg: She exhibits no bony tenderness.   Neurological: She is alert and oriented to person, place, and time. She has normal strength. No sensory deficit. GCS score is 15. GCS eye subscore is 4. GCS verbal subscore is 5. GCS motor subscore is 6.   Skin: Skin is warm, dry and intact.         ED Course   Procedures  Labs Reviewed   CBC W/ AUTO DIFFERENTIAL - Abnormal; Notable for the following components:       Result Value    WBC 13.50 (*)      (*)     MCH 33.3 (*)     Immature Granulocytes 0.7 (*)     Gran # (ANC) 10.9 (*)     Immature Grans (Abs) 0.09 (*)     Gran % 80.6 (*)     Lymph % 13.4 (*)     All other components within normal limits   COMPREHENSIVE METABOLIC PANEL - Abnormal; Notable for the following components:    CO2 20 (*)     Glucose 177 (*)     Total Protein 9.1 (*)     eGFR if non  59.3 (*)     All other components within normal limits   URINALYSIS, REFLEX TO URINE CULTURE - Abnormal; Notable for the following components:    Protein, UA 1+ (*)     Occult Blood UA 1+ (*)     All other components within normal limits    Narrative:     Specimen Source->Urine   HEMOGLOBIN A1C - Abnormal; Notable for the following components:    Hemoglobin A1C 6.5 (*)     Estimated Avg Glucose 140 (*)     All other components within normal limits   MAGNESIUM - Abnormal; Notable for the following components:    Magnesium 1.5 (*)     All other components within normal limits   PROTIME-INR   CK   CK    Narrative:      Add on CPK By Adarsh Subramanian MD Order #[id number]  11/20/2020  19:42    TRANSFERRIN   URINALYSIS MICROSCOPIC    Narrative:     Specimen Source->Urine   PHOSPHORUS   PREALBUMIN   SARS-COV-2 RDRP GENE   TYPE & SCREEN     EKG Readings: (Independently Interpreted)   Initial Reading: No STEMI. Rhythm: Normal Sinus Rhythm. Heart Rate: 90. Clinical Impression: Normal Sinus Rhythm   NSTWA       Imaging Results          CT Pelvis Without Contrast (Final result)  Result time 11/20/20 21:07:52    Final result by Carlos Hinton MD (11/20/20 21:07:52)                 Impression:      Acute impacted fracture left femoral neck appears unchanged.    No acute pelvic fracture identified.    Additional findings as above.      Electronically signed by: Carlos Hinton MD  Date:    11/20/2020  Time:    21:07             Narrative:    EXAMINATION:  CT PELVIS WITHOUT CONTRAST    CLINICAL HISTORY:  Pelvic fracture, follow up;need 2mm cuts with 3d recons in all planesd, axial, sagittal, coronal;    TECHNIQUE:  Axial 1.25-mm images of the pelvis obtained without intravenous contrast.  Data submitted for coronal and sagittal reformats.  3D reformatted images obtained on independent workstation.    COMPARISON:  AP pelvis 11/20/2020.    FINDINGS:  Acute impacted fracture left femoral neck appears unchanged.    No acute fracture of the pelvis identified.    Degenerative changes at the hip joints appear unchanged.  Degenerative spondylosis at L5-S1 with severe disc space narrowing and marginal spurring.    Partially visualized atherosclerotic calcification in the aortoiliac vessels.    Urinary bladder and visualized bowel loops in the pelvis appear unremarkable.  Uterus is absent.  No pelvic mass, adenopathy, free fluid or fluid collection observed.    Calcified injection granulomas in the subcutaneous fatty tissues of the right buttocks.                                CT Head Without Contrast (Final result)  Result time 11/20/20 21:32:24     Final result by Carlos Hinton MD (11/20/20 21:32:24)                 Impression:      No acute process, specifically without evidence of intracranial hemorrhage, calvarial fracture, or acute cervical spine fracture.    3.2 cm calcified extra-axial lesion which appears to arise from the right tentorial leaflet favored to represent a meningioma.  Associated mild localized mass effect and vasogenic edema within the subjacent brain parenchyma without significant midline shift or evidence of herniation.    Dilation throughout the ventricular system out of proportion to the degree of generalized cerebral volume loss, findings which may represent normal pressure hydrocephalus.    Patchy and confluent areas of hypoattenuation in the periventricular white matter bilaterally, some of which may be consistent with chronic microvascular ischemic disease though a component may represent transependymal/interstitial cerebral edema.    Advanced multilevel degenerative changes throughout the cervical spine resulting in mild central canal stenosis at several levels and up to severe focal neural foraminal narrowing, as further detailed above.  Ossification involving interspinous and transverse ligaments.    This report was flagged in Epic as abnormal.    The information above was relayed by Dr. Nguyen by telephone to Dr. Subramanian on 11/20/2020 at approximately 21:00.    Electronically signed by resident: Kenny Nguyen  Date:    11/20/2020  Time:    20:44    Electronically signed by: Carlos Hinton MD  Date:    11/20/2020  Time:    21:32             Narrative:    EXAMINATION:  CT HEAD WITHOUT CONTRAST; CT CERVICAL SPINE WITHOUT CONTRAST    CLINICAL HISTORY:  Head trauma, mod-severe;; Neck pain, recent trauma;    TECHNIQUE:  Low dose axial CT images obtained throughout the head and cervical spine without the use of intravenous contrast.  Axial, sagittal and coronal reconstructions were  performed.    COMPARISON:  None.    FINDINGS:  CT HEAD:    Intracranial compartment:    Moderate generalized cerebral volume loss with compensatory expansion of the ventricular system.  The ventricles appear dilated out of proportion to the degree of volume loss with particular prominence of the temporal horns of the lateral ventricles bilaterally.  No evidence of intraparenchymal or extra-axial hemorrhage or other intracranial fluid collection.  Patchy and confluent areas of hypoattenuation in the periventricular white matter bilaterally, some of which may be consistent with chronic microvascular ischemic disease though a component may represent transependymal/interstitial cerebral edema.    There is a densely calcified lesion which appears to arise from the inferior margin of the right tentorial leaflet and measures 2.8 x 3.2 x 2.5 cm in AP by TV by CC dimension (series 2, image 18; series 601, image 104).  There is a mild degree of surrounding hypoattenuation within the right cerebellar hemisphere.  The lesion results in localized mass effect with partial effacement of the cerebellar vermis.  There is no evidence of herniation or midline shift.    Skull/extracranial contents (limited evaluation):    No evidence of displaced calvarial fracture.  There is slight anterior subluxation of the right temporomandibular joint.  Mastoid air cells and paranasal sinuses are essentially clear.    CT CERVICAL SPINE:    Skull Base and Craniocervical Junction (partially imaged): Normal.    Spinal Alignment: Anterolisthesis of C3 on C4 and C4 on C5 with reversal of the normal cervical lordosis.    Vertebrae: No evidence of acute fracture.  There is callus formation around the left C3 lamina which may represent remote postoperative change or remote healed fracture.    Discs: Multilevel intervertebral disc height loss and disc calcification that is most significant from C4-C7 with associated endplate sclerosis and subchondral  cystic change.    Degenerative findings:    *C2-C3: Posterior disc osteophyte complex, right greater than left facet joint osseous hypertrophy, and uncovertebral joint spurring without significant central canal stenosis or neural foraminal narrowing.  *C3-C4: Anterolisthesis of C3 on C4 and a posterior disc osteophyte complex.  Advanced, right worse than left facet joint osseous hypertrophy and uncovertebral joint spurring resulting in severe right neural foraminal narrowing and mild central canal stenosis.  *C4-C5: Posterior disc osteophyte complex, bilateral facet joint osseous hypertrophy, and uncovertebral joint spurring contribute to moderate bilateral neural foraminal narrowing and mild central canal stenosis.  *C5-C6: Right greater than left facet joint osseous hypertrophy and bilateral uncovertebral joint spurring resulting in mild bilateral neural foraminal narrowing and mild central canal stenosis.  *C6-C7: Posterior disc osteophyte complex and uncovertebral joint spurring resulting in moderate bilateral neural foraminal narrowing and mild central canal stenosis.  *C7-T1: No significant neural foraminal narrowing or central canal stenosis.  Paraspinal muscles & soft tissues: Multilevel ossification of the interspinous ligament.  Ossification of the transverse ligament.    Moderate degree of calcific atherosclerosis of the bilateral carotid bulbs which medialized and lie just posterior to the oropharynx.                                CT Cervical Spine Without Contrast (Final result)  Result time 11/20/20 21:32:24    Final result by Carlos Hinton MD (11/20/20 21:32:24)                 Impression:      No acute process, specifically without evidence of intracranial hemorrhage, calvarial fracture, or acute cervical spine fracture.    3.2 cm calcified extra-axial lesion which appears to arise from the right tentorial leaflet favored to represent a meningioma.  Associated mild localized mass effect and  vasogenic edema within the subjacent brain parenchyma without significant midline shift or evidence of herniation.    Dilation throughout the ventricular system out of proportion to the degree of generalized cerebral volume loss, findings which may represent normal pressure hydrocephalus.    Patchy and confluent areas of hypoattenuation in the periventricular white matter bilaterally, some of which may be consistent with chronic microvascular ischemic disease though a component may represent transependymal/interstitial cerebral edema.    Advanced multilevel degenerative changes throughout the cervical spine resulting in mild central canal stenosis at several levels and up to severe focal neural foraminal narrowing, as further detailed above.  Ossification involving interspinous and transverse ligaments.    This report was flagged in Epic as abnormal.    The information above was relayed by Dr. Nguyen by telephone to Dr. Subramanian on 11/20/2020 at approximately 21:00.    Electronically signed by resident: Kenny Nguyen  Date:    11/20/2020  Time:    20:44    Electronically signed by: Carlos Hinton MD  Date:    11/20/2020  Time:    21:32             Narrative:    EXAMINATION:  CT HEAD WITHOUT CONTRAST; CT CERVICAL SPINE WITHOUT CONTRAST    CLINICAL HISTORY:  Head trauma, mod-severe;; Neck pain, recent trauma;    TECHNIQUE:  Low dose axial CT images obtained throughout the head and cervical spine without the use of intravenous contrast.  Axial, sagittal and coronal reconstructions were performed.    COMPARISON:  None.    FINDINGS:  CT HEAD:    Intracranial compartment:    Moderate generalized cerebral volume loss with compensatory expansion of the ventricular system.  The ventricles appear dilated out of proportion to the degree of volume loss with particular prominence of the temporal horns of the lateral ventricles bilaterally.  No evidence of intraparenchymal or extra-axial hemorrhage or other intracranial fluid  collection.  Patchy and confluent areas of hypoattenuation in the periventricular white matter bilaterally, some of which may be consistent with chronic microvascular ischemic disease though a component may represent transependymal/interstitial cerebral edema.    There is a densely calcified lesion which appears to arise from the inferior margin of the right tentorial leaflet and measures 2.8 x 3.2 x 2.5 cm in AP by TV by CC dimension (series 2, image 18; series 601, image 104).  There is a mild degree of surrounding hypoattenuation within the right cerebellar hemisphere.  The lesion results in localized mass effect with partial effacement of the cerebellar vermis.  There is no evidence of herniation or midline shift.    Skull/extracranial contents (limited evaluation):    No evidence of displaced calvarial fracture.  There is slight anterior subluxation of the right temporomandibular joint.  Mastoid air cells and paranasal sinuses are essentially clear.    CT CERVICAL SPINE:    Skull Base and Craniocervical Junction (partially imaged): Normal.    Spinal Alignment: Anterolisthesis of C3 on C4 and C4 on C5 with reversal of the normal cervical lordosis.    Vertebrae: No evidence of acute fracture.  There is callus formation around the left C3 lamina which may represent remote postoperative change or remote healed fracture.    Discs: Multilevel intervertebral disc height loss and disc calcification that is most significant from C4-C7 with associated endplate sclerosis and subchondral cystic change.    Degenerative findings:    *C2-C3: Posterior disc osteophyte complex, right greater than left facet joint osseous hypertrophy, and uncovertebral joint spurring without significant central canal stenosis or neural foraminal narrowing.  *C3-C4: Anterolisthesis of C3 on C4 and a posterior disc osteophyte complex.  Advanced, right worse than left facet joint osseous hypertrophy and uncovertebral joint spurring resulting in  severe right neural foraminal narrowing and mild central canal stenosis.  *C4-C5: Posterior disc osteophyte complex, bilateral facet joint osseous hypertrophy, and uncovertebral joint spurring contribute to moderate bilateral neural foraminal narrowing and mild central canal stenosis.  *C5-C6: Right greater than left facet joint osseous hypertrophy and bilateral uncovertebral joint spurring resulting in mild bilateral neural foraminal narrowing and mild central canal stenosis.  *C6-C7: Posterior disc osteophyte complex and uncovertebral joint spurring resulting in moderate bilateral neural foraminal narrowing and mild central canal stenosis.  *C7-T1: No significant neural foraminal narrowing or central canal stenosis.  Paraspinal muscles & soft tissues: Multilevel ossification of the interspinous ligament.  Ossification of the transverse ligament.    Moderate degree of calcific atherosclerosis of the bilateral carotid bulbs which medialized and lie just posterior to the oropharynx.                                X-Ray Pelvis Complete min 3 views (Final result)  Result time 11/20/20 20:34:33    Final result by Rojas Hobson MD (11/20/20 20:34:33)                 Impression:      Acute fracture left femoral neck with mild displacement.  Recommend orthopedic follow-up.    This report was flagged in Epic as abnormal.      Electronically signed by: Rojas Hobson  Date:    11/20/2020  Time:    20:34             Narrative:    EXAMINATION:  XR PELVIS COMPLETE MIN 3 VIEWS    CLINICAL HISTORY:  Unspecified fall, initial encounter    TECHNIQUE:  Three views of the pelvis    COMPARISON:  None    FINDINGS:  Femoral heads appear normally positioned.    Acute fracture left femoral neck with mild displacement.    Moderate to severe joint space narrowing of the right hip.  Mild narrowing of the left hip.                                X-Ray Femur Ap/Lat Bilateral (Final result)  Result time 11/20/20 20:31:43    Final result by  Rojas Hobson MD (11/20/20 20:31:43)                 Impression:      Acute fracture of the left femoral neck with mild displacement.  Recommend orthopedic consultation and follow-up.    This report was flagged in Epic as abnormal.      Electronically signed by: Rojas Hobson  Date:    11/20/2020  Time:    20:31             Narrative:    EXAMINATION:  XR FEMUR 2 VIEW BILATERAL    CLINICAL HISTORY:  Unspecified fall, initial encounter    TECHNIQUE:  Four views of the right femur and four views of the left femur.  Multiple studies.    COMPARISON:  None    FINDINGS:  Acute fracture left humeral neck with mild displacement.  Recommend orthopedic follow-up.    Vascular calcifications bilaterally.    No acute fractures of the right femur.    Pubic rami and symphysis appear intact.    Moderate to severe joint space narrowing of the right hip with mild narrowing of the left hip.                               X-Ray Chest 1 View (Final result)  Result time 11/20/20 19:37:58    Final result by Rojas Hobson MD (11/20/20 19:37:58)                 Impression:      Stable minimal interstitial changes at the lung bases could represent scarring.  No significant change.      Electronically signed by: Rojas Hobson  Date:    11/20/2020  Time:    19:37             Narrative:    EXAMINATION:  XR CHEST 1 VIEW    CLINICAL HISTORY:  Unspecified fall, initial encounter    TECHNIQUE:  Single frontal view of the chest was performed.    COMPARISON:  03/08/2020    FINDINGS:  Minimal interstitial changes at the lung bases, similar to the prior study.    The lungs are otherwise clear, with normal appearance of pulmonary vasculature and no pleural effusion or pneumothorax.    The cardiac silhouette is normal in size. The hilar and mediastinal contours are unremarkable.    Bones are intact.                              X-Rays:   Independently Interpreted Readings:   Other Readings:  Xray demonstrates left intertrochanteric fx    Medical  Decision Making:   History:   Old Medical Records: I decided to obtain old medical records.  Initial Assessment:   85 y.o. female with history of osteoporosis and TIAs (on Plavix) presents to the ED with left hip pain after experiencing what seems to be a mechanical fall at home. She reports falling forward when leaving the bathroom and hitting her head in the left frontal region. Denies LOC or headache at the moment. Hip binding was placed by EMS in the field. She has 10/10 pain in the left groin region.  Differential Diagnosis:   DDx: Pelvis fracture, femur fracture, subdural hematoma, epidural hematoma, stroke.  Clinical Tests:   Lab Tests: Ordered and Reviewed  Radiological Study: Ordered and Reviewed  Medical Tests: Ordered and Reviewed  ED Management:  6:42 PM  - Patient seen and examined with Dr. Subramanian. Vitals are stable on arrival. Binding removed to assess patient. She has bony tenderness in the left groin region. She denies current head pain. Pupils equal and reactive bilaterally. CT head non-con ordered to rule out head bleed. CT c-spine ordered to rule out cervical trauma. Xray pelvis and femur ordered to assess for fracture. Basic labs ordered along with CPK. Pain control with percocet.    - Xray demonstrates left intertrochanteric fx. Ortho consulted and will come assess patient.   - CT head demonstrates meningioma. NSGY on board.   - C-collar placed until cervical trauma ruled out with imaging per NSGY  - Plan for surgery with OR in the morning. Patient stable for admission to .   Other:   I have discussed this case with another health care provider.              Attending Attestation:   Physician Attestation Statement for Resident:  As the supervising MD   Physician Attestation Statement: I have personally seen and examined this patient.   I agree with the above history. -: 86 yo female presenting after a fall.  No loss of consciousness.  + head injury, on plavix.  Also with left hip pain.    As  the supervising MD I agree with the above PE.   -: Left hip tenderness, no expanding hematoma, intact distal pulse, all compartments soft.  No abdominal or chest tenderness.  No cspine tenderness.   GCS 15.  Nonfocal neuro exam.  No spinal or paraspinal back TTP   As the supervising MD I agree with the above treatment, course, plan, and disposition.   -: CTH with possible calcified meningioma.  Neurosurgery consulted, appreciate recommendations.  Orthopedics consulted for left hip fracture.  Patient will require admission for further management.   Signed out to oncoming attending pending neurosurgery recommendations.   I have reviewed and agree with the residents interpretation of the following: lab data, x-rays and CT scans.                    ED Course as of Nov 20 2339   Fri Nov 20, 2020 1842 Leukocytosis    WBC(!): 13.50 [AB]   1842 No anemia    Hemoglobin: 15.6 [AB]   2042 X-Ray Chest 1 View [AB]      ED Course User Index  [AB] Adarsh Subramanian MD            Clinical Impression:       ICD-10-CM ICD-9-CM   1. Closed displaced fracture of left femoral neck  S72.002A 820.8   2. Fall  W19.XXXA E888.9   3. Pre-op chest exam  Z01.811 V72.82   4. Closed fracture of left hip, initial encounter  S72.002A 820.8   5. Meningioma  D32.9 225.2                      Disposition:   Disposition: Admitted  Condition: Stable     ED Disposition Condition    Admit                             Munira Martinez MD  Resident  11/20/20 9042       Adarsh Subramanian MD  11/21/20 1020

## 2020-11-21 NOTE — ANESTHESIA PROCEDURE NOTES
Left SIFI catheter    Patient location during procedure: holding area   Block not for primary anesthetic.  Reason for block: at surgeon's request and post-op pain management   Post-op Pain Location: Left hip pain  Start time: 11/21/2020 7:48 AM  Timeout: 11/21/2020 7:48 AM   End time: 11/21/2020 7:56 AM    Staffing  Authorizing Provider: Maco Whitney MD  Performing Provider: Dionisio Hall MD    Preanesthetic Checklist  Completed: patient identified, site marked, surgical consent, pre-op evaluation, timeout performed, IV checked, risks and benefits discussed and monitors and equipment checked  Peripheral Block  Patient position: supine  Prep: ChloraPrep and site prepped and draped  Patient monitoring: heart rate  Block type: fascia iliaca (Suprainguinal fascia iliaca)  Laterality: left  Injection technique: continuous  Needle  Needle type: Tuohy   Needle gauge: 17 G  Needle length: 3.5 in  Needle localization: anatomical landmarks and ultrasound guidance  Catheter type: spring wound  Catheter size: 19 G  Test dose: lidocaine 1.5% with Epi 1-to-200,000 and negative   -ultrasound image captured on disc.  Assessment  Injection assessment: negative aspiration, negative parasthesia and local visualized surrounding nerve  Paresthesia pain: none  Heart rate change: no  Slow fractionated injection: yes  Additional Notes  VSS.  [facility] RN monitoring vitals throughout procedure.  Patient tolerated procedure well.  40ml 0.25% ropivacaine

## 2020-11-21 NOTE — SUBJECTIVE & OBJECTIVE
"Principal Problem:Left displaced femoral neck fracture    Principal Orthopedic Problem: same    Interval History: Pt seen and examined at bedside. ATNMAYSTEPHONRADHA. She reports pain is controlled. C collar cleared by neurosurgery. Plan for surgery this morning for left femoral neck fracture.       Review of patient's allergies indicates:   Allergen Reactions    Baclofen      AMS and distorted dremas       Current Facility-Administered Medications   Medication    0.9%  NaCl infusion    acetaminophen tablet 1,000 mg    allopurinoL tablet 300 mg    amLODIPine tablet 10 mg    atorvastatin tablet 10 mg    bisacodyL suppository 10 mg    [START ON 11/22/2020] clopidogreL tablet 75 mg    fentaNYL injection 25 mcg    hydrALAZINE tablet 25 mg    lidocaine (PF) 10 mg/ml (1%) injection 10 mg    lisinopriL tablet 40 mg    melatonin tablet 6 mg    methocarbamoL tablet 500 mg    metoprolol succinate (TOPROL-XL) 24 hr tablet 100 mg    midazolam (VERSED) 1 mg/mL injection 0.5 mg    morphine injection 2 mg    mupirocin 2 % ointment 1 g    ondansetron injection 4 mg    oxyCODONE immediate release tablet 10 mg    oxyCODONE immediate release tablet 5 mg    polyethylene glycol packet 17 g    pregabalin capsule 75 mg    sodium chloride 0.9% flush 10 mL    tranexamic acid (CYKLOKAPRON) 3,000 mg in sodium chloride 0.9% 100 mL    [START ON 11/22/2020] tuberculin injection 5 Units    venlafaxine tablet 75 mg     Objective:     Vital Signs (Most Recent):  Temp: 98.7 °F (37.1 °C) (11/21/20 0200)  Pulse: 86 (11/21/20 0300)  Resp: 15 (11/21/20 0300)  BP: (!) 168/82 (11/21/20 0300)  SpO2: 95 % (11/21/20 0300) Vital Signs (24h Range):  Temp:  [98.7 °F (37.1 °C)-99.2 °F (37.3 °C)] 98.7 °F (37.1 °C)  Pulse:  [85-90] 86  Resp:  [15-20] 15  SpO2:  [93 %-96 %] 95 %  BP: (168-196)/(77-92) 168/82     Weight: 68 kg (150 lb)  Height: 5' 3" (160 cm)  Body mass index is 26.57 kg/m².    No intake or output data in the 24 hours ending 11/21/20 " 0617    Ortho/SPM Exam    LLE:  Skin intact  No edema/erythema/signs of infection  TTP left hip, non TTP left knee, ankle  + log roll  Compartments soft  Motion of knee and hip without pain  SILT Sa/Cadet/DP/SP/T  Motor intact EHL/FHL/TA/Gastroc  2+ DP, 2+ PT      Significant Labs: All pertinent labs within the past 24 hours have been reviewed.    Significant Imaging: I have reviewed and interpreted all pertinent imaging results/findings.

## 2020-11-22 PROBLEM — D32.9 MENINGIOMA: Status: ACTIVE | Noted: 2020-11-22

## 2020-11-22 LAB
ALBUMIN SERPL BCP-MCNC: 4 G/DL (ref 3.5–5.2)
ALP SERPL-CCNC: 61 U/L (ref 55–135)
ALT SERPL W/O P-5'-P-CCNC: 10 U/L (ref 10–44)
ANION GAP SERPL CALC-SCNC: 12 MMOL/L (ref 8–16)
AST SERPL-CCNC: 22 U/L (ref 10–40)
BASOPHILS # BLD AUTO: 0.01 K/UL (ref 0–0.2)
BASOPHILS NFR BLD: 0.1 % (ref 0–1.9)
BILIRUB SERPL-MCNC: 0.5 MG/DL (ref 0.1–1)
BUN SERPL-MCNC: 16 MG/DL (ref 8–23)
CALCIUM SERPL-MCNC: 10 MG/DL (ref 8.7–10.5)
CHLORIDE SERPL-SCNC: 107 MMOL/L (ref 95–110)
CO2 SERPL-SCNC: 21 MMOL/L (ref 23–29)
CREAT SERPL-MCNC: 1 MG/DL (ref 0.5–1.4)
DIFFERENTIAL METHOD: ABNORMAL
EOSINOPHIL # BLD AUTO: 0.1 K/UL (ref 0–0.5)
EOSINOPHIL NFR BLD: 0.7 % (ref 0–8)
ERYTHROCYTE [DISTWIDTH] IN BLOOD BY AUTOMATED COUNT: 14.3 % (ref 11.5–14.5)
EST. GFR  (AFRICAN AMERICAN): >60 ML/MIN/1.73 M^2
EST. GFR  (NON AFRICAN AMERICAN): 52.2 ML/MIN/1.73 M^2
GLUCOSE SERPL-MCNC: 164 MG/DL (ref 70–110)
HCT VFR BLD AUTO: 41.4 % (ref 37–48.5)
HGB BLD-MCNC: 13.8 G/DL (ref 12–16)
IMM GRANULOCYTES # BLD AUTO: 0.03 K/UL (ref 0–0.04)
IMM GRANULOCYTES NFR BLD AUTO: 0.3 % (ref 0–0.5)
LYMPHOCYTES # BLD AUTO: 1.5 K/UL (ref 1–4.8)
LYMPHOCYTES NFR BLD: 16.4 % (ref 18–48)
MAGNESIUM SERPL-MCNC: 2.2 MG/DL (ref 1.6–2.6)
MCH RBC QN AUTO: 34.4 PG (ref 27–31)
MCHC RBC AUTO-ENTMCNC: 33.3 G/DL (ref 32–36)
MCV RBC AUTO: 103 FL (ref 82–98)
MONOCYTES # BLD AUTO: 0.8 K/UL (ref 0.3–1)
MONOCYTES NFR BLD: 9.1 % (ref 4–15)
NEUTROPHILS # BLD AUTO: 6.7 K/UL (ref 1.8–7.7)
NEUTROPHILS NFR BLD: 73.4 % (ref 38–73)
NRBC BLD-RTO: 0 /100 WBC
PHOSPHATE SERPL-MCNC: 3.1 MG/DL (ref 2.7–4.5)
PLATELET # BLD AUTO: 181 K/UL (ref 150–350)
PMV BLD AUTO: 10.9 FL (ref 9.2–12.9)
POCT GLUCOSE: 196 MG/DL (ref 70–110)
POCT GLUCOSE: 256 MG/DL (ref 70–110)
POCT GLUCOSE: 277 MG/DL (ref 70–110)
POTASSIUM SERPL-SCNC: 4.8 MMOL/L (ref 3.5–5.1)
PROT SERPL-MCNC: 8.2 G/DL (ref 6–8.4)
RBC # BLD AUTO: 4.01 M/UL (ref 4–5.4)
SODIUM SERPL-SCNC: 140 MMOL/L (ref 136–145)
WBC # BLD AUTO: 9.11 K/UL (ref 3.9–12.7)

## 2020-11-22 PROCEDURE — 25000003 PHARM REV CODE 250: Performed by: STUDENT IN AN ORGANIZED HEALTH CARE EDUCATION/TRAINING PROGRAM

## 2020-11-22 PROCEDURE — 86580 TB INTRADERMAL TEST: CPT | Performed by: HOSPITALIST

## 2020-11-22 PROCEDURE — 97161 PT EVAL LOW COMPLEX 20 MIN: CPT

## 2020-11-22 PROCEDURE — 80053 COMPREHEN METABOLIC PANEL: CPT

## 2020-11-22 PROCEDURE — 97165 OT EVAL LOW COMPLEX 30 MIN: CPT

## 2020-11-22 PROCEDURE — 11000001 HC ACUTE MED/SURG PRIVATE ROOM

## 2020-11-22 PROCEDURE — 85025 COMPLETE CBC W/AUTO DIFF WBC: CPT

## 2020-11-22 PROCEDURE — 63600175 PHARM REV CODE 636 W HCPCS: Performed by: INTERNAL MEDICINE

## 2020-11-22 PROCEDURE — 83735 ASSAY OF MAGNESIUM: CPT

## 2020-11-22 PROCEDURE — 99232 PR SUBSEQUENT HOSPITAL CARE,LEVL II: ICD-10-PCS | Mod: ,,, | Performed by: INTERNAL MEDICINE

## 2020-11-22 PROCEDURE — 63600175 PHARM REV CODE 636 W HCPCS: Performed by: HOSPITALIST

## 2020-11-22 PROCEDURE — 99232 SBSQ HOSP IP/OBS MODERATE 35: CPT | Mod: ,,, | Performed by: INTERNAL MEDICINE

## 2020-11-22 PROCEDURE — 97116 GAIT TRAINING THERAPY: CPT

## 2020-11-22 PROCEDURE — 97530 THERAPEUTIC ACTIVITIES: CPT

## 2020-11-22 PROCEDURE — 30200315 PPD INTRADERMAL TEST REV CODE 302: Performed by: HOSPITALIST

## 2020-11-22 PROCEDURE — 99231 SBSQ HOSP IP/OBS SF/LOW 25: CPT | Mod: ,,, | Performed by: ANESTHESIOLOGY

## 2020-11-22 PROCEDURE — 84100 ASSAY OF PHOSPHORUS: CPT

## 2020-11-22 PROCEDURE — 63600175 PHARM REV CODE 636 W HCPCS: Performed by: STUDENT IN AN ORGANIZED HEALTH CARE EDUCATION/TRAINING PROGRAM

## 2020-11-22 PROCEDURE — 99231 PR SUBSEQUENT HOSPITAL CARE,LEVL I: ICD-10-PCS | Mod: ,,, | Performed by: ANESTHESIOLOGY

## 2020-11-22 PROCEDURE — 25000003 PHARM REV CODE 250: Performed by: HOSPITALIST

## 2020-11-22 PROCEDURE — 36415 COLL VENOUS BLD VENIPUNCTURE: CPT

## 2020-11-22 RX ORDER — IBUPROFEN 200 MG
16 TABLET ORAL
Status: DISCONTINUED | OUTPATIENT
Start: 2020-11-22 | End: 2020-11-24 | Stop reason: HOSPADM

## 2020-11-22 RX ORDER — INSULIN ASPART 100 [IU]/ML
0-5 INJECTION, SOLUTION INTRAVENOUS; SUBCUTANEOUS
Status: DISCONTINUED | OUTPATIENT
Start: 2020-11-22 | End: 2020-11-24 | Stop reason: HOSPADM

## 2020-11-22 RX ORDER — IBUPROFEN 200 MG
24 TABLET ORAL
Status: DISCONTINUED | OUTPATIENT
Start: 2020-11-22 | End: 2020-11-24 | Stop reason: HOSPADM

## 2020-11-22 RX ORDER — GLUCAGON 1 MG
1 KIT INJECTION
Status: DISCONTINUED | OUTPATIENT
Start: 2020-11-22 | End: 2020-11-24 | Stop reason: HOSPADM

## 2020-11-22 RX ADMIN — INSULIN ASPART 3 UNITS: 100 INJECTION, SOLUTION INTRAVENOUS; SUBCUTANEOUS at 05:11

## 2020-11-22 RX ADMIN — CALCIUM 500 MG: 500 TABLET ORAL at 08:11

## 2020-11-22 RX ADMIN — CLOPIDOGREL 75 MG: 75 TABLET, FILM COATED ORAL at 08:11

## 2020-11-22 RX ADMIN — METOPROLOL SUCCINATE 100 MG: 100 TABLET, EXTENDED RELEASE ORAL at 08:11

## 2020-11-22 RX ADMIN — ALLOPURINOL 300 MG: 300 TABLET ORAL at 08:11

## 2020-11-22 RX ADMIN — VENLAFAXINE 75 MG: 75 TABLET ORAL at 08:11

## 2020-11-22 RX ADMIN — AMLODIPINE BESYLATE 10 MG: 10 TABLET ORAL at 08:11

## 2020-11-22 RX ADMIN — MUPIROCIN 1 G: 20 OINTMENT TOPICAL at 08:11

## 2020-11-22 RX ADMIN — INSULIN ASPART 1 UNITS: 100 INJECTION, SOLUTION INTRAVENOUS; SUBCUTANEOUS at 09:11

## 2020-11-22 RX ADMIN — MUPIROCIN 1 G: 20 OINTMENT TOPICAL at 09:11

## 2020-11-22 RX ADMIN — LISINOPRIL 40 MG: 10 TABLET ORAL at 08:11

## 2020-11-22 RX ADMIN — PREGABALIN 75 MG: 75 CAPSULE ORAL at 09:11

## 2020-11-22 RX ADMIN — ROPIVACAINE HYDROCHLORIDE 10 ML/HR: 2 INJECTION, SOLUTION EPIDURAL; INFILTRATION at 01:11

## 2020-11-22 RX ADMIN — CALCIUM 500 MG: 500 TABLET ORAL at 09:11

## 2020-11-22 RX ADMIN — HYDRALAZINE HYDROCHLORIDE 25 MG: 25 TABLET, FILM COATED ORAL at 05:11

## 2020-11-22 RX ADMIN — CEFAZOLIN 2 G: 1 INJECTION, POWDER, FOR SOLUTION INTRAMUSCULAR; INTRAVENOUS at 12:11

## 2020-11-22 RX ADMIN — ATORVASTATIN CALCIUM 10 MG: 10 TABLET, FILM COATED ORAL at 08:11

## 2020-11-22 RX ADMIN — Medication 1000 UNITS: at 08:11

## 2020-11-22 RX ADMIN — POLYETHYLENE GLYCOL 3350 17 G: 17 POWDER, FOR SOLUTION ORAL at 08:11

## 2020-11-22 RX ADMIN — ENOXAPARIN SODIUM 40 MG: 40 INJECTION SUBCUTANEOUS at 05:11

## 2020-11-22 RX ADMIN — CEFAZOLIN 2 G: 1 INJECTION, POWDER, FOR SOLUTION INTRAMUSCULAR; INTRAVENOUS at 09:11

## 2020-11-22 RX ADMIN — VENLAFAXINE 75 MG: 75 TABLET ORAL at 09:11

## 2020-11-22 RX ADMIN — ROPIVACAINE HYDROCHLORIDE 10 ML/HR: 2 INJECTION, SOLUTION EPIDURAL; INFILTRATION at 12:11

## 2020-11-22 RX ADMIN — TUBERCULIN PURIFIED PROTEIN DERIVATIVE 5 UNITS: 5 INJECTION, SOLUTION INTRADERMAL at 12:11

## 2020-11-22 NOTE — PT/OT/SLP EVAL
"Occupational Therapy   Evaluation    Name: Farheen Soares  MRN: [id number]  Admitting Diagnosis:  Closed impacted fracture of left hip with routine healing 1 Day Post-Op    Recommendations:     Discharge Recommendations: nursing facility, skilled  Discharge Equipment Recommendations:  bedside commode  Barriers to discharge:       Assessment:     Farheen Soares is a 85 y.o. female with a medical diagnosis of Closed impacted fracture of left hip with routine healing.  Patient is s/p left hip fx and is WBAT LLE. Patient very confused during session with bouts of agitation. She completed bed mobility at moderate assistance. She completed sit-stand at minimal assistance. She would benefit from skilled OT needs while in acute to increase independence with ADLs and ADL transfers. She would benefit from SNF to max functional potential.   Performance deficits affecting function: weakness, impaired endurance, impaired self care skills, impaired functional mobilty, impaired cognition, gait instability, impaired balance, decreased lower extremity function, decreased ROM, orthopedic precautions, decreased safety awareness.      Rehab Prognosis: Good; patient would benefit from acute skilled OT services to address these deficits and reach maximum level of function.       Plan:     Patient to be seen daily to address the above listed problems via self-care/home management, therapeutic activities, therapeutic exercises  · Plan of Care Expires: 12/22/20  · Plan of Care Reviewed with: patient    Subjective     Chief Complaint: "I want to walk out of here" "Get me out"   Patient/Family Comments/goals: "get home"     Occupational Profile:  Patient very confused with limited subjective information: per chart: lives with son in 2 level home and was using rolling walker prior   Equipment Used at Home:  walker, rolling    Pain/Comfort:  · Pain Rating 1: (did not rate)  · Location - Side 1: Left  · Location - Orientation 1: " generalized  · Location 1: hip  · Pain Addressed 1: Pre-medicate for activity, Reposition, Distraction    Patients cultural, spiritual, Hindu conflicts given the current situation: no    Objective:     Communicated with: Nursing prior to session.  Patient found supine with perineural catheter, telemetry, FCD upon OT entry to room.    General Precautions: Standard, fall   Orthopedic Precautions:LLE weight bearing as tolerated   Braces: N/A     Occupational Performance:    Bed Mobility:    · Patient completed Supine to Sit with moderate assistance    Functional Mobility/Transfers:  · Patient completed Sit <> Stand Transfer with minimum assistance  with  rolling walker   · Functional Mobility: patient ambulated in room with rolling walker at moderate assistance     Activities of Daily Living:  · Upper Body Dressing: maximal assistance for donning gown (patient had undressed self)    Cognitive/Visual Perceptual:  Cognitive/Psychosocial Skills:     -       Oriented to: Person   -       Follows Commands/attention:very confused     Physical Exam:  Upper Extremity Range of Motion:     -       Right Upper Extremity: WFL  -       Left Upper Extremity: WFL  Upper Extremity Strength:    -       Right Upper Extremity: WFL  -       Left Upper Extremity: WFL    AMPAC 6 Click ADL:  AMPAC Total Score: 16    Treatment & Education:  -Patient orientated to place and time and situation   -Patient educated on calling for nursing to get back into bed   -Patient educated on role of OT while in acute care and plan of care     Patient left up in chair with all lines intact, call button in reach and RN notified    GOALS:   Multidisciplinary Problems     Occupational Therapy Goals        Problem: Occupational Therapy Goal    Goal Priority Disciplines Outcome Interventions   Occupational Therapy Goal     OT, PT/OT Ongoing, Progressing    Description: Goals to be met by: 12/22/20    Patient will increase functional independence with ADLs by  performing:    UE Dressing with Supervision.  LE Dressing with Supervision.  Grooming while standing at sink with Supervision.  Toileting from toilet with Supervision for hygiene and clothing management.   Toilet transfer to toilet with Supervision.                     History:     Past Medical History:   Diagnosis Date    Allergic rhinitis     Carotid atherosclerosis, bilateral     Chronic low back pain with bilateral sciatica     Chronic pain 8/27/2020    DDD (degenerative disc disease), lumbar 8/17/2020    Depression     Diabetes mellitus, type 2     Diverticulosis     Facet arthropathy     GERD (gastroesophageal reflux disease) 11/25/2019    Gout     Hearing loss     History of TIA (transient ischemic attack)     Hyperlipidemia     Hypertension     IBS (irritable bowel syndrome)     Insomnia     Leg cramps     Lumbar radiculopathy 8/17/2020    Lumbar spondylosis 8/17/2020    Lumbar stenosis     Meningioma     Osteoporosis     Paroxysmal SVT (supraventricular tachycardia)     Primary open angle glaucoma (POAG) of both eyes     PVD (peripheral vascular disease)     30% prox stenosis of celiac trunk and 20% stnosis Prox SMA - per Abd/SMA Arteriogram 4/3/08       Past Surgical History:   Procedure Laterality Date    CATARACT EXTRACTION      CAUDAL EPIDURAL STEROID INJECTION N/A 8/27/2020    Procedure: Injection-steroid-epidural-caudal with catheter;  Surgeon: Johnathan Gonzalez Jr., MD;  Location: [facility]IN [facility];  Service: Pain Management;  Laterality: N/A;    EPIDURAL STEROID INJECTION INTO LUMBAR SPINE      LUMBAR LAMINECTOMY  2009    TOTAL ABDOMINAL HYSTERECTOMY W/ BILATERAL SALPINGOOPHORECTOMY         Time Tracking:     OT Date of Treatment: 11/22/20  OT Start Time: 0951  OT Stop Time: 1016  OT Total Time (min): 25 min    Billable Minutes:Evaluation 10  Therapeutic Activity 15    Suellen Dennis OT  11/22/2020    Co-tx performed for this visit due to patient need for two skilled  therapists to ensure patient and staff safety and to accommodate for patient activity tolerance

## 2020-11-22 NOTE — PROGRESS NOTES
"[facility] Hospital Medicine  Progress Note    Patient Name: Farheen Soares  MRN: [id number]  Patient Class: IP- Inpatient   Admission Date: 11/20/2020  Length of Stay: 2 days  Attending Physician: Jenny Olsen MD  Primary Care Provider: Vonda Chung MD    [facility] Medicine Team: [facility] HOSP MED H Jenny Olsen MD    Subjective:     Principal Problem:Closed impacted fracture of left hip with routine healing        HPI:  86 yo F with Type 2 diabetes not on long term insulin therapy, essential HTN, osteoposis, chronic gout, HLD, Depression, 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 Pt. Reports that she was walking to the bathroom when her leg "gave out" and she fell forward striking her head and then her left hip. She denies any preceding lightheadedness, SOB, palpitations, or chest pain and states that she was in her USOH until the fall occurred. At baseline, the patient reports that she uses a walker at baseline. She lives in a two story house and climb stairs with assistance. She reports that is limited by arthritis pains and unsteadiness, but she denies any chest pains or significant SOB with exertion    Overview/Hospital Course:  Patient admitted to [facility] Hospital Medicine Team H: Hip Fracture team and started on Hip Fracture Pathway with Orthopedic surgery consult for hip fracture. Patient was seen and evaluated by Orthopedic surgery who recommended operative repair of hip fracture. Patient was medically optimized prior to surgery and was taken to OR after optimization on 11/21/2020. Patient underwent left hip percutaneous screw fixation by Dr. Leandro White. Post-op patient WBAT to the left lower extremity as per Orthopedics recommendation. Patient placed on Lovenox 40 mg subcutaneous daily and CHRISTOPHE/SCD's for DVT prophylaxis post-op and will need for total of 28 days. Perineural pain catheter placed " by Anesthesia Pain Service with continuous infusion of Ropivacaine to help with pain control post-op and Anesthesia Pain Service managing while patient in the hospital. Patient placed on multimodal pain management with scheduled Tylenol 1000 mg po every 8 hours and Lyrica 75 mg po nightly post-op for pain control. PT/OT consulted post-op.    Interval History: Patient reports 3/10 pain to left hip. Patient is POD #1 from percutaneous screw fixation by Orthopedics for impacted femoral neck fracture. Patient sitting up in bedside chair after working with therapy this am. Patient is eating well and denies any sore throat or SOB or chest pain. Patient not happy about being up in chair but I explained to patient it is important in her recovery to get moving and stay out of the bed as much as possible. Patient stating she wants to go home but I explained to patient have to wait to see wait and see what therapy recommends.     Review of Systems   Constitutional: Negative for fever.   Respiratory: Negative for cough and shortness of breath.    Cardiovascular: Negative for chest pain and leg swelling.   Gastrointestinal: Negative for abdominal pain, constipation, diarrhea, nausea and vomiting.   Musculoskeletal: Positive for arthralgias (Left hip).   Skin: Negative for rash.   Neurological: Negative for dizziness and light-headedness.   Psychiatric/Behavioral: Negative for agitation and confusion.     Objective:     Vital Signs (Most Recent):  Temp: 97.6 °F (36.4 °C) (11/22/20 1110)  Pulse: 80 (11/22/20 1110)  Resp: 17 (11/22/20 1110)  BP: 160/77 (11/22/20 1110)  SpO2: 98 % (11/22/20 1110) on room air Vital Signs (24h Range):  Temp:  [97.6 °F (36.4 °C)-97.8 °F (36.6 °C)] 97.6 °F (36.4 °C)  Pulse:  [72-91] 80  Resp:  [17-19] 17  SpO2:  [92 %-99 %] 98 %  BP: (119-177)/(69-79) 160/77     Weight: 68 kg (150 lb)  Body mass index is 26.57 kg/m².    Intake/Output Summary (Last 24 hours) at 11/22/2020 5336  Last data filed at  11/22/2020 0514  Gross per 24 hour   Intake 780 ml   Output 750 ml   Net 30 ml      Physical Exam  Vitals signs and nursing note reviewed.   Constitutional:       General: She is not in acute distress.     Appearance: She is well-developed.   Eyes:      Conjunctiva/sclera: Conjunctivae normal.   Neck:      Musculoskeletal: Neck supple.      Vascular: No JVD.   Cardiovascular:      Rate and Rhythm: Normal rate and regular rhythm.      Heart sounds: Normal heart sounds. No murmur. No friction rub. No gallop.    Pulmonary:      Effort: Pulmonary effort is normal. No respiratory distress.      Breath sounds: Normal breath sounds. No wheezing.   Abdominal:      General: Bowel sounds are normal. There is no distension.      Palpations: Abdomen is soft.      Tenderness: There is no abdominal tenderness. There is no guarding.   Skin:     General: Skin is warm.      Capillary Refill: Capillary refill takes less than 2 seconds.      Findings: No erythema.      Comments: Surgical dressing noted on hip. Dressing is clean and dry and intact.   Neurological:      Mental Status: She is alert and oriented to person, place, and time.   Psychiatric:         Behavior: Behavior normal.         Thought Content: Thought content normal.         Significant Labs:   CBC:   Recent Labs   Lab 11/20/20  1820 11/22/20  0457   WBC 13.50* 9.11   HGB 15.6 13.8   HCT 47.2 41.4    181     CMP:   Recent Labs   Lab 11/20/20  1820 11/22/20  0457    140   K 4.1 4.8    107   CO2 20* 21*   * 164*   BUN 15 16   CREATININE 0.9 1.0   CALCIUM 10.3 10.0   PROT 9.1* 8.2   ALBUMIN 4.8 4.0   BILITOT 0.5 0.5   ALKPHOS 69 61   AST 38 22   ALT 16 10   ANIONGAP 14 12   EGFRNONAA 59.3* 52.2*     Magnesium:   Recent Labs   Lab 11/20/20  2233 11/22/20  0457   MG 1.5* 2.2     POCT Glucose:   Recent Labs   Lab 11/21/20  0724 11/21/20  1012 11/22/20  1257   POCTGLUCOSE 168* 205* 196*       Significant Imaging: I have reviewed all pertinent imaging  results/findings within the past 24 hours.      Assessment/Plan:      * Closed impacted fracture of left femoral neck with routine healing s/p percutaneous screw fixation on 11/21/2020  Patient is POD # 1.   · Patient reports minimal pain in left hip.   · Plan is to start PT/OT for gait training and strengthening and restoration of ADL's. Patient is FWB/WBAT: left lower extremity as per Orthopedic recommendations.   · Plan is to start Lovenox 40 mg subcutaneous daily and will need a total of 28 days post-op after hip fracture surgery and CHRISTOPHE/SCDs for DVT prophylaxis.   · Orthopedics is following and managing hip fracture and surgical site.   · Perineural pain catheter in place for pain control and being managed by Anesthesia Pain Service.   · Discontinue Kline to gravity.     Type 2 diabetes mellitus without complication, without long-term current use of insulin  Good control in the hospital in past 24 hours. Patient on Metformin at home to treat as outpatient. Holding metformin here in hospital.     Blood Sugars (AccuCheck):  Recent Labs     11/21/20  0724 11/21/20  1012 11/22/20  1257   POCTGLUCOSE 168* 205* 196*        · HgA1C 6.5% and at goal on this admit.  · Plan is to monitor POCT glucose 4 times a day with each meal and at bedtime and cover with Novolog low dose sliding scale insulin.   · Target pre-meal glucose goal is <140 with all random glucoses <180 in non-critically ill patient.  · [age] yue diabetic diet in hospital.     Essential hypertension  · Patient's blood pressure is controlled here in the hospital over past 24 hours.   · Goal for blood pressure is SBP < 140 and DBP < 90 as patient > or = 60 years of age and patient is diabetic based on JNC 8 guidelines.   · Continue current treatment regimen of Norvasc 10 mg po daily + Lisinopril 40 mg po daily + Toprol  mg po daily to treat.   · Plan is to monitor patient's blood pressure routinely while patient is hospitalized.     Age-related  osteoporosis with current pathological fracture with routine healing  Patient admitted with hip fracture related to osteoporosis.   · Patient has been counseled on need to avoid smoking and moderation of alcohol use with regular weightbearing exercise to help reduce risk of future fractures associated with osteoporosis.   · Will check Vitamin D level.  · Patient will need outpatient DEXA scan and further evaluation for additional treatment of osteoporosis.   · Patient placed on Calcium and Vitamin D to treat.     Chronic gout of multiple sites  Chronic and controlled. Continue Allopurinol 300 mg po daily to treat.       Recurrent major depressive disorder, in full remission  Chronci and controlled. Continue Venlafaxine 75 mg po BID to treat.       Pure hypercholesterolemia  Chronic and controlled. Continue Lipitor 10 mg po daily to treat.       Meningioma  Noted on CT imaging on admit. 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       History of transient ischemic attack (TIA)  Patient with prior history of TIA. Continue home meds of Plavix and Lipitor in hospital for prevention.         VTE Risk Mitigation (From admission, onward)         Ordered     enoxaparin injection 40 mg  Every 24 hours      11/21/20 0932     IP VTE HIGH RISK PATIENT  Once      11/21/20 0759     Place sequential compression device  Until discontinued      11/21/20 0014                Discharge Planning   GONZALEZ: 11/24/2020     Code Status: Full Code   Is the patient medically ready for discharge?: No    Reason for patient still in hospital (select all that apply): Patient trending condition             Jenny Olsen MD  Department of Hospital Medicine   [facility]

## 2020-11-22 NOTE — ASSESSMENT & PLAN NOTE
Patient is POD # 1.   · Patient reports minimal pain in left hip.   · Plan is to start PT/OT for gait training and strengthening and restoration of ADL's. Patient is FWB/WBAT: left lower extremity as per Orthopedic recommendations.   · Plan is to start Lovenox 40 mg subcutaneous daily and will need a total of 28 days post-op after hip fracture surgery and CHRISTOPHE/SCDs for DVT prophylaxis.   · Orthopedics is following and managing hip fracture and surgical site.   · Perineural pain catheter in place for pain control and being managed by Anesthesia Pain Service.   · Discontinue Kline to gravity.

## 2020-11-22 NOTE — ASSESSMENT & PLAN NOTE
Patient with prior history of TIA. Continue home meds of Plavix and Lipitor in hospital for prevention.

## 2020-11-22 NOTE — SUBJECTIVE & OBJECTIVE
Interval History: OR yesterday for left femoral neck ORIF     Medications:  Continuous Infusions:   ropivacaine (PF) 2 mg/ml (0.2%) 10 mL/hr (11/22/20 0019)     Scheduled Meds:   allopurinoL  300 mg Oral Daily    amLODIPine  10 mg Oral Daily    atorvastatin  10 mg Oral Daily    calcium carbonate  500 mg Oral BID    clopidogreL  75 mg Oral Daily    enoxaparin  40 mg Subcutaneous Q24H    lisinopriL  40 mg Oral Daily    metoprolol succinate  100 mg Oral Daily    mupirocin  1 g Nasal BID    polyethylene glycol  17 g Oral Daily    pregabalin  75 mg Oral QHS    tuberculin  5 Units Intradermal Once    venlafaxine  75 mg Oral BID    vitamin D  1,000 Units Oral Daily     PRN Meds:bisacodyL, hydrALAZINE, melatonin, methocarbamoL, morphine, ondansetron, oxyCODONE, oxyCODONE, sodium chloride 0.9%, sodium chloride 0.9%     Review of Systems  Objective:     Weight: 68 kg (150 lb)  Body mass index is 26.57 kg/m².  Vital Signs (Most Recent):  Temp: 97.8 °F (36.6 °C) (11/22/20 0824)  Pulse: 76 (11/22/20 0824)  Resp: 17 (11/22/20 0824)  BP: (!) 161/71 (11/22/20 0824)  SpO2: 99 % (11/22/20 0824) Vital Signs (24h Range):  Temp:  [97.5 °F (36.4 °C)-97.9 °F (36.6 °C)] 97.8 °F (36.6 °C)  Pulse:  [72-91] 76  Resp:  [16-20] 17  SpO2:  [92 %-100 %] 99 %  BP: (110-177)/(59-87) 161/71                          Neurosurgery Physical Exam     left knee flexex and hip ex not tested due to fx.   full strength in all tested groups in LEs.   no clonus, mistry.   rue hand intrinsics 4/5.   otherwise no motor deficit.   no snesory deficit.   no cspine tenderness.    Significant Labs:  Recent Labs   Lab 11/20/20  1820 11/20/20  2233 11/22/20  0457   *  --  164*     --  140   K 4.1  --  4.8     --  107   CO2 20*  --  21*   BUN 15  --  16   CREATININE 0.9  --  1.0   CALCIUM 10.3  --  10.0   MG  --  1.5* 2.2     Recent Labs   Lab 11/20/20  1820 11/22/20  0457   WBC 13.50* 9.11   HGB 15.6 13.8   HCT 47.2 41.4    181      Recent Labs   Lab 11/20/20  1820   INR 1.0     Microbiology Results (last 7 days)     ** No results found for the last 168 hours. **        All pertinent labs from the last 24 hours have been reviewed.    Significant Diagnostics:  I have reviewed and interpreted all pertinent imaging results/findings within the past 24 hours.

## 2020-11-22 NOTE — ASSESSMENT & PLAN NOTE
85 F with known tentorial meningioma, cervical kyphosis, femur fx, consulted for operative clearance for ortho, now s/p left femur repair with ortho (11/22)    -- NSGY will sign off at this time  -- Pt may follow up outpatient for cervical myelopathy and NPH    Please call with any questions

## 2020-11-22 NOTE — NURSING
"Pt refused to take all medications at this time. Pt aa&ox2 person place. However pt stated "im not taking any medicine I wanna get out of here. Yall are keeping me hostage"  Paged md on call to notify. Bed alarm on. No distress noted. Will continue to monitor.  "

## 2020-11-22 NOTE — ASSESSMENT & PLAN NOTE
Patient admitted with hip fracture related to osteoporosis.   · Patient has been counseled on need to avoid smoking and moderation of alcohol use with regular weightbearing exercise to help reduce risk of future fractures associated with osteoporosis.   · Will check Vitamin D level.  · Patient will need outpatient DEXA scan and further evaluation for additional treatment of osteoporosis.   · Patient placed on Calcium and Vitamin D to treat.

## 2020-11-22 NOTE — NURSING
"Patient confused stating " she at [facility] waiting for hair to be washed." . Reoriented to time and place . Patient stated, " she was going to call the police ."patient reoriented again to hospital setting due to fall and braking her her hip requiring  surgery and rehab. Patient stated, "Where is [facility].  I'm calling the police." reoriented Patient again . Charge Nurse Ani notified, and Dr Olsen. Bed alarm on   "

## 2020-11-22 NOTE — PLAN OF CARE
Problem: Physical Therapy Goal  Goal: Physical Therapy Goal  Description: Goals to be met by: 20    Patient will increase functional independence with mobility by performin. Supine to sit with Stand-by Assistance  2. Sit to supine with Stand-by Assistance  3. Sit to stand transfer with Stand-by Assistance  4. Gait  x 100 feet with Stand-by Assistance using Rolling Walker.   5.  Patient will demonstrate independence with a home exercise program  6. Patient's balance will be GOOD: Needs SUPERVISION only during gait and able to self right with moderate LOB.  7. Ascend/Descend 6 inch curb step with Contact Guard Assistance using Rolling Walker.  Outcome: Ongoing, Progressing     PT evaluation completed, goals established and plan of care reviewed with patient.  Patient demonstrates decreased activity tolerance secondary to pain and confusion.  Patient with agitation and confusion during session limiting safety and independence.  Patient able to perform bed mobility, transfers and ambulation with between min A and mod A.  Patient ambulated ~ 7 ft with rolling walker and mod A for support and balance.  Patient will benefit from skilled PT for strengthening and mobility training.      Sukhdeep West, PT, DPT  2020  Pager #: [phone]

## 2020-11-22 NOTE — ANESTHESIA POST-OP PAIN MANAGEMENT
Acute Pain Service Progress Note    Farheen Soarse is a 85 y.o., female, [id number].    Surgery:  Left- Percutaneous Screws    Post Op Day #: 1     Catheter type: L SIFI    Infusion type: Ropivacaine 0.2%  2 basal w/ 10cc IB    Problem List:    Active Hospital Problems    Diagnosis  POA    *Closed impacted fracture of left femoral neck with routine healing s/p percutaneous screw fixation on 11/21/2020 [S72.092D]  Not Applicable     Farheen Soraes is a 85 y.o. female with left hip pain after a ground level fall from standing.  She has a left femoral neck fracture.   CT head was performed which demonstrated a calcified diagnosed as meningioma.  Conversation had with neurosurgery and at this time they are not concerned about a head bleed.  Thus, patient marked, consented, and booked for surgery tomorrow AM.   - OR Tomorrow 11/21 for percutaneous screw fixation of right hip  - NPO at midnight  - Hold Plavix          Meningioma [D32.9]  Unknown    Fall [W19.XXXA]  Yes    Pure hypercholesterolemia [E78.00]  Yes    History of transient ischemic attack (TIA) [Z86.73]  Not Applicable    Type 2 diabetes mellitus without complication, without long-term current use of insulin [E11.9]  Yes    Essential hypertension [I10]  Yes    Age-related osteoporosis with current pathological fracture with routine healing [M80.00XD]  Not Applicable    Chronic gout of multiple sites [M1A.09X0]  Yes    Recurrent major depressive disorder, in full remission [F33.42]  Yes      Resolved Hospital Problems   No resolved problems to display.       Subjective:     General appearance of Alert but disoriented. Doesn't understand why she is here. Unable to adequately assess pain 2/2 AMS, but states she is not having any pain regardless   Pain with rest: 1    Numbers   Pain with movement: 1    Numbers   Side Effects    1. Pruritis No    2. Nausea No    3. Motor Blockade No,    4. Sedation No, 1=awake and  alert    Objective:     Catheter level L SIFI   Catheter site clean, dry, intact         Vitals   Vitals:    11/22/20 0824   BP: (!) 161/71   Pulse: 76   Resp: 17   Temp: 36.6 °C (97.8 °F)        Labs    Admission on 11/20/2020   Component Date Value Ref Range Status    WBC 11/20/2020 13.50* 3.90 - 12.70 K/uL Final    RBC 11/20/2020 4.68  4.00 - 5.40 M/uL Final    Hemoglobin 11/20/2020 15.6  12.0 - 16.0 g/dL Final    Hematocrit 11/20/2020 47.2  37.0 - 48.5 % Final    MCV 11/20/2020 101* 82 - 98 fL Final    MCH 11/20/2020 33.3* 27.0 - 31.0 pg Final    MCHC 11/20/2020 33.1  32.0 - 36.0 g/dL Final    RDW 11/20/2020 14.3  11.5 - 14.5 % Final    Platelets 11/20/2020 234  150 - 350 K/uL Final    MPV 11/20/2020 10.5  9.2 - 12.9 fL Final    Immature Granulocytes 11/20/2020 0.7* 0.0 - 0.5 % Final    Gran # (ANC) 11/20/2020 10.9* 1.8 - 7.7 K/uL Final    Immature Grans (Abs) 11/20/2020 0.09* 0.00 - 0.04 K/uL Final    Lymph # 11/20/2020 1.8  1.0 - 4.8 K/uL Final    Mono # 11/20/2020 0.7  0.3 - 1.0 K/uL Final    Eos # 11/20/2020 0.0  0.0 - 0.5 K/uL Final    Baso # 11/20/2020 0.03  0.00 - 0.20 K/uL Final    nRBC 11/20/2020 0  0 /100 WBC Final    Gran % 11/20/2020 80.6* 38.0 - 73.0 % Final    Lymph % 11/20/2020 13.4* 18.0 - 48.0 % Final    Mono % 11/20/2020 5.0  4.0 - 15.0 % Final    Eosinophil % 11/20/2020 0.1  0.0 - 8.0 % Final    Basophil % 11/20/2020 0.2  0.0 - 1.9 % Final    Differential Method 11/20/2020 Automated   Final    Sodium 11/20/2020 136  136 - 145 mmol/L Final    Potassium 11/20/2020 4.1  3.5 - 5.1 mmol/L Final    Chloride 11/20/2020 102  95 - 110 mmol/L Final    CO2 11/20/2020 20* 23 - 29 mmol/L Final    Glucose 11/20/2020 177* 70 - 110 mg/dL Final    BUN 11/20/2020 15  8 - 23 mg/dL Final    Creatinine 11/20/2020 0.9  0.5 - 1.4 mg/dL Final    Calcium 11/20/2020 10.3  8.7 - 10.5 mg/dL Final    Total Protein 11/20/2020 9.1* 6.0 - 8.4 g/dL Final    Albumin 11/20/2020 4.8  3.5 - 5.2  g/dL Final    Total Bilirubin 11/20/2020 0.5  0.1 - 1.0 mg/dL Final    Alkaline Phosphatase 11/20/2020 69  55 - 135 U/L Final    AST 11/20/2020 38  10 - 40 U/L Final    ALT 11/20/2020 16  10 - 44 U/L Final    Anion Gap 11/20/2020 14  8 - 16 mmol/L Final    eGFR if African American 11/20/2020 >60.0  >60 mL/min/1.73 m^2 Final    eGFR if non African American 11/20/2020 59.3* >60 mL/min/1.73 m^2 Final    Prothrombin Time 11/20/2020 10.9  9.0 - 12.5 sec Final    INR 11/20/2020 1.0  0.8 - 1.2 Final    Group & Rh 11/20/2020 A NEG   Corrected    Indirect Javier 11/20/2020 NEG   Final    POC Rapid COVID 11/20/2020 Negative  Negative Final     Acceptable 11/20/2020 Yes   Final    Specimen UA 11/20/2020 Urine, Clean Catch   Final    Color, UA 11/20/2020 Yellow  Yellow, Straw, Dyana Final    Appearance, UA 11/20/2020 Clear  Clear Final    pH, UA 11/20/2020 5.0  5.0 - 8.0 Final    Specific [place], UA 11/20/2020 1.015  1.005 - 1.030 Final    Protein, UA 11/20/2020 1+* Negative Final    Glucose, UA 11/20/2020 Negative  Negative Final    Ketones, UA 11/20/2020 Negative  Negative Final    Bilirubin (UA) 11/20/2020 Negative  Negative Final    Occult Blood UA 11/20/2020 1+* Negative Final    Nitrite, UA 11/20/2020 Negative  Negative Final    Leukocytes, UA 11/20/2020 Negative  Negative Final    CPK 11/20/2020 76  20 - 180 U/L Final    Transferrin 11/20/2020 299  200 - 375 mg/dL Final    Hemoglobin A1C 11/20/2020 6.5* 4.0 - 5.6 % Final    Estimated Avg Glucose 11/20/2020 140* 68 - 131 mg/dL Final    RBC, UA 11/20/2020 1  0 - 4 /hpf Final    WBC, UA 11/20/2020 0  0 - 5 /hpf Final    Bacteria 11/20/2020 Occasional  None-Occ /hpf Final    Squam Epithel, UA 11/20/2020 0  /hpf Final    Hyaline Casts, UA 11/20/2020 0  0-1/lpf /lpf Final    Microscopic Comment 11/20/2020 SEE COMMENT   Final    Magnesium 11/20/2020 1.5* 1.6 - 2.6 mg/dL Final    Phosphorus 11/20/2020 2.9  2.7 - 4.5 mg/dL  Final    Prealbumin 11/20/2020 25  20 - 43 mg/dL Final    POCT Glucose 11/21/2020 168* 70 - 110 mg/dL Final    POCT Glucose 11/21/2020 205* 70 - 110 mg/dL Final    WBC 11/22/2020 9.11  3.90 - 12.70 K/uL Final    RBC 11/22/2020 4.01  4.00 - 5.40 M/uL Final    Hemoglobin 11/22/2020 13.8  12.0 - 16.0 g/dL Final    Hematocrit 11/22/2020 41.4  37.0 - 48.5 % Final    MCV 11/22/2020 103* 82 - 98 fL Final    MCH 11/22/2020 34.4* 27.0 - 31.0 pg Final    MCHC 11/22/2020 33.3  32.0 - 36.0 g/dL Final    RDW 11/22/2020 14.3  11.5 - 14.5 % Final    Platelets 11/22/2020 181  150 - 350 K/uL Final    MPV 11/22/2020 10.9  9.2 - 12.9 fL Final    Immature Granulocytes 11/22/2020 0.3  0.0 - 0.5 % Final    Gran # (ANC) 11/22/2020 6.7  1.8 - 7.7 K/uL Final    Immature Grans (Abs) 11/22/2020 0.03  0.00 - 0.04 K/uL Final    Lymph # 11/22/2020 1.5  1.0 - 4.8 K/uL Final    Mono # 11/22/2020 0.8  0.3 - 1.0 K/uL Final    Eos # 11/22/2020 0.1  0.0 - 0.5 K/uL Final    Baso # 11/22/2020 0.01  0.00 - 0.20 K/uL Final    nRBC 11/22/2020 0  0 /100 WBC Final    Gran % 11/22/2020 73.4* 38.0 - 73.0 % Final    Lymph % 11/22/2020 16.4* 18.0 - 48.0 % Final    Mono % 11/22/2020 9.1  4.0 - 15.0 % Final    Eosinophil % 11/22/2020 0.7  0.0 - 8.0 % Final    Basophil % 11/22/2020 0.1  0.0 - 1.9 % Final    Differential Method 11/22/2020 Automated   Final    Magnesium 11/22/2020 2.2  1.6 - 2.6 mg/dL Final    Phosphorus 11/22/2020 3.1  2.7 - 4.5 mg/dL Final    Sodium 11/22/2020 140  136 - 145 mmol/L Final    Potassium 11/22/2020 4.8  3.5 - 5.1 mmol/L Final    Chloride 11/22/2020 107  95 - 110 mmol/L Final    CO2 11/22/2020 21* 23 - 29 mmol/L Final    Glucose 11/22/2020 164* 70 - 110 mg/dL Final    BUN 11/22/2020 16  8 - 23 mg/dL Final    Creatinine 11/22/2020 1.0  0.5 - 1.4 mg/dL Final    Calcium 11/22/2020 10.0  8.7 - 10.5 mg/dL Final    Total Protein 11/22/2020 8.2  6.0 - 8.4 g/dL Final    Albumin 11/22/2020 4.0  3.5 - 5.2  g/dL Final    Total Bilirubin 11/22/2020 0.5  0.1 - 1.0 mg/dL Final    Alkaline Phosphatase 11/22/2020 61  55 - 135 U/L Final    AST 11/22/2020 22  10 - 40 U/L Final    ALT 11/22/2020 10  10 - 44 U/L Final    Anion Gap 11/22/2020 12  8 - 16 mmol/L Final    eGFR if African American 11/22/2020 >60.0  >60 mL/min/1.73 m^2 Final    eGFR if non African American 11/22/2020 52.2* >60 mL/min/1.73 m^2 Final        Meds   Current Facility-Administered Medications   Medication Dose Route Frequency Provider Last Rate Last Dose    allopurinoL tablet 300 mg  300 mg Oral Daily Hitesh Teague MD   300 mg at 11/22/20 0830    amLODIPine tablet 10 mg  10 mg Oral Daily Hitesh Teague MD   10 mg at 11/22/20 0829    atorvastatin tablet 10 mg  10 mg Oral Daily Hitesh Teague MD   10 mg at 11/22/20 0830    bisacodyL suppository 10 mg  10 mg Rectal Daily PRN Hitesh Teague MD        calcium carbonate (OS-ELODIA) tablet 500 mg  500 mg Oral BID Dwayne Ding MD   500 mg at 11/22/20 0829    clopidogreL tablet 75 mg  75 mg Oral Daily Hitesh Teague MD   75 mg at 11/22/20 0832    enoxaparin injection 40 mg  40 mg Subcutaneous Q24H Dwayne Ding MD   40 mg at 11/21/20 1634    hydrALAZINE tablet 25 mg  25 mg Oral Q8H PRN Hitesh Teague MD   25 mg at 11/22/20 0513    lisinopriL tablet 40 mg  40 mg Oral Daily Hitesh Teague MD   40 mg at 11/22/20 0830    melatonin tablet 6 mg  6 mg Oral Nightly PRN Hitesh Teague MD        methocarbamoL tablet 500 mg  500 mg Oral Q6H PRN Hitesh Teague MD        metoprolol succinate (TOPROL-XL) 24 hr tablet 100 mg  100 mg Oral Daily Hitesh Teague MD   100 mg at 11/22/20 0829    morphine injection 2 mg  2 mg Intravenous Q3H PRN Hitesh Teague MD   2 mg at 11/21/20 0243    mupirocin 2 % ointment 1 g  1 g Nasal BID Hitesh Teague MD   1 g at 11/22/20 0829    ondansetron injection 4 mg  4 mg Intravenous Q12H PRN Hitesh Teague MD        oxyCODONE immediate release tablet 10 mg   10 mg Oral Q3H PRN Hitesh Teague MD        oxyCODONE immediate release tablet 5 mg  5 mg Oral Q3H PRN Hitesh Teague MD        polyethylene glycol packet 17 g  17 g Oral Daily Hitesh Teague MD   17 g at 11/22/20 0830    pregabalin capsule 75 mg  75 mg Oral QHS Hitesh Teague MD        ropivacaine (PF) 2 mg/ml (0.2%) solution  10 mL/hr Perineural Continuous Hitesh Teague MD 10 mL/hr at 11/22/20 0019 10 mL/hr at 11/22/20 0019    sodium chloride 0.9% flush 10 mL  10 mL Intravenous PRN Hitesh Teague MD        sodium chloride 0.9% flush 10 mL  10 mL Intravenous PRN Ze Robins MD        tuberculin injection 5 Units  5 Units Intradermal Once Hitesh Teague MD        venlafaxine tablet 75 mg  75 mg Oral BID Hitesh Teague MD   75 mg at 11/22/20 0831    vitamin D 1000 units tablet 1,000 Units  1,000 Units Oral Daily Dwayne Jovanny Ding MD   1,000 Units at 11/22/20 0830         Assessment:     Pain control adequate. Patient with significant AMS, unknown baseline. She does not understand why she is here and wants to go home. Notified nurse, bed alert on. Patient denies any pain.     Plan:     Patient doing well, continue present treatment.    1) Continue L SIFI PNC at continuous infusion 2cc/hr with 10cc/hr intermittent bolus   2) Continue scheduled tylenol 1g TID and lyrica 75mg qhs (patient refusing 2/2 AMS)   3) Continue PRN robaxin 500mg q6h, oxy 5mg and 10mg q3h   4) Discontinued IV morphine   5) Safety precautions given patients AMS    Will continue to follow. Please call APS/anesthesia with any questions or concerns.      Evaluator Kalyani Dc

## 2020-11-22 NOTE — ASSESSMENT & PLAN NOTE
Farheen Soares is a 85 y.o. female with a minimally dispaced left femoral neck fracture s/p L hip perc screws on 11/21/20.      Pain control: multimodal  PT/OT: WBAT LLE  DVT PPx: Lovenox 40 daily, SCDs at all times when not ambulating  Abx: postop Ancef  Labs: pending  Raisa: tara    Dispo: f/u PT recs

## 2020-11-22 NOTE — SUBJECTIVE & OBJECTIVE
Interval History: Patient reports 3/10 pain to left hip. Patient is POD #1 from percutaneous screw fixation by Orthopedics for impacted femoral neck fracture. Patient sitting up in bedside chair after working with therapy this am. Patient is eating well and denies any sore throat or SOB or chest pain. Patient not happy about being up in chair but I explained to patient it is important in her recovery to get moving and stay out of the bed as much as possible. Patient stating she wants to go home but I explained to patient have to wait to see wait and see what therapy recommends.     Review of Systems   Constitutional: Negative for fever.   Respiratory: Negative for cough and shortness of breath.    Cardiovascular: Negative for chest pain and leg swelling.   Gastrointestinal: Negative for abdominal pain, constipation, diarrhea, nausea and vomiting.   Musculoskeletal: Positive for arthralgias (Left hip).   Skin: Negative for rash.   Neurological: Negative for dizziness and light-headedness.   Psychiatric/Behavioral: Negative for agitation and confusion.     Objective:     Vital Signs (Most Recent):  Temp: 97.6 °F (36.4 °C) (11/22/20 1110)  Pulse: 80 (11/22/20 1110)  Resp: 17 (11/22/20 1110)  BP: 160/77 (11/22/20 1110)  SpO2: 98 % (11/22/20 1110) on room air Vital Signs (24h Range):  Temp:  [97.6 °F (36.4 °C)-97.8 °F (36.6 °C)] 97.6 °F (36.4 °C)  Pulse:  [72-91] 80  Resp:  [17-19] 17  SpO2:  [92 %-99 %] 98 %  BP: (119-177)/(69-79) 160/77     Weight: 68 kg (150 lb)  Body mass index is 26.57 kg/m².    Intake/Output Summary (Last 24 hours) at 11/22/2020 1336  Last data filed at 11/22/2020 0514  Gross per 24 hour   Intake 780 ml   Output 750 ml   Net 30 ml      Physical Exam  Vitals signs and nursing note reviewed.   Constitutional:       General: She is not in acute distress.     Appearance: She is well-developed.   Eyes:      Conjunctiva/sclera: Conjunctivae normal.   Neck:      Musculoskeletal: Neck supple.       Vascular: No JVD.   Cardiovascular:      Rate and Rhythm: Normal rate and regular rhythm.      Heart sounds: Normal heart sounds. No murmur. No friction rub. No gallop.    Pulmonary:      Effort: Pulmonary effort is normal. No respiratory distress.      Breath sounds: Normal breath sounds. No wheezing.   Abdominal:      General: Bowel sounds are normal. There is no distension.      Palpations: Abdomen is soft.      Tenderness: There is no abdominal tenderness. There is no guarding.   Skin:     General: Skin is warm.      Capillary Refill: Capillary refill takes less than 2 seconds.      Findings: No erythema.      Comments: Surgical dressing noted on hip. Dressing is clean and dry and intact.   Neurological:      Mental Status: She is alert and oriented to person, place, and time.   Psychiatric:         Behavior: Behavior normal.         Thought Content: Thought content normal.         Significant Labs:   CBC:   Recent Labs   Lab 11/20/20 1820 11/22/20  0457   WBC 13.50* 9.11   HGB 15.6 13.8   HCT 47.2 41.4    181     CMP:   Recent Labs   Lab 11/20/20  1820 11/22/20  0457    140   K 4.1 4.8    107   CO2 20* 21*   * 164*   BUN 15 16   CREATININE 0.9 1.0   CALCIUM 10.3 10.0   PROT 9.1* 8.2   ALBUMIN 4.8 4.0   BILITOT 0.5 0.5   ALKPHOS 69 61   AST 38 22   ALT 16 10   ANIONGAP 14 12   EGFRNONAA 59.3* 52.2*     Magnesium:   Recent Labs   Lab 11/20/20  2233 11/22/20  0457   MG 1.5* 2.2     POCT Glucose:   Recent Labs   Lab 11/21/20  0724 11/21/20  1012 11/22/20  1257   POCTGLUCOSE 168* 205* 196*       Significant Imaging: I have reviewed all pertinent imaging results/findings within the past 24 hours.

## 2020-11-22 NOTE — PT/OT/SLP EVAL
Physical Therapy Evaluation    Patient Name:  Farheen Soares   MRN:  [id number]    Recommendations:     Discharge Recommendations:  nursing facility, skilled   Discharge Equipment Recommendations: bedside commode   Barriers to discharge: Decreased caregiver support  Patient strengths: Good activity tolerance, can tolerate two separate therapy sessions in one day, good motivation and good results from previous therapy    Assessment:       Farheen Soares is a 85 y.o. female admitted with a medical diagnosis of Closed impacted fracture of left hip with routine healing s/p L hip pinning 11/21.  She presents with the following impairments/functional limitations:  weakness, impaired endurance, impaired self care skills, gait instability, impaired functional mobilty, decreased lower extremity function, orthopedic precautions, impaired balance, pain, impaired muscle length, decreased ROM, decreased coordination.      Patient demonstrates decreased activity tolerance secondary to pain and confusion.  Patient with agitation and confusion during session limiting safety and independence.  Patient able to perform bed mobility, transfers and ambulation with between min A and mod A.  Patient ambulated ~ 7 ft with rolling walker and mod A for support and balance.  Patient will benefit from skilled PT for strengthening and mobility training.      Rehab Prognosis: Good; patient would benefit from acute skilled PT services daily to address these deficits and reach maximum level of function.  Patient is most appropriate to go to nursing facility, skilled .  Recent Surgery: Procedure(s) (LRB):  Left- Percutaneous Screws- Large  arm Clock side (Left) 1 Day Post-Op    Plan:     During this hospitalization, patient to be seen daily to address the identified rehab impairments via gait training, therapeutic activities, therapeutic exercises, neuromuscular re-education and progress toward the following goals:    · Plan of Care  "Expires:  12/22/20    Subjective     Subjective:"This isn't right what you do to people!" Patient with increased confusion and unaware she had surgery.  Pain/Comfort:  · Pain Rating 1: (did not rate)  · Location - Side 1: Left  · Location - Orientation 1: generalized  · Location 1: hip  · Pain Addressed 1: Pre-medicate for activity, Reposition, Cessation of Activity  · Pain Rating Post-Intervention 1: (did not rate)    Patients cultural, spiritual, Mandaeism conflicts given the current situation: no    Living Environment:  Prior level of function: Patient ambulates with rolling walker.  Confusion limited further home assessemtn  Residence: lives with their son 2-story house   Support available upon discharge: family  Equipment owned: walker, rolling  Objective:     Communicated with RN prior to session.  Patient found supine with perineural catheter, telemetry, FCD  upon PT entry to room.    General Precautions: Standard, fall   Orthopedic Precautions:LLE weight bearing as tolerated   Braces: N/A     Exams:  · RLE ROM: WFL  · RLE Strength: grossly decreased secondary to pain  · LLE ROM: ~ 75% of functional range secondary to pain  · LLE Strength: grossly decreased secondary to pain  · Cognitive Exam:  Patient is oriented to Person and Place      Functional Mobility:  · Bed Mobility:     · Supine to Sit: minimum assistance for LE management and trunk management  · Transfers:     · Sit to Stand: moderate assistance with rolling walker for weight shifting with cues for hand placement  · Gait: Patient ambulated 7 ft with rolling walker and moderate assistance for balance.  · Balance:   · Sitting: GOOD-: Incosistently Maintains balance through MODERATE excursions of active trunk movement,     · Standing: POOR: Needs MOD (moderate) assist during gait      Therapeutic Activities and Exercises:   Gait training:  Patient required cues for position in walker, sequencing and upright posture to increase independence and " safety.  Patient's confusion limited reception of cues.      Patient Education:    Patient educated on assistive device use, bed mobility training, Fall risk, gait training, home safety, Home exercise program, plan of care and transfer training by explanation.  Patient was receptive to education and needs reinforcement.     AM-PAC 6 CLICK MOBILITY  Total Score:12     Patient left up in chair with all lines intact and call button in reach.    GOALS:   Multidisciplinary Problems     Physical Therapy Goals        Problem: Physical Therapy Goal    Goal Priority Disciplines Outcome Goal Variances Interventions   Physical Therapy Goal     PT, PT/OT Ongoing, Progressing     Description: Goals to be met by: 20    Patient will increase functional independence with mobility by performin. Supine to sit with Stand-by Assistance  2. Sit to supine with Stand-by Assistance  3. Sit to stand transfer with Stand-by Assistance  4. Gait  x 100 feet with Stand-by Assistance using Rolling Walker.   5.  Patient will demonstrate independence with a home exercise program  6. Patient's balance will be GOOD: Needs SUPERVISION only during gait and able to self right with moderate LOB.  7. Ascend/Descend 6 inch curb step with Contact Guard Assistance using Rolling Walker.                   History:     Past Medical History:   Diagnosis Date    Allergic rhinitis     Carotid atherosclerosis, bilateral     Chronic low back pain with bilateral sciatica     Chronic pain 2020    DDD (degenerative disc disease), lumbar 2020    Depression     Diabetes mellitus, type 2     Diverticulosis     Facet arthropathy     GERD (gastroesophageal reflux disease) 2019    Gout     Hearing loss     History of TIA (transient ischemic attack)     Hyperlipidemia     Hypertension     IBS (irritable bowel syndrome)     Insomnia     Leg cramps     Lumbar radiculopathy 2020    Lumbar spondylosis 2020    Lumbar  stenosis     Meningioma     Osteoporosis     Paroxysmal SVT (supraventricular tachycardia)     Primary open angle glaucoma (POAG) of both eyes     PVD (peripheral vascular disease)     30% prox stenosis of celiac trunk and 20% stnosis Prox SMA - per Abd/SMA Arteriogram 4/3/08       Past Surgical History:   Procedure Laterality Date    CATARACT EXTRACTION      CAUDAL EPIDURAL STEROID INJECTION N/A 8/27/2020    Procedure: Injection-steroid-epidural-caudal with catheter;  Surgeon: Johnathan Gonzalez Jr., MD;  [facility];  Service: Pain Management;  Laterality: N/A;    EPIDURAL STEROID INJECTION INTO LUMBAR SPINE      LUMBAR LAMINECTOMY  2009    TOTAL ABDOMINAL HYSTERECTOMY W/ BILATERAL SALPINGOOPHORECTOMY         Time Tracking:     PT Received On: 11/22/20  PT Start Time: 0951     PT Stop Time: 1020  PT Total Time (min): 29 min     Billable Minutes: Evaluation 10 min Gait Training 19 min      Sukhdeep West, PT  11/22/2020    Co-treatment performed for this visit due to patient need for two skilled therapists to ensure patient and staff safety and to accommodate for patient activity tolerance/pain management

## 2020-11-22 NOTE — PLAN OF CARE
Problem: Occupational Therapy Goal  Goal: Occupational Therapy Goal  Description: Goals to be met by: 12/22/20    Patient will increase functional independence with ADLs by performing:    UE Dressing with Supervision.  LE Dressing with Supervision.  Grooming while standing at sink with Supervision.  Toileting from toilet with Supervision for hygiene and clothing management.   Toilet transfer to toilet with Supervision.    Outcome: Ongoing, Progressing    OT evaluation with goal established. Patient very confused during session today with bouts of agitation noted.     Suellen Dennis, OT    11/22/2020

## 2020-11-22 NOTE — ASSESSMENT & PLAN NOTE
· Patient's blood pressure is controlled here in the hospital over past 24 hours.   · Goal for blood pressure is SBP < 140 and DBP < 90 as patient > or = 60 years of age and patient is diabetic based on JNC 8 guidelines.   · Continue current treatment regimen of Norvasc 10 mg po daily + Lisinopril 40 mg po daily + Toprol  mg po daily to treat.   · Plan is to monitor patient's blood pressure routinely while patient is hospitalized.

## 2020-11-22 NOTE — PROGRESS NOTES
[facility]  Neurosurgery  Progress Note    Subjective:     History of Present Illness: 86 yo female with pmh of TIA  And DM2 on clopidogrel presenting to [facility] s/p mechanical fall found to have left femoral neck fracture with plan for operative intervention in morning per orthopedic team. Pt struck head during fall.      On exam pt has pain limited weakness of LLE, hip and knee flexion/extension not tested on left. Pt otherwise full strength in all groups with exception of right hand intrinsics (4/5) which she says is chronic in nature. Pt endorses long standing difficulty with fine motor movements in right hand and gait instability. No recent bowel or bladder symptoms (gonzalez in pace at time of exam). No sensory loss. Patrick and clonus negative bilaterally.     GCS 15.     No midline tenderness to palpation of cervical spine. No pain on active movement of neck in all directions.     Pt struck head and underwent imaging of head and cervical spine as well. 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 Pt says this is known disease for nearly a decade. Cervical spine with kyphotic deformity suspected to be degenerative.      Post-Op Info:  Procedure(s) (LRB):  Left- Percutaneous Screws- Large  arm Clock side (Left)   1 Day Post-Op     Interval History: OR yesterday for left femoral neck ORIF     Medications:  Continuous Infusions:   ropivacaine (PF) 2 mg/ml (0.2%) 10 mL/hr (11/22/20 0019)     Scheduled Meds:   allopurinoL  300 mg Oral Daily    amLODIPine  10 mg Oral Daily    atorvastatin  10 mg Oral Daily    calcium carbonate  500 mg Oral BID    clopidogreL  75 mg Oral Daily    enoxaparin  40 mg Subcutaneous Q24H    lisinopriL  40 mg Oral Daily    metoprolol succinate  100 mg Oral Daily    mupirocin  1 g Nasal BID    polyethylene glycol  17 g Oral Daily    pregabalin  75 mg Oral QHS    tuberculin  5 Units Intradermal Once    venlafaxine   75 mg Oral BID    vitamin D  1,000 Units Oral Daily     PRN Meds:bisacodyL, hydrALAZINE, melatonin, methocarbamoL, morphine, ondansetron, oxyCODONE, oxyCODONE, sodium chloride 0.9%, sodium chloride 0.9%     Review of Systems  Objective:     Weight: 68 kg (150 lb)  Body mass index is 26.57 kg/m².  Vital Signs (Most Recent):  Temp: 97.8 °F (36.6 °C) (11/22/20 0824)  Pulse: 76 (11/22/20 0824)  Resp: 17 (11/22/20 0824)  BP: (!) 161/71 (11/22/20 0824)  SpO2: 99 % (11/22/20 0824) Vital Signs (24h Range):  Temp:  [97.5 °F (36.4 °C)-97.9 °F (36.6 °C)] 97.8 °F (36.6 °C)  Pulse:  [72-91] 76  Resp:  [16-20] 17  SpO2:  [92 %-100 %] 99 %  BP: (110-177)/(59-87) 161/71                          Neurosurgery Physical Exam     left knee flexex and hip ex not tested due to fx.   full strength in all tested groups in LEs.   no clonus, mistry.   rue hand intrinsics 4/5.   otherwise no motor deficit.   no snesory deficit.   no cspine tenderness.    Significant Labs:  Recent Labs   Lab 11/20/20 1820 11/20/20 2233 11/22/20 0457   *  --  164*     --  140   K 4.1  --  4.8     --  107   CO2 20*  --  21*   BUN 15  --  16   CREATININE 0.9  --  1.0   CALCIUM 10.3  --  10.0   MG  --  1.5* 2.2     Recent Labs   Lab 11/20/20 1820 11/22/20 0457   WBC 13.50* 9.11   HGB 15.6 13.8   HCT 47.2 41.4    181     Recent Labs   Lab 11/20/20 1820   INR 1.0     Microbiology Results (last 7 days)     ** No results found for the last 168 hours. **        All pertinent labs from the last 24 hours have been reviewed.    Significant Diagnostics:  I have reviewed and interpreted all pertinent imaging results/findings within the past 24 hours.    Assessment/Plan:     Meningioma  85 F with known tentorial meningioma, cervical kyphosis, femur fx, consulted for operative clearance for ortho, now s/p left femur repair with ortho (11/22)    -- NSGY will sign off at this time  -- Pt may follow up outpatient for cervical myelopathy and  NPH    Please call with any questions         Natalia Vang MD  Neurosurgery  [facility]

## 2020-11-22 NOTE — PROGRESS NOTES
[facility]  Orthopedics  Progress Note    Patient Name: Farheen Soares  MRN: [id number]  Admission Date: 11/20/2020  Hospital Length of Stay: 2 days  Attending Provider: Jenny Olsen MD  Primary Care Provider: Vonda Chung MD  Follow-up For: Procedure(s) (LRB):  Left- Percutaneous Screws- Large  arm Clock side (Left)    Post-Operative Day: 1 Day Post-Op  Subjective:     Principal Problem:Closed impacted fracture of left hip with routine healing    Principal Orthopedic Problem: s/p L hip perc screws on 11/21/20    Interval History: NAEON. Hypertensive overnight 177/79; HR stable on all confirmed reads. Pain controlled. Mobilize with PT today. Raisa bourne.    Review of patient's allergies indicates:   Allergen Reactions    Baclofen      AMS and distorted dremas       Current Facility-Administered Medications   Medication    allopurinoL tablet 300 mg    amLODIPine tablet 10 mg    atorvastatin tablet 10 mg    bisacodyL suppository 10 mg    calcium carbonate (OS-ELODIA) tablet 500 mg    ceFAZolin injection 2 g    clopidogreL tablet 75 mg    enoxaparin injection 40 mg    hydrALAZINE tablet 25 mg    lisinopriL tablet 40 mg    melatonin tablet 6 mg    methocarbamoL tablet 500 mg    metoprolol succinate (TOPROL-XL) 24 hr tablet 100 mg    morphine injection 2 mg    mupirocin 2 % ointment 1 g    ondansetron injection 4 mg    oxyCODONE immediate release tablet 10 mg    oxyCODONE immediate release tablet 5 mg    polyethylene glycol packet 17 g    pregabalin capsule 75 mg    ropivacaine (PF) 2 mg/ml (0.2%) solution    sodium chloride 0.9% flush 10 mL    sodium chloride 0.9% flush 10 mL    tuberculin injection 5 Units    venlafaxine tablet 75 mg    vitamin D 1000 units tablet 1,000 Units     Objective:     Vital Signs (Most Recent):  Temp: 97.7 °F (36.5 °C) (11/22/20 0511)  Pulse: 78 (11/22/20 0511)  Resp: 18 (11/22/20 0511)  BP: (!) 177/79 (11/22/20 0511)  SpO2: (!) 94 %  "(11/22/20 0511) Vital Signs (24h Range):  Temp:  [97.5 °F (36.4 °C)-100.1 °F (37.8 °C)] 97.7 °F (36.5 °C)  Pulse:  [75-91] 78  Resp:  [14-24] 18  SpO2:  [92 %-100 %] 94 %  BP: (110-177)/(59-87) 177/79     Weight: 68 kg (150 lb)  Height: 5' 3" (160 cm)  Body mass index is 26.57 kg/m².      Intake/Output Summary (Last 24 hours) at 11/22/2020 0632  Last data filed at 11/22/2020 0514  Gross per 24 hour   Intake 1580 ml   Output 830 ml   Net 750 ml       Ortho/SPM Exam   PE:  Awake/alert/oriented x3, No acute distress, Afebrile, Vital signs stable  Good inspiratory effort with unlaboured breathing  Dressings c/d/i  NVI in operative limb       Significant Labs: All pertinent labs within the past 24 hours have been reviewed.    Significant Imaging: I have reviewed all pertinent imaging results/findings.    Assessment/Plan:     * Closed impacted fracture of left femoral neck with routine healing s/p percutaneous screw fixation on 11/21/2020  Farheen Soares is a 85 y.o. female with a minimally dispaced left femoral neck fracture s/p L hip perc screws on 11/21/20.      Pain control: multimodal  PT/OT: WBAT LLE  DVT PPx: Lovenox 40 daily, SCDs at all times when not ambulating  Abx: postop Ancef  Labs: Hgb 13.8 today  Raisa: tara    Dispo: f/u PT recs              Dwayne Ding MD  Orthopedics  [facility]  "

## 2020-11-22 NOTE — ASSESSMENT & PLAN NOTE
Noted on CT imaging on admit. 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

## 2020-11-22 NOTE — ASSESSMENT & PLAN NOTE
Good control in the hospital in past 24 hours. Patient on Metformin at home to treat as outpatient. Holding metformin here in hospital.     Blood Sugars (AccuCheck):  Recent Labs     11/21/20  0724 11/21/20  1012 11/22/20  1257   POCTGLUCOSE 168* 205* 196*        · HgA1C 6.5% and at goal on this admit.  · Plan is to monitor POCT glucose 4 times a day with each meal and at bedtime and cover with Novolog low dose sliding scale insulin.   · Target pre-meal glucose goal is <140 with all random glucoses <180 in non-critically ill patient.  · [age] yue diabetic diet in hospital.

## 2020-11-22 NOTE — SUBJECTIVE & OBJECTIVE
"Principal Problem:Closed impacted fracture of left hip with routine healing    Principal Orthopedic Problem: s/p L hip perc screws on 11/21/20    Interval History: NAEON. Hypertensive overnight 177/79; HR stable on all confirmed reads. Pain controlled. Mobilize with PT today. Raisa bourne.    Review of patient's allergies indicates:   Allergen Reactions    Baclofen      AMS and distorted dremas       Current Facility-Administered Medications   Medication    allopurinoL tablet 300 mg    amLODIPine tablet 10 mg    atorvastatin tablet 10 mg    bisacodyL suppository 10 mg    calcium carbonate (OS-ELODIA) tablet 500 mg    ceFAZolin injection 2 g    clopidogreL tablet 75 mg    enoxaparin injection 40 mg    hydrALAZINE tablet 25 mg    lisinopriL tablet 40 mg    melatonin tablet 6 mg    methocarbamoL tablet 500 mg    metoprolol succinate (TOPROL-XL) 24 hr tablet 100 mg    morphine injection 2 mg    mupirocin 2 % ointment 1 g    ondansetron injection 4 mg    oxyCODONE immediate release tablet 10 mg    oxyCODONE immediate release tablet 5 mg    polyethylene glycol packet 17 g    pregabalin capsule 75 mg    ropivacaine (PF) 2 mg/ml (0.2%) solution    sodium chloride 0.9% flush 10 mL    sodium chloride 0.9% flush 10 mL    tuberculin injection 5 Units    venlafaxine tablet 75 mg    vitamin D 1000 units tablet 1,000 Units     Objective:     Vital Signs (Most Recent):  Temp: 97.7 °F (36.5 °C) (11/22/20 0511)  Pulse: 78 (11/22/20 0511)  Resp: 18 (11/22/20 0511)  BP: (!) 177/79 (11/22/20 0511)  SpO2: (!) 94 % (11/22/20 0511) Vital Signs (24h Range):  Temp:  [97.5 °F (36.4 °C)-100.1 °F (37.8 °C)] 97.7 °F (36.5 °C)  Pulse:  [75-91] 78  Resp:  [14-24] 18  SpO2:  [92 %-100 %] 94 %  BP: (110-177)/(59-87) 177/79     Weight: 68 kg (150 lb)  Height: 5' 3" (160 cm)  Body mass index is 26.57 kg/m².      Intake/Output Summary (Last 24 hours) at 11/22/2020 0632  Last data filed at 11/22/2020 0514  Gross per 24 hour "   Intake 1580 ml   Output 830 ml   Net 750 ml       Ortho/SPM Exam   PE:  Awake/alert/oriented x3, No acute distress, Afebrile, Vital signs stable  Good inspiratory effort with unlaboured breathing  Dressings c/d/i  NVI in operative limb       Significant Labs: All pertinent labs within the past 24 hours have been reviewed.    Significant Imaging: I have reviewed all pertinent imaging results/findings.

## 2020-11-23 PROBLEM — E55.9 VITAMIN D DEFICIENCY: Status: ACTIVE | Noted: 2020-11-23

## 2020-11-23 LAB
25(OH)D3+25(OH)D2 SERPL-MCNC: 14 NG/ML (ref 30–96)
ALBUMIN SERPL BCP-MCNC: 3.5 G/DL (ref 3.5–5.2)
ALP SERPL-CCNC: 62 U/L (ref 55–135)
ALT SERPL W/O P-5'-P-CCNC: 10 U/L (ref 10–44)
ANION GAP SERPL CALC-SCNC: 11 MMOL/L (ref 8–16)
AST SERPL-CCNC: 23 U/L (ref 10–40)
BASOPHILS # BLD AUTO: 0.04 K/UL (ref 0–0.2)
BASOPHILS NFR BLD: 0.4 % (ref 0–1.9)
BILIRUB SERPL-MCNC: 0.4 MG/DL (ref 0.1–1)
BUN SERPL-MCNC: 13 MG/DL (ref 8–23)
CALCIUM SERPL-MCNC: 9.8 MG/DL (ref 8.7–10.5)
CHLORIDE SERPL-SCNC: 104 MMOL/L (ref 95–110)
CO2 SERPL-SCNC: 22 MMOL/L (ref 23–29)
CREAT SERPL-MCNC: 0.7 MG/DL (ref 0.5–1.4)
DIFFERENTIAL METHOD: ABNORMAL
EOSINOPHIL # BLD AUTO: 0.3 K/UL (ref 0–0.5)
EOSINOPHIL NFR BLD: 2.9 % (ref 0–8)
ERYTHROCYTE [DISTWIDTH] IN BLOOD BY AUTOMATED COUNT: 14.1 % (ref 11.5–14.5)
EST. GFR  (AFRICAN AMERICAN): >60 ML/MIN/1.73 M^2
EST. GFR  (NON AFRICAN AMERICAN): >60 ML/MIN/1.73 M^2
GLUCOSE SERPL-MCNC: 165 MG/DL (ref 70–110)
HCT VFR BLD AUTO: 38.9 % (ref 37–48.5)
HGB BLD-MCNC: 13 G/DL (ref 12–16)
IMM GRANULOCYTES # BLD AUTO: 0.04 K/UL (ref 0–0.04)
IMM GRANULOCYTES NFR BLD AUTO: 0.4 % (ref 0–0.5)
LYMPHOCYTES # BLD AUTO: 2.1 K/UL (ref 1–4.8)
LYMPHOCYTES NFR BLD: 22.9 % (ref 18–48)
MAGNESIUM SERPL-MCNC: 1.7 MG/DL (ref 1.6–2.6)
MCH RBC QN AUTO: 33.6 PG (ref 27–31)
MCHC RBC AUTO-ENTMCNC: 33.4 G/DL (ref 32–36)
MCV RBC AUTO: 101 FL (ref 82–98)
MONOCYTES # BLD AUTO: 1.1 K/UL (ref 0.3–1)
MONOCYTES NFR BLD: 11.6 % (ref 4–15)
NEUTROPHILS # BLD AUTO: 5.7 K/UL (ref 1.8–7.7)
NEUTROPHILS NFR BLD: 61.8 % (ref 38–73)
NRBC BLD-RTO: 0 /100 WBC
PHOSPHATE SERPL-MCNC: 2.5 MG/DL (ref 2.7–4.5)
PLATELET # BLD AUTO: 187 K/UL (ref 150–350)
PMV BLD AUTO: 11 FL (ref 9.2–12.9)
POCT GLUCOSE: 193 MG/DL (ref 70–110)
POTASSIUM SERPL-SCNC: 4.5 MMOL/L (ref 3.5–5.1)
PROT SERPL-MCNC: 7.5 G/DL (ref 6–8.4)
RBC # BLD AUTO: 3.87 M/UL (ref 4–5.4)
SODIUM SERPL-SCNC: 137 MMOL/L (ref 136–145)
WBC # BLD AUTO: 9.2 K/UL (ref 3.9–12.7)

## 2020-11-23 PROCEDURE — C9399 UNCLASSIFIED DRUGS OR BIOLOG: HCPCS | Performed by: INTERNAL MEDICINE

## 2020-11-23 PROCEDURE — 80053 COMPREHEN METABOLIC PANEL: CPT

## 2020-11-23 PROCEDURE — 25000003 PHARM REV CODE 250: Performed by: HOSPITALIST

## 2020-11-23 PROCEDURE — 82306 VITAMIN D 25 HYDROXY: CPT

## 2020-11-23 PROCEDURE — 25000003 PHARM REV CODE 250: Performed by: INTERNAL MEDICINE

## 2020-11-23 PROCEDURE — 11000001 HC ACUTE MED/SURG PRIVATE ROOM

## 2020-11-23 PROCEDURE — 85025 COMPLETE CBC W/AUTO DIFF WBC: CPT

## 2020-11-23 PROCEDURE — 99232 PR SUBSEQUENT HOSPITAL CARE,LEVL II: ICD-10-PCS | Mod: ,,, | Performed by: INTERNAL MEDICINE

## 2020-11-23 PROCEDURE — 25000003 PHARM REV CODE 250: Performed by: STUDENT IN AN ORGANIZED HEALTH CARE EDUCATION/TRAINING PROGRAM

## 2020-11-23 PROCEDURE — 97530 THERAPEUTIC ACTIVITIES: CPT

## 2020-11-23 PROCEDURE — 63600175 PHARM REV CODE 636 W HCPCS: Performed by: HOSPITALIST

## 2020-11-23 PROCEDURE — 84100 ASSAY OF PHOSPHORUS: CPT

## 2020-11-23 PROCEDURE — 36415 COLL VENOUS BLD VENIPUNCTURE: CPT

## 2020-11-23 PROCEDURE — 99232 SBSQ HOSP IP/OBS MODERATE 35: CPT | Mod: ,,, | Performed by: INTERNAL MEDICINE

## 2020-11-23 PROCEDURE — 63600175 PHARM REV CODE 636 W HCPCS: Performed by: STUDENT IN AN ORGANIZED HEALTH CARE EDUCATION/TRAINING PROGRAM

## 2020-11-23 PROCEDURE — 97116 GAIT TRAINING THERAPY: CPT

## 2020-11-23 PROCEDURE — 83735 ASSAY OF MAGNESIUM: CPT

## 2020-11-23 RX ORDER — ACETAMINOPHEN 500 MG
1000 TABLET ORAL EVERY 6 HOURS
Status: DISCONTINUED | OUTPATIENT
Start: 2020-11-23 | End: 2020-11-24 | Stop reason: HOSPADM

## 2020-11-23 RX ORDER — ERGOCALCIFEROL 1.25 MG/1
50000 CAPSULE ORAL
Status: DISCONTINUED | OUTPATIENT
Start: 2020-11-23 | End: 2020-11-24 | Stop reason: HOSPADM

## 2020-11-23 RX ADMIN — MUPIROCIN 1 G: 20 OINTMENT TOPICAL at 08:11

## 2020-11-23 RX ADMIN — VENLAFAXINE 75 MG: 75 TABLET ORAL at 08:11

## 2020-11-23 RX ADMIN — ALLOPURINOL 300 MG: 300 TABLET ORAL at 08:11

## 2020-11-23 RX ADMIN — VENLAFAXINE 75 MG: 75 TABLET ORAL at 09:11

## 2020-11-23 RX ADMIN — ATORVASTATIN CALCIUM 10 MG: 10 TABLET, FILM COATED ORAL at 08:11

## 2020-11-23 RX ADMIN — ENOXAPARIN SODIUM 40 MG: 40 INJECTION SUBCUTANEOUS at 04:11

## 2020-11-23 RX ADMIN — CLOPIDOGREL 75 MG: 75 TABLET, FILM COATED ORAL at 08:11

## 2020-11-23 RX ADMIN — HYDRALAZINE HYDROCHLORIDE 25 MG: 25 TABLET, FILM COATED ORAL at 07:11

## 2020-11-23 RX ADMIN — ACETAMINOPHEN 1000 MG: 500 TABLET ORAL at 01:11

## 2020-11-23 RX ADMIN — ACETAMINOPHEN 1000 MG: 500 TABLET ORAL at 05:11

## 2020-11-23 RX ADMIN — POLYETHYLENE GLYCOL 3350 17 G: 17 POWDER, FOR SOLUTION ORAL at 08:11

## 2020-11-23 RX ADMIN — LISINOPRIL 40 MG: 10 TABLET ORAL at 08:11

## 2020-11-23 RX ADMIN — ROPIVACAINE HYDROCHLORIDE 10 ML/HR: 2 INJECTION, SOLUTION EPIDURAL; INFILTRATION at 05:11

## 2020-11-23 RX ADMIN — HYDRALAZINE HYDROCHLORIDE 25 MG: 25 TABLET, FILM COATED ORAL at 05:11

## 2020-11-23 RX ADMIN — METOPROLOL SUCCINATE 100 MG: 100 TABLET, EXTENDED RELEASE ORAL at 08:11

## 2020-11-23 RX ADMIN — CALCIUM 500 MG: 500 TABLET ORAL at 08:11

## 2020-11-23 RX ADMIN — INSULIN DETEMIR 7 UNITS: 100 INJECTION, SOLUTION SUBCUTANEOUS at 12:11

## 2020-11-23 RX ADMIN — AMLODIPINE BESYLATE 10 MG: 10 TABLET ORAL at 08:11

## 2020-11-23 RX ADMIN — Medication 1000 UNITS: at 08:11

## 2020-11-23 RX ADMIN — PREGABALIN 75 MG: 75 CAPSULE ORAL at 09:11

## 2020-11-23 RX ADMIN — MUPIROCIN 1 G: 20 OINTMENT TOPICAL at 09:11

## 2020-11-23 RX ADMIN — ROPIVACAINE HYDROCHLORIDE 10 ML/HR: 2 INJECTION, SOLUTION EPIDURAL; INFILTRATION at 11:11

## 2020-11-23 RX ADMIN — ACETAMINOPHEN 1000 MG: 500 TABLET ORAL at 11:11

## 2020-11-23 RX ADMIN — ERGOCALCIFEROL 50000 UNITS: 1.25 CAPSULE ORAL at 12:11

## 2020-11-23 RX ADMIN — CALCIUM 500 MG: 500 TABLET ORAL at 09:11

## 2020-11-23 NOTE — PLAN OF CARE
Problem: Physical Therapy Goal  Goal: Physical Therapy Goal  Description: Goals to be met by: 20    Patient will increase functional independence with mobility by performin. Supine to sit with Stand-by Assistance  2. Sit to supine with Stand-by Assistance  3. Sit to stand transfer with Stand-by Assistance  4. Gait  x 100 feet with Stand-by Assistance using Rolling Walker.   5.  Patient will demonstrate independence with a home exercise program  6. Patient's balance will be GOOD: Needs SUPERVISION only during gait and able to self right with moderate LOB.  7. Ascend/Descend 6 inch curb step with Contact Guard Assistance using Rolling Walker.  Outcome: Ongoing, Progressing

## 2020-11-23 NOTE — PT/OT/SLP PROGRESS
Physical Therapy Treatment    Patient Name:  Farheen Soares   MRN:  [id number]    Recommendations:     Discharge Recommendations:  nursing facility, skilled   Discharge Equipment Recommendations: none   Barriers to discharge: Decreased caregiver support    Assessment:     Farheen Soares is a 85 y.o. female admitted with a medical diagnosis of Closed impacted fracture of left hip with routine healing.  She presents with the following impairments/functional limitations:  weakness, impaired balance, impaired functional mobilty, impaired endurance, impaired self care skills, gait instability, decreased lower extremity function, decreased coordination, pain, orthopedic precautions, impaired cognition, decreased safety awareness. Pt was agreeable for participation in therapy session but tolerated fair secondary weakness. She required Max A for transfers, bed mobility and was unable to amb today secondary to not following commands and unable to initiate steps with RW and had posterior left lateral lean greater in standing than sitting.    Rehab Prognosis: Good; patient would benefit from acute skilled PT services to address these deficits and reach maximum level of function.    Recent Surgery: Procedure(s) (LRB):  Left- Percutaneous Screws- Large  arm Clock side (Left) 2 Days Post-Op    Plan:     During this hospitalization, patient to be seen daily to address the identified rehab impairments via gait training, therapeutic activities, therapeutic exercises, neuromuscular re-education and progress toward the following goals:    · Plan of Care Expires:  12/22/20    Subjective     Chief Complaint: I feel sleepy  Patient/Family Comments/goals: I don't feel like eating  Pain/Comfort:  Pain Rating 1: 0/10  Location - Side 1: Left  Location - Orientation 1: distal  Pain Addressed 1: Reposition, Distraction, Pre-medicate for activity  Pain Rating Post-Intervention 1: (reported pain in knee after standing but couldn't  rate)      Objective:     Communicated with RN prior to session.  Patient found supine with perineural catheter, telemetry, PureWick, FCD, bed alarm, oxygen(Avasys) upon PT entry to room.     General Precautions: Standard, fall   Orthopedic Precautions:LLE weight bearing as tolerated   Braces: N/A     Functional Mobility:  · Bed Mobility:     · Scooting: maximal assistance  · Bridging: maximal assistance  · Supine to Sit: maximal assistance  · Sit to Supine: maximal assistance  · Transfers:     · Sit to Stand:  maximal assistance and of 2 persons with rolling walker; verbal and tactile cues for wt shift to midline to decrease posterior and left lateral lean and for knee, hip and trunk ext  · Gait: Pt was unable to initiate steps with RW even with verbal and tactile cues for with shift; she had difficulty staying on task and following commands; multiple trials attempting stepping forward and laterally at EOB  · Balance: Mod-max A of 2 persons for static standing with RW for  , 1 min intervals       AM-PAC 6 CLICK MOBILITY  Turning over in bed (including adjusting bedclothes, sheets and blankets)?: 3  Sitting down on and standing up from a chair with arms (e.g., wheelchair, bedside commode, etc.): 2  Moving from lying on back to sitting on the side of the bed?: 2  Moving to and from a bed to a chair (including a wheelchair)?: 2  Need to walk in hospital room?: 1  Climbing 3-5 steps with a railing?: 1  Basic Mobility Total Score: 11       Therapeutic Activities and Exercises:   Reviewed sequencing for supine<>sit transfers and sit<> stand transfer with RW and RW management for postioning and initiating steps    Patient left supine with all lines intact, call button in reach and RN notified..    GOALS:   Multidisciplinary Problems     Physical Therapy Goals        Problem: Physical Therapy Goal    Goal Priority Disciplines Outcome Goal Variances Interventions   Physical Therapy Goal     PT, PT/OT Ongoing, Progressing      Description: Goals to be met by: 20    Patient will increase functional independence with mobility by performin. Supine to sit with Stand-by Assistance  2. Sit to supine with Stand-by Assistance  3. Sit to stand transfer with Stand-by Assistance  4. Gait  x 100 feet with Stand-by Assistance using Rolling Walker.   5.  Patient will demonstrate independence with a home exercise program  6. Patient's balance will be GOOD: Needs SUPERVISION only during gait and able to self right with moderate LOB.  7. Ascend/Descend 6 inch curb step with Contact Guard Assistance using Rolling Walker.                   Time Tracking:     PT Received On: 20  PT Start Time: 1022     PT Stop Time: 1052  PT Total Time (min): 30 min     Billable Minutes: Gait Training 15 and Therapeutic Activity 15    Treatment Type: Treatment  PT/PTA: PT     PTA Visit Number: 0     Co-tx performed for this visit due to patient need for two skilled therapists to ensure patient and staff safety and to accommodate for patient activity tolerance.    Amelie Kolb, PT  2020

## 2020-11-23 NOTE — PROGRESS NOTES
"[facility] Hospital Medicine  Progress Note    Patient Name: Farheen Soares  MRN: [id number]  Patient Class: IP- Inpatient   Admission Date: 11/20/2020  Length of Stay: 3 days  Attending Physician: Jenny Olsen MD  Primary Care Provider: Vonda Chung MD    [facility] Medicine Team: [facility] MED H Jenny Olsen MD    Subjective:     Principal Problem:Closed impacted fracture of left hip with routine healing        HPI:  84 yo F with Type 2 diabetes not on long term insulin therapy, essential HTN, osteoposis, chronic gout, HLD, Depression, 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 Pt. Reports that she was walking to the bathroom when her leg "gave out" and she fell forward striking her head and then her left hip. She denies any preceding lightheadedness, SOB, palpitations, or chest pain and states that she was in her USOH until the fall occurred. At baseline, the patient reports that she uses a walker at baseline. She lives in a two story house and climb stairs with assistance. She reports that is limited by arthritis pains and unsteadiness, but she denies any chest pains or significant SOB with exertion    Overview/Hospital Course:  Patient admitted to [facility] Hospital Medicine Team H: Hip Fracture team and started on Hip Fracture Pathway with Orthopedic surgery consult for hip fracture. Patient was seen and evaluated by Orthopedic surgery who recommended operative repair of hip fracture. Patient was medically optimized prior to surgery and was taken to OR after optimization on 11/21/2020. Patient underwent left hip percutaneous screw fixation by Dr. Leandro White. Post-op patient WBAT to the left lower extremity as per Orthopedics recommendation. Patient placed on Lovenox 40 mg subcutaneous daily and CHRISTOPHE/SCD's for DVT prophylaxis post-op and will need for total of 28 days. Perineural pain catheter placed " by Anesthesia Pain Service with continuous infusion of Ropivacaine to help with pain control post-op and Anesthesia Pain Service managing while patient in the hospital. Patient placed on multimodal pain management with scheduled Tylenol 1000 mg po every 8 hours and Lyrica 75 mg po nightly post-op for pain control. PT/OT consulted post-op and recommending SNF and CM/SW sent multiple referrals for patient. Pain controlled post-op.     Interval History: Patient reports minimal to no pain in left hip this am. Patient seen by therapy who are recommending SNF on hospital discharge and multiple referrals sent and CM/SW working on placement. Patient's blood sugars mildly elevated this am and likely related to stress of surgery and being off her home Metformin to treat her diabetes.      Review of Systems   Constitutional: Negative for fever.   Respiratory: Negative for cough and shortness of breath.    Cardiovascular: Negative for chest pain and leg swelling.   Gastrointestinal: Negative for abdominal pain, constipation, diarrhea, nausea and vomiting.   Musculoskeletal: Positive for arthralgias (Left hip).   Skin: Negative for rash.   Neurological: Negative for dizziness and light-headedness.   Psychiatric/Behavioral: Negative for agitation and confusion.     Objective:     Vital Signs (Most Recent):  Temp: 98.8 °F (37.1 °C) (11/23/20 0514)  Pulse: 74 (11/23/20 1102)  Resp: 17 (11/23/20 0514)  BP:  160/83 (11/23/20 0514)  SpO2: 92 % (11/23/20 0514) on 2 liters Vital Signs (24h Range):  Temp:  [97 °F (36.1 °C)-99.1 °F (37.3 °C)] 98.8 °F (37.1 °C)  Pulse:  [72-88] 74  Resp:  [17-18] 17  SpO2:  [92 %-99 %] 92 %  BP: (145-180)/(68-83) 180/83     Weight: 68 kg (150 lb)  Body mass index is 26.57 kg/m².    Intake/Output Summary (Last 24 hours) at 11/23/2020 1121  Last data filed at 11/23/2020 0514  Gross per 24 hour   Intake 794 ml   Output 1250 ml   Net -456 ml      Physical Exam  Vitals signs and nursing note reviewed.    Constitutional:       General: She is not in acute distress.     Appearance: Normal appearance. She is well-developed and normal weight. She is not ill-appearing.   Eyes:      Conjunctiva/sclera: Conjunctivae normal.   Neck:      Musculoskeletal: Neck supple.      Vascular: No JVD.   Cardiovascular:      Rate and Rhythm: Normal rate and regular rhythm.      Heart sounds: Normal heart sounds. No murmur. No friction rub. No gallop.    Pulmonary:      Effort: Pulmonary effort is normal. No respiratory distress.      Breath sounds: Normal breath sounds. No wheezing.   Abdominal:      General: Abdomen is flat. Bowel sounds are normal. There is no distension.      Palpations: Abdomen is soft.      Tenderness: There is no abdominal tenderness. There is no guarding.   Skin:     General: Skin is warm.      Findings: No erythema.      Comments: Surgical dressing noted on left hip. Dressing is clean and dry and intact.   Neurological:      Mental Status: She is alert and oriented to person, place, and time.   Psychiatric:         Mood and Affect: Mood normal.         Behavior: Behavior normal.         Thought Content: Thought content normal.         Significant Labs:   CBC:   Recent Labs   Lab 11/22/20  0457 11/23/20  0648   WBC 9.11 9.20   HGB 13.8 13.0   HCT 41.4 38.9    187     CMP:   Recent Labs   Lab 11/22/20  0457 11/23/20  0648    137   K 4.8 4.5    104   CO2 21* 22*   * 165*   BUN 16 13   CREATININE 1.0 0.7   CALCIUM 10.0 9.8   PROT 8.2 7.5   ALBUMIN 4.0 3.5   BILITOT 0.5 0.4   ALKPHOS 61 62   AST 22 23   ALT 10 10   ANIONGAP 12 11   EGFRNONAA 52.2* >60.0     Magnesium:   Recent Labs   Lab 11/22/20  0457 11/23/20  0648   MG 2.2 1.7     POCT Glucose:   Recent Labs   Lab 11/22/20  1257 11/22/20  1659 11/22/20  2106   POCTGLUCOSE 196* 256* 277*     Lab Results   Component Value Date    [id number] 14 (L) 11/23/2020       Significant Imaging: I have reviewed all pertinent imaging  results/findings within the past 24 hours.      Assessment/Plan:      * Closed impacted fracture of left femoral neck with routine healing s/p percutaneous screw fixation on 11/21/2020  Patient is POD # 2.    · Patient reports minimal pain in left hip.   · Plan is to continue PT/OT for gait training and strengthening and restoration of ADL's. Patient is FWB/WBAT: left lower extremity as per Orthopedic recommendations.   · Plan to continue Lovenox 40 mg subcutaneous daily for total of 28 days post-op for DVT prophylaxis after hip fracture surgery.   · Orthopedics is following and managing surgical site.   · Perineural pain catheter in place for pain control and being managed by Anesthesia Pain Service. Continue scheduled Tylenol 1000 mg po every 6 hours + Lyrica 75 mg po nightly for pain control.   · PT/OT recommending Skilled nursing facility and  working on discharge planning with patient and family and multiple SNF referrals sent.      Type 2 diabetes mellitus without complication, without long-term current use of insulin  Suboptimal control in the hospital in past 24 hours with multiple blood sugar readings > 200 and requiring sliding scale insulin. Patient on Metformin at home to treat as outpatient. Holding Metformin here in hospital but plan to discharge on Metformin on hospital discharge.     Blood Sugars (AccuCheck):    Recent Labs     11/21/20  0724 11/21/20  1012 11/22/20  1257 11/22/20  1659 11/22/20  2106   POCTGLUCOSE 168* 205* 196* 256* 277*        · HgA1C 6.5% and at goal on this admit.  · Will add Levemir 7 units daily to treat hyperglycemia.   · Plan is to monitor POCT glucose 4 times a day with each meal and at bedtime and cover with Novolog low dose sliding scale insulin.   · Target pre-meal glucose goal is <140 with all random glucoses <180 in non-critically ill patient.  · 2000 yue diabetic diet in hospital.     Essential hypertension  · Patient's blood pressure is controlled here  in the hospital over past 24 hours.   · Goal for blood pressure is SBP < 140 and DBP < 90 as patient > or = 60 years of age and patient is diabetic based on JNC 8 guidelines.   · Continue current treatment regimen of Norvasc 10 mg po daily + Lisinopril 40 mg po daily + Toprol  mg po daily to treat.   · Plan is to monitor patient's blood pressure routinely while patient is hospitalized.     Age-related osteoporosis with current pathological fracture with routine healing  Vitamin D deficiency   Patient admitted with hip fracture related to osteoporosis.   · Patient has been counseled on need to avoid smoking and moderation of alcohol use with regular weightbearing exercise to help reduce risk of future fractures associated with osteoporosis.   · Vitamin D level 14 on this admit consistent with moderate to severe deficiency so will start Vitamin D 50,000 units po weekly to treat and will discharge from hospital on this dose in additional to calcium replacement to treat osteoporosis.   · Patient will need outpatient DEXA scan and further evaluation for additional treatment of osteoporosis.     Chronic gout of multiple sites  Chronic and controlled. Continue Allopurinol 300 mg po daily to treat.       Recurrent major depressive disorder, in full remission  Chronci and controlled. Continue Venlafaxine 75 mg po BID to treat.       Pure hypercholesterolemia  Chronic and controlled. Continue Lipitor 10 mg po daily to treat.       Meningioma  Noted on CT imaging on admit. 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       History of transient ischemic attack (TIA)  Patient with prior history of TIA. Continue home meds of Plavix and Lipitor in hospital for prevention.         VTE Risk Mitigation (From admission, onward)         Ordered     enoxaparin injection 40 mg  Every 24 hours      11/21/20 0932     IP VTE HIGH RISK PATIENT  Once      11/21/20  0759     Place sequential compression device  Until discontinued      11/21/20 0014                Discharge Planning   GONZALEZ: 11/24/2020     Code Status: Full Code   Is the patient medically ready for discharge?: No    Reason for patient still in hospital (select all that apply): Patient trending condition  Discharge Plan A: Skilled Nursing Facility            Jenny Olsen MD  Department of Hospital Medicine   [facility]

## 2020-11-23 NOTE — SUBJECTIVE & OBJECTIVE
"Principal Problem:Closed impacted fracture of left hip with routine healing    Principal Orthopedic Problem: s/p L hip perc screws on 11/21/20    Interval History: NAEON. Hypertensive overnight.  Ambulated 7 ft with PT yesterday.  Patient reports no pain in her left hip today.  Hemoglobin stable.    Review of patient's allergies indicates:   Allergen Reactions    Baclofen      AMS and distorted dremas       Current Facility-Administered Medications   Medication    allopurinoL tablet 300 mg    amLODIPine tablet 10 mg    atorvastatin tablet 10 mg    bisacodyL suppository 10 mg    calcium carbonate (OS-ELODIA) tablet 500 mg    clopidogreL tablet 75 mg    dextrose 50% injection 12.5 g    dextrose 50% injection 25 g    enoxaparin injection 40 mg    glucagon (human recombinant) injection 1 mg    glucose chewable tablet 16 g    glucose chewable tablet 24 g    hydrALAZINE tablet 25 mg    insulin aspart U-100 pen 0-5 Units    lisinopriL tablet 40 mg    melatonin tablet 6 mg    methocarbamoL tablet 500 mg    metoprolol succinate (TOPROL-XL) 24 hr tablet 100 mg    mupirocin 2 % ointment 1 g    ondansetron injection 4 mg    oxyCODONE immediate release tablet 10 mg    oxyCODONE immediate release tablet 5 mg    polyethylene glycol packet 17 g    pregabalin capsule 75 mg    ropivacaine (PF) 2 mg/ml (0.2%) solution    sodium chloride 0.9% flush 10 mL    sodium chloride 0.9% flush 10 mL    venlafaxine tablet 75 mg    vitamin D 1000 units tablet 1,000 Units     Objective:     Vital Signs (Most Recent):  Temp: 98.8 °F (37.1 °C) (11/23/20 0514)  Pulse: 73 (11/23/20 0514)  Resp: 17 (11/23/20 0514)  BP: (!) 180/83 (11/23/20 0514)  SpO2: (!) 92 % (11/23/20 0514) Vital Signs (24h Range):  Temp:  [97 °F (36.1 °C)-99.1 °F (37.3 °C)] 98.8 °F (37.1 °C)  Pulse:  [72-88] 73  Resp:  [17-18] 17  SpO2:  [92 %-99 %] 92 %  BP: (145-180)/(68-83) 180/83     Weight: 68 kg (150 lb)  Height: 5' 3" (160 cm)  Body mass index is " 26.57 kg/m².      Intake/Output Summary (Last 24 hours) at 11/23/2020 0646  Last data filed at 11/23/2020 0514  Gross per 24 hour   Intake 1231 ml   Output 1250 ml   Net -19 ml       Ortho/SPM Exam     PE:  Awake/alert/oriented x3, No acute distress, Afebrile, Vital signs stable  Good inspiratory effort with unlaboured breathing  Dressings c/d/i  NVI in operative limb       Significant Labs: All pertinent labs within the past 24 hours have been reviewed.    Significant Imaging: I have reviewed all pertinent imaging results/findings.

## 2020-11-23 NOTE — PLAN OF CARE
Pt resting in bed. Pt is disoriented to time and place. Pt acknowledges femur fracture and the need to call for assistance with call button. As a precaution telesitter is in pts room. Dressing clean, dry, intact and pt has reported her pain 0/10. PNC infusing ropivacain @ 2mls/hr. IV site clean, dry, intact. No concerns at this time.

## 2020-11-23 NOTE — PLAN OF CARE
CM sent referrals to the following SNF; [facility], [facility], [facility], [facility], [facility], [facility] . Will discuss referrals with family and continue to follow for discharge needs.

## 2020-11-23 NOTE — PT/OT/SLP PROGRESS
Occupational Therapy   Treatment    Name: Farheen Soares  MRN: [id number]  Admitting Diagnosis:  Closed impacted fracture of left hip with routine healing  2 Days Post-Op    Recommendations:     Discharge Recommendations: nursing facility, skilled  Discharge Equipment Recommendations:  none  Barriers to discharge:  None    Assessment:     Farheen Soares is a 85 y.o. female with a medical diagnosis of Closed impacted fracture of left hip with routine healing.  She presents with the following Performance deficits affecting function are weakness, impaired endurance, impaired self care skills, impaired functional mobilty, gait instability, impaired balance, decreased lower extremity function, decreased safety awareness, pain, orthopedic precautions.     Pt agreeable to therapy and tolerated session fairly secondary to weakness. Pt required Max A in bed mobility, t/f's, and ambulation using RW due to weakness and pt unable to follow verbal commands. Pt required Max v/c's for initiation and to stay on task. Pt with posterior lean in standing and unable to ambulate EOB. Continue OT POC    Rehab Prognosis:  Good; patient would benefit from acute skilled OT services to address these deficits and reach maximum level of function.       Plan:     Patient to be seen daily to address the above listed problems via self-care/home management, therapeutic activities, therapeutic exercises  · Plan of Care Expires: 12/22/20  · Plan of Care Reviewed with: patient    Subjective     Pain/Comfort:  · Pain Rating 1: 0/10  · Location - Side 1: Left  · Location 1: hip  · Pain Addressed 1: Distraction, Reposition, Pre-medicate for activity    Objective:     Communicated with: Nursing prior to session.  Patient found supine with perineural catheter, telemetry, FCD upon OT entry to room.    General Precautions: Standard, fall   Orthopedic Precautions:LLE weight bearing as tolerated   Braces: N/A     Occupational Performance:     Bed  Mobility:    · Patient completed Scooting/Bridging with maximal assistance  · Patient completed Supine to Sit with maximal assistance  · Patient completed Sit to Supine with maximal assistance     Functional Mobility/Transfers:  · Patient completed Sit <> Stand Transfer with maximal assistance and of 2 persons  with  rolling walker   · Functional Mobility: Pt agreeable to therapy and tolerated session fairly secondary to weakness. Pt required Max A in bed mobility, t/f's, and ambulation using RW due to weakness and pt unable to follow verbal commands. Pt required Max v/c's for initiation and to stay on task. Pt with posterior lean in standing and unable to ambulate EOB. Continue OT POC    Activities of Daily Living:  · Upper Body Dressing: minimum assistance alex gown  · Lower Body Dressing: total assistance alex socks      [facility] 6 Click ADL: 16    Treatment & Education:  - OT POC   - Importance of mobility to maximize recovery.  - safety w/ functional mobility; hand placement to ensure safe transfers to various surfaces in prep for ADLs  - Reviewed gait sequence and RW management via verbalization and demonstration   - encouraged to ambulate within household environment at least every hour to hour 1/2    Patient left supine with all lines intact, call button in reach and RN notified    GOALS:   Multidisciplinary Problems     Occupational Therapy Goals        Problem: Occupational Therapy Goal    Goal Priority Disciplines Outcome Interventions   Occupational Therapy Goal     OT, PT/OT Ongoing, Progressing    Description: Goals to be met by: 12/22/20    Patient will increase functional independence with ADLs by performing:    UE Dressing with Supervision.  LE Dressing with Supervision.  Grooming while standing at sink with Supervision.  Toileting from toilet with Supervision for hygiene and clothing management.   Toilet transfer to toilet with Supervision.                     Time Tracking:     OT Date of Treatment:  11/23/20  OT Start Time: 1022  OT Stop Time: 1052  OT Total Time (min): 30 min    Billable Minutes:Therapeutic Activity 30    Kimberly Quezada OT  11/23/2020    Co-tx performed for this visit due to patient need for two skilled therapists to ensure patient and staff safety and to accommodate for patient activity tolerance

## 2020-11-23 NOTE — ASSESSMENT & PLAN NOTE
Patient is POD # 2.    · Patient reports minimal pain in left hip.   · Plan is to continue PT/OT for gait training and strengthening and restoration of ADL's. Patient is FWB/WBAT: left lower extremity as per Orthopedic recommendations.   · Plan to continue Lovenox 40 mg subcutaneous daily for total of 28 days post-op for DVT prophylaxis after hip fracture surgery.   · Orthopedics is following and managing surgical site.   · Perineural pain catheter in place for pain control and being managed by Anesthesia Pain Service. Continue scheduled Tylenol 1000 mg po every 6 hours + Lyrica 75 mg po nightly for pain control.   · PT/OT recommending Skilled nursing facility and  working on discharge planning with patient and family and multiple SNF referrals sent.

## 2020-11-23 NOTE — ASSESSMENT & PLAN NOTE
Vitamin D deficiency   Patient admitted with hip fracture related to osteoporosis.   · Patient has been counseled on need to avoid smoking and moderation of alcohol use with regular weightbearing exercise to help reduce risk of future fractures associated with osteoporosis.   · Vitamin D level 14 on this admit consistent with moderate to severe deficiency so will start Vitamin D 50,000 units po weekly to treat and will discharge from hospital on this dose in additional to calcium replacement to treat osteoporosis.   · Patient will need outpatient DEXA scan and further evaluation for additional treatment of osteoporosis.

## 2020-11-23 NOTE — ASSESSMENT & PLAN NOTE
Farheen Soares is a 85 y.o. female with a minimally dispaced left femoral neck fracture s/p L hip perc screws on 11/21/20.      Pain control: multimodal  PT/OT: WBAT LLE  DVT PPx: Lovenox 40 daily, SCDs at all times when not ambulating  Abx: postop Ancef  Labs: stable  Kline: dc    Dispo: continue early mobilization

## 2020-11-23 NOTE — SUBJECTIVE & OBJECTIVE
Interval History: Patient reports minimal to no pain in left hip this am. Patient seen by therapy who are recommending SNF on hospital discharge and multiple referrals sent and CM/SW working on placement. Patient's blood sugars mildly elevated this am and likely related to stress of surgery and being off her home Metformin to treat her diabetes.      Review of Systems   Constitutional: Negative for fever.   Respiratory: Negative for cough and shortness of breath.    Cardiovascular: Negative for chest pain and leg swelling.   Gastrointestinal: Negative for abdominal pain, constipation, diarrhea, nausea and vomiting.   Musculoskeletal: Positive for arthralgias (Left hip).   Skin: Negative for rash.   Neurological: Negative for dizziness and light-headedness.   Psychiatric/Behavioral: Negative for agitation and confusion.     Objective:     Vital Signs (Most Recent):  Temp: 98.8 °F (37.1 °C) (11/23/20 0514)  Pulse: 74 (11/23/20 1102)  Resp: 17 (11/23/20 0514)  BP:  160/83 (11/23/20 0514)  SpO2: 92 % (11/23/20 0514) on 2 liters Vital Signs (24h Range):  Temp:  [97 °F (36.1 °C)-99.1 °F (37.3 °C)] 98.8 °F (37.1 °C)  Pulse:  [72-88] 74  Resp:  [17-18] 17  SpO2:  [92 %-99 %] 92 %  BP: (145-180)/(68-83) 180/83     Weight: 68 kg (150 lb)  Body mass index is 26.57 kg/m².    Intake/Output Summary (Last 24 hours) at 11/23/2020 1121  Last data filed at 11/23/2020 0514  Gross per 24 hour   Intake 794 ml   Output 1250 ml   Net -456 ml      Physical Exam  Vitals signs and nursing note reviewed.   Constitutional:       General: She is not in acute distress.     Appearance: Normal appearance. She is well-developed and normal weight. She is not ill-appearing.   Eyes:      Conjunctiva/sclera: Conjunctivae normal.   Neck:      Musculoskeletal: Neck supple.      Vascular: No JVD.   Cardiovascular:      Rate and Rhythm: Normal rate and regular rhythm.      Heart sounds: Normal heart sounds. No murmur. No friction rub. No gallop.     Pulmonary:      Effort: Pulmonary effort is normal. No respiratory distress.      Breath sounds: Normal breath sounds. No wheezing.   Abdominal:      General: Abdomen is flat. Bowel sounds are normal. There is no distension.      Palpations: Abdomen is soft.      Tenderness: There is no abdominal tenderness. There is no guarding.   Skin:     General: Skin is warm.      Findings: No erythema.      Comments: Surgical dressing noted on left hip. Dressing is clean and dry and intact.   Neurological:      Mental Status: She is alert and oriented to person, place, and time.   Psychiatric:         Mood and Affect: Mood normal.         Behavior: Behavior normal.         Thought Content: Thought content normal.         Significant Labs:   CBC:   Recent Labs   Lab 11/22/20  0457 11/23/20  0648   WBC 9.11 9.20   HGB 13.8 13.0   HCT 41.4 38.9    187     CMP:   Recent Labs   Lab 11/22/20  0457 11/23/20  0648    137   K 4.8 4.5    104   CO2 21* 22*   * 165*   BUN 16 13   CREATININE 1.0 0.7   CALCIUM 10.0 9.8   PROT 8.2 7.5   ALBUMIN 4.0 3.5   BILITOT 0.5 0.4   ALKPHOS 61 62   AST 22 23   ALT 10 10   ANIONGAP 12 11   EGFRNONAA 52.2* >60.0     Magnesium:   Recent Labs   Lab 11/22/20  0457 11/23/20  0648   MG 2.2 1.7     POCT Glucose:   Recent Labs   Lab 11/22/20  1257 11/22/20  1659 11/22/20  2106   POCTGLUCOSE 196* 256* 277*     Lab Results   Component Value Date    [id number] 14 (L) 11/23/2020       Significant Imaging: I have reviewed all pertinent imaging results/findings within the past 24 hours.

## 2020-11-23 NOTE — PLAN OF CARE
Pt agreeable to therapy and tolerated session fairly secondary to weakness. Pt required Max A in bed mobility, t/f's, and ambulation using RW due to weakness and pt unable to follow verbal commands. Pt required Max v/c's for initiation and to stay on task. Pt with posterior lean in standing and unable to ambulate EOB. Continue OT POC      Problem: Occupational Therapy Goal  Goal: Occupational Therapy Goal  Description: Goals to be met by: 12/22/20    Patient will increase functional independence with ADLs by performing:    UE Dressing with Supervision.  LE Dressing with Supervision.  Grooming while standing at sink with Supervision.  Toileting from toilet with Supervision for hygiene and clothing management.   Toilet transfer to toilet with Supervision.    Outcome: Ongoing, Progressing

## 2020-11-23 NOTE — ANESTHESIA POST-OP PAIN MANAGEMENT
Acute Pain Service Progress Note    Farheen Soares is a 85 y.o., female, [id number].    Surgery:  Left- Percutaneous Screws    Post Op Day #: 2     Catheter type: L SIFI    Infusion type: Ropivacaine 0.2%  2 basal w/ 10cc IB    Problem List:    Active Hospital Problems    Diagnosis  POA    *Closed impacted fracture of left femoral neck with routine healing s/p percutaneous screw fixation on 11/21/2020 [S72.092D]  Not Applicable    Meningioma [D32.9]  Yes    Fall [W19.XXXA]  Yes    Pure hypercholesterolemia [E78.00]  Yes    History of transient ischemic attack (TIA) [Z86.73]  Not Applicable    Type 2 diabetes mellitus without complication, without long-term current use of insulin [E11.9]  Yes    Essential hypertension [I10]  Yes    Age-related osteoporosis with current pathological fracture with routine healing [M80.00XD]  Not Applicable    Chronic gout of multiple sites [M1A.09X0]  Yes    Recurrent major depressive disorder, in full remission [F33.42]  Yes      Resolved Hospital Problems   No resolved problems to display.       Subjective:     General appearance of alert, oriented, no complaints. Able to answer questions appropriately.   Pain with rest: 1    Numbers   Pain with movement: 1    Numbers   Side Effects    1. Pruritis No    2. Nausea No    3. Motor Blockade No,    4. Sedation No, 1=awake and alert    Objective:     Catheter level L SIFI   Catheter site clean, dry, intact. Placed additional Tegaderm for full catheter coverage.      Vitals   Vitals:    11/23/20 0725   BP:    Pulse: 78   Resp:    Temp:         Labs    Admission on 11/20/2020   Component Date Value Ref Range Status    WBC 11/20/2020 13.50* 3.90 - 12.70 K/uL Final    RBC 11/20/2020 4.68  4.00 - 5.40 M/uL Final    Hemoglobin 11/20/2020 15.6  12.0 - 16.0 g/dL Final    Hematocrit 11/20/2020 47.2  37.0 - 48.5 % Final    MCV 11/20/2020 101* 82 - 98 fL Final    MCH 11/20/2020 33.3* 27.0 - 31.0 pg Final    MCHC 11/20/2020 33.1   32.0 - 36.0 g/dL Final    RDW 11/20/2020 14.3  11.5 - 14.5 % Final    Platelets 11/20/2020 234  150 - 350 K/uL Final    MPV 11/20/2020 10.5  9.2 - 12.9 fL Final    Immature Granulocytes 11/20/2020 0.7* 0.0 - 0.5 % Final    Gran # (ANC) 11/20/2020 10.9* 1.8 - 7.7 K/uL Final    Immature Grans (Abs) 11/20/2020 0.09* 0.00 - 0.04 K/uL Final    Lymph # 11/20/2020 1.8  1.0 - 4.8 K/uL Final    Mono # 11/20/2020 0.7  0.3 - 1.0 K/uL Final    Eos # 11/20/2020 0.0  0.0 - 0.5 K/uL Final    Baso # 11/20/2020 0.03  0.00 - 0.20 K/uL Final    nRBC 11/20/2020 0  0 /100 WBC Final    Gran % 11/20/2020 80.6* 38.0 - 73.0 % Final    Lymph % 11/20/2020 13.4* 18.0 - 48.0 % Final    Mono % 11/20/2020 5.0  4.0 - 15.0 % Final    Eosinophil % 11/20/2020 0.1  0.0 - 8.0 % Final    Basophil % 11/20/2020 0.2  0.0 - 1.9 % Final    Differential Method 11/20/2020 Automated   Final    Sodium 11/20/2020 136  136 - 145 mmol/L Final    Potassium 11/20/2020 4.1  3.5 - 5.1 mmol/L Final    Chloride 11/20/2020 102  95 - 110 mmol/L Final    CO2 11/20/2020 20* 23 - 29 mmol/L Final    Glucose 11/20/2020 177* 70 - 110 mg/dL Final    BUN 11/20/2020 15  8 - 23 mg/dL Final    Creatinine 11/20/2020 0.9  0.5 - 1.4 mg/dL Final    Calcium 11/20/2020 10.3  8.7 - 10.5 mg/dL Final    Total Protein 11/20/2020 9.1* 6.0 - 8.4 g/dL Final    Albumin 11/20/2020 4.8  3.5 - 5.2 g/dL Final    Total Bilirubin 11/20/2020 0.5  0.1 - 1.0 mg/dL Final    Alkaline Phosphatase 11/20/2020 69  55 - 135 U/L Final    AST 11/20/2020 38  10 - 40 U/L Final    ALT 11/20/2020 16  10 - 44 U/L Final    Anion Gap 11/20/2020 14  8 - 16 mmol/L Final    eGFR if African American 11/20/2020 >60.0  >60 mL/min/1.73 m^2 Final    eGFR if non African American 11/20/2020 59.3* >60 mL/min/1.73 m^2 Final    Prothrombin Time 11/20/2020 10.9  9.0 - 12.5 sec Final    INR 11/20/2020 1.0  0.8 - 1.2 Final    Group & Rh 11/20/2020 A NEG   Corrected    Indirect Javier 11/20/2020 NEG    Final    POC Rapid COVID 11/20/2020 Negative  Negative Final     Acceptable 11/20/2020 Yes   Final    Specimen UA 11/20/2020 Urine, Clean Catch   Final    Color, UA 11/20/2020 Yellow  Yellow, Straw, Dyana Final    Appearance, UA 11/20/2020 Clear  Clear Final    pH, UA 11/20/2020 5.0  5.0 - 8.0 Final    Specific [place], UA 11/20/2020 1.015  1.005 - 1.030 Final    Protein, UA 11/20/2020 1+* Negative Final    Glucose, UA 11/20/2020 Negative  Negative Final    Ketones, UA 11/20/2020 Negative  Negative Final    Bilirubin (UA) 11/20/2020 Negative  Negative Final    Occult Blood UA 11/20/2020 1+* Negative Final    Nitrite, UA 11/20/2020 Negative  Negative Final    Leukocytes, UA 11/20/2020 Negative  Negative Final    CPK 11/20/2020 76  20 - 180 U/L Final    Transferrin 11/20/2020 299  200 - 375 mg/dL Final    Hemoglobin A1C 11/20/2020 6.5* 4.0 - 5.6 % Final    Estimated Avg Glucose 11/20/2020 140* 68 - 131 mg/dL Final    RBC, UA 11/20/2020 1  0 - 4 /hpf Final    WBC, UA 11/20/2020 0  0 - 5 /hpf Final    Bacteria 11/20/2020 Occasional  None-Occ /hpf Final    Squam Epithel, UA 11/20/2020 0  /hpf Final    Hyaline Casts, UA 11/20/2020 0  0-1/lpf /lpf Final    Microscopic Comment 11/20/2020 SEE COMMENT   Final    Magnesium 11/20/2020 1.5* 1.6 - 2.6 mg/dL Final    Phosphorus 11/20/2020 2.9  2.7 - 4.5 mg/dL Final    Prealbumin 11/20/2020 25  20 - 43 mg/dL Final    POCT Glucose 11/21/2020 168* 70 - 110 mg/dL Final    POCT Glucose 11/21/2020 205* 70 - 110 mg/dL Final    WBC 11/22/2020 9.11  3.90 - 12.70 K/uL Final    RBC 11/22/2020 4.01  4.00 - 5.40 M/uL Final    Hemoglobin 11/22/2020 13.8  12.0 - 16.0 g/dL Final    Hematocrit 11/22/2020 41.4  37.0 - 48.5 % Final    MCV 11/22/2020 103* 82 - 98 fL Final    MCH 11/22/2020 34.4* 27.0 - 31.0 pg Final    MCHC 11/22/2020 33.3  32.0 - 36.0 g/dL Final    RDW 11/22/2020 14.3  11.5 - 14.5 % Final    Platelets 11/22/2020 181  150 - 350  K/uL Final    MPV 11/22/2020 10.9  9.2 - 12.9 fL Final    Immature Granulocytes 11/22/2020 0.3  0.0 - 0.5 % Final    Gran # (ANC) 11/22/2020 6.7  1.8 - 7.7 K/uL Final    Immature Grans (Abs) 11/22/2020 0.03  0.00 - 0.04 K/uL Final    Lymph # 11/22/2020 1.5  1.0 - 4.8 K/uL Final    Mono # 11/22/2020 0.8  0.3 - 1.0 K/uL Final    Eos # 11/22/2020 0.1  0.0 - 0.5 K/uL Final    Baso # 11/22/2020 0.01  0.00 - 0.20 K/uL Final    nRBC 11/22/2020 0  0 /100 WBC Final    Gran % 11/22/2020 73.4* 38.0 - 73.0 % Final    Lymph % 11/22/2020 16.4* 18.0 - 48.0 % Final    Mono % 11/22/2020 9.1  4.0 - 15.0 % Final    Eosinophil % 11/22/2020 0.7  0.0 - 8.0 % Final    Basophil % 11/22/2020 0.1  0.0 - 1.9 % Final    Differential Method 11/22/2020 Automated   Final    Magnesium 11/22/2020 2.2  1.6 - 2.6 mg/dL Final    Phosphorus 11/22/2020 3.1  2.7 - 4.5 mg/dL Final    Sodium 11/22/2020 140  136 - 145 mmol/L Final    Potassium 11/22/2020 4.8  3.5 - 5.1 mmol/L Final    Chloride 11/22/2020 107  95 - 110 mmol/L Final    CO2 11/22/2020 21* 23 - 29 mmol/L Final    Glucose 11/22/2020 164* 70 - 110 mg/dL Final    BUN 11/22/2020 16  8 - 23 mg/dL Final    Creatinine 11/22/2020 1.0  0.5 - 1.4 mg/dL Final    Calcium 11/22/2020 10.0  8.7 - 10.5 mg/dL Final    Total Protein 11/22/2020 8.2  6.0 - 8.4 g/dL Final    Albumin 11/22/2020 4.0  3.5 - 5.2 g/dL Final    Total Bilirubin 11/22/2020 0.5  0.1 - 1.0 mg/dL Final    Alkaline Phosphatase 11/22/2020 61  55 - 135 U/L Final    AST 11/22/2020 22  10 - 40 U/L Final    ALT 11/22/2020 10  10 - 44 U/L Final    Anion Gap 11/22/2020 12  8 - 16 mmol/L Final    eGFR if African American 11/22/2020 >60.0  >60 mL/min/1.73 m^2 Final    eGFR if non African American 11/22/2020 52.2* >60 mL/min/1.73 m^2 Final    POCT Glucose 11/22/2020 196* 70 - 110 mg/dL Final    POCT Glucose 11/22/2020 256* 70 - 110 mg/dL Final    POCT Glucose 11/22/2020 277* 70 - 110 mg/dL Final        Meds   Current  Facility-Administered Medications   Medication Dose Route Frequency Provider Last Rate Last Dose    allopurinoL tablet 300 mg  300 mg Oral Daily Hitesh Teague MD   300 mg at 11/22/20 0830    amLODIPine tablet 10 mg  10 mg Oral Daily Hitesh Teague MD   10 mg at 11/22/20 0829    atorvastatin tablet 10 mg  10 mg Oral Daily Hitesh Teague MD   10 mg at 11/22/20 0830    bisacodyL suppository 10 mg  10 mg Rectal Daily PRN Hitesh Teague MD        calcium carbonate (OS-ELODIA) tablet 500 mg  500 mg Oral BID [facility] Jovanny Ding MD   500 mg at 11/22/20 2107    clopidogreL tablet 75 mg  75 mg Oral Daily Hitesh Teague MD   75 mg at 11/22/20 0832    dextrose 50% injection 12.5 g  12.5 g Intravenous PRN Jenny Olsen MD        dextrose 50% injection 25 g  25 g Intravenous PRN Jenny Olsen MD        enoxaparin injection 40 mg  40 mg Subcutaneous Q24H Dwayne Ding MD   40 mg at 11/22/20 1705    glucagon (human recombinant) injection 1 mg  1 mg Intramuscular PRN Jenny Olsen MD        glucose chewable tablet 16 g  16 g Oral PRN Jenny Olsen MD        glucose chewable tablet 24 g  24 g Oral PRN Jenny Olsen MD        hydrALAZINE tablet 25 mg  25 mg Oral Q8H PRN Hitesh Teague MD   25 mg at 11/23/20 0518    insulin aspart U-100 pen 0-5 Units  0-5 Units Subcutaneous QID (AC + HS) PRN Jenny Olsen MD   1 Units at 11/22/20 2109    lisinopriL tablet 40 mg  40 mg Oral Daily Hitesh Teague MD   40 mg at 11/22/20 0830    melatonin tablet 6 mg  6 mg Oral Nightly PRN Hitesh Teague MD        methocarbamoL tablet 500 mg  500 mg Oral Q6H PRN Hitesh Teague MD        metoprolol succinate (TOPROL-XL) 24 hr tablet 100 mg  100 mg Oral Daily Hitesh Teague MD   100 mg at 11/22/20 0829    mupirocin 2 % ointment 1 g  1 g Nasal BID Hitesh Teague MD   1 g at 11/22/20 2100    ondansetron injection 4 mg  4 mg Intravenous Q12H PRN Hitesh Teague MD        oxyCODONE immediate  release tablet 10 mg  10 mg Oral Q3H PRN Hitesh Teague MD        oxyCODONE immediate release tablet 5 mg  5 mg Oral Q3H PRN Hitesh Teague MD        polyethylene glycol packet 17 g  17 g Oral Daily Hitesh Teague MD   17 g at 11/22/20 0830    pregabalin capsule 75 mg  75 mg Oral QHS Hitesh Teague MD   75 mg at 11/22/20 2107    ropivacaine (PF) 2 mg/ml (0.2%) solution  10 mL/hr Perineural Continuous Hitesh Teague MD 10 mL/hr at 11/23/20 0508 10 mL/hr at 11/23/20 0508    sodium chloride 0.9% flush 10 mL  10 mL Intravenous PRN Hitesh Teague MD        sodium chloride 0.9% flush 10 mL  10 mL Intravenous PRN Ze Robins MD        venlafaxine tablet 75 mg  75 mg Oral BID Hitesh Teague MD   75 mg at 11/22/20 2107    vitamin D 1000 units tablet 1,000 Units  1,000 Units Oral Daily [facility] Jovanny Ding MD   1,000 Units at 11/22/20 0830       Assessment:     Pain control adequate. Patient denies any pain at rest or with activity. Has not required PRN medications in last 24 hours.     Plan:     Patient doing well, continue present treatment.    1) Continue L SIFI PNC at continuous infusion 2cc/hr with 10cc/hr intermittent bolus   2) Schedule Tylenol 1000mg q6h   3) Continue scheduled Lyrica 75mg qhs   4) Continue PRN robaxin 500mg q6h   5) Stop Oxy 5mg and 10mg q3h    Will continue to follow. Please call APS/anesthesia with any questions or concerns.      Evaluator Kenroy Germain

## 2020-11-23 NOTE — PLAN OF CARE
Patient has been accepted to [facility]. CM spoke to pt and pt's family to discuss the acceptance, pt agreed. TIMI faxed clinical doc's via  to [facility] for Authorization request. CM team will continue to follow.    11:35 AM  TIMI completed LOCET and faxed PASRR to state. Waiting on 142.    3:23 PM  TIMI spoke to staff at [facility] who reports Authorization has been obtained.     Savannah Weathers LMSW  Case Management   [facility]   Ext. [phone]

## 2020-11-23 NOTE — ASSESSMENT & PLAN NOTE
Suboptimal control in the hospital in past 24 hours with multiple blood sugar readings > 200 and requiring sliding scale insulin. Patient on Metformin at home to treat as outpatient. Holding Metformin here in hospital but plan to discharge on Metformin on hospital discharge.     Blood Sugars (AccuCheck):    Recent Labs     11/21/20  0724 11/21/20  1012 11/22/20  1257 11/22/20  1659 11/22/20  2106   POCTGLUCOSE 168* 205* 196* 256* 277*        · HgA1C 6.5% and at goal on this admit.  · Will add Levemir 7 units daily to treat hyperglycemia.   · Plan is to monitor POCT glucose 4 times a day with each meal and at bedtime and cover with Novolog low dose sliding scale insulin.   · Target pre-meal glucose goal is <140 with all random glucoses <180 in non-critically ill patient.  · [age] yue diabetic diet in hospital.

## 2020-11-23 NOTE — ADDENDUM NOTE
Addendum  created 11/22/20 2029 by Dionisio Hall MD    Charge Capture section accepted, Cosign clinical note with attestation

## 2020-11-23 NOTE — PLAN OF CARE
Spoke with patient at bedside while working with PT/OT. Patient cooperative but pleasantly confused. Placed call to patient's daughter Colleen regarding d/c planning assessment. Colleen reports patient lives in a 3rd floor apartment with an elevator with her son. Therapy eval completed over the weekend and recommendations given for SNF. Patient has been accepted to [facility] at this time. Daughter would like to proceed with admission to this facility. SW to notify facility to complete paper work with daughter. SW to submit to [facility] for auth for discharge to NH on 11/24/20. PCP and Pharmacy verified. Will continue to follow for discharge needs.   11/23/20 1033   Discharge Assessment   Assessment Type Discharge Planning Assessment   Confirmed/corrected address and phone number on facesheet? Yes   Assessment information obtained from? Patient   Expected Length of Stay (days) 4   Communicated expected length of stay with patient/caregiver yes   Prior to hospitilization cognitive status: Not Oriented to Place;Not Oriented to Time   Prior to hospitalization functional status: Assistive Equipment   Current cognitive status: Not Oriented to Place;Not Oriented to Time   Current Functional Status: Needs Assistance;Partially Dependent   Facility Arrived From: Home   Lives With child(sammy), adult   Able to Return to Prior Arrangements no   Is patient able to care for self after discharge? No   Who are your caregiver(s) and their phone number(s)? Colleen Soares (Daughter) [phone]   Readmission Within the Last 30 Days no previous admission in last 30 days   Patient currently being followed by outpatient case management? No   Patient currently receives any other outside agency services? No   Equipment Currently Used at Home walker, rolling;3-in-1 commode   Do you have any problems affording any of your prescribed medications? No   Is the patient taking medications as prescribed? yes   Does the patient have transportation  home? Yes   Transportation Anticipated health plan transportation   Does the patient receive services at the [facility]? No   Discharge Plan A Skilled Nursing Facility   Discharge Plan B Skilled Nursing Facility   DME Needed Upon Discharge  none   Patient/Family in Agreement with Plan yes

## 2020-11-23 NOTE — PROGRESS NOTES
[facility]  Orthopedics  Progress Note    Patient Name: Fahreen Soares  MRN: [id number]  Admission Date: 11/20/2020  Hospital Length of Stay: 3 days  Attending Provider: Jenny Olsen MD  Primary Care Provider: Vonda Chung MD  Follow-up For: Procedure(s) (LRB):  Left- Percutaneous Screws- Large  arm Clock side (Left)    Post-Operative Day: 2 Days Post-Op  Subjective:     Principal Problem:Closed impacted fracture of left hip with routine healing    Principal Orthopedic Problem: s/p L hip perc screws on 11/21/20    Interval History: NAEON. Hypertensive overnight.  Ambulated 7 ft with PT yesterday.  Patient reports no pain in her left hip today.  Hemoglobin stable.    Review of patient's allergies indicates:   Allergen Reactions    Baclofen      AMS and distorted dremas       Current Facility-Administered Medications   Medication    allopurinoL tablet 300 mg    amLODIPine tablet 10 mg    atorvastatin tablet 10 mg    bisacodyL suppository 10 mg    calcium carbonate (OS-ELODIA) tablet 500 mg    clopidogreL tablet 75 mg    dextrose 50% injection 12.5 g    dextrose 50% injection 25 g    enoxaparin injection 40 mg    glucagon (human recombinant) injection 1 mg    glucose chewable tablet 16 g    glucose chewable tablet 24 g    hydrALAZINE tablet 25 mg    insulin aspart U-100 pen 0-5 Units    lisinopriL tablet 40 mg    melatonin tablet 6 mg    methocarbamoL tablet 500 mg    metoprolol succinate (TOPROL-XL) 24 hr tablet 100 mg    mupirocin 2 % ointment 1 g    ondansetron injection 4 mg    oxyCODONE immediate release tablet 10 mg    oxyCODONE immediate release tablet 5 mg    polyethylene glycol packet 17 g    pregabalin capsule 75 mg    ropivacaine (PF) 2 mg/ml (0.2%) solution    sodium chloride 0.9% flush 10 mL    sodium chloride 0.9% flush 10 mL    venlafaxine tablet 75 mg    vitamin D 1000 units tablet 1,000 Units     Objective:     Vital Signs (Most  "Recent):  Temp: 98.8 °F (37.1 °C) (11/23/20 0514)  Pulse: 73 (11/23/20 0514)  Resp: 17 (11/23/20 0514)  BP: (!) 180/83 (11/23/20 0514)  SpO2: (!) 92 % (11/23/20 0514) Vital Signs (24h Range):  Temp:  [97 °F (36.1 °C)-99.1 °F (37.3 °C)] 98.8 °F (37.1 °C)  Pulse:  [72-88] 73  Resp:  [17-18] 17  SpO2:  [92 %-99 %] 92 %  BP: (145-180)/(68-83) 180/83     Weight: 68 kg (150 lb)  Height: 5' 3" (160 cm)  Body mass index is 26.57 kg/m².      Intake/Output Summary (Last 24 hours) at 11/23/2020 0646  Last data filed at 11/23/2020 0514  Gross per 24 hour   Intake 1231 ml   Output 1250 ml   Net -19 ml       Ortho/SPM Exam     PE:  Awake/alert/oriented x3, No acute distress, Afebrile, Vital signs stable  Good inspiratory effort with unlaboured breathing  Dressings c/d/i  NVI in operative limb       Significant Labs: All pertinent labs within the past 24 hours have been reviewed.    Significant Imaging: I have reviewed all pertinent imaging results/findings.    Assessment/Plan:     * Closed impacted fracture of left femoral neck with routine healing s/p percutaneous screw fixation on 11/21/2020  Farheen Soares is a 85 y.o. female with a minimally dispaced left femoral neck fracture s/p L hip perc screws on 11/21/20.      Pain control: multimodal  PT/OT: WBAT LLE  DVT PPx: Lovenox 40 daily, SCDs at all times when not ambulating  Abx: postop Ancef  Labs: stable  Raisa: tara    Dispo: continue early mobilization            Harjinder Clancy MD  Orthopedics  [facility]  "

## 2020-11-24 ENCOUNTER — TELEPHONE (OUTPATIENT)
Dept: ORTHOPEDICS | Facility: CLINIC | Age: 85
End: 2020-11-24

## 2020-11-24 VITALS
HEART RATE: 72 BPM | SYSTOLIC BLOOD PRESSURE: 175 MMHG | WEIGHT: 150 LBS | DIASTOLIC BLOOD PRESSURE: 81 MMHG | OXYGEN SATURATION: 93 % | TEMPERATURE: 98 F | HEIGHT: 63 IN | RESPIRATION RATE: 18 BRPM | BODY MASS INDEX: 26.58 KG/M2

## 2020-11-24 LAB
ALBUMIN SERPL BCP-MCNC: 3.4 G/DL (ref 3.5–5.2)
ALP SERPL-CCNC: 66 U/L (ref 55–135)
ALT SERPL W/O P-5'-P-CCNC: 9 U/L (ref 10–44)
ANION GAP SERPL CALC-SCNC: 10 MMOL/L (ref 8–16)
AST SERPL-CCNC: 21 U/L (ref 10–40)
BASOPHILS # BLD AUTO: 0.04 K/UL (ref 0–0.2)
BASOPHILS NFR BLD: 0.5 % (ref 0–1.9)
BILIRUB SERPL-MCNC: 0.5 MG/DL (ref 0.1–1)
BUN SERPL-MCNC: 12 MG/DL (ref 8–23)
CALCIUM SERPL-MCNC: 9.8 MG/DL (ref 8.7–10.5)
CHLORIDE SERPL-SCNC: 101 MMOL/L (ref 95–110)
CO2 SERPL-SCNC: 23 MMOL/L (ref 23–29)
CREAT SERPL-MCNC: 0.7 MG/DL (ref 0.5–1.4)
DIFFERENTIAL METHOD: ABNORMAL
EOSINOPHIL # BLD AUTO: 0.3 K/UL (ref 0–0.5)
EOSINOPHIL NFR BLD: 3 % (ref 0–8)
ERYTHROCYTE [DISTWIDTH] IN BLOOD BY AUTOMATED COUNT: 13.7 % (ref 11.5–14.5)
EST. GFR  (AFRICAN AMERICAN): >60 ML/MIN/1.73 M^2
EST. GFR  (NON AFRICAN AMERICAN): >60 ML/MIN/1.73 M^2
GLUCOSE SERPL-MCNC: 168 MG/DL (ref 70–110)
HCT VFR BLD AUTO: 39.1 % (ref 37–48.5)
HGB BLD-MCNC: 13.3 G/DL (ref 12–16)
IMM GRANULOCYTES # BLD AUTO: 0.03 K/UL (ref 0–0.04)
IMM GRANULOCYTES NFR BLD AUTO: 0.3 % (ref 0–0.5)
LYMPHOCYTES # BLD AUTO: 2.5 K/UL (ref 1–4.8)
LYMPHOCYTES NFR BLD: 28.7 % (ref 18–48)
MAGNESIUM SERPL-MCNC: 1.7 MG/DL (ref 1.6–2.6)
MCH RBC QN AUTO: 34 PG (ref 27–31)
MCHC RBC AUTO-ENTMCNC: 34 G/DL (ref 32–36)
MCV RBC AUTO: 100 FL (ref 82–98)
MONOCYTES # BLD AUTO: 1 K/UL (ref 0.3–1)
MONOCYTES NFR BLD: 11.1 % (ref 4–15)
NEUTROPHILS # BLD AUTO: 4.9 K/UL (ref 1.8–7.7)
NEUTROPHILS NFR BLD: 56.4 % (ref 38–73)
NRBC BLD-RTO: 0 /100 WBC
PHOSPHATE SERPL-MCNC: 3.3 MG/DL (ref 2.7–4.5)
PLATELET # BLD AUTO: 188 K/UL (ref 150–350)
PMV BLD AUTO: 10.5 FL (ref 9.2–12.9)
POTASSIUM SERPL-SCNC: 4.3 MMOL/L (ref 3.5–5.1)
PROT SERPL-MCNC: 7.5 G/DL (ref 6–8.4)
RBC # BLD AUTO: 3.91 M/UL (ref 4–5.4)
SODIUM SERPL-SCNC: 134 MMOL/L (ref 136–145)
WBC # BLD AUTO: 8.64 K/UL (ref 3.9–12.7)

## 2020-11-24 PROCEDURE — 85025 COMPLETE CBC W/AUTO DIFF WBC: CPT

## 2020-11-24 PROCEDURE — 99239 HOSP IP/OBS DSCHRG MGMT >30: CPT | Mod: ,,, | Performed by: INTERNAL MEDICINE

## 2020-11-24 PROCEDURE — 99231 PR SUBSEQUENT HOSPITAL CARE,LEVL I: ICD-10-PCS | Mod: ,,, | Performed by: ANESTHESIOLOGY

## 2020-11-24 PROCEDURE — 99231 SBSQ HOSP IP/OBS SF/LOW 25: CPT | Mod: ,,, | Performed by: ANESTHESIOLOGY

## 2020-11-24 PROCEDURE — 84100 ASSAY OF PHOSPHORUS: CPT

## 2020-11-24 PROCEDURE — 83735 ASSAY OF MAGNESIUM: CPT

## 2020-11-24 PROCEDURE — 97535 SELF CARE MNGMENT TRAINING: CPT

## 2020-11-24 PROCEDURE — 80053 COMPREHEN METABOLIC PANEL: CPT

## 2020-11-24 PROCEDURE — 99239 PR HOSPITAL DISCHARGE DAY,>30 MIN: ICD-10-PCS | Mod: ,,, | Performed by: INTERNAL MEDICINE

## 2020-11-24 PROCEDURE — 97116 GAIT TRAINING THERAPY: CPT

## 2020-11-24 PROCEDURE — 25000003 PHARM REV CODE 250: Performed by: STUDENT IN AN ORGANIZED HEALTH CARE EDUCATION/TRAINING PROGRAM

## 2020-11-24 PROCEDURE — 97530 THERAPEUTIC ACTIVITIES: CPT

## 2020-11-24 PROCEDURE — 25000003 PHARM REV CODE 250: Performed by: HOSPITALIST

## 2020-11-24 PROCEDURE — 36415 COLL VENOUS BLD VENIPUNCTURE: CPT

## 2020-11-24 RX ORDER — ACETAMINOPHEN 500 MG
1000 TABLET ORAL EVERY 8 HOURS PRN
Refills: 0 | COMMUNITY
Start: 2020-11-24

## 2020-11-24 RX ORDER — ENOXAPARIN SODIUM 100 MG/ML
40 INJECTION SUBCUTANEOUS DAILY
Start: 2020-11-24 | End: 2020-12-14

## 2020-11-24 RX ORDER — CALCIUM CARBONATE 500(1250)
500 TABLET ORAL 2 TIMES DAILY
Refills: 0 | COMMUNITY
Start: 2020-11-24 | End: 2022-06-29

## 2020-11-24 RX ORDER — ERGOCALCIFEROL 1.25 MG/1
50000 CAPSULE ORAL
Start: 2020-11-30 | End: 2021-03-29 | Stop reason: SDUPTHER

## 2020-11-24 RX ORDER — INSULIN ASPART 100 [IU]/ML
0-5 INJECTION, SOLUTION INTRAVENOUS; SUBCUTANEOUS
Refills: 0
Start: 2020-11-24 | End: 2021-02-08

## 2020-11-24 RX ORDER — METOPROLOL SUCCINATE 100 MG/1
100 TABLET, EXTENDED RELEASE ORAL DAILY
Start: 2020-11-24 | End: 2020-12-20

## 2020-11-24 RX ADMIN — POLYETHYLENE GLYCOL 3350 17 G: 17 POWDER, FOR SOLUTION ORAL at 12:11

## 2020-11-24 RX ADMIN — AMLODIPINE BESYLATE 10 MG: 10 TABLET ORAL at 12:11

## 2020-11-24 RX ADMIN — METOPROLOL SUCCINATE 100 MG: 100 TABLET, EXTENDED RELEASE ORAL at 12:11

## 2020-11-24 RX ADMIN — HYDRALAZINE HYDROCHLORIDE 25 MG: 25 TABLET, FILM COATED ORAL at 05:11

## 2020-11-24 RX ADMIN — CALCIUM 500 MG: 500 TABLET ORAL at 12:11

## 2020-11-24 RX ADMIN — CLOPIDOGREL 75 MG: 75 TABLET, FILM COATED ORAL at 12:11

## 2020-11-24 RX ADMIN — MUPIROCIN 1 G: 20 OINTMENT TOPICAL at 12:11

## 2020-11-24 RX ADMIN — ALLOPURINOL 300 MG: 300 TABLET ORAL at 12:11

## 2020-11-24 RX ADMIN — ATORVASTATIN CALCIUM 10 MG: 10 TABLET, FILM COATED ORAL at 12:11

## 2020-11-24 RX ADMIN — VENLAFAXINE 75 MG: 75 TABLET ORAL at 12:11

## 2020-11-24 RX ADMIN — INSULIN DETEMIR 7 UNITS: 100 INJECTION, SOLUTION SUBCUTANEOUS at 09:11

## 2020-11-24 RX ADMIN — LISINOPRIL 40 MG: 10 TABLET ORAL at 12:11

## 2020-11-24 RX ADMIN — ACETAMINOPHEN 1000 MG: 500 TABLET ORAL at 12:11

## 2020-11-24 RX ADMIN — ACETAMINOPHEN 1000 MG: 500 TABLET ORAL at 05:11

## 2020-11-24 NOTE — ANESTHESIA POST-OP PAIN MANAGEMENT
Acute Pain Service Progress Note    Farheen Soares is a 85 y.o., female, [id number].    Surgery:  Left- Percutaneous Screws    Post Op Day #: 3     Catheter type: L SIFI    Infusion type: Ropivacaine 0.2%  2 basal w/ 10cc IB    Problem List:    There are no hospital problems to display for this patient.      Subjective:     General appearance of alert, oriented, no complaints. Able to answer questions appropriately.   Pain with rest: 1    Numbers   Pain with movement: 4    Numbers   Side Effects    1. Pruritis No    2. Nausea No    3. Motor Blockade No,    4. Sedation No, 1=awake and alert    Objective:     Catheter level L SIFI   Catheter site removed with tip intact.      Vitals   There were no vitals filed for this visit.     Labs    No results displayed because visit has over 200 results.           Meds   No current facility-administered medications for this visit.      No current outpatient medications on file.     Facility-Administered Medications Ordered in Other Visits   Medication Dose Route Frequency Provider Last Rate Last Dose    acetaminophen tablet 1,000 mg  1,000 mg Oral Q6H Tavares Isabel MD   1,000 mg at 11/24/20 0541    allopurinoL tablet 300 mg  300 mg Oral Daily Hitesh Teague MD   300 mg at 11/23/20 0852    amLODIPine tablet 10 mg  10 mg Oral Daily Hitesh Teague MD   10 mg at 11/23/20 0851    atorvastatin tablet 10 mg  10 mg Oral Daily Hitesh Teague MD   10 mg at 11/23/20 0852    bisacodyL suppository 10 mg  10 mg Rectal Daily PRN Hitesh Teague MD        calcium carbonate (OS-ELODIA) tablet 500 mg  500 mg Oral BID Dwayne Jovanny Ding MD   500 mg at 11/23/20 2105    clopidogreL tablet 75 mg  75 mg Oral Daily Hitesh Teague MD   75 mg at 11/23/20 0852    dextrose 50% injection 12.5 g  12.5 g Intravenous PRN Jenny Olsen MD        dextrose 50% injection 25 g  25 g Intravenous PRN Jenny Olsen MD        enoxaparin injection 40 mg  40 mg Subcutaneous Q24H Dwayneendy Petty  MD Toña   40 mg at 11/23/20 1625    ergocalciferol capsule 50,000 Units  50,000 Units Oral Q7 Days Jenny Olsen MD   50,000 Units at 11/23/20 1200    glucagon (human recombinant) injection 1 mg  1 mg Intramuscular PRN Jenny Olsen MD        glucose chewable tablet 16 g  16 g Oral PRN Jenny Olsen MD        glucose chewable tablet 24 g  24 g Oral PRN Jenny Olsen MD        hydrALAZINE tablet 25 mg  25 mg Oral Q8H PRN Hitesh Teague MD   25 mg at 11/24/20 0545    insulin aspart U-100 pen 0-5 Units  0-5 Units Subcutaneous QID (AC + HS) PRN Jenny Olsen MD   1 Units at 11/22/20 2109    insulin detemir U-100 pen 7 Units  7 Units Subcutaneous Daily Jenny Olsen MD   7 Units at 11/23/20 1245    lisinopriL tablet 40 mg  40 mg Oral Daily Hitesh Teague MD   40 mg at 11/23/20 0851    melatonin tablet 6 mg  6 mg Oral Nightly PRN Hitesh Teague MD        methocarbamoL tablet 500 mg  500 mg Oral Q6H PRN Hitesh Teague MD        metoprolol succinate (TOPROL-XL) 24 hr tablet 100 mg  100 mg Oral Daily Hitesh Teague MD   100 mg at 11/23/20 0852    mupirocin 2 % ointment 1 g  1 g Nasal BID Hitesh Teague MD   1 g at 11/23/20 2107    ondansetron injection 4 mg  4 mg Intravenous Q12H PRN Hitesh Teague MD        polyethylene glycol packet 17 g  17 g Oral Daily Hitesh Teague MD   17 g at 11/23/20 0857    pregabalin capsule 75 mg  75 mg Oral QHS Hitesh Teague MD   75 mg at 11/23/20 2105    ropivacaine (PF) 2 mg/ml (0.2%) solution  10 mL/hr Perineural Continuous Hitesh Teague MD 10 mL/hr at 11/23/20 2351 10 mL/hr at 11/23/20 2351    sodium chloride 0.9% flush 10 mL  10 mL Intravenous PRN Hitesh Teague MD        sodium chloride 0.9% flush 10 mL  10 mL Intravenous PRN Ze Robins MD        venlafaxine tablet 75 mg  75 mg Oral BID Hitesh Teague MD   75 mg at 11/23/20 7618       Assessment:     Pain control adequate. Patient denies any pain at rest,  minimal pain with activity. Has not required PRN medications in last 24 hours. Primary team plans on d/c today. Paused and pulled PNC. Blue tip intact.    Plan:     Patient doing well, continue present treatment.    1) Pulled L SIFI PNC. Blue tip intact.   2) Continue schedule Tylenol 1000mg q6h, Lyrica 75mg qhs while in house. Stop lyrica prior to discharge.    3) Continue PRN Robaxin 500mg q6h while in house. Stop prior to discharge.    Thank you for including us in the care of Farheen Rodger . We will sign off. Please contact us if you have any additional questions.        Evaluator Kenroy Germain

## 2020-11-24 NOTE — PLAN OF CARE
11/24/20 1226   Post-Acute Status   Post-Acute Authorization Placement   Post-Acute Placement Status Set-up Complete     Patient's set-up has been completed. TIMI scheduled d/c transportation to [facility] through [facility]. Patient is scheduled to be picked up at 1:30 pm. TIMI provided patient's nurse with report number #[phone]; ask for the nurse for room #138B. TIMI is in communication with patient's CM and patient's Care Team. Requested  time does not guarantee arrival time.      12:29 PM  TIMI spoke to pt's daughter Colleen #[phone] via phone call to discuss transportation and provide room #.     Savannah Weathers LMSW  Case Management   [facility]   Ext. [phone]

## 2020-11-24 NOTE — PLAN OF CARE
Pt pleasantly confused and agreeable to therapy. Pt required Max A in bed mobility, t/f's, and ambulation due to weakness. Pt required Max A to t/f bed<>bsc<>bedside chair using RW due to inability to move BLE. Pt required Max A for perineal hygiene. Continue OT POC      Problem: Occupational Therapy Goal  Goal: Occupational Therapy Goal  Description: Goals to be met by: 12/22/20    Patient will increase functional independence with ADLs by performing:    UE Dressing with Supervision.  LE Dressing with Supervision.  Grooming while standing at sink with Supervision.  Toileting from toilet with Supervision for hygiene and clothing management.   Toilet transfer to toilet with Supervision.    Outcome: Ongoing, Progressing

## 2020-11-24 NOTE — ASSESSMENT & PLAN NOTE
Improved control in the hospital in past 24 hours. Patient on Metformin at home to treat as outpatient. Holding Metformin here in hospital but patient to resume Metformin on hospital discharge to treat diabetes.     Blood Sugars (AccuCheck):    Recent Labs     11/22/20  1257 11/22/20  1659 11/22/20  2106 11/23/20  2239   POCTGLUCOSE 196* 256* 277* 193*        · HgA1C 6.5% and at goal on this admit.  · Plan is to monitor POCT glucose 4 times a day with each meal and at bedtime and cover with Novolog low dose sliding scale insulin on discharge to [facility].   · [age] yue diabetic diet on discharge.

## 2020-11-24 NOTE — PT/OT/SLP PROGRESS
Occupational Therapy   Treatment    Name: Farheen Soares  MRN: [id number]  Admitting Diagnosis:  Closed impacted fracture of left hip with routine healing  3 Days Post-Op    Recommendations:     Discharge Recommendations: nursing facility, skilled  Discharge Equipment Recommendations:  none  Barriers to discharge:  None    Assessment:     Farheen Soares is a 85 y.o. female with a medical diagnosis of Closed impacted fracture of left hip with routine healing.  She presents with the following. Performance deficits affecting function are weakness, impaired endurance, impaired self care skills, impaired functional mobilty, gait instability, impaired balance, decreased safety awareness, pain, orthopedic precautions.     Pt pleasantly confused and agreeable to therapy. Pt required Max A in bed mobility, t/f's, and ambulation due to weakness. Pt required Max A to t/f bed<>bsc<>bedside chair using RW due to inability to move BLE. Pt required Max A for perineal hygiene. Continue OT POC    Rehab Prognosis:  Fair; patient would benefit from acute skilled OT services to address these deficits and reach maximum level of function.       Plan:     Patient to be seen daily to address the above listed problems via self-care/home management, therapeutic activities, therapeutic exercises  · Plan of Care Expires: 12/22/20  · Plan of Care Reviewed with: patient    Subjective     Pain/Comfort:  · Pain Rating 1: 0/10  · Location - Side 1: Left  · Location 1: hip  · Pain Addressed 1: Pre-medicate for activity, Reposition, Distraction    Objective:     Communicated with: Nursing prior to session.  Patient found supine with perineural catheter upon OT entry to room.    General Precautions: Standard, fall   Orthopedic Precautions:LLE weight bearing as tolerated   Braces: N/A     Occupational Performance:     Bed Mobility:    · Patient completed Scooting/Bridging with maximal assistance  · Patient completed Supine to Sit with  maximal assistance     Functional Mobility/Transfers:  · Patient completed Sit <> Stand Transfer with maximal assistance  with  rolling walker   · Patient completed Chair <> Mat Step Transfer technique with maximal assistance with rolling walker  · Patient completed Toilet Transfer Step Transfer technique with maximal assistance with  rolling walker  · Functional Mobility: Pt pleasantly confused and agreeable to therapy. Pt required Max A in bed mobility, t/f's, and ambulation due to weakness. Pt required Max A to t/f bed<>bsc<>bedside chair using RW due to inability to move BLE. Pt required Max A for perineal hygiene. Continue OT POC    Activities of Daily Living:  · Upper Body Dressing: minimum assistance alex gown  · Toileting: maximal assistance for perineal hygiene      [facility] 6 Click ADL: 16    Treatment & Education:  - OT POC   - Importance of mobility to maximize recovery.  - safety w/ functional mobility; hand placement to ensure safe transfers to various surfaces in prep for ADLs  - Reviewed gait sequence and RW management via verbalization and demonstration   - encouraged to ambulate within household environment at least every hour to hour 1/2  -Educated on weight bearing status    Patient left up in chair with all lines intact, call button in reach and RN notified    GOALS:   Multidisciplinary Problems     Occupational Therapy Goals        Problem: Occupational Therapy Goal    Goal Priority Disciplines Outcome Interventions   Occupational Therapy Goal     OT, PT/OT Ongoing, Progressing    Description: Goals to be met by: 12/22/20    Patient will increase functional independence with ADLs by performing:    UE Dressing with Supervision.  LE Dressing with Supervision.  Grooming while standing at sink with Supervision.  Toileting from toilet with Supervision for hygiene and clothing management.   Toilet transfer to toilet with Supervision.                     Time Tracking:     OT Date of Treatment:  11/24/20  OT Start Time: 0952  OT Stop Time: 1016  OT Total Time (min): 24 min    Billable Minutes:Self Care/Home Management 24    Kimberly Quezada OT  11/24/2020    Co-tx performed for this visit due to patient need for two skilled therapists to ensure patient and staff safety and to accommodate for patient activity tolerance

## 2020-11-24 NOTE — SUBJECTIVE & OBJECTIVE
"Principal Problem:Closed impacted fracture of left hip with routine healing    Principal Orthopedic Problem: s/p L hip perc screws on 11/21/20    Interval History: NAEON. Hypertensive overnight.  Patient required max assist for transfer, did not ambulate yesterday.  Patient reports no pain in her left hip today.  Hemoglobin stable.    Review of patient's allergies indicates:   Allergen Reactions    Baclofen      AMS and distorted dremas       Current Facility-Administered Medications   Medication    acetaminophen tablet 1,000 mg    allopurinoL tablet 300 mg    amLODIPine tablet 10 mg    atorvastatin tablet 10 mg    bisacodyL suppository 10 mg    calcium carbonate (OS-ELODIA) tablet 500 mg    clopidogreL tablet 75 mg    dextrose 50% injection 12.5 g    dextrose 50% injection 25 g    enoxaparin injection 40 mg    ergocalciferol capsule 50,000 Units    glucagon (human recombinant) injection 1 mg    glucose chewable tablet 16 g    glucose chewable tablet 24 g    hydrALAZINE tablet 25 mg    insulin aspart U-100 pen 0-5 Units    insulin detemir U-100 pen 7 Units    lisinopriL tablet 40 mg    melatonin tablet 6 mg    methocarbamoL tablet 500 mg    metoprolol succinate (TOPROL-XL) 24 hr tablet 100 mg    mupirocin 2 % ointment 1 g    ondansetron injection 4 mg    polyethylene glycol packet 17 g    pregabalin capsule 75 mg    ropivacaine (PF) 2 mg/ml (0.2%) solution    sodium chloride 0.9% flush 10 mL    sodium chloride 0.9% flush 10 mL    venlafaxine tablet 75 mg     Objective:     Vital Signs (Most Recent):  Temp: 97.8 °F (36.6 °C) (11/24/20 0542)  Pulse: 67 (11/24/20 0542)  Resp: 18 (11/24/20 0542)  BP: (!) 175/81 (11/24/20 0542)  SpO2: (!) 93 % (11/24/20 0542) Vital Signs (24h Range):  Temp:  [97.8 °F (36.6 °C)-98.8 °F (37.1 °C)] 97.8 °F (36.6 °C)  Pulse:  [67-83] 67  Resp:  [16-18] 18  SpO2:  [93 %-97 %] 93 %  BP: (146-182)/(67-81) 175/81     Weight: 68 kg (150 lb)  Height: 5' 3" (160 cm)  Body " mass index is 26.57 kg/m².      Intake/Output Summary (Last 24 hours) at 11/24/2020 0655  Last data filed at 11/24/2020 0600  Gross per 24 hour   Intake --   Output 1800 ml   Net -1800 ml       Ortho/SPM Exam     PE:  Awake/alert/oriented x3, No acute distress, Afebrile, Vital signs stable  Good inspiratory effort with unlaboured breathing  Dressings c/d/i  NVI in operative limb       Significant Labs: All pertinent labs within the past 24 hours have been reviewed.    Significant Imaging: I have reviewed all pertinent imaging results/findings.

## 2020-11-24 NOTE — ASSESSMENT & PLAN NOTE
Farheen Soares is a 85 y.o. female with a minimally dispaced left femoral neck fracture s/p L hip perc screws on 11/21/20.      Pain control: multimodal  PT/OT: WBAT LLE  DVT PPx: Lovenox 40 and plavix 75 daily, SCDs at all times when not ambulating  Abx: postop Ancef  Labs: stable  Kline: out    Dispo: continue early mobilization, anticipate discharge to skilled nursing

## 2020-11-24 NOTE — NURSING
Report called to Denis at [facility]. IV sites. Transport scheduled for 1300. No concerns at this time. Family aware of DC plans per AISHA and TIMI.

## 2020-11-24 NOTE — PROGRESS NOTES
[facility]  Orthopedics  Progress Note    Patient Name: Farheen Soares  MRN: [id number]  Admission Date: 11/20/2020  Hospital Length of Stay: 4 days  Attending Provider: Jenny Olsen MD  Primary Care Provider: Vonda Chung MD  Follow-up For: Procedure(s) (LRB):  Left- Percutaneous Screws- Large  arm Clock side (Left)    Post-Operative Day: 3 Days Post-Op  Subjective:     Principal Problem:Closed impacted fracture of left hip with routine healing    Principal Orthopedic Problem: s/p L hip perc screws on 11/21/20    Interval History: NAEON. Hypertensive overnight.  Patient required max assist for transfer, did not ambulate yesterday.  Patient reports no pain in her left hip today.  Hemoglobin stable.  Patient hard of hearing.    Review of patient's allergies indicates:   Allergen Reactions    Baclofen      AMS and distorted dremas       Current Facility-Administered Medications   Medication    acetaminophen tablet 1,000 mg    allopurinoL tablet 300 mg    amLODIPine tablet 10 mg    atorvastatin tablet 10 mg    bisacodyL suppository 10 mg    calcium carbonate (OS-ELODIA) tablet 500 mg    clopidogreL tablet 75 mg    dextrose 50% injection 12.5 g    dextrose 50% injection 25 g    enoxaparin injection 40 mg    ergocalciferol capsule 50,000 Units    glucagon (human recombinant) injection 1 mg    glucose chewable tablet 16 g    glucose chewable tablet 24 g    hydrALAZINE tablet 25 mg    insulin aspart U-100 pen 0-5 Units    insulin detemir U-100 pen 7 Units    lisinopriL tablet 40 mg    melatonin tablet 6 mg    methocarbamoL tablet 500 mg    metoprolol succinate (TOPROL-XL) 24 hr tablet 100 mg    mupirocin 2 % ointment 1 g    ondansetron injection 4 mg    polyethylene glycol packet 17 g    pregabalin capsule 75 mg    ropivacaine (PF) 2 mg/ml (0.2%) solution    sodium chloride 0.9% flush 10 mL    sodium chloride 0.9% flush 10 mL    venlafaxine tablet 75 mg  "    Objective:     Vital Signs (Most Recent):  Temp: 97.8 °F (36.6 °C) (11/24/20 0542)  Pulse: 67 (11/24/20 0542)  Resp: 18 (11/24/20 0542)  BP: (!) 175/81 (11/24/20 0542)  SpO2: (!) 93 % (11/24/20 0542) Vital Signs (24h Range):  Temp:  [97.8 °F (36.6 °C)-98.8 °F (37.1 °C)] 97.8 °F (36.6 °C)  Pulse:  [67-83] 67  Resp:  [16-18] 18  SpO2:  [93 %-97 %] 93 %  BP: (146-182)/(67-81) 175/81     Weight: 68 kg (150 lb)  Height: 5' 3" (160 cm)  Body mass index is 26.57 kg/m².      Intake/Output Summary (Last 24 hours) at 11/24/2020 0655  Last data filed at 11/24/2020 0600  Gross per 24 hour   Intake --   Output 1800 ml   Net -1800 ml       Ortho/SPM Exam     PE:  Awake/alert/oriented x3, No acute distress, Afebrile, Vital signs stable  Good inspiratory effort with unlaboured breathing  Dressings c/d/i  NVI in operative limb       Significant Labs: All pertinent labs within the past 24 hours have been reviewed.    Significant Imaging: I have reviewed all pertinent imaging results/findings.    Assessment/Plan:     * Closed impacted fracture of left femoral neck with routine healing s/p percutaneous screw fixation on 11/21/2020  Farheen Soares is a 85 y.o. female with a minimally dispaced left femoral neck fracture s/p L hip perc screws on 11/21/20.      Pain control: multimodal  PT/OT: WBAT LLE  DVT PPx: Lovenox 40 and plavix 75 daily, SCDs at all times when not ambulating  Abx: postop Ancef  Labs: stable  Kline: out    Dispo: continue early mobilization, anticipate discharge to skilled nursing            Kailash Luna MD  Orthopedics  [facility]  "

## 2020-11-24 NOTE — PT/OT/SLP PROGRESS
Physical Therapy Treatment    Patient Name:  Farheen Soares   MRN:  [id number]    Recommendations:     Discharge Recommendations:  nursing facility, skilled   Discharge Equipment Recommendations: none   Barriers to discharge: Inaccessible home and Decreased caregiver support    Assessment:     Farheen Soares is a 85 y.o. female admitted with a medical diagnosis of Closed impacted fracture of left hip with routine healing.  She presents with the following impairments/functional limitations:  weakness, impaired endurance, impaired self care skills, impaired functional mobilty, gait instability, impaired balance, impaired cognition, decreased coordination, decreased lower extremity function, decreased safety awareness, pain, decreased ROM, impaired skin, edema, orthopedic precautions Co treat with OT due to patient's increased level of required skilled assistance and decreased tolerance to activity. Patient is very [place]. Patient pleasant and willing to participate in therapy, tolerated session fairly requiring MaxA for bed mobility and transfers and maximal cueing. Patient does not complain of pain during mobility. Primarily limited by poor motor planning and sequencing. Requires maximal v/t cueing, however patient exhibits resistive movement throughout mobility, although she appears very calm and not fearful of falling or pain. Patient is not safe to return home when medically ready at current level of mobility and would benefit from skilled nursing facility to improve functional mobility and safety.      Rehab Prognosis: Good; patient would benefit from acute skilled PT services to address these deficits and reach maximum level of function.    Recent Surgery: Procedure(s) (LRB):  Left- Percutaneous Screws- Large  arm Clock side (Left) 3 Days Post-Op    Plan:     During this hospitalization, patient to be seen daily to address the identified rehab impairments via gait training, therapeutic activities,  "therapeutic exercises, neuromuscular re-education and progress toward the following goals:    · Plan of Care Expires:  12/22/20    Subjective     Chief Complaint: none stated  Patient/Family Comments/goals: "Oh well ya'll did all the work. Thank you"  Pain/Comfort:  · Pain Rating 1: 0/10(pain well controlled, complained of no pain with movement)  · Location - Side 1: Left  · Location 1: hip  · Pain Rating Post-Intervention 1: 0/10      Objective:     Communicated with nurse prior to session.  Patient found HOB elevated with perineural catheter upon PT entry to room.     General Precautions: Standard, fall   Orthopedic Precautions:LLE weight bearing as tolerated   Braces:       Functional Mobility:  · Bed Mobility:     · Scooting: maximal assistance  · Supine to Sit: maximal assistance  · Transfers:     · Sit to Stand:  maximal assistance with rolling walker  · Bed to Chair: maximal assistance with  rolling walker  using  Step Transfer  · Toilet Transfer: maximal assistance with  rolling walker  using  Step Transfer  · Gait: 2-3 steps + 4-5 steps during transfers wtih RW, MaxA. Maximal v/t cueing for sequencing. Decreased weightshifting, decreased foot clearance, assitance to place bilateral feet, flexed posture, v/t cues for walker management and safe hand placement.        AM-PAC 6 CLICK MOBILITY  Turning over in bed (including adjusting bedclothes, sheets and blankets)?: 2  Sitting down on and standing up from a chair with arms (e.g., wheelchair, bedside commode, etc.): 2  Moving from lying on back to sitting on the side of the bed?: 2  Moving to and from a bed to a chair (including a wheelchair)?: 2  Need to walk in hospital room?: 2  Climbing 3-5 steps with a railing?: 1  Basic Mobility Total Score: 11       Therapeutic Activities and Exercises:   Patient educated on role of PT in the hospital.   Pt educated on plan and goals with physical therapy.   Pt educated on importance of OOB activity to decrease the " risks associated with bed rest.   Instruction provided for safety and technique for gait and transfers with RW.      Patient left up in chair with all lines intact, call button in reach and nurse notified..    GOALS:   Multidisciplinary Problems     Physical Therapy Goals        Problem: Physical Therapy Goal    Goal Priority Disciplines Outcome Goal Variances Interventions   Physical Therapy Goal     PT, PT/OT Ongoing, Progressing     Description: Goals to be met by: 20    Patient will increase functional independence with mobility by performin. Supine to sit with Stand-by Assistance  2. Sit to supine with Stand-by Assistance  3. Sit to stand transfer with Stand-by Assistance  4. Gait  x 100 feet with Stand-by Assistance using Rolling Walker.   5.  Patient will demonstrate independence with a home exercise program  6. Patient's balance will be GOOD: Needs SUPERVISION only during gait and able to self right with moderate LOB.  7. Ascend/Descend 6 inch curb step with Contact Guard Assistance using Rolling Walker.                   Time Tracking:     PT Received On: 20  PT Start Time: 0955     PT Stop Time: 1018  PT Total Time (min): 23 min     Billable Minutes: Gait Training 8 and Therapeutic Activity 15    Treatment Type: Treatment  PT/PTA: PT     PTA Visit Number: 0     Meme Mccabe, PT  2020

## 2020-11-24 NOTE — DISCHARGE SUMMARY
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 Pt. Reports that she was walking to the bathroom when her leg "gave out" and she fell forward striking her head and then her left hip. She denies any preceding lightheadedness, SOB, palpitations, or chest pain and states that she was in her USOH until the fall occurred. At baseline, the patient reports that she uses a walker at baseline. She lives in a two story house and climb stairs with assistance. She reports that is limited by arthritis pains and unsteadiness, but she denies any chest pains or significant SOB with exertion      11/21/2020  Procedure(s) (LRB):  Left- Percutaneous Screws- Large  arm Clock side (Left)   Surgeon(s):  MD Yvonne Gonzalez MD Parth Nitin Desai, MD Hunter L Bohlen, MD Kyle David Planchard, MD       Hospital Course:   Patient admitted to [facility] Hospital Medicine Team H: Hip Fracture team and started on Hip Fracture Pathway with Orthopedic surgery consult for hip fracture. Patient was seen and evaluated by Orthopedic surgery who recommended operative repair of hip fracture. Patient was medically optimized prior to surgery and was taken to OR after optimization on 11/21/2020. Patient underwent left hip percutaneous screw fixation by Dr. Leandro White. Post-op patient WBAT to the left lower extremity as " per Orthopedics recommendation. Patient placed on Lovenox 40 mg subcutaneous daily and CHRISTOPHE/SCD's for DVT prophylaxis post-op and will need for total of 28 days. Perineural pain catheter placed by Anesthesia Pain Service with continuous infusion of Ropivacaine to help with pain control post-op and Anesthesia Pain Service managing while patient in the hospital. Patient placed on multimodal pain management with scheduled Tylenol 1000 mg po every 8 hours and Lyrica 75 mg po nightly post-op for pain control. PT/OT consulted post-op and recommending SNF and CM/SW sent multiple referrals for patient. Pain controlled post-op. Patent accepted and discharged to [facility] on 11/24. Perineural pain catheter removed by Anesthesia prior to discharge and pain well controlled on Tylenol alone. Patient discharged on Lovenox 40 mg subcutaneous daily for DVT prophylaxis. Surgical bandage to remain in place until Orthopedic clinic follow-up. Patient WBAT to left lower extremity on discharge.      Consults:   Consults (From admission, onward)        Status Ordering Provider     Inpatient consult to Neurosurgery  Once     Provider:  (Not yet assigned)    Completed YANIRA ALANIZ     Inpatient consult to Orthopedic Surgery  Once     Provider:  (Not yet assigned)    Completed YANIRA ALANIZ     Inpatient consult to Social Work/Case Management  Once     Provider:  (Not yet assigned)    Completed AUGUSTINA DORMAN          * Closed impacted fracture of left femoral neck with routine healing s/p percutaneous screw fixation on 11/21/2020  Patient is POD # 3 on day of discharge.  · Patient reports minimal pain in left hip.   · Plan is to continue PT/OT for gait training and strengthening and restoration of ADL's on discharge to [facility]. Patient is FWB/WBAT: left lower extremity as per Orthopedic recommendations on discharge.   · Plan to continue Lovenox 40 mg subcutaneous daily for total of 28 days post-op for DVT prophylaxis  after hip fracture surgery and patient discharge to [facility] on Lovenox.   · Perineural pain catheter removed by Anesthesia on am of 11/24 prior to discharge.   · Patient to continue scheduled Tylenol 1000 mg po every 8 hours prn for pain control post-op on discharge.    · Surgical bandage to remain in place to left hip until Orthopedic clinic follow-up.       Type 2 diabetes mellitus without complication, without long-term current use of insulin  Improved control in the hospital in past 24 hours. Patient on Metformin at home to treat as outpatient. Holding Metformin here in hospital but patient to resume Metformin on hospital discharge to treat diabetes.     Blood Sugars (AccuCheck):    Recent Labs     11/22/20  1257 11/22/20  1659 11/22/20  2106 11/23/20  2239   POCTGLUCOSE 196* 256* 277* 193*        · HgA1C 6.5% and at goal on this admit.  · Plan is to monitor POCT glucose 4 times a day with each meal and at bedtime and cover with Novolog low dose sliding scale insulin on discharge to [facility].   · 2000 yue diabetic diet on discharge.    Essential hypertension  · Patient's blood pressure is controlled here in the hospital.  · Goal for blood pressure is SBP < 140 and DBP < 90 as patient > or = 60 years of age and patient is diabetic based on JNC 8 guidelines.   · Continue current treatment regimen of Norvasc 10 mg po daily + Lisinopril 40 mg po daily + Toprol  mg po daily to treat on discharge.       Age-related osteoporosis with current pathological fracture with routine healing  Vitamin D deficiency   Patient admitted with hip fracture related to osteoporosis.   · Patient has been counseled on need to avoid smoking and moderation of alcohol use with regular weightbearing exercise to help reduce risk of future fractures associated with osteoporosis.   · Vitamin D level 14 on this admit consistent with moderate to severe deficiency so started on Vitamin D 50,000 units po weekly to treat and will  discharge from hospital on this dose in additional to calcium replacement to treat osteoporosis.   · Patient will need outpatient DEXA scan and further evaluation for additional treatment of osteoporosis.     Chronic gout of multiple sites  Chronic and controlled. Continue Allopurinol 300 mg po daily to treat on discharge.       Recurrent major depressive disorder, in full remission  Chronci and controlled. Continue Venlafaxine 150 mg po every am and 75 mg po every evening to treat on discharge.       Pure hypercholesterolemia  Chronic and controlled. Continue Lipitor 10 mg po daily to treat on discharge.       Meningioma  Noted on CT imaging on admit. 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       History of transient ischemic attack (TIA)  Patient with prior history of TIA. Continue home meds of Plavix and Lipitor in hospital for prevention on discharge.       Final Active Diagnoses:    Diagnosis Date Noted POA    PRINCIPAL PROBLEM:  Closed impacted fracture of left femoral neck with routine healing s/p percutaneous screw fixation on 11/21/2020 [S72.092D] 11/20/2020 Not Applicable    Type 2 diabetes mellitus without complication, without long-term current use of insulin [E11.9] 11/25/2019 Yes    Essential hypertension [I10] 11/25/2019 Yes    Age-related osteoporosis with current pathological fracture with routine healing [M80.00XD]  Not Applicable    Chronic gout of multiple sites [M1A.09X0]  Yes    Recurrent major depressive disorder, in full remission [F33.42]  Yes    Pure hypercholesterolemia [E78.00] 11/25/2019 Yes    Meningioma [D32.9] 11/22/2020 Yes    History of transient ischemic attack (TIA) [Z86.73] 11/25/2019 Not Applicable    Vitamin D deficiency [E55.9] 11/23/2020 Yes    Fall [W19.XXXA] 11/20/2020 Yes      Problems Resolved During this Admission:       Discharged Condition: good    Disposition: Skilled Nursing  Facility ([facility])    Follow Up:  Follow-up in Orthopedic clinic in 2 weeks for post-op follow-up with Dr. Leandro White.    Patient Instructions:      Ambulatory referral/consult to Orthopedics Fracture Care   Standing Status: Future   Referral Priority: Routine Referral Type: Consultation   Requested Specialty: Orthopedic Surgery   Number of Visits Requested: 1     Diet diabetic     Leave dressing on - Keep it clean, dry, and intact until clinic visit     Activity as tolerated     Weight bearing restrictions (specify):   Order Comments: Weight bearing as tolerated to left lower extremity       Significant Diagnostic Studies: Labs:   CMP   Recent Labs   Lab 11/23/20  0648 11/24/20  0355    134*   K 4.5 4.3    101   CO2 22* 23   * 168*   BUN 13 12   CREATININE 0.7 0.7   CALCIUM 9.8 9.8   PROT 7.5 7.5   ALBUMIN 3.5 3.4*   BILITOT 0.4 0.5   ALKPHOS 62 66   AST 23 21   ALT 10 9*   ANIONGAP 11 10   ESTGFRAFRICA >60.0 >60.0   EGFRNONAA >60.0 >60.0   , CBC   Recent Labs   Lab 11/23/20  0648 11/24/20  0355   WBC 9.20 8.64   HGB 13.0 13.3   HCT 38.9 39.1    188    and A1C:   Recent Labs   Lab 11/20/20  2134   HGBA1C 6.5*     Lab Results   Component Value Date    [id number] 14 (L) 11/23/2020       Pending Diagnostic Studies:     None         Medications:  Reconciled Home Medications:      Medication List      START taking these medications    acetaminophen 500 MG tablet  Commonly known as: TYLENOL  Take 2 tablets (1,000 mg total) by mouth every 8 (eight) hours as needed (Mild pain).     calcium carbonate 500 mg calcium (1,250 mg) tablet  Commonly known as: OS-ELODIA  Take 1 tablet (500 mg total) by mouth 2 (two) times daily.     enoxaparin 40 mg/0.4 mL Syrg  Commonly known as: LOVENOX  Inject 0.4 mLs (40 mg total) into the skin once daily. End date 12/14/2020. for 20 days     ergocalciferol 50,000 unit Cap  Commonly known as: ERGOCALCIFEROL  Take 1 capsule (50,000 Units total) by mouth every 7  days.  Start taking on: November 30, 2020     insulin aspart U-100 100 unit/mL (3 mL) Inpn pen  Commonly known as: NovoLOG  Inject 0-5 Units into the skin before meals and at bedtime as needed (Hyperglycemia).        CHANGE how you take these medications    metoprolol succinate 100 MG 24 hr tablet  Commonly known as: TOPROL-XL  Take 1 tablet (100 mg total) by mouth once daily.  What changed:   · how much to take  · how to take this  · when to take this  · additional instructions        CONTINUE taking these medications    alendronate 70 MG tablet  Commonly known as: FOSAMAX  Take 1 tablet (70 mg total) by mouth every 7 days.     allopurinoL 300 MG tablet  Commonly known as: ZYLOPRIM  Take 1 tablet (300 mg total) by mouth once daily.     amLODIPine 10 MG tablet  Commonly known as: NORVASC  Take 1 tablet (10 mg total) by mouth once daily.     atorvastatin 10 MG tablet  Commonly known as: LIPITOR  Take 1 tablet (10 mg total) by mouth once daily.     blood sugar diagnostic Strp  To check BG three times daily, to use with insurance preferred meter     blood-glucose meter kit  To check BG three times daily, to use with insurance preferred meter     clopidogreL 75 mg tablet  Commonly known as: PLAVIX  Take 1 tablet (75 mg total) by mouth once daily.     fexofenadine 180 MG tablet  Commonly known as: ALLEGRA  Take 180 mg by mouth daily as needed.     fluticasone propionate 50 mcg/actuation nasal spray  Commonly known as: FLONASE  1 spray by Each Nare route once daily.     lancets Misc  To check BG three times daily, to use with insurance preferred meter     lisinopriL 40 MG tablet  Commonly known as: PRINIVIL,ZESTRIL  Take 1 tablet (40 mg total) by mouth once daily.     metFORMIN 500 MG ER 24hr tablet  Commonly known as: GLUCOPHAGE-XR  Take 1 tablet (500 mg total) by mouth daily with breakfast.     ranitidine 150 MG tablet  Commonly known as: ZANTAC  Take 150 mg by mouth 2 (two) times daily.     venlafaxine 75 MG  tablet  Commonly known as: EFFEXOR  2 tabs po QAM and 1 po QHS            Indwelling Lines/Drains at time of discharge:   Lines/Drains/Airways     None                 Time spent on the discharge of patient: 32 minutes  Patient was seen and examined on the date of discharge and determined to be suitable for discharge.         Jenny Olsen MD  Department of Hospital Medicine  [facility]

## 2020-11-24 NOTE — ASSESSMENT & PLAN NOTE
Patient is POD # 3 on day of discharge.  · Patient reports minimal pain in left hip.   · Plan is to continue PT/OT for gait training and strengthening and restoration of ADL's on discharge to [facility]. Patient is FWB/WBAT: left lower extremity as per Orthopedic recommendations on discharge.   · Plan to continue Lovenox 40 mg subcutaneous daily for total of 28 days post-op for DVT prophylaxis after hip fracture surgery and patient discharge to [facility] on Lovenox.   · Perineural pain catheter removed by Anesthesia on am of 11/24 prior to discharge.   · Patient to continue scheduled Tylenol 1000 mg po every 8 hours prn for pain control post-op on discharge.    · Surgical bandage to remain in place to left hip until Orthopedic clinic follow-up.

## 2020-11-24 NOTE — ADDENDUM NOTE
Addendum  created 11/24/20 1129 by Yaquelin Reid MD    Charge Capture section accepted, Cosign clinical note with attestation, Intraprocedure Event edited

## 2020-11-24 NOTE — PLAN OF CARE
[facility]     Department of Hospital Medicine     [address] [place] [address]     [phone] after hours  [phone] fax       NURSING HOME ORDERS    11/24/2020    Admit to [facility]:  Skilled Bed                                            Diagnoses:  Active Hospital Problems    Diagnosis  POA    *Closed impacted fracture of left femoral neck with routine healing s/p percutaneous screw fixation on 11/21/2020 [S72.092D]  Not Applicable     Priority: 1 - High    Type 2 diabetes mellitus without complication, without long-term current use of insulin [E11.9]  Yes     Priority: 2     Essential hypertension [I10]  Yes     Priority: 3     Age-related osteoporosis with current pathological fracture with routine healing [M80.00XD]  Not Applicable     Priority: 4     Chronic gout of multiple sites [M1A.09X0]  Yes     Priority: 5     Recurrent major depressive disorder, in full remission [F33.42]  Yes     Priority: 6     Pure hypercholesterolemia [E78.00]  Yes     Priority: 7     Meningioma [D32.9]  Yes     Priority: 8     History of transient ischemic attack (TIA) [Z86.73]  Not Applicable     Priority: 9     Vitamin D deficiency [E55.9]  Yes    Fall [W19.XXXA]  Yes      Resolved Hospital Problems   No resolved problems to display.       Allergies:  Review of patient's allergies indicates:   Allergen Reactions    Baclofen      AMS and distorted dremas       Vitals: Every shift (Skilled Nursing patients)        Code Status: Full Code     Diet: diabetic diet: 2200 calorie     Activities:   - Up in a chair each morning as tolerated   - Ambulate with assistance to bathroom   - May use walker, cane, or self-propelled wheelchair   - Weight bearing: FWB/WBAT: left lower extremity    LABS: Per facility protocol    Nursing: Out of bed BID, Up with assistance    Nursing Precautions:          - Fall precautions per nursing home protocol      CONSULTS:      Physical Therapy to evaluate and treat 5 times a week     Occupational Therapy to evaluate and treat 5 times a week      WOUND CARE ORDERS  Keep Aquacel AG dressing in place that was applied post-op and leave on left hip and do not remove until Orthopedic clinic follow-up. Assess surgical dressing with each treatment. Call MD if any drainage reaches border to border of dressing horizontally, signs or symptoms of infection, temp >101 F, induration, swelling or redness.                DIABETES CARE:   SN to perform and educate Diabetic management with blood glucose monitoring:, Fingerstick blood sugar before meals and nightly and Report CBG < 60 or > 350 to physician.                                          Insulin Sliding Scale          Glucose  Novolog Insulin Subcutaneous        0 - 60   Orange juice or glucose tablet, hold insulin      No insulin   201-250  2 units   251-300  4 units   301-350  6 units   351-400  8 units   >400   10 units then call physician      Medications:     acetaminophen (TYLENOL) 500 MG tablet  Take 2 tablets (1,000 mg total) by mouth every 8 (eight) hours as needed (Mild pain).             alendronate (FOSAMAX) 70 MG tablet  Take 1 tablet (70 mg total) by mouth every 7 days.             allopurinoL (ZYLOPRIM) 300 MG tablet  Take 1 tablet (300 mg total) by mouth once daily.             amLODIPine (NORVASC) 10 MG tablet  Take 1 tablet (10 mg total) by mouth once daily.             atorvastatin (LIPITOR) 10 MG tablet  Take 1 tablet (10 mg total) by mouth once daily.             calcium carbonate (OS-ELODIA) 500 mg calcium (1,250 mg) tablet  Take 1 tablet (500 mg total) by mouth 2 (two) times daily.             clopidogreL (PLAVIX) 75 mg tablet  Take 1 tablet (75 mg total) by mouth once daily.             enoxaparin (LOVENOX) 40 mg/0.4 mL Syrg  Inject 0.4 mLs (40 mg total) into the skin once daily. End date 12/14/2020.              ergocalciferol (ERGOCALCIFEROL) 50,000 unit Cap  Take 1  capsule (50,000 Units total) by mouth every 7 days.             fexofenadine (ALLEGRA) 180 MG tablet  Take 180 mg by mouth daily as needed for seasonal allergies.              fluticasone (FLONASE) 50 mcg/actuation nasal spray  1 spray by Each Nare route once daily.             insulin aspart U-100 (NOVOLOG) 100 unit/mL (3 mL) InPn pen  Inject 0-5 Units into the skin before meals and at bedtime as needed (Hyperglycemia).             lisinopriL (PRINIVIL,ZESTRIL) 40 MG tablet  Take 1 tablet (40 mg total) by mouth once daily.             metFORMIN (GLUCOPHAGE-XR) 500 MG ER 24hr tablet  Take 1 tablet (500 mg total) by mouth daily with breakfast.             metoprolol succinate (TOPROL-XL) 100 MG 24 hr tablet  Take 1 tablet (100 mg total) by mouth once daily.             venlafaxine (EFFEXOR) 75 MG tablet  2 tablets po every morning and 1 tablet po every evening.                    Follow-up:   Follow-up with Orthopedic clinic with Dr. Leandro White in 2 weeks.      _________________________________  Jenny Olsen MD  11/24/2020
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
## 2020-11-25 PROBLEM — S72.092D: Status: RESOLVED | Noted: 2020-11-20 | Resolved: 2020-11-25

## 2020-11-25 NOTE — ASSESSMENT & PLAN NOTE
Patient with prior history of TIA. Continue home meds of Plavix and Lipitor in hospital for prevention on discharge.

## 2020-11-25 NOTE — ASSESSMENT & PLAN NOTE
· Patient's blood pressure is controlled here in the hospital.  · Goal for blood pressure is SBP < 140 and DBP < 90 as patient > or = 60 years of age and patient is diabetic based on JNC 8 guidelines.   · Continue current treatment regimen of Norvasc 10 mg po daily + Lisinopril 40 mg po daily + Toprol  mg po daily to treat on discharge.

## 2020-11-25 NOTE — ASSESSMENT & PLAN NOTE
Vitamin D deficiency   Patient admitted with hip fracture related to osteoporosis.   · Patient has been counseled on need to avoid smoking and moderation of alcohol use with regular weightbearing exercise to help reduce risk of future fractures associated with osteoporosis.   · Vitamin D level 14 on this admit consistent with moderate to severe deficiency so started on Vitamin D 50,000 units po weekly to treat and will discharge from hospital on this dose in additional to calcium replacement to treat osteoporosis.   · Patient will need outpatient DEXA scan and further evaluation for additional treatment of osteoporosis.

## 2020-11-25 NOTE — ASSESSMENT & PLAN NOTE
Chronci and controlled. Continue Venlafaxine 150 mg po every am and 75 mg po every evening to treat on discharge.

## 2020-11-25 NOTE — PLAN OF CARE
Patient discharged to [facility] on 11/24/20.   11/25/20 1046   Final Note   Assessment Type Final Discharge Note   Anticipated Discharge Disposition SNF   What phone number can be called within the next 1-3 days to see how you are doing after discharge?   ([phone])   Hospital Follow Up  Appt(s) scheduled? Yes   Discharge plans and expectations educations in teach back method with documentation complete? Yes   Right Care Referral Info   Post Acute Recommendation SNF / Sub-Acute Rehab   Referral Type Skilled Nursing Facility   Facility Name [facility]

## 2020-12-07 ENCOUNTER — OFFICE VISIT (OUTPATIENT)
Dept: ORTHOPEDICS | Facility: CLINIC | Age: 85
End: 2020-12-07
Payer: MEDICARE

## 2020-12-07 DIAGNOSIS — Z87.81 S/P LEFT HIP FRACTURE: Primary | ICD-10-CM

## 2020-12-07 PROCEDURE — 1101F PR PT FALLS ASSESS DOC 0-1 FALLS W/OUT INJ PAST YR: ICD-10-PCS | Mod: CPTII,S$GLB,, | Performed by: NURSE PRACTITIONER

## 2020-12-07 PROCEDURE — 99024 PR POST-OP FOLLOW-UP VISIT: ICD-10-PCS | Mod: S$GLB,,, | Performed by: NURSE PRACTITIONER

## 2020-12-07 PROCEDURE — 1101F PT FALLS ASSESS-DOCD LE1/YR: CPT | Mod: CPTII,S$GLB,, | Performed by: NURSE PRACTITIONER

## 2020-12-07 PROCEDURE — 3288F PR FALLS RISK ASSESSMENT DOCUMENTED: ICD-10-PCS | Mod: CPTII,S$GLB,, | Performed by: NURSE PRACTITIONER

## 2020-12-07 PROCEDURE — 3288F FALL RISK ASSESSMENT DOCD: CPT | Mod: CPTII,S$GLB,, | Performed by: NURSE PRACTITIONER

## 2020-12-07 PROCEDURE — 99024 POSTOP FOLLOW-UP VISIT: CPT | Mod: S$GLB,,, | Performed by: NURSE PRACTITIONER

## 2020-12-07 PROCEDURE — 1125F AMNT PAIN NOTED PAIN PRSNT: CPT | Mod: S$GLB,,, | Performed by: NURSE PRACTITIONER

## 2020-12-07 PROCEDURE — 99999 PR PBB SHADOW E&M-EST. PATIENT-LVL III: ICD-10-PCS | Mod: PBBFAC,,, | Performed by: NURSE PRACTITIONER

## 2020-12-07 PROCEDURE — 1125F PR PAIN SEVERITY QUANTIFIED, PAIN PRESENT: ICD-10-PCS | Mod: S$GLB,,, | Performed by: NURSE PRACTITIONER

## 2020-12-07 PROCEDURE — 99999 PR PBB SHADOW E&M-EST. PATIENT-LVL III: CPT | Mod: PBBFAC,,, | Performed by: NURSE PRACTITIONER

## 2020-12-07 RX ORDER — HYDROCODONE BITARTRATE AND ACETAMINOPHEN 5; 325 MG/1; MG/1
1 TABLET ORAL EVERY 6 HOURS PRN
Qty: 28 TABLET | Refills: 0 | Status: SHIPPED | OUTPATIENT
Start: 2020-12-07 | End: 2020-12-17

## 2020-12-07 NOTE — PROGRESS NOTES
Farheen Soares presents for initial post-operative visit following a left total hip femoral neck pinning performed by Dr. White on 11/21/2020.  She comes to this visit in a wheelchair accompanied by her daughter who lives down the [place] from her apartment.     Exam:  Patient is transitioning well with assistance from the chair to the toilet and bed  Incision is clean and dry without drainage or erythema. Sutures removed without difficulty.     Initial post-operative radiographs reviewed today revealing satisfactory position of the prosthesis.    A/P  2 weeks s/p left total hip replacement  - Patient advised to keep the incision clean and dry for the next 24 hours after which she may wash the area with antibacterial soap in the shower, but is advised not to submerge until the incision is completely healed.  - Continue lovenox for 1 month from surgery.  - Pain medication refilled with norco 5/325mg for bedtime  - Follow up in 4 weeks with me with xray. Pt will call clinic immediately with problems/concerns.

## 2020-12-29 DIAGNOSIS — M10.9 GOUT, UNSPECIFIED CAUSE, UNSPECIFIED CHRONICITY, UNSPECIFIED SITE: ICD-10-CM

## 2020-12-29 DIAGNOSIS — E11.9 TYPE 2 DIABETES MELLITUS WITHOUT COMPLICATION, WITHOUT LONG-TERM CURRENT USE OF INSULIN: ICD-10-CM

## 2020-12-29 DIAGNOSIS — E78.5 HYPERLIPIDEMIA, UNSPECIFIED HYPERLIPIDEMIA TYPE: ICD-10-CM

## 2020-12-30 RX ORDER — ALLOPURINOL 300 MG/1
300 TABLET ORAL DAILY
Qty: 90 TABLET | Refills: 0 | Status: SHIPPED | OUTPATIENT
Start: 2020-12-30 | End: 2021-03-28

## 2020-12-30 RX ORDER — ATORVASTATIN CALCIUM 10 MG/1
10 TABLET, FILM COATED ORAL DAILY
Qty: 90 TABLET | Refills: 0 | OUTPATIENT
Start: 2020-12-30 | End: 2021-12-30

## 2020-12-30 RX ORDER — METFORMIN HYDROCHLORIDE 500 MG/1
500 TABLET, EXTENDED RELEASE ORAL
Qty: 90 TABLET | Refills: 0
Start: 2020-12-30 | End: 2021-02-08 | Stop reason: SDUPTHER

## 2021-01-06 ENCOUNTER — OFFICE VISIT (OUTPATIENT)
Dept: ORTHOPEDICS | Facility: CLINIC | Age: 86
End: 2021-01-06
Payer: MEDICARE

## 2021-01-06 ENCOUNTER — HOSPITAL ENCOUNTER (OUTPATIENT)
Dept: RADIOLOGY | Facility: HOSPITAL | Age: 86
Discharge: HOME | End: 2021-01-06
Attending: NURSE PRACTITIONER
Payer: MEDICARE

## 2021-01-06 DIAGNOSIS — M25.559 HIP PAIN: Primary | ICD-10-CM

## 2021-01-06 DIAGNOSIS — M25.559 HIP PAIN: ICD-10-CM

## 2021-01-06 PROCEDURE — 73502 XR HIP 2 VIEW LEFT: ICD-10-PCS | Mod: 26,LT,, | Performed by: RADIOLOGY

## 2021-01-06 PROCEDURE — 1101F PT FALLS ASSESS-DOCD LE1/YR: CPT | Mod: CPTII,S$GLB,, | Performed by: NURSE PRACTITIONER

## 2021-01-06 PROCEDURE — 99024 POSTOP FOLLOW-UP VISIT: CPT | Mod: S$GLB,,, | Performed by: NURSE PRACTITIONER

## 2021-01-06 PROCEDURE — 73502 X-RAY EXAM HIP UNI 2-3 VIEWS: CPT | Mod: TC,LT

## 2021-01-06 PROCEDURE — 99999 PR PBB SHADOW E&M-EST. PATIENT-LVL III: ICD-10-PCS | Mod: PBBFAC,,, | Performed by: NURSE PRACTITIONER

## 2021-01-06 PROCEDURE — 3288F FALL RISK ASSESSMENT DOCD: CPT | Mod: CPTII,S$GLB,, | Performed by: NURSE PRACTITIONER

## 2021-01-06 PROCEDURE — 99024 PR POST-OP FOLLOW-UP VISIT: ICD-10-PCS | Mod: S$GLB,,, | Performed by: NURSE PRACTITIONER

## 2021-01-06 PROCEDURE — 1101F PR PT FALLS ASSESS DOC 0-1 FALLS W/OUT INJ PAST YR: ICD-10-PCS | Mod: CPTII,S$GLB,, | Performed by: NURSE PRACTITIONER

## 2021-01-06 PROCEDURE — 3288F PR FALLS RISK ASSESSMENT DOCUMENTED: ICD-10-PCS | Mod: CPTII,S$GLB,, | Performed by: NURSE PRACTITIONER

## 2021-01-06 PROCEDURE — 1125F AMNT PAIN NOTED PAIN PRSNT: CPT | Mod: S$GLB,,, | Performed by: NURSE PRACTITIONER

## 2021-01-06 PROCEDURE — 73502 X-RAY EXAM HIP UNI 2-3 VIEWS: CPT | Mod: 26,LT,, | Performed by: RADIOLOGY

## 2021-01-06 PROCEDURE — 1125F PR PAIN SEVERITY QUANTIFIED, PAIN PRESENT: ICD-10-PCS | Mod: S$GLB,,, | Performed by: NURSE PRACTITIONER

## 2021-01-06 PROCEDURE — 99999 PR PBB SHADOW E&M-EST. PATIENT-LVL III: CPT | Mod: PBBFAC,,, | Performed by: NURSE PRACTITIONER

## 2021-01-07 RX ORDER — ATORVASTATIN CALCIUM 10 MG/1
10 TABLET, FILM COATED ORAL DAILY
Qty: 90 TABLET | Refills: 0 | Status: SHIPPED | OUTPATIENT
Start: 2021-01-07 | End: 2021-02-08 | Stop reason: SDUPTHER

## 2021-01-10 ENCOUNTER — IMMUNIZATION (OUTPATIENT)
Dept: INTERNAL MEDICINE | Facility: CLINIC | Age: 86
End: 2021-01-10
Payer: MEDICARE

## 2021-01-10 DIAGNOSIS — Z23 NEED FOR VACCINATION: ICD-10-CM

## 2021-01-10 PROCEDURE — 91300 COVID-19, MRNA, LNP-S, PF, 30 MCG/0.3 ML DOSE VACCINE: CPT | Mod: PBBFAC | Performed by: INTERNAL MEDICINE

## 2021-01-31 ENCOUNTER — IMMUNIZATION (OUTPATIENT)
Dept: INTERNAL MEDICINE | Facility: CLINIC | Age: 86
End: 2021-01-31
Payer: MEDICARE

## 2021-01-31 DIAGNOSIS — Z23 NEED FOR VACCINATION: Primary | ICD-10-CM

## 2021-01-31 PROCEDURE — 91300 PR SARS-COV- 2 COVID-19 VACCINE, NO PRSV, 30MCG/0.3ML, IM: CPT | Mod: PBBFAC | Performed by: INTERNAL MEDICINE

## 2021-01-31 PROCEDURE — 0002A PR IMMUNIZ ADMIN, SARS-COV-2 COVID-19 VACC, 30MCG/0.3ML, 2ND DOSE: CPT | Mod: PBBFAC | Performed by: INTERNAL MEDICINE

## 2021-01-31 RX ADMIN — RNA INGREDIENT BNT-162B2 0.3 ML: 0.23 INJECTION, SUSPENSION INTRAMUSCULAR at 09:01

## 2021-02-08 ENCOUNTER — TELEPHONE (OUTPATIENT)
Dept: PRIMARY CARE CLINIC | Facility: CLINIC | Age: 86
End: 2021-02-08

## 2021-02-08 ENCOUNTER — OFFICE VISIT (OUTPATIENT)
Dept: PRIMARY CARE CLINIC | Facility: CLINIC | Age: 86
End: 2021-02-08
Payer: MEDICARE

## 2021-02-08 VITALS
HEIGHT: 63 IN | BODY MASS INDEX: 27.23 KG/M2 | HEART RATE: 67 BPM | OXYGEN SATURATION: 97 % | TEMPERATURE: 99 F | WEIGHT: 153.69 LBS | DIASTOLIC BLOOD PRESSURE: 74 MMHG | SYSTOLIC BLOOD PRESSURE: 138 MMHG

## 2021-02-08 DIAGNOSIS — D32.9 BENIGN NEOPLASM OF MENINGES, UNSPECIFIED: ICD-10-CM

## 2021-02-08 DIAGNOSIS — M54.41 CHRONIC BILATERAL LOW BACK PAIN WITH BILATERAL SCIATICA: ICD-10-CM

## 2021-02-08 DIAGNOSIS — E55.9 VITAMIN D DEFICIENCY: ICD-10-CM

## 2021-02-08 DIAGNOSIS — E11.9 TYPE 2 DIABETES MELLITUS WITHOUT COMPLICATION, WITHOUT LONG-TERM CURRENT USE OF INSULIN: ICD-10-CM

## 2021-02-08 DIAGNOSIS — I10 HYPERTENSION, UNSPECIFIED TYPE: ICD-10-CM

## 2021-02-08 DIAGNOSIS — G89.29 CHRONIC BILATERAL LOW BACK PAIN WITH BILATERAL SCIATICA: ICD-10-CM

## 2021-02-08 DIAGNOSIS — R39.15 URINARY URGENCY: ICD-10-CM

## 2021-02-08 DIAGNOSIS — M81.0 OSTEOPOROSIS, UNSPECIFIED OSTEOPOROSIS TYPE, UNSPECIFIED PATHOLOGICAL FRACTURE PRESENCE: ICD-10-CM

## 2021-02-08 DIAGNOSIS — M10.9 GOUT, UNSPECIFIED CAUSE, UNSPECIFIED CHRONICITY, UNSPECIFIED SITE: ICD-10-CM

## 2021-02-08 DIAGNOSIS — E11.9 TYPE 2 DIABETES MELLITUS WITHOUT COMPLICATION, WITHOUT LONG-TERM CURRENT USE OF INSULIN: Primary | ICD-10-CM

## 2021-02-08 DIAGNOSIS — F32.A DEPRESSION, UNSPECIFIED DEPRESSION TYPE: ICD-10-CM

## 2021-02-08 DIAGNOSIS — M54.42 CHRONIC BILATERAL LOW BACK PAIN WITH BILATERAL SCIATICA: ICD-10-CM

## 2021-02-08 DIAGNOSIS — F33.42 MAJOR DEPRESSIVE DISORDER, RECURRENT, IN FULL REMISSION: ICD-10-CM

## 2021-02-08 DIAGNOSIS — G91.2 NPH (NORMAL PRESSURE HYDROCEPHALUS): Primary | ICD-10-CM

## 2021-02-08 DIAGNOSIS — E78.5 HYPERLIPIDEMIA, UNSPECIFIED HYPERLIPIDEMIA TYPE: ICD-10-CM

## 2021-02-08 DIAGNOSIS — Z86.73 HISTORY OF TRANSIENT ISCHEMIC ATTACK (TIA): ICD-10-CM

## 2021-02-08 PROCEDURE — 1100F PTFALLS ASSESS-DOCD GE2>/YR: CPT | Mod: CPTII,S$GLB,, | Performed by: INTERNAL MEDICINE

## 2021-02-08 PROCEDURE — 99499 RISK ADDL DX/OHS AUDIT: ICD-10-PCS | Mod: S$GLB,,, | Performed by: INTERNAL MEDICINE

## 2021-02-08 PROCEDURE — 99214 OFFICE O/P EST MOD 30 MIN: CPT | Mod: S$GLB,,, | Performed by: INTERNAL MEDICINE

## 2021-02-08 PROCEDURE — 3078F DIAST BP <80 MM HG: CPT | Mod: CPTII,S$GLB,, | Performed by: INTERNAL MEDICINE

## 2021-02-08 PROCEDURE — 3075F PR MOST RECENT SYSTOLIC BLOOD PRESS GE 130-139MM HG: ICD-10-PCS | Mod: CPTII,S$GLB,, | Performed by: INTERNAL MEDICINE

## 2021-02-08 PROCEDURE — 1100F PR PT FALLS ASSESS DOC 2+ FALLS/FALL W/INJURY/YR: ICD-10-PCS | Mod: CPTII,S$GLB,, | Performed by: INTERNAL MEDICINE

## 2021-02-08 PROCEDURE — 99999 PR PBB SHADOW E&M-EST. PATIENT-LVL III: CPT | Mod: PBBFAC,,, | Performed by: INTERNAL MEDICINE

## 2021-02-08 PROCEDURE — 3075F SYST BP GE 130 - 139MM HG: CPT | Mod: CPTII,S$GLB,, | Performed by: INTERNAL MEDICINE

## 2021-02-08 PROCEDURE — 1159F PR MEDICATION LIST DOCUMENTED IN MEDICAL RECORD: ICD-10-PCS | Mod: S$GLB,,, | Performed by: INTERNAL MEDICINE

## 2021-02-08 PROCEDURE — 3078F PR MOST RECENT DIASTOLIC BLOOD PRESSURE < 80 MM HG: ICD-10-PCS | Mod: CPTII,S$GLB,, | Performed by: INTERNAL MEDICINE

## 2021-02-08 PROCEDURE — 3288F FALL RISK ASSESSMENT DOCD: CPT | Mod: CPTII,S$GLB,, | Performed by: INTERNAL MEDICINE

## 2021-02-08 PROCEDURE — 1159F MED LIST DOCD IN RCRD: CPT | Mod: S$GLB,,, | Performed by: INTERNAL MEDICINE

## 2021-02-08 PROCEDURE — 1126F AMNT PAIN NOTED NONE PRSNT: CPT | Mod: S$GLB,,, | Performed by: INTERNAL MEDICINE

## 2021-02-08 PROCEDURE — 99999 PR PBB SHADOW E&M-EST. PATIENT-LVL III: ICD-10-PCS | Mod: PBBFAC,,, | Performed by: INTERNAL MEDICINE

## 2021-02-08 PROCEDURE — 99214 PR OFFICE/OUTPT VISIT, EST, LEVL IV, 30-39 MIN: ICD-10-PCS | Mod: S$GLB,,, | Performed by: INTERNAL MEDICINE

## 2021-02-08 PROCEDURE — 99499 UNLISTED E&M SERVICE: CPT | Mod: S$GLB,,, | Performed by: INTERNAL MEDICINE

## 2021-02-08 PROCEDURE — 1126F PR PAIN SEVERITY QUANTIFIED, NO PAIN PRESENT: ICD-10-PCS | Mod: S$GLB,,, | Performed by: INTERNAL MEDICINE

## 2021-02-08 PROCEDURE — 3288F PR FALLS RISK ASSESSMENT DOCUMENTED: ICD-10-PCS | Mod: CPTII,S$GLB,, | Performed by: INTERNAL MEDICINE

## 2021-02-08 RX ORDER — LISINOPRIL 40 MG/1
40 TABLET ORAL DAILY
Qty: 90 TABLET | Refills: 1 | Status: SHIPPED | OUTPATIENT
Start: 2021-02-08 | End: 2021-08-09 | Stop reason: SDUPTHER

## 2021-02-08 RX ORDER — CLOPIDOGREL BISULFATE 75 MG/1
75 TABLET ORAL DAILY
Qty: 90 TABLET | Refills: 1 | Status: ON HOLD | OUTPATIENT
Start: 2021-02-08 | End: 2021-07-12 | Stop reason: HOSPADM

## 2021-02-08 RX ORDER — VENLAFAXINE HYDROCHLORIDE 150 MG/1
150 CAPSULE, EXTENDED RELEASE ORAL NIGHTLY
COMMUNITY
Start: 2021-01-14 | End: 2021-02-08 | Stop reason: SDUPTHER

## 2021-02-08 RX ORDER — MINERAL OIL
180 ENEMA (ML) RECTAL DAILY PRN
Qty: 90 TABLET | Refills: 1 | Status: SHIPPED | OUTPATIENT
Start: 2021-02-08 | End: 2022-06-29

## 2021-02-08 RX ORDER — METOPROLOL SUCCINATE 100 MG/1
100 TABLET, EXTENDED RELEASE ORAL DAILY
Qty: 90 TABLET | Refills: 1 | Status: SHIPPED | OUTPATIENT
Start: 2021-02-08 | End: 2021-08-09 | Stop reason: SDUPTHER

## 2021-02-08 RX ORDER — AMLODIPINE BESYLATE 10 MG/1
10 TABLET ORAL DAILY
Qty: 90 TABLET | Refills: 1 | Status: SHIPPED | OUTPATIENT
Start: 2021-02-08 | End: 2021-08-09 | Stop reason: SDUPTHER

## 2021-02-08 RX ORDER — OXYCODONE AND ACETAMINOPHEN 5; 325 MG/1; MG/1
1 TABLET ORAL EVERY 4 HOURS PRN
COMMUNITY
End: 2021-08-09

## 2021-02-08 RX ORDER — METFORMIN HYDROCHLORIDE 500 MG/1
500 TABLET, EXTENDED RELEASE ORAL
Qty: 90 TABLET | Refills: 0
Start: 2021-02-08 | End: 2021-03-29 | Stop reason: SDUPTHER

## 2021-02-08 RX ORDER — VENLAFAXINE HYDROCHLORIDE 150 MG/1
150 CAPSULE, EXTENDED RELEASE ORAL NIGHTLY
Qty: 90 CAPSULE | Refills: 3 | Status: SHIPPED | OUTPATIENT
Start: 2021-02-08 | End: 2022-02-10 | Stop reason: SDUPTHER

## 2021-02-08 RX ORDER — TRAMADOL HYDROCHLORIDE 50 MG/1
50 TABLET ORAL EVERY 12 HOURS PRN
Qty: 14 TABLET | Refills: 0 | Status: SHIPPED | OUTPATIENT
Start: 2021-02-08 | End: 2021-02-15

## 2021-02-08 RX ORDER — ALENDRONATE SODIUM 70 MG/1
70 TABLET ORAL
Qty: 12 TABLET | Refills: 0 | Status: SHIPPED | OUTPATIENT
Start: 2021-02-08 | End: 2021-03-02

## 2021-02-08 RX ORDER — ATORVASTATIN CALCIUM 10 MG/1
10 TABLET, FILM COATED ORAL DAILY
Qty: 90 TABLET | Refills: 0 | Status: SHIPPED | OUTPATIENT
Start: 2021-02-08 | End: 2021-08-09 | Stop reason: SDUPTHER

## 2021-02-10 ENCOUNTER — TELEPHONE (OUTPATIENT)
Dept: NEUROLOGY | Facility: CLINIC | Age: 86
End: 2021-02-10

## 2021-03-15 ENCOUNTER — TELEPHONE (OUTPATIENT)
Dept: PRIMARY CARE CLINIC | Facility: CLINIC | Age: 86
End: 2021-03-15

## 2021-03-22 ENCOUNTER — LAB VISIT (OUTPATIENT)
Dept: LAB | Facility: HOSPITAL | Age: 86
End: 2021-03-22
Attending: INTERNAL MEDICINE
Payer: MEDICARE

## 2021-03-22 DIAGNOSIS — E78.5 HYPERLIPIDEMIA, UNSPECIFIED HYPERLIPIDEMIA TYPE: ICD-10-CM

## 2021-03-22 DIAGNOSIS — M10.9 GOUT, UNSPECIFIED CAUSE, UNSPECIFIED CHRONICITY, UNSPECIFIED SITE: ICD-10-CM

## 2021-03-22 DIAGNOSIS — E55.9 VITAMIN D DEFICIENCY: ICD-10-CM

## 2021-03-22 LAB
25(OH)D3+25(OH)D2 SERPL-MCNC: 14 NG/ML (ref 30–96)
CHOLEST SERPL-MCNC: 139 MG/DL (ref 120–199)
CHOLEST/HDLC SERPL: 3.7 {RATIO} (ref 2–5)
HDLC SERPL-MCNC: 38 MG/DL (ref 40–75)
HDLC SERPL: 27.3 % (ref 20–50)
LDLC SERPL CALC-MCNC: 56.6 MG/DL (ref 63–159)
NONHDLC SERPL-MCNC: 101 MG/DL
TRIGL SERPL-MCNC: 222 MG/DL (ref 30–150)
URATE SERPL-MCNC: 3.6 MG/DL (ref 2.4–5.7)

## 2021-03-22 PROCEDURE — 84550 ASSAY OF BLOOD/URIC ACID: CPT | Performed by: INTERNAL MEDICINE

## 2021-03-22 PROCEDURE — 36415 COLL VENOUS BLD VENIPUNCTURE: CPT | Mod: PN | Performed by: INTERNAL MEDICINE

## 2021-03-22 PROCEDURE — 82306 VITAMIN D 25 HYDROXY: CPT | Performed by: INTERNAL MEDICINE

## 2021-03-22 PROCEDURE — 80061 LIPID PANEL: CPT | Performed by: INTERNAL MEDICINE

## 2021-03-29 DIAGNOSIS — E55.9 VITAMIN D DEFICIENCY: Primary | ICD-10-CM

## 2021-03-29 RX ORDER — ERGOCALCIFEROL 1.25 MG/1
50000 CAPSULE ORAL
Qty: 12 CAPSULE | Refills: 0
Start: 2021-03-29 | End: 2021-06-15

## 2021-04-29 ENCOUNTER — LAB VISIT (OUTPATIENT)
Dept: LAB | Facility: HOSPITAL | Age: 86
End: 2021-04-29
Attending: PSYCHIATRY & NEUROLOGY
Payer: MEDICARE

## 2021-04-29 ENCOUNTER — OFFICE VISIT (OUTPATIENT)
Dept: NEUROLOGY | Facility: CLINIC | Age: 86
End: 2021-04-29
Payer: MEDICARE

## 2021-04-29 VITALS
DIASTOLIC BLOOD PRESSURE: 76 MMHG | BODY MASS INDEX: 27.93 KG/M2 | WEIGHT: 157.63 LBS | SYSTOLIC BLOOD PRESSURE: 149 MMHG | HEART RATE: 66 BPM | HEIGHT: 63 IN

## 2021-04-29 DIAGNOSIS — R27.0 ATAXIA, UNSPECIFIED: ICD-10-CM

## 2021-04-29 DIAGNOSIS — G91.2 NPH (NORMAL PRESSURE HYDROCEPHALUS): ICD-10-CM

## 2021-04-29 DIAGNOSIS — R41.89 COGNITIVE CHANGES: Primary | ICD-10-CM

## 2021-04-29 DIAGNOSIS — R27.0 ATAXIA: ICD-10-CM

## 2021-04-29 LAB — VIT B12 SERPL-MCNC: 420 PG/ML (ref 210–950)

## 2021-04-29 PROCEDURE — 99205 OFFICE O/P NEW HI 60 MIN: CPT | Mod: S$GLB,,, | Performed by: PSYCHIATRY & NEUROLOGY

## 2021-04-29 PROCEDURE — 99499 RISK ADDL DX/OHS AUDIT: ICD-10-PCS | Mod: S$GLB,,, | Performed by: PSYCHIATRY & NEUROLOGY

## 2021-04-29 PROCEDURE — 99205 PR OFFICE/OUTPT VISIT, NEW, LEVL V, 60-74 MIN: ICD-10-PCS | Mod: S$GLB,,, | Performed by: PSYCHIATRY & NEUROLOGY

## 2021-04-29 PROCEDURE — 1159F PR MEDICATION LIST DOCUMENTED IN MEDICAL RECORD: ICD-10-PCS | Mod: S$GLB,,, | Performed by: PSYCHIATRY & NEUROLOGY

## 2021-04-29 PROCEDURE — 99999 PR PBB SHADOW E&M-EST. PATIENT-LVL IV: ICD-10-PCS | Mod: PBBFAC,,, | Performed by: PSYCHIATRY & NEUROLOGY

## 2021-04-29 PROCEDURE — 1126F AMNT PAIN NOTED NONE PRSNT: CPT | Mod: S$GLB,,, | Performed by: PSYCHIATRY & NEUROLOGY

## 2021-04-29 PROCEDURE — 99999 PR PBB SHADOW E&M-EST. PATIENT-LVL IV: CPT | Mod: PBBFAC,,, | Performed by: PSYCHIATRY & NEUROLOGY

## 2021-04-29 PROCEDURE — 1159F MED LIST DOCD IN RCRD: CPT | Mod: S$GLB,,, | Performed by: PSYCHIATRY & NEUROLOGY

## 2021-04-29 PROCEDURE — 82607 VITAMIN B-12: CPT | Performed by: PSYCHIATRY & NEUROLOGY

## 2021-04-29 PROCEDURE — 1126F PR PAIN SEVERITY QUANTIFIED, NO PAIN PRESENT: ICD-10-PCS | Mod: S$GLB,,, | Performed by: PSYCHIATRY & NEUROLOGY

## 2021-04-29 PROCEDURE — 83921 ORGANIC ACID SINGLE QUANT: CPT | Performed by: PSYCHIATRY & NEUROLOGY

## 2021-04-29 PROCEDURE — 99499 UNLISTED E&M SERVICE: CPT | Mod: S$GLB,,, | Performed by: PSYCHIATRY & NEUROLOGY

## 2021-04-29 PROCEDURE — 36415 COLL VENOUS BLD VENIPUNCTURE: CPT | Performed by: PSYCHIATRY & NEUROLOGY

## 2021-05-04 LAB — METHYLMALONATE SERPL-SCNC: 0.35 UMOL/L

## 2021-05-07 ENCOUNTER — HOSPITAL ENCOUNTER (OUTPATIENT)
Dept: RADIOLOGY | Facility: HOSPITAL | Age: 86
Discharge: HOME | End: 2021-05-07
Attending: PSYCHIATRY & NEUROLOGY
Payer: MEDICARE

## 2021-05-07 DIAGNOSIS — R27.0 ATAXIA, UNSPECIFIED: ICD-10-CM

## 2021-05-07 PROCEDURE — 70551 MRI BRAIN STEM W/O DYE: CPT | Mod: 26,,, | Performed by: RADIOLOGY

## 2021-05-07 PROCEDURE — 70551 MRI BRAIN WITHOUT CONTRAST: ICD-10-PCS | Mod: 26,,, | Performed by: RADIOLOGY

## 2021-05-07 PROCEDURE — 70551 MRI BRAIN STEM W/O DYE: CPT | Mod: TC

## 2021-05-10 ENCOUNTER — PATIENT MESSAGE (OUTPATIENT)
Dept: NEUROLOGY | Facility: CLINIC | Age: 86
End: 2021-05-10

## 2021-05-14 ENCOUNTER — TELEPHONE (OUTPATIENT)
Dept: NEUROSURGERY | Facility: CLINIC | Age: 86
End: 2021-05-14

## 2021-05-28 ENCOUNTER — PATIENT OUTREACH (OUTPATIENT)
Dept: ADMINISTRATIVE | Facility: OTHER | Age: 86
End: 2021-05-28

## 2021-05-28 DIAGNOSIS — E11.9 TYPE 2 DIABETES MELLITUS WITHOUT COMPLICATION, WITHOUT LONG-TERM CURRENT USE OF INSULIN: Primary | ICD-10-CM

## 2021-06-01 ENCOUNTER — OFFICE VISIT (OUTPATIENT)
Dept: NEUROSURGERY | Facility: CLINIC | Age: 86
End: 2021-06-01
Payer: MEDICARE

## 2021-06-01 VITALS
BODY MASS INDEX: 27.73 KG/M2 | WEIGHT: 156.5 LBS | HEART RATE: 67 BPM | DIASTOLIC BLOOD PRESSURE: 70 MMHG | SYSTOLIC BLOOD PRESSURE: 129 MMHG

## 2021-06-01 DIAGNOSIS — G93.89 CEREBRAL VENTRICULOMEGALY: ICD-10-CM

## 2021-06-01 DIAGNOSIS — R26.9 GAIT DISTURBANCE: Primary | ICD-10-CM

## 2021-06-01 PROCEDURE — 1159F PR MEDICATION LIST DOCUMENTED IN MEDICAL RECORD: ICD-10-PCS | Mod: S$GLB,,, | Performed by: NEUROLOGICAL SURGERY

## 2021-06-01 PROCEDURE — 3288F PR FALLS RISK ASSESSMENT DOCUMENTED: ICD-10-PCS | Mod: CPTII,S$GLB,, | Performed by: NEUROLOGICAL SURGERY

## 2021-06-01 PROCEDURE — 1100F PR PT FALLS ASSESS DOC 2+ FALLS/FALL W/INJURY/YR: ICD-10-PCS | Mod: CPTII,S$GLB,, | Performed by: NEUROLOGICAL SURGERY

## 2021-06-01 PROCEDURE — 99204 OFFICE O/P NEW MOD 45 MIN: CPT | Mod: S$GLB,,, | Performed by: NEUROLOGICAL SURGERY

## 2021-06-01 PROCEDURE — 1125F AMNT PAIN NOTED PAIN PRSNT: CPT | Mod: S$GLB,,, | Performed by: NEUROLOGICAL SURGERY

## 2021-06-01 PROCEDURE — 1159F MED LIST DOCD IN RCRD: CPT | Mod: S$GLB,,, | Performed by: NEUROLOGICAL SURGERY

## 2021-06-01 PROCEDURE — 3288F FALL RISK ASSESSMENT DOCD: CPT | Mod: CPTII,S$GLB,, | Performed by: NEUROLOGICAL SURGERY

## 2021-06-01 PROCEDURE — 99999 PR PBB SHADOW E&M-EST. PATIENT-LVL V: ICD-10-PCS | Mod: PBBFAC,,, | Performed by: NEUROLOGICAL SURGERY

## 2021-06-01 PROCEDURE — 1100F PTFALLS ASSESS-DOCD GE2>/YR: CPT | Mod: CPTII,S$GLB,, | Performed by: NEUROLOGICAL SURGERY

## 2021-06-01 PROCEDURE — 1125F PR PAIN SEVERITY QUANTIFIED, PAIN PRESENT: ICD-10-PCS | Mod: S$GLB,,, | Performed by: NEUROLOGICAL SURGERY

## 2021-06-01 PROCEDURE — 99204 PR OFFICE/OUTPT VISIT, NEW, LEVL IV, 45-59 MIN: ICD-10-PCS | Mod: S$GLB,,, | Performed by: NEUROLOGICAL SURGERY

## 2021-06-01 PROCEDURE — 99999 PR PBB SHADOW E&M-EST. PATIENT-LVL V: CPT | Mod: PBBFAC,,, | Performed by: NEUROLOGICAL SURGERY

## 2021-06-02 ENCOUNTER — TELEPHONE (OUTPATIENT)
Dept: PRIMARY CARE CLINIC | Facility: CLINIC | Age: 86
End: 2021-06-02

## 2021-06-04 ENCOUNTER — TELEPHONE (OUTPATIENT)
Dept: NEUROSURGERY | Facility: CLINIC | Age: 86
End: 2021-06-04

## 2021-06-07 ENCOUNTER — TELEPHONE (OUTPATIENT)
Dept: NEUROSURGERY | Facility: CLINIC | Age: 86
End: 2021-06-07

## 2021-06-07 ENCOUNTER — CLINICAL SUPPORT (OUTPATIENT)
Dept: REHABILITATION | Facility: HOSPITAL | Age: 86
End: 2021-06-07
Attending: NEUROLOGICAL SURGERY
Payer: MEDICARE

## 2021-06-07 DIAGNOSIS — R26.9 GAIT DISTURBANCE: ICD-10-CM

## 2021-06-07 DIAGNOSIS — G93.89 CEREBRAL VENTRICULOMEGALY: ICD-10-CM

## 2021-06-07 PROCEDURE — 97161 PT EVAL LOW COMPLEX 20 MIN: CPT | Mod: PO

## 2021-06-30 ENCOUNTER — TELEPHONE (OUTPATIENT)
Dept: NEUROSURGERY | Facility: CLINIC | Age: 86
End: 2021-06-30

## 2021-07-07 ENCOUNTER — PATIENT MESSAGE (OUTPATIENT)
Dept: SURGERY | Facility: HOSPITAL | Age: 86
End: 2021-07-07

## 2021-07-09 ENCOUNTER — ANESTHESIA EVENT (OUTPATIENT)
Dept: SURGERY | Facility: HOSPITAL | Age: 86
End: 2021-07-09
Payer: MEDICARE

## 2021-07-09 ENCOUNTER — TELEPHONE (OUTPATIENT)
Dept: NEUROSURGERY | Facility: CLINIC | Age: 86
End: 2021-07-09

## 2021-07-09 ENCOUNTER — PATIENT MESSAGE (OUTPATIENT)
Dept: NEUROSURGERY | Facility: CLINIC | Age: 86
End: 2021-07-09

## 2021-07-12 ENCOUNTER — CLINICAL SUPPORT (OUTPATIENT)
Dept: REHABILITATION | Facility: HOSPITAL | Age: 86
End: 2021-07-12
Attending: NEUROLOGICAL SURGERY
Payer: MEDICARE

## 2021-07-12 ENCOUNTER — HOSPITAL ENCOUNTER (OUTPATIENT)
Facility: HOSPITAL | Age: 86
Discharge: HOME | End: 2021-07-12
Attending: NEUROLOGICAL SURGERY | Admitting: NEUROLOGICAL SURGERY
Payer: MEDICARE

## 2021-07-12 ENCOUNTER — ANESTHESIA (OUTPATIENT)
Dept: SURGERY | Facility: HOSPITAL | Age: 86
End: 2021-07-12
Payer: MEDICARE

## 2021-07-12 VITALS
TEMPERATURE: 98 F | BODY MASS INDEX: 27.73 KG/M2 | SYSTOLIC BLOOD PRESSURE: 146 MMHG | OXYGEN SATURATION: 95 % | HEART RATE: 80 BPM | WEIGHT: 156.5 LBS | DIASTOLIC BLOOD PRESSURE: 75 MMHG | RESPIRATION RATE: 20 BRPM

## 2021-07-12 DIAGNOSIS — G93.89 CEREBRAL VENTRICULOMEGALY: Primary | ICD-10-CM

## 2021-07-12 DIAGNOSIS — R26.9 GAIT DISTURBANCE: ICD-10-CM

## 2021-07-12 DIAGNOSIS — G91.2 NPH (NORMAL PRESSURE HYDROCEPHALUS): ICD-10-CM

## 2021-07-12 LAB
APTT BLDCRRT: 22.8 SEC (ref 21–32)
BASOPHILS # BLD AUTO: 0.03 K/UL (ref 0–0.2)
BASOPHILS NFR BLD: 0.5 % (ref 0–1.9)
CLARITY CSF: ABNORMAL
COLOR CSF: ABNORMAL
DIFFERENTIAL METHOD: ABNORMAL
EOSINOPHIL # BLD AUTO: 0.1 K/UL (ref 0–0.5)
EOSINOPHIL NFR BLD: 0.9 % (ref 0–8)
EOSINOPHIL NFR CSF MANUAL: 3 %
ERYTHROCYTE [DISTWIDTH] IN BLOOD BY AUTOMATED COUNT: 13.9 % (ref 11.5–14.5)
GLUCOSE CSF-MCNC: 96 MG/DL (ref 40–70)
HCT VFR BLD AUTO: 44.3 % (ref 37–48.5)
HGB BLD-MCNC: 15.1 G/DL (ref 12–16)
IMM GRANULOCYTES # BLD AUTO: 0.02 K/UL (ref 0–0.04)
IMM GRANULOCYTES NFR BLD AUTO: 0.3 % (ref 0–0.5)
INR PPP: 1 (ref 0.8–1.2)
LYMPHOCYTES # BLD AUTO: 2.1 K/UL (ref 1–4.8)
LYMPHOCYTES NFR BLD: 32.9 % (ref 18–48)
LYMPHOCYTES NFR CSF MANUAL: 9 % (ref 40–80)
MCH RBC QN AUTO: 34 PG (ref 27–31)
MCHC RBC AUTO-ENTMCNC: 34.1 G/DL (ref 32–36)
MCV RBC AUTO: 100 FL (ref 82–98)
MONOCYTES # BLD AUTO: 0.6 K/UL (ref 0.3–1)
MONOCYTES NFR BLD: 8.9 % (ref 4–15)
MONOS+MACROS NFR CSF MANUAL: 7 % (ref 15–45)
NEUTROPHILS # BLD AUTO: 3.6 K/UL (ref 1.8–7.7)
NEUTROPHILS NFR BLD: 56.5 % (ref 38–73)
NEUTROPHILS NFR CSF MANUAL: 81 % (ref 0–6)
NRBC BLD-RTO: 0 /100 WBC
PLATELET # BLD AUTO: 224 K/UL (ref 150–450)
PMV BLD AUTO: 11.1 FL (ref 9.2–12.9)
POCT GLUCOSE: 171 MG/DL (ref 70–110)
POCT GLUCOSE: 184 MG/DL (ref 70–110)
PROT CSF-MCNC: 85 MG/DL (ref 15–40)
PROTHROMBIN TIME: 10.7 SEC (ref 9–12.5)
RBC # BLD AUTO: 4.44 M/UL (ref 4–5.4)
RBC # CSF: ABNORMAL /CU MM
SPECIMEN VOL CSF: 1 ML
WBC # BLD AUTO: 6.41 K/UL (ref 3.9–12.7)
WBC # CSF: 56 /CU MM (ref 0–5)

## 2021-07-12 PROCEDURE — 71000044 HC DOSC ROUTINE RECOVERY FIRST HOUR: Performed by: NEUROLOGICAL SURGERY

## 2021-07-12 PROCEDURE — 25000003 PHARM REV CODE 250: Performed by: NURSE ANESTHETIST, CERTIFIED REGISTERED

## 2021-07-12 PROCEDURE — 62270 DX LMBR SPI PNXR: CPT | Mod: ,,, | Performed by: NEUROLOGICAL SURGERY

## 2021-07-12 PROCEDURE — 36000705 HC OR TIME LEV I EA ADD 15 MIN: Performed by: NEUROLOGICAL SURGERY

## 2021-07-12 PROCEDURE — 97110 THERAPEUTIC EXERCISES: CPT | Mod: PO

## 2021-07-12 PROCEDURE — 25000003 PHARM REV CODE 250: Performed by: NEUROLOGICAL SURGERY

## 2021-07-12 PROCEDURE — 71000045 HC DOSC ROUTINE RECOVERY EA ADD'L HR: Performed by: NEUROLOGICAL SURGERY

## 2021-07-12 PROCEDURE — 25000003 PHARM REV CODE 250: Performed by: STUDENT IN AN ORGANIZED HEALTH CARE EDUCATION/TRAINING PROGRAM

## 2021-07-12 PROCEDURE — 63600175 PHARM REV CODE 636 W HCPCS: Performed by: NURSE ANESTHETIST, CERTIFIED REGISTERED

## 2021-07-12 PROCEDURE — D9220A PRA ANESTHESIA: Mod: CRNA,,, | Performed by: NURSE ANESTHETIST, CERTIFIED REGISTERED

## 2021-07-12 PROCEDURE — 82962 GLUCOSE BLOOD TEST: CPT | Performed by: NEUROLOGICAL SURGERY

## 2021-07-12 PROCEDURE — D9220A PRA ANESTHESIA: ICD-10-PCS | Mod: ANES,,, | Performed by: STUDENT IN AN ORGANIZED HEALTH CARE EDUCATION/TRAINING PROGRAM

## 2021-07-12 PROCEDURE — 71000015 HC POSTOP RECOV 1ST HR: Performed by: NEUROLOGICAL SURGERY

## 2021-07-12 PROCEDURE — 37000008 HC ANESTHESIA 1ST 15 MINUTES: Performed by: NEUROLOGICAL SURGERY

## 2021-07-12 PROCEDURE — 62270 PR SPINAL PUNCTURE,LUMBAR,DIAGNOSTIC: ICD-10-PCS | Mod: ,,, | Performed by: NEUROLOGICAL SURGERY

## 2021-07-12 PROCEDURE — 85610 PROTHROMBIN TIME: CPT | Performed by: STUDENT IN AN ORGANIZED HEALTH CARE EDUCATION/TRAINING PROGRAM

## 2021-07-12 PROCEDURE — 36000704 HC OR TIME LEV I 1ST 15 MIN: Performed by: NEUROLOGICAL SURGERY

## 2021-07-12 PROCEDURE — D9220A PRA ANESTHESIA: ICD-10-PCS | Mod: CRNA,,, | Performed by: NURSE ANESTHETIST, CERTIFIED REGISTERED

## 2021-07-12 PROCEDURE — 85730 THROMBOPLASTIN TIME PARTIAL: CPT | Performed by: STUDENT IN AN ORGANIZED HEALTH CARE EDUCATION/TRAINING PROGRAM

## 2021-07-12 PROCEDURE — 82962 GLUCOSE BLOOD TEST: CPT | Mod: 91 | Performed by: NEUROLOGICAL SURGERY

## 2021-07-12 PROCEDURE — 63600175 PHARM REV CODE 636 W HCPCS: Performed by: STUDENT IN AN ORGANIZED HEALTH CARE EDUCATION/TRAINING PROGRAM

## 2021-07-12 PROCEDURE — 37000009 HC ANESTHESIA EA ADD 15 MINS: Performed by: NEUROLOGICAL SURGERY

## 2021-07-12 PROCEDURE — 84157 ASSAY OF PROTEIN OTHER: CPT | Performed by: NEUROLOGICAL SURGERY

## 2021-07-12 PROCEDURE — 82945 GLUCOSE OTHER FLUID: CPT | Performed by: NEUROLOGICAL SURGERY

## 2021-07-12 PROCEDURE — 89051 BODY FLUID CELL COUNT: CPT | Performed by: NEUROLOGICAL SURGERY

## 2021-07-12 PROCEDURE — D9220A PRA ANESTHESIA: Mod: ANES,,, | Performed by: STUDENT IN AN ORGANIZED HEALTH CARE EDUCATION/TRAINING PROGRAM

## 2021-07-12 PROCEDURE — 85025 COMPLETE CBC W/AUTO DIFF WBC: CPT | Performed by: STUDENT IN AN ORGANIZED HEALTH CARE EDUCATION/TRAINING PROGRAM

## 2021-07-12 RX ORDER — CEPHALEXIN 500 MG/1
500 CAPSULE ORAL 4 TIMES DAILY
Qty: 20 CAPSULE | Refills: 0 | Status: SHIPPED | OUTPATIENT
Start: 2021-07-12 | End: 2021-07-17

## 2021-07-12 RX ORDER — ACETAMINOPHEN 325 MG/1
650 TABLET ORAL EVERY 6 HOURS PRN
Status: DISCONTINUED | OUTPATIENT
Start: 2021-07-12 | End: 2021-07-12 | Stop reason: HOSPADM

## 2021-07-12 RX ORDER — SODIUM CHLORIDE 9 MG/ML
INJECTION, SOLUTION INTRAVENOUS CONTINUOUS
Status: DISCONTINUED | OUTPATIENT
Start: 2021-07-12 | End: 2021-07-12 | Stop reason: HOSPADM

## 2021-07-12 RX ORDER — DEXTROMETHORPHAN HYDROBROMIDE, GUAIFENESIN 5; 100 MG/5ML; MG/5ML
650 LIQUID ORAL EVERY 8 HOURS PRN
Qty: 60 TABLET | Refills: 0 | Status: ON HOLD | OUTPATIENT
Start: 2021-07-12 | End: 2022-06-29 | Stop reason: HOSPADM

## 2021-07-12 RX ORDER — PROPOFOL 10 MG/ML
VIAL (ML) INTRAVENOUS
Status: DISCONTINUED | OUTPATIENT
Start: 2021-07-12 | End: 2021-07-12

## 2021-07-12 RX ORDER — SODIUM CHLORIDE 0.9 % (FLUSH) 0.9 %
10 SYRINGE (ML) INJECTION
Status: DISCONTINUED | OUTPATIENT
Start: 2021-07-12 | End: 2021-07-12 | Stop reason: HOSPADM

## 2021-07-12 RX ORDER — LIDOCAINE HYDROCHLORIDE 20 MG/ML
INJECTION INTRAVENOUS
Status: DISCONTINUED | OUTPATIENT
Start: 2021-07-12 | End: 2021-07-12

## 2021-07-12 RX ORDER — MUPIROCIN 20 MG/G
1 OINTMENT TOPICAL 2 TIMES DAILY
Status: DISCONTINUED | OUTPATIENT
Start: 2021-07-12 | End: 2021-07-12 | Stop reason: HOSPADM

## 2021-07-12 RX ORDER — MUPIROCIN 20 MG/G
OINTMENT TOPICAL
Status: DISCONTINUED | OUTPATIENT
Start: 2021-07-12 | End: 2021-07-12 | Stop reason: HOSPADM

## 2021-07-12 RX ORDER — LIDOCAINE HYDROCHLORIDE 10 MG/ML
INJECTION, SOLUTION EPIDURAL; INFILTRATION; INTRACAUDAL; PERINEURAL
Status: DISCONTINUED | OUTPATIENT
Start: 2021-07-12 | End: 2021-07-12 | Stop reason: HOSPADM

## 2021-07-12 RX ORDER — CEFAZOLIN SODIUM 1 G/3ML
2 INJECTION, POWDER, FOR SOLUTION INTRAMUSCULAR; INTRAVENOUS
Status: COMPLETED | OUTPATIENT
Start: 2021-07-12 | End: 2021-07-12

## 2021-07-12 RX ORDER — ACETAMINOPHEN 325 MG/1
650 TABLET ORAL ONCE
Status: COMPLETED | OUTPATIENT
Start: 2021-07-12 | End: 2021-07-12

## 2021-07-12 RX ORDER — PROPOFOL 10 MG/ML
VIAL (ML) INTRAVENOUS CONTINUOUS PRN
Status: DISCONTINUED | OUTPATIENT
Start: 2021-07-12 | End: 2021-07-12

## 2021-07-12 RX ADMIN — SODIUM CHLORIDE: 0.9 INJECTION, SOLUTION INTRAVENOUS at 07:07

## 2021-07-12 RX ADMIN — CEFAZOLIN 2 G: 330 INJECTION, POWDER, FOR SOLUTION INTRAMUSCULAR; INTRAVENOUS at 07:07

## 2021-07-12 RX ADMIN — ACETAMINOPHEN 650 MG: 325 TABLET ORAL at 11:07

## 2021-07-12 RX ADMIN — PROPOFOL 30 MG: 10 INJECTION, EMULSION INTRAVENOUS at 07:07

## 2021-07-12 RX ADMIN — MUPIROCIN: 20 OINTMENT TOPICAL at 06:07

## 2021-07-12 RX ADMIN — Medication 50 MCG/KG/MIN: at 07:07

## 2021-07-12 RX ADMIN — LIDOCAINE HYDROCHLORIDE 100 MG: 20 INJECTION, SOLUTION INTRAVENOUS at 07:07

## 2021-07-13 ENCOUNTER — CLINICAL SUPPORT (OUTPATIENT)
Dept: REHABILITATION | Facility: HOSPITAL | Age: 86
End: 2021-07-13
Attending: NEUROLOGICAL SURGERY
Payer: MEDICARE

## 2021-07-13 DIAGNOSIS — R26.9 GAIT DISTURBANCE: ICD-10-CM

## 2021-07-13 PROCEDURE — 97110 THERAPEUTIC EXERCISES: CPT | Mod: PO

## 2021-07-14 ENCOUNTER — OFFICE VISIT (OUTPATIENT)
Dept: NEUROSURGERY | Facility: CLINIC | Age: 86
End: 2021-07-14
Payer: MEDICARE

## 2021-07-14 VITALS
WEIGHT: 156.5 LBS | HEART RATE: 71 BPM | BODY MASS INDEX: 27.73 KG/M2 | SYSTOLIC BLOOD PRESSURE: 172 MMHG | DIASTOLIC BLOOD PRESSURE: 76 MMHG

## 2021-07-14 DIAGNOSIS — G91.2 NPH (NORMAL PRESSURE HYDROCEPHALUS): Primary | ICD-10-CM

## 2021-07-14 PROCEDURE — 1159F PR MEDICATION LIST DOCUMENTED IN MEDICAL RECORD: ICD-10-PCS | Mod: S$GLB,,, | Performed by: NEUROLOGICAL SURGERY

## 2021-07-14 PROCEDURE — 1126F AMNT PAIN NOTED NONE PRSNT: CPT | Mod: S$GLB,,, | Performed by: NEUROLOGICAL SURGERY

## 2021-07-14 PROCEDURE — 99499 RISK ADDL DX/OHS AUDIT: ICD-10-PCS | Mod: S$GLB,,, | Performed by: NEUROLOGICAL SURGERY

## 2021-07-14 PROCEDURE — 99499 UNLISTED E&M SERVICE: CPT | Mod: S$GLB,,, | Performed by: NEUROLOGICAL SURGERY

## 2021-07-14 PROCEDURE — 99214 PR OFFICE/OUTPT VISIT, EST, LEVL IV, 30-39 MIN: ICD-10-PCS | Mod: S$GLB,,, | Performed by: NEUROLOGICAL SURGERY

## 2021-07-14 PROCEDURE — 3288F PR FALLS RISK ASSESSMENT DOCUMENTED: ICD-10-PCS | Mod: CPTII,S$GLB,, | Performed by: NEUROLOGICAL SURGERY

## 2021-07-14 PROCEDURE — 99214 OFFICE O/P EST MOD 30 MIN: CPT | Mod: S$GLB,,, | Performed by: NEUROLOGICAL SURGERY

## 2021-07-14 PROCEDURE — 99999 PR PBB SHADOW E&M-EST. PATIENT-LVL IV: ICD-10-PCS | Mod: PBBFAC,,, | Performed by: NEUROLOGICAL SURGERY

## 2021-07-14 PROCEDURE — 1126F PR PAIN SEVERITY QUANTIFIED, NO PAIN PRESENT: ICD-10-PCS | Mod: S$GLB,,, | Performed by: NEUROLOGICAL SURGERY

## 2021-07-14 PROCEDURE — 1101F PR PT FALLS ASSESS DOC 0-1 FALLS W/OUT INJ PAST YR: ICD-10-PCS | Mod: CPTII,S$GLB,, | Performed by: NEUROLOGICAL SURGERY

## 2021-07-14 PROCEDURE — 3288F FALL RISK ASSESSMENT DOCD: CPT | Mod: CPTII,S$GLB,, | Performed by: NEUROLOGICAL SURGERY

## 2021-07-14 PROCEDURE — 1101F PT FALLS ASSESS-DOCD LE1/YR: CPT | Mod: CPTII,S$GLB,, | Performed by: NEUROLOGICAL SURGERY

## 2021-07-14 PROCEDURE — 1159F MED LIST DOCD IN RCRD: CPT | Mod: S$GLB,,, | Performed by: NEUROLOGICAL SURGERY

## 2021-07-14 PROCEDURE — 99999 PR PBB SHADOW E&M-EST. PATIENT-LVL IV: CPT | Mod: PBBFAC,,, | Performed by: NEUROLOGICAL SURGERY

## 2021-08-03 ENCOUNTER — PATIENT MESSAGE (OUTPATIENT)
Dept: ADMINISTRATIVE | Facility: HOSPITAL | Age: 86
End: 2021-08-03

## 2021-08-09 ENCOUNTER — OFFICE VISIT (OUTPATIENT)
Dept: PRIMARY CARE CLINIC | Facility: CLINIC | Age: 86
End: 2021-08-09
Payer: MEDICARE

## 2021-08-09 VITALS
SYSTOLIC BLOOD PRESSURE: 130 MMHG | RESPIRATION RATE: 20 BRPM | TEMPERATURE: 99 F | DIASTOLIC BLOOD PRESSURE: 74 MMHG | OXYGEN SATURATION: 96 % | HEART RATE: 69 BPM

## 2021-08-09 DIAGNOSIS — E11.9 TYPE 2 DIABETES MELLITUS WITHOUT COMPLICATION, WITHOUT LONG-TERM CURRENT USE OF INSULIN: Primary | ICD-10-CM

## 2021-08-09 DIAGNOSIS — E78.5 HYPERLIPIDEMIA, UNSPECIFIED HYPERLIPIDEMIA TYPE: ICD-10-CM

## 2021-08-09 DIAGNOSIS — M47.816 LUMBAR SPONDYLOSIS: ICD-10-CM

## 2021-08-09 DIAGNOSIS — G91.2 NPH (NORMAL PRESSURE HYDROCEPHALUS): ICD-10-CM

## 2021-08-09 DIAGNOSIS — I10 HYPERTENSION, UNSPECIFIED TYPE: ICD-10-CM

## 2021-08-09 DIAGNOSIS — M10.9 GOUT, UNSPECIFIED CAUSE, UNSPECIFIED CHRONICITY, UNSPECIFIED SITE: ICD-10-CM

## 2021-08-09 DIAGNOSIS — M80.00XD AGE-RELATED OSTEOPOROSIS WITH CURRENT PATHOLOGICAL FRACTURE WITH ROUTINE HEALING: ICD-10-CM

## 2021-08-09 DIAGNOSIS — E55.9 VITAMIN D DEFICIENCY: ICD-10-CM

## 2021-08-09 DIAGNOSIS — F32.A DEPRESSION, UNSPECIFIED DEPRESSION TYPE: ICD-10-CM

## 2021-08-09 PROCEDURE — 99999 PR PBB SHADOW E&M-EST. PATIENT-LVL IV: CPT | Mod: PBBFAC,,, | Performed by: INTERNAL MEDICINE

## 2021-08-09 PROCEDURE — 1101F PR PT FALLS ASSESS DOC 0-1 FALLS W/OUT INJ PAST YR: ICD-10-PCS | Mod: CPTII,S$GLB,, | Performed by: INTERNAL MEDICINE

## 2021-08-09 PROCEDURE — 3075F SYST BP GE 130 - 139MM HG: CPT | Mod: CPTII,S$GLB,, | Performed by: INTERNAL MEDICINE

## 2021-08-09 PROCEDURE — 1159F PR MEDICATION LIST DOCUMENTED IN MEDICAL RECORD: ICD-10-PCS | Mod: CPTII,S$GLB,, | Performed by: INTERNAL MEDICINE

## 2021-08-09 PROCEDURE — 3075F PR MOST RECENT SYSTOLIC BLOOD PRESS GE 130-139MM HG: ICD-10-PCS | Mod: CPTII,S$GLB,, | Performed by: INTERNAL MEDICINE

## 2021-08-09 PROCEDURE — 1126F AMNT PAIN NOTED NONE PRSNT: CPT | Mod: CPTII,S$GLB,, | Performed by: INTERNAL MEDICINE

## 2021-08-09 PROCEDURE — 99214 PR OFFICE/OUTPT VISIT, EST, LEVL IV, 30-39 MIN: ICD-10-PCS | Mod: S$GLB,,, | Performed by: INTERNAL MEDICINE

## 2021-08-09 PROCEDURE — 3288F FALL RISK ASSESSMENT DOCD: CPT | Mod: CPTII,S$GLB,, | Performed by: INTERNAL MEDICINE

## 2021-08-09 PROCEDURE — 99499 UNLISTED E&M SERVICE: CPT | Mod: S$GLB,,, | Performed by: INTERNAL MEDICINE

## 2021-08-09 PROCEDURE — 1126F PR PAIN SEVERITY QUANTIFIED, NO PAIN PRESENT: ICD-10-PCS | Mod: CPTII,S$GLB,, | Performed by: INTERNAL MEDICINE

## 2021-08-09 PROCEDURE — 3078F PR MOST RECENT DIASTOLIC BLOOD PRESSURE < 80 MM HG: ICD-10-PCS | Mod: CPTII,S$GLB,, | Performed by: INTERNAL MEDICINE

## 2021-08-09 PROCEDURE — 99999 PR PBB SHADOW E&M-EST. PATIENT-LVL IV: ICD-10-PCS | Mod: PBBFAC,,, | Performed by: INTERNAL MEDICINE

## 2021-08-09 PROCEDURE — 3288F PR FALLS RISK ASSESSMENT DOCUMENTED: ICD-10-PCS | Mod: CPTII,S$GLB,, | Performed by: INTERNAL MEDICINE

## 2021-08-09 PROCEDURE — 3078F DIAST BP <80 MM HG: CPT | Mod: CPTII,S$GLB,, | Performed by: INTERNAL MEDICINE

## 2021-08-09 PROCEDURE — 99499 RISK ADDL DX/OHS AUDIT: ICD-10-PCS | Mod: S$GLB,,, | Performed by: INTERNAL MEDICINE

## 2021-08-09 PROCEDURE — 1159F MED LIST DOCD IN RCRD: CPT | Mod: CPTII,S$GLB,, | Performed by: INTERNAL MEDICINE

## 2021-08-09 PROCEDURE — 99214 OFFICE O/P EST MOD 30 MIN: CPT | Mod: S$GLB,,, | Performed by: INTERNAL MEDICINE

## 2021-08-09 PROCEDURE — 1101F PT FALLS ASSESS-DOCD LE1/YR: CPT | Mod: CPTII,S$GLB,, | Performed by: INTERNAL MEDICINE

## 2021-08-09 RX ORDER — ALLOPURINOL 300 MG/1
300 TABLET ORAL DAILY
Qty: 90 TABLET | Refills: 1 | Status: SHIPPED | OUTPATIENT
Start: 2021-08-09 | End: 2022-04-29 | Stop reason: SDUPTHER

## 2021-08-09 RX ORDER — AMLODIPINE BESYLATE 10 MG/1
10 TABLET ORAL DAILY
Qty: 90 TABLET | Refills: 1 | Status: SHIPPED | OUTPATIENT
Start: 2021-08-09 | End: 2022-02-10 | Stop reason: SDUPTHER

## 2021-08-09 RX ORDER — TRAMADOL HYDROCHLORIDE 50 MG/1
50 TABLET ORAL EVERY 12 HOURS PRN
Qty: 14 TABLET | Refills: 0 | Status: SHIPPED | OUTPATIENT
Start: 2021-08-09 | End: 2021-08-16

## 2021-08-09 RX ORDER — LISINOPRIL 40 MG/1
40 TABLET ORAL DAILY
Qty: 90 TABLET | Refills: 1 | Status: SHIPPED | OUTPATIENT
Start: 2021-08-09 | End: 2022-02-10 | Stop reason: SDUPTHER

## 2021-08-09 RX ORDER — ATORVASTATIN CALCIUM 10 MG/1
10 TABLET, FILM COATED ORAL DAILY
Qty: 90 TABLET | Refills: 1 | Status: SHIPPED | OUTPATIENT
Start: 2021-08-09 | End: 2022-02-10 | Stop reason: SDUPTHER

## 2021-08-09 RX ORDER — METFORMIN HYDROCHLORIDE 500 MG/1
500 TABLET, EXTENDED RELEASE ORAL DAILY
Qty: 90 TABLET | Refills: 1 | Status: SHIPPED | OUTPATIENT
Start: 2021-08-09 | End: 2022-02-10 | Stop reason: SDUPTHER

## 2021-08-09 RX ORDER — METOPROLOL SUCCINATE 100 MG/1
100 TABLET, EXTENDED RELEASE ORAL DAILY
Qty: 90 TABLET | Refills: 1 | Status: SHIPPED | OUTPATIENT
Start: 2021-08-09 | End: 2022-02-10 | Stop reason: SDUPTHER

## 2021-08-09 RX ORDER — ERGOCALCIFEROL 1.25 MG/1
50000 CAPSULE ORAL
Qty: 12 CAPSULE | Refills: 0 | Status: SHIPPED | OUTPATIENT
Start: 2021-08-09 | End: 2021-11-01

## 2021-09-02 ENCOUNTER — PATIENT MESSAGE (OUTPATIENT)
Dept: NEUROLOGY | Facility: CLINIC | Age: 86
End: 2021-09-02

## 2021-09-03 ENCOUNTER — PATIENT MESSAGE (OUTPATIENT)
Dept: NEUROSURGERY | Facility: CLINIC | Age: 86
End: 2021-09-03

## 2021-11-12 ENCOUNTER — LAB VISIT (OUTPATIENT)
Dept: LAB | Facility: HOSPITAL | Age: 86
End: 2021-11-12
Attending: INTERNAL MEDICINE
Payer: MEDICARE

## 2021-11-12 DIAGNOSIS — E11.9 TYPE 2 DIABETES MELLITUS WITHOUT COMPLICATION, WITHOUT LONG-TERM CURRENT USE OF INSULIN: ICD-10-CM

## 2021-11-12 DIAGNOSIS — E78.5 HYPERLIPIDEMIA, UNSPECIFIED HYPERLIPIDEMIA TYPE: ICD-10-CM

## 2021-11-12 DIAGNOSIS — I10 HYPERTENSION, UNSPECIFIED TYPE: ICD-10-CM

## 2021-11-12 LAB
ALBUMIN SERPL BCP-MCNC: 4.3 G/DL (ref 3.5–5.2)
ALP SERPL-CCNC: 62 U/L (ref 55–135)
ALT SERPL W/O P-5'-P-CCNC: 18 U/L (ref 10–44)
ANION GAP SERPL CALC-SCNC: 11 MMOL/L (ref 8–16)
AST SERPL-CCNC: 32 U/L (ref 10–40)
BILIRUB SERPL-MCNC: 0.5 MG/DL (ref 0.1–1)
BUN SERPL-MCNC: 16 MG/DL (ref 8–23)
CALCIUM SERPL-MCNC: 10.3 MG/DL (ref 8.7–10.5)
CHLORIDE SERPL-SCNC: 107 MMOL/L (ref 95–110)
CHOLEST SERPL-MCNC: 103 MG/DL (ref 120–199)
CHOLEST/HDLC SERPL: 3 {RATIO} (ref 2–5)
CO2 SERPL-SCNC: 20 MMOL/L (ref 23–29)
CREAT SERPL-MCNC: 0.8 MG/DL (ref 0.5–1.4)
EST. GFR  (AFRICAN AMERICAN): >60 ML/MIN/1.73 M^2
EST. GFR  (NON AFRICAN AMERICAN): >60 ML/MIN/1.73 M^2
ESTIMATED AVG GLUCOSE: 157 MG/DL (ref 68–131)
ESTIMATED AVG GLUCOSE: 157 MG/DL (ref 68–131)
GLUCOSE SERPL-MCNC: 159 MG/DL (ref 70–110)
HBA1C MFR BLD: 7.1 % (ref 4–5.6)
HBA1C MFR BLD: 7.1 % (ref 4–5.6)
HDLC SERPL-MCNC: 34 MG/DL (ref 40–75)
HDLC SERPL: 33 % (ref 20–50)
LDLC SERPL CALC-MCNC: 41.8 MG/DL (ref 63–159)
NONHDLC SERPL-MCNC: 69 MG/DL
POTASSIUM SERPL-SCNC: 4.6 MMOL/L (ref 3.5–5.1)
PROT SERPL-MCNC: 8.4 G/DL (ref 6–8.4)
SODIUM SERPL-SCNC: 138 MMOL/L (ref 136–145)
TRIGL SERPL-MCNC: 136 MG/DL (ref 30–150)

## 2021-11-12 PROCEDURE — 80053 COMPREHEN METABOLIC PANEL: CPT | Performed by: INTERNAL MEDICINE

## 2021-11-12 PROCEDURE — 36415 COLL VENOUS BLD VENIPUNCTURE: CPT | Performed by: INTERNAL MEDICINE

## 2021-11-12 PROCEDURE — 80061 LIPID PANEL: CPT | Performed by: INTERNAL MEDICINE

## 2021-11-12 PROCEDURE — 83036 HEMOGLOBIN GLYCOSYLATED A1C: CPT | Performed by: INTERNAL MEDICINE

## 2021-12-06 ENCOUNTER — PES CALL (OUTPATIENT)
Dept: ADMINISTRATIVE | Facility: CLINIC | Age: 86
End: 2021-12-06
Payer: MEDICARE

## 2021-12-21 ENCOUNTER — IMMUNIZATION (OUTPATIENT)
Dept: INTERNAL MEDICINE | Facility: CLINIC | Age: 86
End: 2021-12-21
Payer: MEDICARE

## 2021-12-21 ENCOUNTER — LAB VISIT (OUTPATIENT)
Dept: LAB | Facility: HOSPITAL | Age: 86
End: 2021-12-21
Attending: INTERNAL MEDICINE
Payer: MEDICARE

## 2021-12-21 ENCOUNTER — TELEPHONE (OUTPATIENT)
Dept: PRIMARY CARE CLINIC | Facility: CLINIC | Age: 86
End: 2021-12-21
Payer: MEDICARE

## 2021-12-21 DIAGNOSIS — R30.0 DYSURIA: ICD-10-CM

## 2021-12-21 DIAGNOSIS — Z23 NEED FOR VACCINATION: Primary | ICD-10-CM

## 2021-12-21 DIAGNOSIS — R35.0 FREQUENT URINATION: Primary | ICD-10-CM

## 2021-12-21 DIAGNOSIS — R35.0 FREQUENT URINATION: ICD-10-CM

## 2021-12-21 LAB
BACTERIA #/AREA URNS AUTO: ABNORMAL /HPF
BILIRUB UR QL STRIP: NEGATIVE
CLARITY UR REFRACT.AUTO: ABNORMAL
COLOR UR AUTO: YELLOW
GLUCOSE UR QL STRIP: NEGATIVE
HGB UR QL STRIP: ABNORMAL
HYALINE CASTS UR QL AUTO: 0 /LPF
KETONES UR QL STRIP: NEGATIVE
LEUKOCYTE ESTERASE UR QL STRIP: ABNORMAL
MICROSCOPIC COMMENT: ABNORMAL
NITRITE UR QL STRIP: NEGATIVE
PH UR STRIP: 5 [PH] (ref 5–8)
PROT UR QL STRIP: ABNORMAL
RBC #/AREA URNS AUTO: 48 /HPF (ref 0–4)
SP GR UR STRIP: 1.02 (ref 1–1.03)
SQUAMOUS #/AREA URNS AUTO: 9 /HPF
URN SPEC COLLECT METH UR: ABNORMAL
WBC #/AREA URNS AUTO: >100 /HPF (ref 0–5)
WBC CLUMPS UR QL AUTO: ABNORMAL

## 2021-12-21 PROCEDURE — 87086 URINE CULTURE/COLONY COUNT: CPT | Performed by: INTERNAL MEDICINE

## 2021-12-21 PROCEDURE — 87077 CULTURE AEROBIC IDENTIFY: CPT | Performed by: INTERNAL MEDICINE

## 2021-12-21 PROCEDURE — 0004A COVID-19, MRNA, LNP-S, PF, 30 MCG/0.3 ML DOSE VACCINE: CPT | Mod: CV19,PBBFAC | Performed by: INTERNAL MEDICINE

## 2021-12-21 PROCEDURE — 87186 SC STD MICRODIL/AGAR DIL: CPT | Performed by: INTERNAL MEDICINE

## 2021-12-21 PROCEDURE — 87088 URINE BACTERIA CULTURE: CPT | Performed by: INTERNAL MEDICINE

## 2021-12-21 PROCEDURE — 81001 URINALYSIS AUTO W/SCOPE: CPT | Performed by: INTERNAL MEDICINE

## 2021-12-22 ENCOUNTER — TELEPHONE (OUTPATIENT)
Dept: PRIMARY CARE CLINIC | Facility: CLINIC | Age: 86
End: 2021-12-22
Payer: MEDICARE

## 2021-12-22 DIAGNOSIS — N30.01 ACUTE CYSTITIS WITH HEMATURIA: Primary | ICD-10-CM

## 2021-12-22 RX ORDER — NITROFURANTOIN 25; 75 MG/1; MG/1
100 CAPSULE ORAL 2 TIMES DAILY
Qty: 10 CAPSULE | Refills: 0 | Status: SHIPPED | OUTPATIENT
Start: 2021-12-22 | End: 2021-12-27

## 2021-12-23 LAB — BACTERIA UR CULT: ABNORMAL

## 2021-12-28 ENCOUNTER — TELEPHONE (OUTPATIENT)
Dept: PRIMARY CARE CLINIC | Facility: CLINIC | Age: 86
End: 2021-12-28
Payer: MEDICARE

## 2022-01-07 ENCOUNTER — PATIENT MESSAGE (OUTPATIENT)
Dept: PRIMARY CARE CLINIC | Facility: CLINIC | Age: 87
End: 2022-01-07
Payer: MEDICARE

## 2022-01-07 DIAGNOSIS — F32.A DEPRESSION, UNSPECIFIED DEPRESSION TYPE: ICD-10-CM

## 2022-01-07 RX ORDER — VENLAFAXINE HYDROCHLORIDE 150 MG/1
150 CAPSULE, EXTENDED RELEASE ORAL NIGHTLY
Qty: 90 CAPSULE | Refills: 3 | Status: CANCELLED | OUTPATIENT
Start: 2022-01-07

## 2022-01-07 NOTE — TELEPHONE ENCOUNTER
No new care gaps identified.  Powered by [employer]. Reference number: [id number].   1/07/2022 11:08:05 AM CST

## 2022-02-10 ENCOUNTER — OFFICE VISIT (OUTPATIENT)
Dept: PRIMARY CARE CLINIC | Facility: CLINIC | Age: 87
End: 2022-02-10
Payer: MEDICARE

## 2022-02-10 VITALS
SYSTOLIC BLOOD PRESSURE: 119 MMHG | OXYGEN SATURATION: 97 % | BODY MASS INDEX: 25.78 KG/M2 | TEMPERATURE: 98 F | HEIGHT: 63 IN | DIASTOLIC BLOOD PRESSURE: 70 MMHG | RESPIRATION RATE: 17 BRPM | HEART RATE: 65 BPM | WEIGHT: 145.5 LBS

## 2022-02-10 DIAGNOSIS — M81.0 OSTEOPOROSIS, UNSPECIFIED OSTEOPOROSIS TYPE, UNSPECIFIED PATHOLOGICAL FRACTURE PRESENCE: ICD-10-CM

## 2022-02-10 DIAGNOSIS — I67.9 CEREBROVASCULAR DISEASE: ICD-10-CM

## 2022-02-10 DIAGNOSIS — G89.29 CHRONIC BILATERAL LOW BACK PAIN WITH BILATERAL SCIATICA: ICD-10-CM

## 2022-02-10 DIAGNOSIS — G91.2 NPH (NORMAL PRESSURE HYDROCEPHALUS): ICD-10-CM

## 2022-02-10 DIAGNOSIS — F32.A DEPRESSION, UNSPECIFIED DEPRESSION TYPE: ICD-10-CM

## 2022-02-10 DIAGNOSIS — M54.42 CHRONIC BILATERAL LOW BACK PAIN WITH BILATERAL SCIATICA: ICD-10-CM

## 2022-02-10 DIAGNOSIS — H61.23 BILATERAL IMPACTED CERUMEN: ICD-10-CM

## 2022-02-10 DIAGNOSIS — E78.5 HYPERLIPIDEMIA, UNSPECIFIED HYPERLIPIDEMIA TYPE: ICD-10-CM

## 2022-02-10 DIAGNOSIS — M54.41 CHRONIC BILATERAL LOW BACK PAIN WITH BILATERAL SCIATICA: ICD-10-CM

## 2022-02-10 DIAGNOSIS — I10 HYPERTENSION, UNSPECIFIED TYPE: Primary | ICD-10-CM

## 2022-02-10 DIAGNOSIS — E11.9 TYPE 2 DIABETES MELLITUS WITHOUT COMPLICATION, WITHOUT LONG-TERM CURRENT USE OF INSULIN: ICD-10-CM

## 2022-02-10 DIAGNOSIS — M10.9 GOUT, UNSPECIFIED CAUSE, UNSPECIFIED CHRONICITY, UNSPECIFIED SITE: ICD-10-CM

## 2022-02-10 DIAGNOSIS — Z78.0 POSTMENOPAUSAL: ICD-10-CM

## 2022-02-10 PROCEDURE — 1159F MED LIST DOCD IN RCRD: CPT | Mod: CPTII,S$GLB,, | Performed by: INTERNAL MEDICINE

## 2022-02-10 PROCEDURE — 3288F FALL RISK ASSESSMENT DOCD: CPT | Mod: CPTII,S$GLB,, | Performed by: INTERNAL MEDICINE

## 2022-02-10 PROCEDURE — 3288F PR FALLS RISK ASSESSMENT DOCUMENTED: ICD-10-PCS | Mod: CPTII,S$GLB,, | Performed by: INTERNAL MEDICINE

## 2022-02-10 PROCEDURE — 1101F PT FALLS ASSESS-DOCD LE1/YR: CPT | Mod: CPTII,S$GLB,, | Performed by: INTERNAL MEDICINE

## 2022-02-10 PROCEDURE — 1126F AMNT PAIN NOTED NONE PRSNT: CPT | Mod: CPTII,S$GLB,, | Performed by: INTERNAL MEDICINE

## 2022-02-10 PROCEDURE — 3078F PR MOST RECENT DIASTOLIC BLOOD PRESSURE < 80 MM HG: ICD-10-PCS | Mod: CPTII,S$GLB,, | Performed by: INTERNAL MEDICINE

## 2022-02-10 PROCEDURE — 3074F SYST BP LT 130 MM HG: CPT | Mod: CPTII,S$GLB,, | Performed by: INTERNAL MEDICINE

## 2022-02-10 PROCEDURE — 99214 OFFICE O/P EST MOD 30 MIN: CPT | Mod: S$GLB,,, | Performed by: INTERNAL MEDICINE

## 2022-02-10 PROCEDURE — 1160F PR REVIEW ALL MEDS BY PRESCRIBER/CLIN PHARMACIST DOCUMENTED: ICD-10-PCS | Mod: CPTII,S$GLB,, | Performed by: INTERNAL MEDICINE

## 2022-02-10 PROCEDURE — 1159F PR MEDICATION LIST DOCUMENTED IN MEDICAL RECORD: ICD-10-PCS | Mod: CPTII,S$GLB,, | Performed by: INTERNAL MEDICINE

## 2022-02-10 PROCEDURE — 3074F PR MOST RECENT SYSTOLIC BLOOD PRESSURE < 130 MM HG: ICD-10-PCS | Mod: CPTII,S$GLB,, | Performed by: INTERNAL MEDICINE

## 2022-02-10 PROCEDURE — 1126F PR PAIN SEVERITY QUANTIFIED, NO PAIN PRESENT: ICD-10-PCS | Mod: CPTII,S$GLB,, | Performed by: INTERNAL MEDICINE

## 2022-02-10 PROCEDURE — 99499 RISK ADDL DX/OHS AUDIT: ICD-10-PCS | Mod: S$GLB,,, | Performed by: INTERNAL MEDICINE

## 2022-02-10 PROCEDURE — 3051F PR MOST RECENT HEMOGLOBIN A1C LEVEL 7.0 - < 8.0%: ICD-10-PCS | Mod: CPTII,S$GLB,, | Performed by: INTERNAL MEDICINE

## 2022-02-10 PROCEDURE — 99999 PR PBB SHADOW E&M-EST. PATIENT-LVL IV: ICD-10-PCS | Mod: PBBFAC,,, | Performed by: INTERNAL MEDICINE

## 2022-02-10 PROCEDURE — 1101F PR PT FALLS ASSESS DOC 0-1 FALLS W/OUT INJ PAST YR: ICD-10-PCS | Mod: CPTII,S$GLB,, | Performed by: INTERNAL MEDICINE

## 2022-02-10 PROCEDURE — 99499 UNLISTED E&M SERVICE: CPT | Mod: S$GLB,,, | Performed by: INTERNAL MEDICINE

## 2022-02-10 PROCEDURE — 1160F RVW MEDS BY RX/DR IN RCRD: CPT | Mod: CPTII,S$GLB,, | Performed by: INTERNAL MEDICINE

## 2022-02-10 PROCEDURE — 99999 PR PBB SHADOW E&M-EST. PATIENT-LVL IV: CPT | Mod: PBBFAC,,, | Performed by: INTERNAL MEDICINE

## 2022-02-10 PROCEDURE — 3078F DIAST BP <80 MM HG: CPT | Mod: CPTII,S$GLB,, | Performed by: INTERNAL MEDICINE

## 2022-02-10 PROCEDURE — 3051F HG A1C>EQUAL 7.0%<8.0%: CPT | Mod: CPTII,S$GLB,, | Performed by: INTERNAL MEDICINE

## 2022-02-10 PROCEDURE — 99214 PR OFFICE/OUTPT VISIT, EST, LEVL IV, 30-39 MIN: ICD-10-PCS | Mod: S$GLB,,, | Performed by: INTERNAL MEDICINE

## 2022-02-10 RX ORDER — LISINOPRIL 40 MG/1
40 TABLET ORAL DAILY
Qty: 90 TABLET | Refills: 1 | Status: SHIPPED | OUTPATIENT
Start: 2022-02-10 | End: 2022-11-15 | Stop reason: SDUPTHER

## 2022-02-10 RX ORDER — METFORMIN HYDROCHLORIDE 500 MG/1
500 TABLET, EXTENDED RELEASE ORAL DAILY
Qty: 90 TABLET | Refills: 1 | Status: SHIPPED | OUTPATIENT
Start: 2022-02-10 | End: 2022-11-15 | Stop reason: SDUPTHER

## 2022-02-10 RX ORDER — ATORVASTATIN CALCIUM 10 MG/1
10 TABLET, FILM COATED ORAL DAILY
Qty: 90 TABLET | Refills: 1 | Status: SHIPPED | OUTPATIENT
Start: 2022-02-10 | End: 2023-02-23 | Stop reason: SDUPTHER

## 2022-02-10 RX ORDER — ALENDRONATE SODIUM 70 MG/1
70 TABLET ORAL
Qty: 12 TABLET | Refills: 1 | Status: SHIPPED | OUTPATIENT
Start: 2022-02-10 | End: 2022-02-10

## 2022-02-10 RX ORDER — AMLODIPINE BESYLATE 10 MG/1
10 TABLET ORAL DAILY
Qty: 90 TABLET | Refills: 1 | Status: SHIPPED | OUTPATIENT
Start: 2022-02-10 | End: 2022-11-15 | Stop reason: SDUPTHER

## 2022-02-10 RX ORDER — METOPROLOL SUCCINATE 100 MG/1
100 TABLET, EXTENDED RELEASE ORAL DAILY
Qty: 90 TABLET | Refills: 1 | Status: SHIPPED | OUTPATIENT
Start: 2022-02-10 | End: 2022-11-15 | Stop reason: SDUPTHER

## 2022-02-10 RX ORDER — CLOPIDOGREL BISULFATE 75 MG/1
75 TABLET ORAL DAILY
Qty: 30 TABLET | Refills: 11 | Status: SHIPPED | OUTPATIENT
Start: 2022-02-10 | End: 2022-02-10

## 2022-02-10 RX ORDER — CLOPIDOGREL BISULFATE 75 MG/1
75 TABLET ORAL DAILY
Qty: 90 TABLET | Refills: 3 | Status: ON HOLD | OUTPATIENT
Start: 2022-02-10 | End: 2023-01-12 | Stop reason: SDUPTHER

## 2022-02-10 RX ORDER — VENLAFAXINE HYDROCHLORIDE 150 MG/1
150 CAPSULE, EXTENDED RELEASE ORAL NIGHTLY
Qty: 90 CAPSULE | Refills: 3 | Status: SHIPPED | OUTPATIENT
Start: 2022-02-10 | End: 2022-03-19

## 2022-02-10 RX ORDER — ALENDRONATE SODIUM 70 MG/1
70 TABLET ORAL
Qty: 12 TABLET | Refills: 1 | Status: SHIPPED | OUTPATIENT
Start: 2022-02-10 | End: 2022-11-15 | Stop reason: SDUPTHER

## 2022-02-10 NOTE — PROGRESS NOTES
"[facility] Note    Chief Complaint      Chief Complaint   Patient presents with    Follow-up       History of Present Illness      Farheen Soares is a 86 y.o.    AAF with DM - II, HTN, osteoposis, Gout, HLD, Hearing loss, Depression, Chronic ow back pain, GERd, Ar, Benign brain tumor, h/o TIA presents to  fu chronic issues. Last visit - 8/9/21.     Vit D def -  vitamin D was low at 14.  Pt took Ergocalciferol 50,000 units once weekly x 12 wks. Rec OTC Vit D 3 2000 units one daily and we should repeat  Vit D level     NPH - s/p LP - 7/12/21- improved gait. Planning for  shunt. Fu by Neuro, Dr. León and NS, Dr. Espinoza.      Left hip fracture - Hospitalized - 11/20/20-s/p left total hip femoral neck pinning performed by Dr. White on 11/21/2020 due to Left hip fracture. "doing fine". She uses a walker to ambulate to the bathroom at home.  She is in a wheelchair today.      Chronic low back pain - Did well on a trial of prn Tramadol 1/2-1 tab BID prn- takes rarely.     DM - II - Pt on Metformin  mg/d. She does not check her glc.   H A1c 7.1 -controlled on metformin. + slight microalbuminuria (small protein in your urine from your Diabetes). Pt is already on Lisinopril which helps to prevent this. No further recommendations at this time.       HTN - controlled Amlodipine 10 mg/d, Lisinopril 40 mg/d, Toprol  mg/d     Osteoporosis - Pt on Fosamax 70 mg/wk.  Will repeat DEXA.       Gout - Pt is on Allopurinol 300 mg/d. Uric acid-3.1-within normal limits on allopurinol. No recent gout attacks.      HLD - Pt controlled on Atorvastatin 10 mg QHS.   Cont current. Daughter reports pt has olfactory hallucinations a night and smells something cooking.        Hearing loss - Pt refuses to wear her Hearing aids.     Depression - Pt is on Venlafaxine  mg QHS.  Stable.      Chronic Low back pain - Fu by Pain Mgnt.  She has had prev Back surgery (Lumbar Laminectomy) and has received " "epidural inj in past.  Pt on Venlafaxine. "It's better". She takes Tylenol prn and rarely tramadol if up most of the day.      GERD -Rec Reflux prec.      AR - Pt on allegra daily prn.      Benign brain mass/NPH - Per daughter, it has been stable.  3.2 cm Meningioma. Pt was eval by NS inpatient.  Per note, pt may follow up outpatient for cervical myelopathy and NPH.      H/o TIA - Pt ran out of Plavix 75 mg/d. Will send refill.      Acc by rommel -she lives with - Colleen Soares - [phone] (she is a nurse).  I have permission to speak to both. Other daughter -  Sherin Wong - lives in [place] - [phone].      HCM - Flu - admin 2/10/22; Tdap - 2/9/17;  PCV 13 - 12/23/14;  PVX 23 - 1/1/03; Shingrix - ?; Zostavax - 10/9/12; COVID - 19 vaccine (Pfizer) #1 - 1/10/21; #2 - 1/31/21; # 3 12/21/21;  MGM - no longer gets;  DEXA - due - will order;  C-scope - ?; Prev PCP - Dr. Norman Haddad in [place]; Prev Pain Mgmnt - Dr. Harjinder Rose       Patient Care Team:  Vonda Chung MD as PCP - General (Internal Medicine)  Hanh Elizabeth MA as Care Coordinator     Health Maintenance:  Immunization History   Administered Date(s) Administered    COVID-19, MRNA, LN-S, PF (Pfizer) (Purple Cap) 01/10/2021, 01/31/2021, 12/21/2021    Influenza (FLUAD) - Quadrivalent - Adjuvanted - PF *Preferred* (65+) 02/10/2022    Influenza - High Dose - PF (65 years and older) 11/25/2019    PPD Test 11/22/2020    Pneumococcal Conjugate - 13 Valent 12/23/2014    Pneumococcal Polysaccharide - 23 Valent 01/01/2003    Tdap 02/09/2017    Zoster 10/09/2012      Health Maintenance   Topic Date Due    Foot Exam  Never done    Eye Exam  Never done    DEXA SCAN  Never done    Hemoglobin A1c  05/12/2022    Lipid Panel  11/12/2022    TETANUS VACCINE  02/09/2027        Past Medical History:  Past Medical History:   Diagnosis Date    Age-related osteoporosis with current pathological fracture with routine healing     Allergic " rhinitis     Carotid atherosclerosis, bilateral     Chronic gout of multiple sites     Chronic low back pain with bilateral sciatica     Closed impacted fracture of left femoral neck with routine healing s/p percutaneous screw fixation on 11/21/2020 11/20/2020    DDD (degenerative disc disease), lumbar 8/17/2020    Diverticulosis     Essential hypertension 11/25/2019    Facet arthropathy     GERD (gastroesophageal reflux disease) 11/25/2019    Hearing loss     History of transient ischemic attack (TIA) 11/25/2019    IBS (irritable bowel syndrome)     Insomnia     Lumbar radiculopathy 8/17/2020    Lumbar spondylosis 8/17/2020    Lumbar stenosis     Meningioma     Paroxysmal SVT (supraventricular tachycardia)     Primary open angle glaucoma (POAG) of both eyes     Pure hypercholesterolemia 11/25/2019    PVD (peripheral vascular disease)     30% prox stenosis of celiac trunk and 20% stnosis Prox SMA - per Abd/SMA Arteriogram 4/3/08    Recurrent major depressive disorder, in full remission     Type 2 diabetes mellitus without complication, without long-term current use of insulin 11/25/2019    Vitamin D deficiency 11/23/2020       Past Surgical History:   has a past surgical history that includes Epidural steroid injection into lumbar spine; Lumbar laminectomy (2009); Total abdominal hysterectomy w/ bilateral salpingoophorectomy; Cataract extraction; Caudal epidural steroid injection (N/A, 8/27/2020); and Percutaneous pinning of hip (Left, 11/21/2020).    Family History:  family history includes Diabetes in her mother; Hyperlipidemia in her son; Hypertension in her daughter, father, mother, and son; Hypothyroidism in her daughter; Stroke in her father.     Social History:  Social History     Tobacco Use    Smoking status: Never Smoker    Smokeless tobacco: Never Used   Substance Use Topics    Alcohol use: Yes    Drug use: Never       Review of Systems   Constitutional: Negative for chills,  diaphoresis and fever.   HENT: Positive for hearing loss.    Eyes: Positive for visual disturbance.        Wears glasses but has not wearing them   Respiratory: Negative for chest tightness and shortness of breath.    Cardiovascular: Negative for chest pain.   Gastrointestinal: Positive for abdominal pain and diarrhea. Negative for constipation, nausea and vomiting.        Upper abd pain sometimes.  Has diarrhea every 3 wks or so - watery 1-3 stools x 1 day.    Genitourinary: Negative for dysuria and frequency.   Neurological: Positive for memory loss. Negative for dizziness and headaches.        Short term memory issues per daughter.  She will ask the same question a couple times within the hour.    Psychiatric/Behavioral: Positive for dysphoric mood. The patient is not nervous/anxious.         Medications:    Current Outpatient Medications:     acetaminophen (TYLENOL) 650 MG TbSR, Take 1 tablet (650 mg total) by mouth every 8 (eight) hours as needed (pain)., Disp: 60 tablet, Rfl: 0    allopurinoL (ZYLOPRIM) 300 MG tablet, Take 1 tablet (300 mg total) by mouth once daily., Disp: 90 tablet, Rfl: 1    blood sugar diagnostic Strp, To check BG three times daily, to use with insurance preferred meter, Disp: 100 strip, Rfl: 0    blood-glucose meter kit, To check BG three times daily, to use with insurance preferred meter, Disp: 1 each, Rfl: 0    calcium carbonate (OS-ELODIA) 500 mg calcium (1,250 mg) tablet, Take 1 tablet (500 mg total) by mouth 2 (two) times daily., Disp: , Rfl: 0    fexofenadine (ALLEGRA) 180 MG tablet, Take 1 tablet (180 mg total) by mouth daily as needed., Disp: 90 tablet, Rfl: 1    fluticasone (FLONASE) 50 mcg/actuation nasal spray, 1 spray by Each Nostril route daily as needed. , Disp: , Rfl:     lancets Mis, To check BG three times daily, to use with insurance preferred meter, Disp: 100 each, Rfl: 0    multivitamin capsule, Take 1 capsule by mouth once daily., Disp: , Rfl:      "acetaminophen (TYLENOL) 500 MG tablet, Take 2 tablets (1,000 mg total) by mouth every 8 (eight) hours as needed (Mild pain). (Patient not taking: Reported on 2/10/2022), Disp: , Rfl: 0    alendronate (FOSAMAX) 70 MG tablet, Take 1 tablet (70 mg total) by mouth every 7 days., Disp: 12 tablet, Rfl: 1    amLODIPine (NORVASC) 10 MG tablet, Take 1 tablet (10 mg total) by mouth once daily., Disp: 90 tablet, Rfl: 1    atorvastatin (LIPITOR) 10 MG tablet, Take 1 tablet (10 mg total) by mouth once daily., Disp: 90 tablet, Rfl: 1    clopidogreL (PLAVIX) 75 mg tablet, Take 1 tablet (75 mg total) by mouth once daily., Disp: 90 tablet, Rfl: 3    flu vac 2021 65up-[id number],PF, (FLUAD QUAD 2021-22,65Y UP,,PF,) 60 mcg (15 mcg x 4)/0.5 mL Syrg, Inject 0.5 mLs into the muscle once. for 1 dose, Disp: 0.5 mL, Rfl: 0    lisinopriL (PRINIVIL,ZESTRIL) 40 MG tablet, Take 1 tablet (40 mg total) by mouth once daily., Disp: 90 tablet, Rfl: 1    metFORMIN (GLUCOPHAGE-XR) 500 MG ER 24hr tablet, Take 1 tablet (500 mg total) by mouth once daily., Disp: 90 tablet, Rfl: 1    metoprolol succinate (TOPROL-XL) 100 MG 24 hr tablet, Take 1 tablet (100 mg total) by mouth once daily., Disp: 90 tablet, Rfl: 1    venlafaxine (EFFEXOR-XR) 150 MG Cp24, Take 1 capsule (150 mg total) by mouth every evening., Disp: 90 capsule, Rfl: 3     Allergies:  Review of patient's allergies indicates:   Allergen Reactions    Baclofen      AMS and distorted dremas       Physical Exam      Vital Signs  Temp: 98 °F (36.7 °C)  Pulse: 65  Resp: 17  SpO2: 97 %  BP: 119/70  BP Location: Left arm  Patient Position: Sitting  Pain Score: 0-No pain  Height and Weight  Height: 5' 3" (160 cm)  Weight: 66 kg (145 lb 8.1 oz)  BSA (Calculated - sq m): 1.71 sq meters  BMI (Calculated): 25.8  Weight in (lb) to have BMI = 25: 140.8      Patient Position: Sitting      Physical Exam  Vitals reviewed.   Constitutional:       General: She is not in acute distress.     Appearance: Normal " appearance. She is not ill-appearing, toxic-appearing or diaphoretic.      Comments: wheelchair bound   HENT:      Head: Normocephalic and atraumatic.      Ears:      Comments: Silas cerumen impaction - removed manually; Pt could noticeably hear better afterwards  Eyes:      Extraocular Movements: Extraocular movements intact.      Conjunctiva/sclera: Conjunctivae normal.      Pupils: Pupils are equal, round, and reactive to light.   Cardiovascular:      Rate and Rhythm: Normal rate and regular rhythm.      Pulses: Normal pulses.      Heart sounds: Normal heart sounds.   Pulmonary:      Effort: No respiratory distress.      Breath sounds: No wheezing.   Abdominal:      Palpations: Abdomen is soft.      Tenderness: There is no abdominal tenderness.   Neurological:      Mental Status: She is alert.   Psychiatric:         Mood and Affect: Mood normal.         Behavior: Behavior normal.          Laboratory:  CBC:  Recent Labs   Lab 11/23/20 0648 11/23/20 0648 11/24/20 0355 11/24/20 0355 07/12/21  0616   WBC 9.20   < > 8.64   < > 6.41   RBC 3.87 L   < > 3.91 L   < > 4.44   Hemoglobin 13.0   < > 13.3   < > 15.1   Hematocrit 38.9   < > 39.1   < > 44.3   Platelets 187   < > 188   < > 224    H   < > 100 H   < > 100 H   MCH 33.6 H   < > 34.0 H   < > 34.0 H   MCHC 33.4  --  34.0  --  34.1    < > = values in this interval not displayed.       CMP:  Recent Labs   Lab 11/23/20 0648 11/23/20 0648 11/24/20 0355 11/24/20 0355 11/12/21  1007   Glucose 165 H   < > 168 H   < > 159 H   Calcium 9.8   < > 9.8   < > 10.3   Albumin 3.5   < > 3.4 L   < > 4.3   Total Protein 7.5   < > 7.5   < > 8.4   Sodium 137   < > 134 L   < > 138   Potassium 4.5   < > 4.3   < > 4.6   CO2 22 L   < > 23   < > 20 L   Chloride 104   < > 101   < > 107   BUN 13   < > 12   < > 16   Creatinine 0.7   < > 0.7   < > 0.8   Alkaline Phosphatase 62   < > 66   < > 62   ALT 10   < > 9 L   < > 18   AST 23   < > 21   < > 32   Total Bilirubin 0.4  --  0.5  --   0.5    < > = values in this interval not displayed.           URINALYSIS:  Recent Labs   Lab 03/08/20 2031 03/08/20 2031 11/20/20  2122 03/22/21  0958 12/21/21  1446   Color, UA Yellow   < > Yellow   < > Yellow   Specific Gravity, UA 1.020   < > 1.015   < > 1.020   pH, UA 6.0   < > 5.0   < > 5.0   Protein, UA 1+ A   < > 1+ A   < > 2+ A   Bacteria Rare   < > Occasional  --  Many A   Nitrite, UA Negative   < > Negative   < > Negative   Leukocytes, UA Negative   < > Negative   < > 3+ A   Urobilinogen, UA Negative  --   --   --   --    Hyaline Casts, UA 0  --  0  --  0    < > = values in this interval not displayed.        LIPIDS:  Recent Labs   Lab 12/14/19  1132 03/22/21  1040 11/12/21  1007   TSH 2.082  --   --    HDL 38 L 38 L 34 L   Cholesterol 105 L 139 103 L   Triglycerides 167 H 222 H 136   LDL Cholesterol 33.6 L 56.6 L 41.8 L   HDL/Cholesterol Ratio 36.2 27.3 33.0   Non-HDL Cholesterol 67 101 69   Total Cholesterol/HDL Ratio 2.8 3.7 3.0       TSH:  Recent Labs   Lab 12/14/19  1132   TSH 2.082       A1C:  Recent Labs   Lab 12/14/19  1132 11/20/20  2134 11/12/21  1007   Hemoglobin A1C 6.8 H 6.5 H 7.1 H  7.1 H       Urine Microalbumin/Cr:  Recent Labs   Lab 03/22/21  0958   Microalb/Creat Ratio 38.5 H         Other:   Recent Labs   Lab 11/20/20  2134 11/23/20  0648 03/22/21  1040 04/29/21  1614   Vit D, 25-Hydroxy  --  14 L 14 L  --    Vitamin B-12  --   --   --  420   Transferrin 299  --   --   --            Assessment/Plan     Farheen Soares is a 86 y.o.female with:    Hypertension, unspecified type  -     amLODIPine (NORVASC) 10 MG tablet; Take 1 tablet (10 mg total) by mouth once daily.  Dispense: 90 tablet; Refill: 1  -     lisinopriL (PRINIVIL,ZESTRIL) 40 MG tablet; Take 1 tablet (40 mg total) by mouth once daily.  Dispense: 90 tablet; Refill: 1  -     metoprolol succinate (TOPROL-XL) 100 MG 24 hr tablet; Take 1 tablet (100 mg total) by mouth once daily.  Dispense: 90 tablet; Refill: 1  -  Controlled.  Cont current.     Type 2 diabetes mellitus without complication, without long-term current use of insulin  -     metFORMIN (GLUCOPHAGE-XR) 500 MG ER 24hr tablet; Take 1 tablet (500 mg total) by mouth once daily.  Dispense: 90 tablet; Refill: 1  -     Cancel: Microalbumin/Creatinine Ratio, Urine  -     Comprehensive Metabolic Panel; Future; Expected date: 05/10/2022  -     Hemoglobin A1C; Future; Expected date: 05/10/2022  -     MICROALBUMIN / CREATININE RATIO URINE; Future; Expected date: 02/10/2022  - Controlled.  Cont current.     Osteoporosis, unspecified osteoporosis type, unspecified pathological fracture presence  -     Discontinue: alendronate (FOSAMAX) 70 MG tablet; Take 1 tablet (70 mg total) by mouth every 7 days.  Dispense: 12 tablet; Refill: 1  -     alendronate (FOSAMAX) 70 MG tablet; Take 1 tablet (70 mg total) by mouth every 7 days.  Dispense: 12 tablet; Refill: 1  - Stable.  Cont current regimen. Repeat DEXA.    Hyperlipidemia, unspecified hyperlipidemia type  -     atorvastatin (LIPITOR) 10 MG tablet; Take 1 tablet (10 mg total) by mouth once daily.  Dispense: 90 tablet; Refill: 1  - Controlled.  Cont current.     Depression, unspecified depression type  -     venlafaxine (EFFEXOR-XR) 150 MG Cp24; Take 1 capsule (150 mg total) by mouth every evening.  Dispense: 90 capsule; Refill: 3  - Stable.  Cont current regimen.    Chronic bilateral low back pain with bilateral sciatica  - Stable.  Cont current regimen.    NPH (normal pressure hydrocephalus)  - Fu by NS and planning for possible  shunt.     Gout, unspecified cause, unspecified chronicity, unspecified site  - Stable. Cont current.     Cerebrovascular disease  -     clopidogreL (PLAVIX) 75 mg tablet; Take 1 tablet (75 mg total) by mouth once daily.  Dispense: 90 tablet; Refill: 3  - Resume Plavix.    Postmenopausal  -     DXA Bone Density Spine And Hip; Future; Expected date: 02/10/2022     Bilateral impacted cerumen  - Performed  today manually - pt could noticeably hear better afterwards.     Chronic conditions status updated as per HPI.  Other than changes above, cont current medications and maintain follow up with specialists.  Follow up in about 6 months (around 8/10/2022).      Vonda Chung MD  [facility]

## 2022-02-14 ENCOUNTER — PES CALL (OUTPATIENT)
Dept: ADMINISTRATIVE | Facility: CLINIC | Age: 87
End: 2022-02-14
Payer: MEDICARE

## 2022-02-18 ENCOUNTER — PES CALL (OUTPATIENT)
Dept: ADMINISTRATIVE | Facility: CLINIC | Age: 87
End: 2022-02-18
Payer: MEDICARE

## 2022-03-03 NOTE — ASSESSMENT & PLAN NOTE
- patient appears acutely ill, reports fever, nausea, poor po, body aches, HA, too weak to walk; no travel, no new skin rashes  - CT A/P, CXR no acute findings, mildly elevated WBC, flu neg, lactic normal, UA benign; BC drawn  - unclear etiology, suspect viral, was given Vanco and Zosyn in ED  - s/p LP, traumatic tap, culture  - CRP 77.5  - RVP pending  - antiemetics RVP  - appreciate ID recs Start ceftriaxone 2g IV q24 hours  - Throat culture         [id number]

## 2022-03-16 DIAGNOSIS — F32.A DEPRESSION, UNSPECIFIED DEPRESSION TYPE: ICD-10-CM

## 2022-03-16 NOTE — TELEPHONE ENCOUNTER
Care Due:                  Date            Visit Type   Department     Provider  --------------------------------------------------------------------------------                                EP -                              PRIMARY      LTR PRIMARY  Last Visit: 02-      CARE ([facility])   CARE           Vonda Chung                              EP -                              PRIMARY      LTR PRIMARY  Next Visit: 08-      CARE ([facility])   CARE           Vonda Chung                                                            Last  Test          Frequency    Reason                     Performed    Due Date  --------------------------------------------------------------------------------    HBA1C.......  6 months...  metFORMIN................  11- 05-    Uric Acid...  12 months..  allopurinoL..............  03- 03-    Powered by [employer]. Reference number: [id number].   3/16/2022 12:27:24 AM CDT

## 2022-03-19 RX ORDER — VENLAFAXINE HYDROCHLORIDE 150 MG/1
150 CAPSULE, EXTENDED RELEASE ORAL NIGHTLY
Qty: 90 CAPSULE | Refills: 2 | Status: SHIPPED | OUTPATIENT
Start: 2022-03-19 | End: 2023-02-23 | Stop reason: SDUPTHER

## 2022-03-19 NOTE — TELEPHONE ENCOUNTER
Refill Authorization Note   Farheen Soares  is requesting a refill authorization.  Brief Assessment and Rationale for Refill:  Approve     Medication Therapy Plan:       Medication Reconciliation Completed: No   Comments:   --->Care Gap information included below if applicable.   Orders Placed This Encounter    venlafaxine (EFFEXOR-XR) 150 MG Cp24      Requested Prescriptions   Signed Prescriptions Disp Refills    venlafaxine (EFFEXOR-XR) 150 MG Cp24 90 capsule 2     Sig: TAKE 1 CAPSULE (150 MG TOTAL) BY MOUTH EVERY EVENING.       Psychiatry: Antidepressants - SNRI - desvenlafaxine & venlafaxine Passed - 3/19/2022  5:14 PM        Passed - Patient is at least 18 years old        Passed - Last BP in normal range within 360 days     BP Readings from Last 3 Encounters:   02/10/22 119/70   08/09/21 130/74   07/14/21 (!) 172/76               Passed - Valid encounter within last 15 months     Recent Visits  Date Type Provider Dept   02/10/22 Office Visit Vonda Chung MD [facility]   08/09/21 Office Visit Vonda Chung MD [facility]   02/08/21 Office Visit Vonda Chung MD [facility]   Showing recent visits within past 720 days and meeting all other requirements  Future Appointments  No visits were found meeting these conditions.  Showing future appointments within next 150 days and meeting all other requirements      Future Appointments              In 2 weeks [facility], DEXA1 [facility] - Bone Density 2nd Fl, [facility]    In 1 month LAB, APPOINTMENT [facility] - Primary Care Bldg, [facility]    In 4 months [facility]                Passed - Cr is 1.39 or below and within 360 days     Lab Results   Component Value Date    CREATININE 0.8 11/12/2021    CREATININE 0.7 11/24/2020    CREATININE 0.7 11/23/2020              Passed - eGFR within 360 days     Lab Results   Component Value Date    EGFRNONAA >60.0 11/12/2021     EGFRNONAA >60.0 11/24/2020    EGFRNONAA >60.0 11/23/2020                    Appointments  past 12m or future 3m with PCP    Date Provider   Last Visit   2/10/2022 Vonda Chung MD   Next Visit   8/10/2022 Vonda Chung MD   ED visits in past 90 days: 0     Note composed:5:16 PM 03/19/2022

## 2022-03-19 NOTE — TELEPHONE ENCOUNTER
Provider Staff:     Action is required for this patient.   Please see care gap opportunities below in Care Due Message.     Thanks!  [facility]     Appointments      Date Provider   Last Visit   2/10/2022 Vonda Chung MD   Next Visit   8/10/2022 Vonda Chung MD     Note composed:5:16 PM 03/19/2022

## 2022-05-27 ENCOUNTER — OFFICE VISIT (OUTPATIENT)
Dept: HOME HEALTH SERVICES | Facility: CLINIC | Age: 87
End: 2022-05-27
Payer: MEDICARE

## 2022-05-27 VITALS
HEART RATE: 60 BPM | HEIGHT: 63 IN | WEIGHT: 146 LBS | BODY MASS INDEX: 25.87 KG/M2 | TEMPERATURE: 97 F | DIASTOLIC BLOOD PRESSURE: 95 MMHG | SYSTOLIC BLOOD PRESSURE: 140 MMHG

## 2022-05-27 DIAGNOSIS — G91.2 NPH (NORMAL PRESSURE HYDROCEPHALUS): ICD-10-CM

## 2022-05-27 DIAGNOSIS — M1A.09X0 CHRONIC GOUT OF MULTIPLE SITES, UNSPECIFIED CAUSE: ICD-10-CM

## 2022-05-27 DIAGNOSIS — G89.29 OTHER CHRONIC PAIN: ICD-10-CM

## 2022-05-27 DIAGNOSIS — M51.36 DDD (DEGENERATIVE DISC DISEASE), LUMBAR: ICD-10-CM

## 2022-05-27 DIAGNOSIS — Z00.00 ENCOUNTER FOR PREVENTIVE HEALTH EXAMINATION: Primary | ICD-10-CM

## 2022-05-27 DIAGNOSIS — I10 ESSENTIAL HYPERTENSION: ICD-10-CM

## 2022-05-27 DIAGNOSIS — R41.89 COGNITIVE CHANGES: ICD-10-CM

## 2022-05-27 DIAGNOSIS — E11.9 TYPE 2 DIABETES MELLITUS WITHOUT COMPLICATION, WITHOUT LONG-TERM CURRENT USE OF INSULIN: ICD-10-CM

## 2022-05-27 DIAGNOSIS — K21.9 GASTROESOPHAGEAL REFLUX DISEASE, UNSPECIFIED WHETHER ESOPHAGITIS PRESENT: ICD-10-CM

## 2022-05-27 DIAGNOSIS — M80.00XD AGE-RELATED OSTEOPOROSIS WITH CURRENT PATHOLOGICAL FRACTURE WITH ROUTINE HEALING: ICD-10-CM

## 2022-05-27 DIAGNOSIS — E78.5 HYPERLIPIDEMIA, UNSPECIFIED HYPERLIPIDEMIA TYPE: ICD-10-CM

## 2022-05-27 DIAGNOSIS — D32.9 MENINGIOMA: ICD-10-CM

## 2022-05-27 DIAGNOSIS — R39.15 URINARY URGENCY: ICD-10-CM

## 2022-05-27 DIAGNOSIS — F33.42 RECURRENT MAJOR DEPRESSIVE DISORDER, IN FULL REMISSION: ICD-10-CM

## 2022-05-27 PROCEDURE — 3051F PR MOST RECENT HEMOGLOBIN A1C LEVEL 7.0 - < 8.0%: ICD-10-PCS | Mod: CPTII,S$GLB,, | Performed by: NURSE PRACTITIONER

## 2022-05-27 PROCEDURE — 99499 RISK ADDL DX/OHS AUDIT: ICD-10-PCS | Mod: S$GLB,,, | Performed by: NURSE PRACTITIONER

## 2022-05-27 PROCEDURE — 1126F PR PAIN SEVERITY QUANTIFIED, NO PAIN PRESENT: ICD-10-PCS | Mod: CPTII,S$GLB,, | Performed by: NURSE PRACTITIONER

## 2022-05-27 PROCEDURE — 3077F PR MOST RECENT SYSTOLIC BLOOD PRESSURE >= 140 MM HG: ICD-10-PCS | Mod: CPTII,S$GLB,, | Performed by: NURSE PRACTITIONER

## 2022-05-27 PROCEDURE — G0439 PR MEDICARE ANNUAL WELLNESS SUBSEQUENT VISIT: ICD-10-PCS | Mod: S$GLB,,, | Performed by: NURSE PRACTITIONER

## 2022-05-27 PROCEDURE — 3288F FALL RISK ASSESSMENT DOCD: CPT | Mod: CPTII,S$GLB,, | Performed by: NURSE PRACTITIONER

## 2022-05-27 PROCEDURE — 1101F PR PT FALLS ASSESS DOC 0-1 FALLS W/OUT INJ PAST YR: ICD-10-PCS | Mod: CPTII,S$GLB,, | Performed by: NURSE PRACTITIONER

## 2022-05-27 PROCEDURE — 3051F HG A1C>EQUAL 7.0%<8.0%: CPT | Mod: CPTII,S$GLB,, | Performed by: NURSE PRACTITIONER

## 2022-05-27 PROCEDURE — 3080F PR MOST RECENT DIASTOLIC BLOOD PRESSURE >= 90 MM HG: ICD-10-PCS | Mod: CPTII,S$GLB,, | Performed by: NURSE PRACTITIONER

## 2022-05-27 PROCEDURE — 3288F PR FALLS RISK ASSESSMENT DOCUMENTED: ICD-10-PCS | Mod: CPTII,S$GLB,, | Performed by: NURSE PRACTITIONER

## 2022-05-27 PROCEDURE — G0439 PPPS, SUBSEQ VISIT: HCPCS | Mod: S$GLB,,, | Performed by: NURSE PRACTITIONER

## 2022-05-27 PROCEDURE — 1160F PR REVIEW ALL MEDS BY PRESCRIBER/CLIN PHARMACIST DOCUMENTED: ICD-10-PCS | Mod: CPTII,S$GLB,, | Performed by: NURSE PRACTITIONER

## 2022-05-27 PROCEDURE — 1126F AMNT PAIN NOTED NONE PRSNT: CPT | Mod: CPTII,S$GLB,, | Performed by: NURSE PRACTITIONER

## 2022-05-27 PROCEDURE — 3077F SYST BP >= 140 MM HG: CPT | Mod: CPTII,S$GLB,, | Performed by: NURSE PRACTITIONER

## 2022-05-27 PROCEDURE — 99499 UNLISTED E&M SERVICE: CPT | Mod: S$GLB,,, | Performed by: NURSE PRACTITIONER

## 2022-05-27 PROCEDURE — 1159F MED LIST DOCD IN RCRD: CPT | Mod: CPTII,S$GLB,, | Performed by: NURSE PRACTITIONER

## 2022-05-27 PROCEDURE — 1160F RVW MEDS BY RX/DR IN RCRD: CPT | Mod: CPTII,S$GLB,, | Performed by: NURSE PRACTITIONER

## 2022-05-27 PROCEDURE — 1101F PT FALLS ASSESS-DOCD LE1/YR: CPT | Mod: CPTII,S$GLB,, | Performed by: NURSE PRACTITIONER

## 2022-05-27 PROCEDURE — 3080F DIAST BP >= 90 MM HG: CPT | Mod: CPTII,S$GLB,, | Performed by: NURSE PRACTITIONER

## 2022-05-27 PROCEDURE — 1159F PR MEDICATION LIST DOCUMENTED IN MEDICAL RECORD: ICD-10-PCS | Mod: CPTII,S$GLB,, | Performed by: NURSE PRACTITIONER

## 2022-05-29 PROBLEM — R41.89 COGNITIVE CHANGES: Status: ACTIVE | Noted: 2022-05-29

## 2022-05-30 NOTE — PROGRESS NOTES
"  Farheen Soares presented for a  Medicare AWV and comprehensive Health Risk Assessment today. The following components were reviewed and updated:    · Medical history  · Family History  · Social history  · Allergies and Current Medications  · Health Risk Assessment  · Health Maintenance  · Care Team         ** See Completed Assessments for Annual Wellness Visit within the encounter summary.**         The following assessments were completed:  · Living Situation  · CAGE  · Depression Screening  · Timed Get Up and Go  · Whisper Test  · Cognitive Function Screening  · Nutrition Screening  · ADL Screening  · PAQ Screening        Vitals:    05/27/22 1111   BP: (!) 140/95   Pulse: 60   Temp: 96.8 °F (36 °C)   Weight: 66.2 kg (146 lb)   Height: 5' 3" (1.6 m)     Body mass index is 25.86 kg/m².  Physical Exam  Constitutional:       Appearance: Normal appearance.   HENT:      Head: Normocephalic and atraumatic.      Ears:      Comments: Hearing loss     Nose: Nose normal.      Mouth/Throat:      Mouth: Mucous membranes are moist.   Eyes:      Extraocular Movements: Extraocular movements intact.   Cardiovascular:      Rate and Rhythm: Normal rate and regular rhythm.      Heart sounds: Normal heart sounds.   Pulmonary:      Effort: Pulmonary effort is normal. No respiratory distress.      Breath sounds: Normal breath sounds.   Abdominal:      General: Bowel sounds are normal. There is no distension.      Palpations: Abdomen is soft.   Musculoskeletal:         General: No swelling. Normal range of motion.      Cervical back: Normal range of motion.   Skin:     General: Skin is warm and dry.   Neurological:      Mental Status: She is alert.      Gait: Gait abnormal (walker present).      Comments: Memory loss   Psychiatric:         Mood and Affect: Mood normal.         Behavior: Behavior normal.               Diagnoses and health risks identified today and associated recommendations/orders:    1. Encounter for preventive " health examination  Assessments completed. Preventive measures and health maintenance reviewed with patient and patients son.    2. Recurrent major depressive disorder, in full remission  Stable, followed by PCP.    3. Type 2 diabetes mellitus without complication, without long-term current use of insulin  Stable, followed by PCP.    4. Meningioma  Stable, followed by PCP Neurosurgery.    5. NPH (normal pressure hydrocephalus)  Stable, followed by PCP and Neurosurgery.    6. Cognitive changes  Stable, followed by PCP.    7. DDD (degenerative disc disease), lumbar  Stable, followed by PCP.    8. Other chronic pain  Stable, followed by PCP.    9. Essential hypertension  Stable, followed by PCP.    10. Gastroesophageal reflux disease, unspecified whether esophagitis present  Stable, followed by PCP.    11. Urinary urgency  Stable, followed by PCP.    12. Hyperlipidemia, unspecified hyperlipidemia type  Stable, followed by PCP.    13. Chronic gout of multiple sites, unspecified cause  Stable, followed by PCP.    14. Age-related osteoporosis with current pathological fracture with routine healing  Stable, followed by PCP.    Provided Farheen with a 5-10 year written screening schedule and personal prevention plan. Recommendations were developed using the USPSTF age appropriate recommendations. Education, counseling, and referrals were provided as needed. After Visit Summary printed and given to patient which includes a list of additional screenings\tests needed.    Follow up in about 1 year (around 5/27/2023) for your next annual wellness visit.    Anika Chavarria NP  I offered to discuss advanced care planning, including how to pick a person who would make decisions for you if you were unable to make them for yourself, called a health care power of , and what kind of decisions you might make such as use of life sustaining treatments such as ventilators and tube feeding when faced with a life limiting  illness recorded on a living will that they will need to know. (How you want to be cared for as you near the end of your natural life)     X Patient is interested in learning more about how to make advanced directives.  I provided them paperwork and offered to discuss this with them.

## 2022-05-30 NOTE — PATIENT INSTRUCTIONS
Counseling and Referral of Other Preventative  (Italic type indicates deductible and co-insurance are waived)    Patient Name: Farheen Soares  Today's Date: 5/29/2022    Health Maintenance       Date Due Completion Date    Foot Exam Never done ---    Eye Exam Never done ---    DEXA Scan Never done ---    Shingles Vaccine (2 of 3) 12/04/2012 10/9/2012    Diabetes Urine Screening 03/22/2022 3/22/2021    COVID-19 Vaccine (4 - Booster for Pfizer series) 04/21/2022 12/21/2021    Hemoglobin A1c 05/12/2022 11/12/2021    Lipid Panel 11/12/2022 11/12/2021    TETANUS VACCINE 02/09/2027 2/9/2017        No orders of the defined types were placed in this encounter.    The following information is provided to all patients.  This information is to help you find resources for any of the problems found today that may be affecting your health:                Living healthy guide: www.[facility].[place].[facility]      Understanding Diabetes: www.diabetes.org      Eating healthy: www.cdc.gov/healthyweight      CDC home safety checklist: www.cdc.gov/steadi/patient.html      Agency on Aging: www.goea.[place].[facility]      Alcoholics anonymous (AA): www.aa.org      Physical Activity: www.lulú.nih.gov/jo1jnxn      Tobacco use: www.quitwithusla.org

## 2022-06-27 ENCOUNTER — HOSPITAL ENCOUNTER (OUTPATIENT)
Facility: OTHER | Age: 87
Discharge: HOME HEALTH | End: 2022-06-29
Attending: EMERGENCY MEDICINE | Admitting: INTERNAL MEDICINE
Payer: MEDICARE

## 2022-06-27 DIAGNOSIS — I49.9 ARRHYTHMIA: ICD-10-CM

## 2022-06-27 DIAGNOSIS — R41.82 ALTERED MENTAL STATUS, UNSPECIFIED ALTERED MENTAL STATUS TYPE: ICD-10-CM

## 2022-06-27 DIAGNOSIS — E86.0 DEHYDRATION: ICD-10-CM

## 2022-06-27 DIAGNOSIS — R29.810 FACIAL DROOP: ICD-10-CM

## 2022-06-27 DIAGNOSIS — J18.9 PNEUMONIA OF LEFT LOWER LOBE DUE TO INFECTIOUS ORGANISM: Primary | ICD-10-CM

## 2022-06-27 DIAGNOSIS — R09.02 HYPOXIA: ICD-10-CM

## 2022-06-27 PROBLEM — G91.9 HYDROCEPHALUS: Status: RESOLVED | Noted: 2022-06-27 | Resolved: 2022-06-27

## 2022-06-27 PROBLEM — G91.9 HYDROCEPHALUS: Status: ACTIVE | Noted: 2022-06-27

## 2022-06-27 PROBLEM — I69.392 FACIAL DROOP DUE TO OLD STROKE: Status: ACTIVE | Noted: 2022-06-27

## 2022-06-27 LAB
ABO + RH BLD: NORMAL
ALBUMIN SERPL BCP-MCNC: 4.4 G/DL (ref 3.5–5.2)
ALP SERPL-CCNC: 68 U/L (ref 55–135)
ALT SERPL W/O P-5'-P-CCNC: 16 U/L (ref 10–44)
ANION GAP SERPL CALC-SCNC: 20 MMOL/L (ref 8–16)
AST SERPL-CCNC: 39 U/L (ref 10–40)
BASOPHILS # BLD AUTO: 0.03 K/UL (ref 0–0.2)
BASOPHILS NFR BLD: 0.3 % (ref 0–1.9)
BILIRUB SERPL-MCNC: 0.7 MG/DL (ref 0.1–1)
BILIRUB UR QL STRIP: NEGATIVE
BLD GP AB SCN CELLS X3 SERPL QL: NORMAL
BUN SERPL-MCNC: 16 MG/DL (ref 8–23)
CALCIUM SERPL-MCNC: 10.6 MG/DL (ref 8.7–10.5)
CHLORIDE SERPL-SCNC: 99 MMOL/L (ref 95–110)
CLARITY UR: CLEAR
CO2 SERPL-SCNC: 19 MMOL/L (ref 23–29)
COLOR UR: YELLOW
CREAT SERPL-MCNC: 1 MG/DL (ref 0.5–1.4)
CTP QC/QA: YES
DIFFERENTIAL METHOD: ABNORMAL
EOSINOPHIL # BLD AUTO: 0 K/UL (ref 0–0.5)
EOSINOPHIL NFR BLD: 0.3 % (ref 0–8)
ERYTHROCYTE [DISTWIDTH] IN BLOOD BY AUTOMATED COUNT: 14 % (ref 11.5–14.5)
EST. GFR  (AFRICAN AMERICAN): 59 ML/MIN/1.73 M^2
EST. GFR  (NON AFRICAN AMERICAN): 51 ML/MIN/1.73 M^2
GLUCOSE SERPL-MCNC: 206 MG/DL (ref 70–110)
GLUCOSE UR QL STRIP: NEGATIVE
HCT VFR BLD AUTO: 45.9 % (ref 37–48.5)
HGB BLD-MCNC: 15.7 G/DL (ref 12–16)
HGB UR QL STRIP: NEGATIVE
IMM GRANULOCYTES # BLD AUTO: 0.04 K/UL (ref 0–0.04)
IMM GRANULOCYTES NFR BLD AUTO: 0.4 % (ref 0–0.5)
KETONES UR QL STRIP: ABNORMAL
LACTATE SERPL-SCNC: 2.2 MMOL/L (ref 0.5–2.2)
LACTATE SERPL-SCNC: 2.4 MMOL/L (ref 0.5–2.2)
LEUKOCYTE ESTERASE UR QL STRIP: NEGATIVE
LIPASE SERPL-CCNC: 55 U/L (ref 4–60)
LYMPHOCYTES # BLD AUTO: 1.5 K/UL (ref 1–4.8)
LYMPHOCYTES NFR BLD: 14 % (ref 18–48)
MAGNESIUM SERPL-MCNC: 1.9 MG/DL (ref 1.6–2.6)
MCH RBC QN AUTO: 34.4 PG (ref 27–31)
MCHC RBC AUTO-ENTMCNC: 34.2 G/DL (ref 32–36)
MCV RBC AUTO: 100 FL (ref 82–98)
MONOCYTES # BLD AUTO: 0.7 K/UL (ref 0.3–1)
MONOCYTES NFR BLD: 6.7 % (ref 4–15)
NEUTROPHILS # BLD AUTO: 8.1 K/UL (ref 1.8–7.7)
NEUTROPHILS NFR BLD: 78.3 % (ref 38–73)
NITRITE UR QL STRIP: NEGATIVE
NRBC BLD-RTO: 0 /100 WBC
PH UR STRIP: 6 [PH] (ref 5–8)
PHOSPHATE SERPL-MCNC: 3.3 MG/DL (ref 2.7–4.5)
PLATELET # BLD AUTO: 300 K/UL (ref 150–450)
PMV BLD AUTO: 11 FL (ref 9.2–12.9)
POC MOLECULAR INFLUENZA A AGN: NEGATIVE
POC MOLECULAR INFLUENZA B AGN: NEGATIVE
POTASSIUM SERPL-SCNC: 4.1 MMOL/L (ref 3.5–5.1)
PROCALCITONIN SERPL IA-MCNC: 0.04 NG/ML
PROCALCITONIN SERPL IA-MCNC: 0.05 NG/ML
PROT SERPL-MCNC: 9.2 G/DL (ref 6–8.4)
PROT UR QL STRIP: NEGATIVE
RBC # BLD AUTO: 4.57 M/UL (ref 4–5.4)
SARS-COV-2 RDRP RESP QL NAA+PROBE: NEGATIVE
SARS-COV-2 RDRP RESP QL NAA+PROBE: NEGATIVE
SODIUM SERPL-SCNC: 138 MMOL/L (ref 136–145)
SP GR UR STRIP: 1.02 (ref 1–1.03)
TROPONIN I SERPL DL<=0.01 NG/ML-MCNC: 0.01 NG/ML (ref 0–0.03)
URN SPEC COLLECT METH UR: ABNORMAL
UROBILINOGEN UR STRIP-ACNC: NEGATIVE EU/DL
WBC # BLD AUTO: 10.4 K/UL (ref 3.9–12.7)

## 2022-06-27 PROCEDURE — 84100 ASSAY OF PHOSPHORUS: CPT | Performed by: PHYSICIAN ASSISTANT

## 2022-06-27 PROCEDURE — 85025 COMPLETE CBC W/AUTO DIFF WBC: CPT | Performed by: PHYSICIAN ASSISTANT

## 2022-06-27 PROCEDURE — 83735 ASSAY OF MAGNESIUM: CPT | Performed by: PHYSICIAN ASSISTANT

## 2022-06-27 PROCEDURE — 81003 URINALYSIS AUTO W/O SCOPE: CPT | Performed by: PHYSICIAN ASSISTANT

## 2022-06-27 PROCEDURE — 96366 THER/PROPH/DIAG IV INF ADDON: CPT

## 2022-06-27 PROCEDURE — 99220 PR INITIAL OBSERVATION CARE,LEVL III: ICD-10-PCS | Mod: ,,, | Performed by: PHYSICIAN ASSISTANT

## 2022-06-27 PROCEDURE — G0378 HOSPITAL OBSERVATION PER HR: HCPCS

## 2022-06-27 PROCEDURE — 25000003 PHARM REV CODE 250: Performed by: PHYSICIAN ASSISTANT

## 2022-06-27 PROCEDURE — 83605 ASSAY OF LACTIC ACID: CPT | Mod: 91 | Performed by: PHYSICIAN ASSISTANT

## 2022-06-27 PROCEDURE — 96365 THER/PROPH/DIAG IV INF INIT: CPT

## 2022-06-27 PROCEDURE — 96361 HYDRATE IV INFUSION ADD-ON: CPT

## 2022-06-27 PROCEDURE — 83036 HEMOGLOBIN GLYCOSYLATED A1C: CPT | Performed by: PHYSICIAN ASSISTANT

## 2022-06-27 PROCEDURE — 96367 TX/PROPH/DG ADDL SEQ IV INF: CPT

## 2022-06-27 PROCEDURE — 63600175 PHARM REV CODE 636 W HCPCS: Performed by: PHYSICIAN ASSISTANT

## 2022-06-27 PROCEDURE — 93010 ELECTROCARDIOGRAM REPORT: CPT | Mod: ,,, | Performed by: INTERNAL MEDICINE

## 2022-06-27 PROCEDURE — 84145 PROCALCITONIN (PCT): CPT | Performed by: PHYSICIAN ASSISTANT

## 2022-06-27 PROCEDURE — U0002 COVID-19 LAB TEST NON-CDC: HCPCS | Performed by: PHYSICIAN ASSISTANT

## 2022-06-27 PROCEDURE — 84484 ASSAY OF TROPONIN QUANT: CPT | Performed by: PHYSICIAN ASSISTANT

## 2022-06-27 PROCEDURE — 87449 NOS EACH ORGANISM AG IA: CPT | Performed by: PHYSICIAN ASSISTANT

## 2022-06-27 PROCEDURE — 99285 EMERGENCY DEPT VISIT HI MDM: CPT | Mod: 25

## 2022-06-27 PROCEDURE — 87040 BLOOD CULTURE FOR BACTERIA: CPT | Mod: 59 | Performed by: PHYSICIAN ASSISTANT

## 2022-06-27 PROCEDURE — 86901 BLOOD TYPING SEROLOGIC RH(D): CPT | Performed by: PHYSICIAN ASSISTANT

## 2022-06-27 PROCEDURE — 83690 ASSAY OF LIPASE: CPT | Performed by: PHYSICIAN ASSISTANT

## 2022-06-27 PROCEDURE — 93005 ELECTROCARDIOGRAM TRACING: CPT

## 2022-06-27 PROCEDURE — 93010 EKG 12-LEAD: ICD-10-PCS | Mod: ,,, | Performed by: INTERNAL MEDICINE

## 2022-06-27 PROCEDURE — U0002 COVID-19 LAB TEST NON-CDC: HCPCS | Performed by: EMERGENCY MEDICINE

## 2022-06-27 PROCEDURE — 80053 COMPREHEN METABOLIC PANEL: CPT | Performed by: PHYSICIAN ASSISTANT

## 2022-06-27 PROCEDURE — 84145 PROCALCITONIN (PCT): CPT | Mod: 91 | Performed by: PHYSICIAN ASSISTANT

## 2022-06-27 PROCEDURE — 83605 ASSAY OF LACTIC ACID: CPT | Performed by: PHYSICIAN ASSISTANT

## 2022-06-27 PROCEDURE — 99220 PR INITIAL OBSERVATION CARE,LEVL III: CPT | Mod: ,,, | Performed by: PHYSICIAN ASSISTANT

## 2022-06-27 PROCEDURE — 96375 TX/PRO/DX INJ NEW DRUG ADDON: CPT

## 2022-06-27 RX ORDER — ONDANSETRON 2 MG/ML
4 INJECTION INTRAMUSCULAR; INTRAVENOUS
Status: COMPLETED | OUTPATIENT
Start: 2022-06-27 | End: 2022-06-27

## 2022-06-27 RX ORDER — ACETAMINOPHEN 325 MG/1
650 TABLET ORAL EVERY 8 HOURS PRN
Status: CANCELLED | OUTPATIENT
Start: 2022-06-27

## 2022-06-27 RX ORDER — TALC
6 POWDER (GRAM) TOPICAL NIGHTLY PRN
Status: CANCELLED | OUTPATIENT
Start: 2022-06-27

## 2022-06-27 RX ORDER — SODIUM CHLORIDE 9 MG/ML
INJECTION, SOLUTION INTRAVENOUS CONTINUOUS
Status: DISCONTINUED | OUTPATIENT
Start: 2022-06-27 | End: 2022-06-29

## 2022-06-27 RX ORDER — SODIUM CHLORIDE 0.9 % (FLUSH) 0.9 %
10 SYRINGE (ML) INJECTION DAILY
Status: CANCELLED | OUTPATIENT
Start: 2022-06-28

## 2022-06-27 RX ADMIN — SODIUM CHLORIDE: 0.9 INJECTION, SOLUTION INTRAVENOUS at 11:06

## 2022-06-27 RX ADMIN — CEFTRIAXONE 2 G: 2 INJECTION, SOLUTION INTRAVENOUS at 05:06

## 2022-06-27 RX ADMIN — AZITHROMYCIN MONOHYDRATE 500 MG: 500 INJECTION, POWDER, LYOPHILIZED, FOR SOLUTION INTRAVENOUS at 06:06

## 2022-06-27 RX ADMIN — ONDANSETRON 4 MG: 2 INJECTION INTRAMUSCULAR; INTRAVENOUS at 04:06

## 2022-06-27 RX ADMIN — AMPICILLIN SODIUM AND SULBACTAM SODIUM 3 G: 2; 1 INJECTION, POWDER, FOR SOLUTION INTRAMUSCULAR; INTRAVENOUS at 09:06

## 2022-06-27 RX ADMIN — SODIUM CHLORIDE 500 ML: 0.9 INJECTION, SOLUTION INTRAVENOUS at 04:06

## 2022-06-27 NOTE — ED NOTES
Pt resting quietly on stretcher with eyes closed, responds to verbal stimuli. Pt remains on continuous cardiac and pulse ox monitoring with non-invasive blood pressure to cycle every 30 minutes. Pt hypertensive, otherwise VS stable. No acute distress or discomfort reported or observed. Bed locked in lowest position; side rails up and locked x 2; call light, bedside table, and personal belongings within reach. Room assessed for safety measures and cleanliness; no action needed at this time. Plan of care discussed. Pt instructed to alert nurse for assistance and before attempting to get out of bed; verbalizes understanding. Pt denies needs or complaints at this time; will continue to monitor. Daughter at bedside.

## 2022-06-27 NOTE — ED PROVIDER NOTES
Source of History:  EMS and daughter    Chief complaint:  Nausea (N/V for the past two days with altered mental status and a new onset of a-fib per EMS)      HPI:  Farheen Soares is a 87 y.o. female  with type 2 diabetes, hypertension, osteoporosis, gout, hyperlipidemia, GERD, TIA, PVD, NPH, history of benign calcified brain mass and dementia who is presenting to the emergency department with her daughter by EMS for nausea, vomiting and altered mental status.  Daughter states she has been having postprandial vomiting since yesterday.  States yesterday vomitus was clear and today has been more a green/dark color.  No history of dark stools.  She did complain of some minor upper abdominal pain yesterday.  Daughter states she appears more drowsy and is less verbally responsive today.  No fever at home.  She was treated for a UTI approximately 2 weeks ago when she was complaining of dysuria, daughter does not know name of the antibiotic or course.  No cough or trouble breathing.  When asked, patient denies pain but says yes to headache. Per EMS, patient had episode of Afib and acu check was 200.   This is the extent to the patients complaints today here in the emergency department.    ROS: As per HPI and below:  Limited by patient's mental status  General: No fever.  + fatigue   Head: + headache    Respiratory: No shortness of breath.  No cough.  Abdomen: + nausea and vomiting   Genito-Urinary: + dysuria 2 weeks ago.  Neurologic: +altered mental status   Integument: No rashes or lesions.  Allergy/immunology:  Not immunocompromised.    Review of patient's allergies indicates:   Allergen Reactions    Baclofen      AMS and distorted dremas       PMH:  As per HPI and below:  Past Medical History:   Diagnosis Date    Age-related osteoporosis with current pathological fracture with routine healing     Allergic rhinitis     Carotid atherosclerosis, bilateral     Chronic gout of multiple sites     Chronic low  back pain with bilateral sciatica     Closed impacted fracture of left femoral neck with routine healing s/p percutaneous screw fixation on 11/21/2020 11/20/2020    DDD (degenerative disc disease), lumbar 8/17/2020    Diverticulosis     Essential hypertension 11/25/2019    Facet arthropathy     GERD (gastroesophageal reflux disease) 11/25/2019    Hearing loss     History of transient ischemic attack (TIA) 11/25/2019    IBS (irritable bowel syndrome)     Insomnia     Lumbar radiculopathy 8/17/2020    Lumbar spondylosis 8/17/2020    Lumbar stenosis     Meningioma     Paroxysmal SVT (supraventricular tachycardia)     Primary open angle glaucoma (POAG) of both eyes     Pure hypercholesterolemia 11/25/2019    PVD (peripheral vascular disease)     30% prox stenosis of celiac trunk and 20% stnosis Prox SMA - per Abd/SMA Arteriogram 4/3/08    Recurrent major depressive disorder, in full remission     Type 2 diabetes mellitus without complication, without long-term current use of insulin 11/25/2019    Vitamin D deficiency 11/23/2020     Past Surgical History:   Procedure Laterality Date    CATARACT EXTRACTION      CAUDAL EPIDURAL STEROID INJECTION N/A 8/27/2020    Procedure: Injection-steroid-epidural-caudal with catheter;  Surgeon: Johnathan Gonzalez Jr., MD;  Location: [facility] PAIN [facility]GT;  Service: Pain Management;  Laterality: N/A;    EPIDURAL STEROID INJECTION INTO LUMBAR SPINE      LUMBAR LAMINECTOMY  2009    PERCUTANEOUS PINNING OF HIP Left 11/21/2020    Procedure: Left- Percutaneous Screws- Large  arm Clock side;  Surgeon: Leandro White MD;  Location: [facility] OR [address] [place] [address];  Service: Orthopedics;  Laterality: Left;    TOTAL ABDOMINAL HYSTERECTOMY W/ BILATERAL SALPINGOOPHORECTOMY         Social History     Tobacco Use    Smoking status: Never Smoker    Smokeless tobacco: Never Used   Substance Use Topics    Alcohol use: Yes     Comment: socially    Drug use: Never       Physical Exam:    BP (!)  181/78   Pulse 81   Temp 98.1 °F (36.7 °C) (Oral)   Resp 18   Wt 68 kg (150 lb)   SpO2 95%   BMI 26.57 kg/m²   Nursing note and vital signs reviewed.  Appearance:  Somnolent but arousable to verbal cues.  Nontoxic appearing.  Eyes: No conjunctival injection. PERRLA. Tracks movement with eyes   ENT: Mucous membranes slightly dry    Chest/ Respiratory: Clear to auscultation bilaterally.  Good air movement.  No wheezes.  No rhonchi. No rales. No accessory muscle use.  Cardiovascular:  Irregularly irregular.  No murmurs. No gallops. No rubs. Faint but intact distal pulses  Abdomen: Soft.  Not distended.  Nontender.  No guarding.  No rebound. Non-peritoneal.  Musculoskeletal: Stiff extremities.  No deformities.  Neck supple.  No meningismus.  Skin: No rashes seen. Extremities cool to touch.  No abrasions.  No ecchymoses.  Neurologic: Oriented to person and place but not time.  Symmetric  strength.  Left-sided facial droop slightly at rest and with smiling.  Mental Status: GCS 14  Labs that have been ordered have been independently reviewed and interpreted by myself.    I decided to obtain the patient's medical records.      MDM/ Differential Dx:    87 y.o. female with dementia type 2 diabetes, hypertension, osteoporosis, gout, hyperlipidemia, GERD, TIA, PVD who is presenting to the emergency department by EMS, history provided mainly by daughter.  Brought in due to concern of decreased mental status from baseline, and vomiting since yesterday.  Patient is afebrile, nontoxic appearing hemodynamically stable.  She is hypertensive and did not take her nighttime blood pressure medicine last night.  On exam she has a left-sided facial droop when smiling.  Daughter states that she sometimes has a droop to this side of her face from a prior dental procedure approximately 1 year ago, states it may be more noticeable today.   Differential includes altered mental status secondary to metabolic etiology, dehydration,  infectious etiology, stroke, intracranial bleed.  Patient will be closely monitored on cardiac monitoring,    ED Course as of 06/27/22 1826 Mon Jun 27, 2022   1626 CT with chronic findings, stable brain mass and NPH. [AG]   1627 Comprehensive metabolic panel(!)  GFR is 59  Mild metabolic acidosis.  Creatinine upper limits of baseline. Urine with ketones.   All consistent with dehydration status.  [AG]   1816 EKG rom 1601 - NSR at a rate of 83 bpm.  [AG]      ED Course User Index  [AG] Jaquan Reid PA-C           Diagnostic Impression:    1. Altered mental status, unspecified altered mental status type    2. Arrhythmia    3. Pneumonia of left lower lobe due to infectious organism    4. Hypoxia    5. Facial droop    6. Dehydration                  Jaquan Reid PA-C  06/27/22 1827

## 2022-06-27 NOTE — ED TRIAGE NOTES
Pt presents to ED w/ c/o N/V x 2 days, with dizziness. Pt's daughter reports pt complained of dysuria x 2 weeks ago, but has not mentioned it again since.

## 2022-06-27 NOTE — HPI
"Per Stephany Carias PA-C:    "Ms. Farheen Soares is a 87 y.o. female, with PMH of T2DM, HTN, HLD, GERD, PVD, NPH, osteoporosis, gout, GERD, benign calcified brain mass and dementia, who presented to [facility] ED on 6/27/22 due to nausea, vomiting and altered mental status. Her daughter notes that she appears drowsy and less verbally responsive today. Additionally, she has been having upper abdominal pain, with post-prandial vomiting of clear (initially) to green (at present) fluid. She was treated for a UTI 2 weeks ago with antibiotic, the name of which the daughter can not recall.  She denies the patient has had a fever, cough, difficulty breathing. EMS was called to bring her to the hospital and noted that her rhythm was A. Fib. She was evaluated in the ED where she was found to have a left sided facial droop, which started 1 year ago after a dental procedure, but was more noticeable today.  with labs showing hyperglycemia without DKA. Lactic acid was elevated at 2.4.  UA was negative for UTI. A CXR showed LLL GGO. A CT Head showed no new hemorrhage or infarct, a stable appearing posterior fossa extra-cranial mass, and ventriculometry concerning for hydrocephalus and chronic small vessel ischemic change. She was treated for CAP."  "

## 2022-06-28 PROBLEM — G93.41 ACUTE METABOLIC ENCEPHALOPATHY: Status: ACTIVE | Noted: 2022-06-27

## 2022-06-28 LAB
ANION GAP SERPL CALC-SCNC: 10 MMOL/L (ref 8–16)
BASOPHILS # BLD AUTO: 0.04 K/UL (ref 0–0.2)
BASOPHILS NFR BLD: 0.5 % (ref 0–1.9)
BUN SERPL-MCNC: 12 MG/DL (ref 8–23)
CALCIUM SERPL-MCNC: 9.9 MG/DL (ref 8.7–10.5)
CHLORIDE SERPL-SCNC: 105 MMOL/L (ref 95–110)
CO2 SERPL-SCNC: 24 MMOL/L (ref 23–29)
CREAT SERPL-MCNC: 0.9 MG/DL (ref 0.5–1.4)
DIFFERENTIAL METHOD: ABNORMAL
EOSINOPHIL # BLD AUTO: 0 K/UL (ref 0–0.5)
EOSINOPHIL NFR BLD: 0.5 % (ref 0–8)
ERYTHROCYTE [DISTWIDTH] IN BLOOD BY AUTOMATED COUNT: 13.8 % (ref 11.5–14.5)
EST. GFR  (AFRICAN AMERICAN): >60 ML/MIN/1.73 M^2
EST. GFR  (NON AFRICAN AMERICAN): 58 ML/MIN/1.73 M^2
ESTIMATED AVG GLUCOSE: 160 MG/DL (ref 68–131)
FOLATE SERPL-MCNC: 12.5 NG/ML (ref 4–24)
GLUCOSE SERPL-MCNC: 148 MG/DL (ref 70–110)
HBA1C MFR BLD: 7.2 % (ref 4–5.6)
HCT VFR BLD AUTO: 43.8 % (ref 37–48.5)
HGB BLD-MCNC: 14.9 G/DL (ref 12–16)
IMM GRANULOCYTES # BLD AUTO: 0.02 K/UL (ref 0–0.04)
IMM GRANULOCYTES NFR BLD AUTO: 0.3 % (ref 0–0.5)
LACTATE SERPL-SCNC: 2.1 MMOL/L (ref 0.5–2.2)
LACTATE SERPL-SCNC: 2.2 MMOL/L (ref 0.5–2.2)
LYMPHOCYTES # BLD AUTO: 2.9 K/UL (ref 1–4.8)
LYMPHOCYTES NFR BLD: 39.4 % (ref 18–48)
MAGNESIUM SERPL-MCNC: 1.9 MG/DL (ref 1.6–2.6)
MCH RBC QN AUTO: 34.6 PG (ref 27–31)
MCHC RBC AUTO-ENTMCNC: 34 G/DL (ref 32–36)
MCV RBC AUTO: 102 FL (ref 82–98)
MONOCYTES # BLD AUTO: 1 K/UL (ref 0.3–1)
MONOCYTES NFR BLD: 13.6 % (ref 4–15)
NEUTROPHILS # BLD AUTO: 3.4 K/UL (ref 1.8–7.7)
NEUTROPHILS NFR BLD: 45.7 % (ref 38–73)
NRBC BLD-RTO: 0 /100 WBC
PLATELET # BLD AUTO: 233 K/UL (ref 150–450)
PMV BLD AUTO: 10.6 FL (ref 9.2–12.9)
POCT GLUCOSE: 162 MG/DL (ref 70–110)
POCT GLUCOSE: 173 MG/DL (ref 70–110)
POCT GLUCOSE: 176 MG/DL (ref 70–110)
POCT GLUCOSE: 188 MG/DL (ref 70–110)
POTASSIUM SERPL-SCNC: 4.4 MMOL/L (ref 3.5–5.1)
RBC # BLD AUTO: 4.31 M/UL (ref 4–5.4)
SODIUM SERPL-SCNC: 139 MMOL/L (ref 136–145)
TSH SERPL DL<=0.005 MIU/L-ACNC: 0.75 UIU/ML (ref 0.4–4)
VIT B12 SERPL-MCNC: 582 PG/ML (ref 210–950)
WBC # BLD AUTO: 7.36 K/UL (ref 3.9–12.7)

## 2022-06-28 PROCEDURE — 80048 BASIC METABOLIC PNL TOTAL CA: CPT | Performed by: PHYSICIAN ASSISTANT

## 2022-06-28 PROCEDURE — 96366 THER/PROPH/DIAG IV INF ADDON: CPT

## 2022-06-28 PROCEDURE — 63600175 PHARM REV CODE 636 W HCPCS: Performed by: PHYSICIAN ASSISTANT

## 2022-06-28 PROCEDURE — 82607 VITAMIN B-12: CPT | Performed by: HOSPITALIST

## 2022-06-28 PROCEDURE — 83605 ASSAY OF LACTIC ACID: CPT | Mod: 91 | Performed by: HOSPITALIST

## 2022-06-28 PROCEDURE — 84443 ASSAY THYROID STIM HORMONE: CPT | Performed by: HOSPITALIST

## 2022-06-28 PROCEDURE — G0426 INPT/ED TELECONSULT50: HCPCS | Mod: 95,,, | Performed by: PSYCHIATRY & NEUROLOGY

## 2022-06-28 PROCEDURE — G0378 HOSPITAL OBSERVATION PER HR: HCPCS

## 2022-06-28 PROCEDURE — 63600175 PHARM REV CODE 636 W HCPCS: Performed by: HOSPITALIST

## 2022-06-28 PROCEDURE — 83735 ASSAY OF MAGNESIUM: CPT | Performed by: PHYSICIAN ASSISTANT

## 2022-06-28 PROCEDURE — 25000003 PHARM REV CODE 250: Performed by: HOSPITALIST

## 2022-06-28 PROCEDURE — 25500020 PHARM REV CODE 255: Performed by: HOSPITALIST

## 2022-06-28 PROCEDURE — G0426 PR INPT TELEHEALTH CONSULT 50M: ICD-10-PCS | Mod: 95,,, | Performed by: PSYCHIATRY & NEUROLOGY

## 2022-06-28 PROCEDURE — 82746 ASSAY OF FOLIC ACID SERUM: CPT | Performed by: HOSPITALIST

## 2022-06-28 PROCEDURE — 87632 RESP VIRUS 6-11 TARGETS: CPT | Performed by: PHYSICIAN ASSISTANT

## 2022-06-28 PROCEDURE — 85025 COMPLETE CBC W/AUTO DIFF WBC: CPT | Performed by: PHYSICIAN ASSISTANT

## 2022-06-28 PROCEDURE — 25000003 PHARM REV CODE 250: Performed by: PHYSICIAN ASSISTANT

## 2022-06-28 PROCEDURE — 99226 PR SUBSEQUENT OBSERVATION CARE,LEVEL III: CPT | Mod: ,,, | Performed by: HOSPITALIST

## 2022-06-28 PROCEDURE — 96372 THER/PROPH/DIAG INJ SC/IM: CPT | Performed by: HOSPITALIST

## 2022-06-28 PROCEDURE — 36415 COLL VENOUS BLD VENIPUNCTURE: CPT | Performed by: PHYSICIAN ASSISTANT

## 2022-06-28 PROCEDURE — 83605 ASSAY OF LACTIC ACID: CPT | Performed by: PHYSICIAN ASSISTANT

## 2022-06-28 PROCEDURE — 99226 PR SUBSEQUENT OBSERVATION CARE,LEVEL III: ICD-10-PCS | Mod: ,,, | Performed by: HOSPITALIST

## 2022-06-28 PROCEDURE — 36415 COLL VENOUS BLD VENIPUNCTURE: CPT | Performed by: HOSPITALIST

## 2022-06-28 PROCEDURE — A9585 GADOBUTROL INJECTION: HCPCS | Performed by: HOSPITALIST

## 2022-06-28 RX ORDER — ATORVASTATIN CALCIUM 10 MG/1
10 TABLET, FILM COATED ORAL DAILY
Status: DISCONTINUED | OUTPATIENT
Start: 2022-06-28 | End: 2022-06-29 | Stop reason: HOSPADM

## 2022-06-28 RX ORDER — VENLAFAXINE HYDROCHLORIDE 75 MG/1
150 CAPSULE, EXTENDED RELEASE ORAL NIGHTLY
Status: DISCONTINUED | OUTPATIENT
Start: 2022-06-28 | End: 2022-06-29 | Stop reason: HOSPADM

## 2022-06-28 RX ORDER — CLOPIDOGREL BISULFATE 75 MG/1
75 TABLET ORAL DAILY
Status: DISCONTINUED | OUTPATIENT
Start: 2022-06-28 | End: 2022-06-29 | Stop reason: HOSPADM

## 2022-06-28 RX ORDER — LISINOPRIL 20 MG/1
40 TABLET ORAL DAILY
Status: DISCONTINUED | OUTPATIENT
Start: 2022-06-28 | End: 2022-06-29 | Stop reason: HOSPADM

## 2022-06-28 RX ORDER — AMLODIPINE BESYLATE 5 MG/1
10 TABLET ORAL DAILY
Status: DISCONTINUED | OUTPATIENT
Start: 2022-06-28 | End: 2022-06-29 | Stop reason: HOSPADM

## 2022-06-28 RX ORDER — GADOBUTROL 604.72 MG/ML
7 INJECTION INTRAVENOUS
Status: COMPLETED | OUTPATIENT
Start: 2022-06-28 | End: 2022-06-28

## 2022-06-28 RX ORDER — METOPROLOL SUCCINATE 50 MG/1
100 TABLET, EXTENDED RELEASE ORAL DAILY
Status: DISCONTINUED | OUTPATIENT
Start: 2022-06-28 | End: 2022-06-29 | Stop reason: HOSPADM

## 2022-06-28 RX ORDER — ALLOPURINOL 300 MG/1
300 TABLET ORAL DAILY
Status: DISCONTINUED | OUTPATIENT
Start: 2022-06-28 | End: 2022-06-29 | Stop reason: HOSPADM

## 2022-06-28 RX ORDER — ENOXAPARIN SODIUM 100 MG/ML
40 INJECTION SUBCUTANEOUS EVERY 24 HOURS
Status: DISCONTINUED | OUTPATIENT
Start: 2022-06-28 | End: 2022-06-29 | Stop reason: HOSPADM

## 2022-06-28 RX ADMIN — VENLAFAXINE HYDROCHLORIDE 150 MG: 75 CAPSULE, EXTENDED RELEASE ORAL at 09:06

## 2022-06-28 RX ADMIN — METOPROLOL SUCCINATE 100 MG: 50 TABLET, EXTENDED RELEASE ORAL at 06:06

## 2022-06-28 RX ADMIN — AMPICILLIN SODIUM AND SULBACTAM SODIUM 3 G: 2; 1 INJECTION, POWDER, FOR SOLUTION INTRAMUSCULAR; INTRAVENOUS at 09:06

## 2022-06-28 RX ADMIN — CLOPIDOGREL 75 MG: 75 TABLET, FILM COATED ORAL at 06:06

## 2022-06-28 RX ADMIN — ENOXAPARIN SODIUM 40 MG: 100 INJECTION SUBCUTANEOUS at 06:06

## 2022-06-28 RX ADMIN — LISINOPRIL 40 MG: 20 TABLET ORAL at 06:06

## 2022-06-28 RX ADMIN — ALLOPURINOL 300 MG: 300 TABLET ORAL at 06:06

## 2022-06-28 RX ADMIN — AMPICILLIN SODIUM AND SULBACTAM SODIUM 3 G: 2; 1 INJECTION, POWDER, FOR SOLUTION INTRAMUSCULAR; INTRAVENOUS at 03:06

## 2022-06-28 RX ADMIN — GADOBUTROL 7 ML: 604.72 INJECTION INTRAVENOUS at 02:06

## 2022-06-28 RX ADMIN — ATORVASTATIN CALCIUM 10 MG: 10 TABLET, FILM COATED ORAL at 06:06

## 2022-06-28 RX ADMIN — AMLODIPINE BESYLATE 10 MG: 5 TABLET ORAL at 06:06

## 2022-06-28 RX ADMIN — AMPICILLIN SODIUM AND SULBACTAM SODIUM 3 G: 2; 1 INJECTION, POWDER, FOR SOLUTION INTRAMUSCULAR; INTRAVENOUS at 06:06

## 2022-06-28 NOTE — PROGRESS NOTES
[facility] Med Surg ([place])  Wound Care    Patient Name:  Farheen Soares   MRN:  [id number]  Date: 6/28/2022  Diagnosis: CAP (community acquired pneumonia)    History:     Past Medical History:   Diagnosis Date    Age-related osteoporosis with current pathological fracture with routine healing     Allergic rhinitis     Carotid atherosclerosis, bilateral     Chronic gout of multiple sites     Chronic low back pain with bilateral sciatica     Closed impacted fracture of left femoral neck with routine healing s/p percutaneous screw fixation on 11/21/2020 11/20/2020    DDD (degenerative disc disease), lumbar 8/17/2020    Diverticulosis     Essential hypertension 11/25/2019    Facet arthropathy     GERD (gastroesophageal reflux disease) 11/25/2019    Hearing loss     History of transient ischemic attack (TIA) 11/25/2019    IBS (irritable bowel syndrome)     Insomnia     Lumbar radiculopathy 8/17/2020    Lumbar spondylosis 8/17/2020    Lumbar stenosis     Meningioma     Paroxysmal SVT (supraventricular tachycardia)     Primary open angle glaucoma (POAG) of both eyes     Pure hypercholesterolemia 11/25/2019    PVD (peripheral vascular disease)     30% prox stenosis of celiac trunk and 20% stnosis Prox SMA - per Abd/SMA Arteriogram 4/3/08    Recurrent major depressive disorder, in full remission     Type 2 diabetes mellitus without complication, without long-term current use of insulin 11/25/2019    Vitamin D deficiency 11/23/2020       Social History     Socioeconomic History    Marital status:    Tobacco Use    Smoking status: Never Smoker    Smokeless tobacco: Never Used   Substance and Sexual Activity    Alcohol use: Yes     Comment: socially    Drug use: Never    Sexual activity: Not Currently     Social Determinants of Health     Financial Resource Strain: Low Risk     Difficulty of Paying Living Expenses: Not hard at all   Food Insecurity: No Food Insecurity    Worried  About Running Out of Food in the Last Year: Never true    Ran Out of Food in the Last Year: Never true   Transportation Needs: No Transportation Needs    Lack of Transportation (Medical): No    Lack of Transportation (Non-Medical): No   Physical Activity: Inactive    Days of Exercise per Week: 0 days    Minutes of Exercise per Session: 0 min   Stress: No Stress Concern Present    Feeling of Stress : Not at all   Social Connections: Socially Isolated    Frequency of Communication with Friends and Family: Twice a week    Frequency of Social Gatherings with Friends and Family: More than three times a week    Attends [employer] Services: Never    Active Member of Clubs or Organizations: No    Attends Club or Organization Meetings: Never    Marital Status:    Housing Stability: Low Risk     Unable to Pay for Housing in the Last Year: No    Number of Places Lived in the Last Year: 1    Unstable Housing in the Last Year: No       Precautions:     Allergies as of 06/27/2022 - Reviewed 06/27/2022   Allergen Reaction Noted    Baclofen  11/25/2019       [facility] Assessment Details/Treatment     Identified as at risk for pressure injury development with Gabino score of 17. Orders entered for prevention.      06/28/2022

## 2022-06-28 NOTE — SUBJECTIVE & OBJECTIVE
Past Medical History:   Diagnosis Date    Age-related osteoporosis with current pathological fracture with routine healing     Allergic rhinitis     Carotid atherosclerosis, bilateral     Chronic gout of multiple sites     Chronic low back pain with bilateral sciatica     Closed impacted fracture of left femoral neck with routine healing s/p percutaneous screw fixation on 11/21/2020 11/20/2020    DDD (degenerative disc disease), lumbar 8/17/2020    Diverticulosis     Essential hypertension 11/25/2019    Facet arthropathy     GERD (gastroesophageal reflux disease) 11/25/2019    Hearing loss     History of transient ischemic attack (TIA) 11/25/2019    IBS (irritable bowel syndrome)     Insomnia     Lumbar radiculopathy 8/17/2020    Lumbar spondylosis 8/17/2020    Lumbar stenosis     Meningioma     Paroxysmal SVT (supraventricular tachycardia)     Primary open angle glaucoma (POAG) of both eyes     Pure hypercholesterolemia 11/25/2019    PVD (peripheral vascular disease)     30% prox stenosis of celiac trunk and 20% stnosis Prox SMA - per Abd/SMA Arteriogram 4/3/08    Recurrent major depressive disorder, in full remission     Type 2 diabetes mellitus without complication, without long-term current use of insulin 11/25/2019    Vitamin D deficiency 11/23/2020       Past Surgical History:   Procedure Laterality Date    CATARACT EXTRACTION      CAUDAL EPIDURAL STEROID INJECTION N/A 8/27/2020    Procedure: Injection-steroid-epidural-caudal with catheter;  Surgeon: Johnathan Gonzalez Jr., MD;  Location: [facility];  Service: Pain Management;  Laterality: N/A;    EPIDURAL STEROID INJECTION INTO LUMBAR SPINE      LUMBAR LAMINECTOMY  2009    PERCUTANEOUS PINNING OF HIP Left 11/21/2020    Procedure: Left- Percutaneous Screws- Large  arm Clock side;  Surgeon: Leandro White MD;  Location: [address];  Service: Orthopedics;  Laterality: Left;    TOTAL ABDOMINAL HYSTERECTOMY W/ BILATERAL SALPINGOOPHORECTOMY          Review of patient's allergies indicates:   Allergen Reactions    Baclofen      AMS and distorted dremas       No current facility-administered medications on file prior to encounter.     Current Outpatient Medications on File Prior to Encounter   Medication Sig    acetaminophen (TYLENOL) 500 MG tablet Take 2 tablets (1,000 mg total) by mouth every 8 (eight) hours as needed (Mild pain). (Patient not taking: No sig reported)    acetaminophen (TYLENOL) 650 MG TbSR Take 1 tablet (650 mg total) by mouth every 8 (eight) hours as needed (pain).    alendronate (FOSAMAX) 70 MG tablet Take 1 tablet (70 mg total) by mouth every 7 days.    allopurinoL (ZYLOPRIM) 300 MG tablet Take 1 tablet (300 mg total) by mouth once daily.    amLODIPine (NORVASC) 10 MG tablet Take 1 tablet (10 mg total) by mouth once daily.    atorvastatin (LIPITOR) 10 MG tablet Take 1 tablet (10 mg total) by mouth once daily.    blood sugar diagnostic Strp To check BG three times daily, to use with insurance preferred meter (Patient not taking: Reported on 5/29/2022)    blood-glucose meter kit To check BG three times daily, to use with insurance preferred meter (Patient not taking: Reported on 5/29/2022)    calcium carbonate (OS-ELODIA) 500 mg calcium (1,250 mg) tablet Take 1 tablet (500 mg total) by mouth 2 (two) times daily.    clopidogreL (PLAVIX) 75 mg tablet Take 1 tablet (75 mg total) by mouth once daily.    fexofenadine (ALLEGRA) 180 MG tablet Take 1 tablet (180 mg total) by mouth daily as needed.    fluticasone (FLONASE) 50 mcg/actuation nasal spray 1 spray by Each Nostril route daily as needed.     lancets Misc To check BG three times daily, to use with insurance preferred meter (Patient not taking: Reported on 5/29/2022)    lisinopriL (PRINIVIL,ZESTRIL) 40 MG tablet Take 1 tablet (40 mg total) by mouth once daily.    metFORMIN (GLUCOPHAGE-XR) 500 MG ER 24hr tablet Take 1 tablet (500 mg total) by mouth once daily.    metoprolol succinate  (TOPROL-XL) 100 MG 24 hr tablet Take 1 tablet (100 mg total) by mouth once daily.    multivitamin capsule Take 1 capsule by mouth once daily.    venlafaxine (EFFEXOR-XR) 150 MG Cp24 TAKE 1 CAPSULE (150 MG TOTAL) BY MOUTH EVERY EVENING.     Family History       Problem Relation (Age of Onset)    Diabetes Mother    Hyperlipidemia Son    Hypertension Mother, Father, Daughter, Son    Hypothyroidism Daughter    Stroke Father          Tobacco Use    Smoking status: Never Smoker    Smokeless tobacco: Never Used   Substance and Sexual Activity    Alcohol use: Yes     Comment: socially    Drug use: Never    Sexual activity: Not Currently     Review of Systems   Unable to perform ROS: Dementia   Objective:     Vital Signs (Most Recent):  Temp: 98.7 °F (37.1 °C) (06/27/22 2321)  Pulse: 79 (06/28/22 0000)  Resp: 16 (06/27/22 2321)  BP: (!) 166/77 (06/27/22 2321)  SpO2: 97 % (06/27/22 2321)   Vital Signs (24h Range):  Temp:  [98.1 °F (36.7 °C)-98.8 °F (37.1 °C)] 98.7 °F (37.1 °C)  Pulse:  [78-93] 79  Resp:  [16-20] 16  SpO2:  [94 %-99 %] 97 %  BP: (112-206)/(58-89) 166/77     Weight: 69.5 kg (153 lb 3.5 oz)  Body mass index is 24 kg/m².    Physical Exam  Vitals and nursing note reviewed.   Constitutional:       General: She is not in acute distress.     Appearance: She is well-developed and normal weight. She is not ill-appearing, toxic-appearing or diaphoretic.      Comments: Elderly and chronically ill appearing.   HENT:      Head: Normocephalic and atraumatic.      Comments: Even when speaking in loudest voice, patient unable to hear examiner.   Eyes:      General: No scleral icterus.        Right eye: No discharge.         Left eye: No discharge.      Conjunctiva/sclera: Conjunctivae normal.   Neck:      Trachea: No tracheal deviation.   Cardiovascular:      Rate and Rhythm: Normal rate and regular rhythm.      Heart sounds: Normal heart sounds. No murmur heard.    No gallop.   Pulmonary:      Effort: Pulmonary effort is  normal. No respiratory distress.      Breath sounds: Normal breath sounds. No stridor. No wheezing or rales.   Abdominal:      General: Bowel sounds are normal. There is no distension.      Palpations: Abdomen is soft. There is no mass.      Tenderness: There is no abdominal tenderness. There is no guarding.   Musculoskeletal:         General: No deformity. Normal range of motion.      Cervical back: Normal range of motion and neck supple.   Skin:     General: Skin is warm and dry.      Coloration: Skin is not pale.      Findings: No erythema or rash.   Neurological:      General: No focal deficit present.      Mental Status: She is alert and oriented to person, place, and time.      Cranial Nerves: No cranial nerve deficit.      Motor: No abnormal muscle tone.   Psychiatric:         Mood and Affect: Mood normal.         Behavior: Behavior normal.         Thought Content: Thought content normal.         Judgment: Judgment normal.           Significant Labs: All pertinent labs within the past 24 hours have been reviewed.  BMP:   Recent Labs   Lab 06/27/22  1514   *      K 4.1   CL 99   CO2 19*   BUN 16   CREATININE 1.0   CALCIUM 10.6*   MG 1.9     CBC:   Recent Labs   Lab 06/27/22  1514   WBC 10.40   HGB 15.7   HCT 45.9        CMP:   Recent Labs   Lab 06/27/22  1514      K 4.1   CL 99   CO2 19*   *   BUN 16   CREATININE 1.0   CALCIUM 10.6*   PROT 9.2*   ALBUMIN 4.4   BILITOT 0.7   ALKPHOS 68   AST 39   ALT 16   ANIONGAP 20*   EGFRNONAA 51*     Urine Culture: No results for input(s): LABURIN in the last 48 hours.  Urine Studies:   Recent Labs   Lab 06/27/22  1638   COLORU Yellow   APPEARANCEUA Clear   PHUR 6.0   SPECGRAV 1.020   PROTEINUA Negative   GLUCUA Negative   KETONESU 2+*   BILIRUBINUA Negative   OCCULTUA Negative   NITRITE Negative   UROBILINOGEN Negative   LEUKOCYTESUR Negative       Significant Imaging: I have reviewed all pertinent imaging results/findings within the past  24 hours.  Imaging Results              CT Head Without Contrast (Final result)  Result time 06/27/22 15:57:30      Final result by Kenroy Moore MD (06/27/22 15:57:30)                   Impression:      No new hemorrhage or major vascular distribution infarct.    Stable appearance of the known posterior fossa extra-axial mass (presumed meningioma).    Ventriculomegaly out of proportion to the degree of sulcal enlargement.  Configuration remains suspicious for normal pressure hydrocephalus.    Moderate supratentorial leukoencephalopathy, nonspecific both likely reflecting chronic small vessel ischemic change.    Clinical correlation required.      Electronically signed by: Kenroy Moore MD  Date:    06/27/2022  Time:    15:57               Narrative:    EXAMINATION:  CT HEAD WITHOUT CONTRAST    CLINICAL HISTORY:  Mental status change, unknown cause;    TECHNIQUE:  Low dose axial CT images obtained throughout the head without the use of intravenous contrast.  Axial, sagittal and coronal reconstructions were performed.    COMPARISON:  MRI 05/07/2021, CT 11/20/2020    FINDINGS:  Intracranial compartment:    Periphery calcified extra-axial mass projecting over right superior cerebellum.  Lesion similar in overall size and configuration, measures on the order 3.0 x 2.4 cm maximal transverse dimension.  There is localized mass effect without subjacent parenchymal edema.  No new extra-axial mass, blood or fluid collections identified.    Prominence of the ventricles.  Third ventricle diameter measuring up to 1.1 cm.  Ventricular enlargement out of proportion to the degree of sulcal enlargement, with some crowding of the sulci near the vertex.    Patchy and confluent T2/FLAIR hyperintensity in the supratentorial white matter, nonspecific but most likely reflecting chronic small vessel ischemic changes. No recent or remote major vascular distribution infarct. No recent or remote hemorrhage.  No mass effect or midline  shift.    Scattered atherosclerotic calcification about the skull base.    Skull/extracranial contents (limited evaluation):    No displaced calvarial fracture.    The mastoid air cells and visualized paranasal sinuses are essentially clear.                                       X-Ray Chest AP Portable (Final result)  Result time 06/27/22 15:49:33      Final result by Sushil Rosario MD (06/27/22 15:49:33)                   Impression:      Ground-glass airspace opacity in the left lower lobe.      Electronically signed by: Sushil Rosario MD  Date:    06/27/2022  Time:    15:49               Narrative:    EXAMINATION:  XR CHEST AP PORTABLE    CLINICAL HISTORY:  Sepsis;    TECHNIQUE:  Single frontal view of the chest was performed.    COMPARISON:  11/20/2020.    FINDINGS:  The trachea is unremarkable.  There are calcifications of the aortic knob.  The cardiomediastinal silhouette is stable.  There is no evidence of free air beneath the hemidiaphragms.  No pleural effusions.  There is no evidence of a pneumothorax.  There is no evidence of pneumomediastinum.  There is a ground-glass airspace opacity in the left lower lobe.  There are degenerative changes in the osseous structures.

## 2022-06-28 NOTE — ASSESSMENT & PLAN NOTE
Chronic and stable.  May benefit from  shunt.  Follow-up with Neurosurgery for consideration of  shunt placement.

## 2022-06-28 NOTE — PLAN OF CARE
Disoriented. POC reviewed. Pt had MRI today.  Cardiac monitor maintained. No complaints of pain. Pt is bowel and bladder incontinent. Bed low and locked, side rails up x3, call light within reach.    Problem: Adult Inpatient Plan of Care  Goal: Plan of Care Review  Outcome: Ongoing, Progressing  Goal: Patient-Specific Goal (Individualized)  Outcome: Ongoing, Progressing  Goal: Absence of Hospital-Acquired Illness or Injury  Outcome: Ongoing, Progressing  Goal: Optimal Comfort and Wellbeing  Outcome: Ongoing, Progressing  Goal: Readiness for Transition of Care  Outcome: Ongoing, Progressing     Problem: Diabetes Comorbidity  Goal: Blood Glucose Level Within Targeted Range  Outcome: Ongoing, Progressing     Problem: Fluid Imbalance (Pneumonia)  Goal: Fluid Balance  Outcome: Ongoing, Progressing     Problem: Infection (Pneumonia)  Goal: Resolution of Infection Signs and Symptoms  Outcome: Ongoing, Progressing     Problem: Respiratory Compromise (Pneumonia)  Goal: Effective Oxygenation and Ventilation  Outcome: Ongoing, Progressing     Problem: Impaired Wound Healing  Goal: Optimal Wound Healing  Outcome: Ongoing, Progressing     Problem: Skin Injury Risk Increased  Goal: Skin Health and Integrity  Outcome: Ongoing, Progressing     Problem: Fall Injury Risk  Goal: Absence of Fall and Fall-Related Injury  Outcome: Ongoing, Progressing

## 2022-06-28 NOTE — NURSING
Patient admitted to unit. Alert and oriented x 2. Disoriented to situation and time. Verbal, slow to respond. Denies SOB or acute respiratory distress. 3L/NC. Denied pain and discomfort during shift. Skin warm and dry to touch. No open skin issues noted. Healed abrasion noted to R knee. Pure wick in place. IV site patent and flushing without difficulty.

## 2022-06-28 NOTE — ASSESSMENT & PLAN NOTE
- Ms. Farheen Soares presents with altered mental status and emesis   - CXR shows LLL GGO    - initially treated for CAP in ED   - in light of concurrent emesis, will expand coverage for aspiration PNA   - sputum cultures pending   - lactic acid elevated, now s/p IVF & ABX in ED, trend   - CURB-65 core: 2  - aspiration precautions

## 2022-06-28 NOTE — ASSESSMENT & PLAN NOTE
- chronic, stable   - Per Outpatient notes this was first diagnosed 6/1/22 and confirmed w/ LP on 7/12/21  - after LP, her symptoms improved, and she was last documented at her PCP visit on 2/10/22 to be planning to get a  shunt  - she follows w/ Dr. León and NS, Dr. Espinoza  - today's imaging shows ventriculometry suspicion for normal pressure hydrocephalus

## 2022-06-28 NOTE — PT/OT/SLP PROGRESS
Physical Therapy  Not Seen    Patient Name:  Farheen Soares   MRN:  [id number]    Patient not seen today secondary to Patient fatigue. Will follow-up as schedule allows.

## 2022-06-28 NOTE — H&P
[facility] Medicine  History & Physical    Patient Name: Farheen Soares  MRN: [id number]  Patient Class: OP- Observation  Admission Date: 6/27/2022  Attending Physician: Nate Baer MD   Primary Care Provider: Vonda Chung MD         Patient information was obtained from past medical records and ER records.     Subjective:     Principal Problem:CAP (community acquired pneumonia)    Chief Complaint:   Chief Complaint   Patient presents with    Nausea     N/V for the past two days with altered mental status and a new onset of a-fib per EMS        HPI: Ms. Farheen Soares is a 87 y.o. female, with PMH of T2DM, HTN, HLD, GERD, PVD, NPH, osteoporosis, gout, GERD, benign calcified brain mass and dementia, who presented to [facility] ED on 6/27/22 due to nausea, vomiting and altered mental status. Her daughter notes that she appears drowsy and less verbally responsive today. Additionally, she has been having upper abdominal pain, with post-prandial vomiting of clear (initially) to green (at present) fluid. She was treated for a UTI 2 weeks ago with antibiotic, the name of which the daughter can not recall.  She denies the patient has had a fever, cough, difficulty breathing. EMS was called to bring her to the hospital and noted that her rhythm was A. Fib. She was evaluated in the ED where she was found to have a left sided facial droop, which started 1 year ago after a dental procedure, but was more noticeable today.  with labs showing hyperglycemia without DKA. Lactic acid was elevated at 2.4.  UA was negative for UTI. A CXR showed LLL GGO. A CT Head showed no new hemorrhage or infarct, a stable appearing posterior fossa extra-cranial mass, and ventriculometry concerning for hydrocephalus and chronic small vessel ischemic change. She was treated for CAP. She was placed on OBS.       Past Medical History:   Diagnosis Date    Age-related osteoporosis with current pathological  fracture with routine healing     Allergic rhinitis     Carotid atherosclerosis, bilateral     Chronic gout of multiple sites     Chronic low back pain with bilateral sciatica     Closed impacted fracture of left femoral neck with routine healing s/p percutaneous screw fixation on 11/21/2020 11/20/2020    DDD (degenerative disc disease), lumbar 8/17/2020    Diverticulosis     Essential hypertension 11/25/2019    Facet arthropathy     GERD (gastroesophageal reflux disease) 11/25/2019    Hearing loss     History of transient ischemic attack (TIA) 11/25/2019    IBS (irritable bowel syndrome)     Insomnia     Lumbar radiculopathy 8/17/2020    Lumbar spondylosis 8/17/2020    Lumbar stenosis     Meningioma     Paroxysmal SVT (supraventricular tachycardia)     Primary open angle glaucoma (POAG) of both eyes     Pure hypercholesterolemia 11/25/2019    PVD (peripheral vascular disease)     30% prox stenosis of celiac trunk and 20% stnosis Prox SMA - per Abd/SMA Arteriogram 4/3/08    Recurrent major depressive disorder, in full remission     Type 2 diabetes mellitus without complication, without long-term current use of insulin 11/25/2019    Vitamin D deficiency 11/23/2020       Past Surgical History:   Procedure Laterality Date    CATARACT EXTRACTION      CAUDAL EPIDURAL STEROID INJECTION N/A 8/27/2020    Procedure: Injection-steroid-epidural-caudal with catheter;  Surgeon: Johnathan Gonzalez Jr., MD;  Location: [facility];  Service: Pain Management;  Laterality: N/A;    EPIDURAL STEROID INJECTION INTO LUMBAR SPINE      LUMBAR LAMINECTOMY  2009    PERCUTANEOUS PINNING OF HIP Left 11/21/2020    Procedure: Left- Percutaneous Screws- Large  arm Clock side;  Surgeon: Leandro White MD;  Location: [address];  Service: Orthopedics;  Laterality: Left;    TOTAL ABDOMINAL HYSTERECTOMY W/ BILATERAL SALPINGOOPHORECTOMY         Review of patient's allergies indicates:   Allergen Reactions     Baclofen      AMS and distorted dremas       No current facility-administered medications on file prior to encounter.     Current Outpatient Medications on File Prior to Encounter   Medication Sig    acetaminophen (TYLENOL) 500 MG tablet Take 2 tablets (1,000 mg total) by mouth every 8 (eight) hours as needed (Mild pain). (Patient not taking: No sig reported)    acetaminophen (TYLENOL) 650 MG TbSR Take 1 tablet (650 mg total) by mouth every 8 (eight) hours as needed (pain).    alendronate (FOSAMAX) 70 MG tablet Take 1 tablet (70 mg total) by mouth every 7 days.    allopurinoL (ZYLOPRIM) 300 MG tablet Take 1 tablet (300 mg total) by mouth once daily.    amLODIPine (NORVASC) 10 MG tablet Take 1 tablet (10 mg total) by mouth once daily.    atorvastatin (LIPITOR) 10 MG tablet Take 1 tablet (10 mg total) by mouth once daily.    blood sugar diagnostic Strp To check BG three times daily, to use with insurance preferred meter (Patient not taking: Reported on 5/29/2022)    blood-glucose meter kit To check BG three times daily, to use with insurance preferred meter (Patient not taking: Reported on 5/29/2022)    calcium carbonate (OS-ELODIA) 500 mg calcium (1,250 mg) tablet Take 1 tablet (500 mg total) by mouth 2 (two) times daily.    clopidogreL (PLAVIX) 75 mg tablet Take 1 tablet (75 mg total) by mouth once daily.    fexofenadine (ALLEGRA) 180 MG tablet Take 1 tablet (180 mg total) by mouth daily as needed.    fluticasone (FLONASE) 50 mcg/actuation nasal spray 1 spray by Each Nostril route daily as needed.     lancets Misc To check BG three times daily, to use with insurance preferred meter (Patient not taking: Reported on 5/29/2022)    lisinopriL (PRINIVIL,ZESTRIL) 40 MG tablet Take 1 tablet (40 mg total) by mouth once daily.    metFORMIN (GLUCOPHAGE-XR) 500 MG ER 24hr tablet Take 1 tablet (500 mg total) by mouth once daily.    metoprolol succinate (TOPROL-XL) 100 MG 24 hr tablet Take 1 tablet (100 mg total) by  mouth once daily.    multivitamin capsule Take 1 capsule by mouth once daily.    venlafaxine (EFFEXOR-XR) 150 MG Cp24 TAKE 1 CAPSULE (150 MG TOTAL) BY MOUTH EVERY EVENING.     Family History       Problem Relation (Age of Onset)    Diabetes Mother    Hyperlipidemia Son    Hypertension Mother, Father, Daughter, Son    Hypothyroidism Daughter    Stroke Father          Tobacco Use    Smoking status: Never Smoker    Smokeless tobacco: Never Used   Substance and Sexual Activity    Alcohol use: Yes     Comment: socially    Drug use: Never    Sexual activity: Not Currently     Review of Systems   Unable to perform ROS: Dementia   Objective:     Vital Signs (Most Recent):  Temp: 98.7 °F (37.1 °C) (06/27/22 2321)  Pulse: 79 (06/28/22 0000)  Resp: 16 (06/27/22 2321)  BP: (!) 166/77 (06/27/22 2321)  SpO2: 97 % (06/27/22 2321)   Vital Signs (24h Range):  Temp:  [98.1 °F (36.7 °C)-98.8 °F (37.1 °C)] 98.7 °F (37.1 °C)  Pulse:  [78-93] 79  Resp:  [16-20] 16  SpO2:  [94 %-99 %] 97 %  BP: (112-206)/(58-89) 166/77     Weight: 69.5 kg (153 lb 3.5 oz)  Body mass index is 24 kg/m².    Physical Exam  Vitals and nursing note reviewed.   Constitutional:       General: She is not in acute distress.     Appearance: She is well-developed and normal weight. She is not ill-appearing, toxic-appearing or diaphoretic.      Comments: Elderly and chronically ill appearing.   HENT:      Head: Normocephalic and atraumatic.      Comments: Even when speaking in loudest voice, patient unable to hear examiner.   Eyes:      General: No scleral icterus.        Right eye: No discharge.         Left eye: No discharge.      Conjunctiva/sclera: Conjunctivae normal.   Neck:      Trachea: No tracheal deviation.   Cardiovascular:      Rate and Rhythm: Normal rate and regular rhythm.      Heart sounds: Normal heart sounds. No murmur heard.    No gallop.   Pulmonary:      Effort: Pulmonary effort is normal. No respiratory distress.      Breath sounds: Normal  breath sounds. No stridor. No wheezing or rales.   Abdominal:      General: Bowel sounds are normal. There is no distension.      Palpations: Abdomen is soft. There is no mass.      Tenderness: There is no abdominal tenderness. There is no guarding.   Musculoskeletal:         General: No deformity. Normal range of motion.      Cervical back: Normal range of motion and neck supple.   Skin:     General: Skin is warm and dry.      Coloration: Skin is not pale.      Findings: No erythema or rash.   Neurological:      General: No focal deficit present.      Mental Status: She is alert and oriented to person, place, and time.      Cranial Nerves: No cranial nerve deficit.      Motor: No abnormal muscle tone.   Psychiatric:         Mood and Affect: Mood normal.         Behavior: Behavior normal.         Thought Content: Thought content normal.         Judgment: Judgment normal.           Significant Labs: All pertinent labs within the past 24 hours have been reviewed.  BMP:   Recent Labs   Lab 06/27/22  1514   *      K 4.1   CL 99   CO2 19*   BUN 16   CREATININE 1.0   CALCIUM 10.6*   MG 1.9     CBC:   Recent Labs   Lab 06/27/22  1514   WBC 10.40   HGB 15.7   HCT 45.9        CMP:   Recent Labs   Lab 06/27/22  1514      K 4.1   CL 99   CO2 19*   *   BUN 16   CREATININE 1.0   CALCIUM 10.6*   PROT 9.2*   ALBUMIN 4.4   BILITOT 0.7   ALKPHOS 68   AST 39   ALT 16   ANIONGAP 20*   EGFRNONAA 51*     Urine Culture: No results for input(s): LABURIN in the last 48 hours.  Urine Studies:   Recent Labs   Lab 06/27/22  1638   COLORU Yellow   APPEARANCEUA Clear   PHUR 6.0   SPECGRAV 1.020   PROTEINUA Negative   GLUCUA Negative   KETONESU 2+*   BILIRUBINUA Negative   OCCULTUA Negative   NITRITE Negative   UROBILINOGEN Negative   LEUKOCYTESUR Negative       Significant Imaging: I have reviewed all pertinent imaging results/findings within the past 24 hours.  Imaging Results              CT Head Without  Contrast (Final result)  Result time 06/27/22 15:57:30      Final result by Kenroy Moore MD (06/27/22 15:57:30)                   Impression:      No new hemorrhage or major vascular distribution infarct.    Stable appearance of the known posterior fossa extra-axial mass (presumed meningioma).    Ventriculomegaly out of proportion to the degree of sulcal enlargement.  Configuration remains suspicious for normal pressure hydrocephalus.    Moderate supratentorial leukoencephalopathy, nonspecific both likely reflecting chronic small vessel ischemic change.    Clinical correlation required.      Electronically signed by: Kenroy Moore MD  Date:    06/27/2022  Time:    15:57               Narrative:    EXAMINATION:  CT HEAD WITHOUT CONTRAST    CLINICAL HISTORY:  Mental status change, unknown cause;    TECHNIQUE:  Low dose axial CT images obtained throughout the head without the use of intravenous contrast.  Axial, sagittal and coronal reconstructions were performed.    COMPARISON:  MRI 05/07/2021, CT 11/20/2020    FINDINGS:  Intracranial compartment:    Periphery calcified extra-axial mass projecting over right superior cerebellum.  Lesion similar in overall size and configuration, measures on the order 3.0 x 2.4 cm maximal transverse dimension.  There is localized mass effect without subjacent parenchymal edema.  No new extra-axial mass, blood or fluid collections identified.    Prominence of the ventricles.  Third ventricle diameter measuring up to 1.1 cm.  Ventricular enlargement out of proportion to the degree of sulcal enlargement, with some crowding of the sulci near the vertex.    Patchy and confluent T2/FLAIR hyperintensity in the supratentorial white matter, nonspecific but most likely reflecting chronic small vessel ischemic changes. No recent or remote major vascular distribution infarct. No recent or remote hemorrhage.  No mass effect or midline shift.    Scattered atherosclerotic calcification about  the skull base.    Skull/extracranial contents (limited evaluation):    No displaced calvarial fracture.    The mastoid air cells and visualized paranasal sinuses are essentially clear.                                       X-Ray Chest AP Portable (Final result)  Result time 06/27/22 15:49:33      Final result by Sushil Rosario MD (06/27/22 15:49:33)                   Impression:      Ground-glass airspace opacity in the left lower lobe.      Electronically signed by: Sushil Rosario MD  Date:    06/27/2022  Time:    15:49               Narrative:    EXAMINATION:  XR CHEST AP PORTABLE    CLINICAL HISTORY:  Sepsis;    TECHNIQUE:  Single frontal view of the chest was performed.    COMPARISON:  11/20/2020.    FINDINGS:  The trachea is unremarkable.  There are calcifications of the aortic knob.  The cardiomediastinal silhouette is stable.  There is no evidence of free air beneath the hemidiaphragms.  No pleural effusions.  There is no evidence of a pneumothorax.  There is no evidence of pneumomediastinum.  There is a ground-glass airspace opacity in the left lower lobe.  There are degenerative changes in the osseous structures.                                       Assessment/Plan:     * CAP (community acquired pneumonia)  - Ms. Farheen Soares presents with altered mental status and emesis   - CXR shows LLL GGO    - initially treated for CAP in ED   - in light of concurrent emesis, will expand coverage for aspiration PNA   - sputum cultures pending   - lactic acid elevated, now s/p IVF & ABX in ED, trend   - CURB-65 core: 2  - aspiration precautions       Facial droop due to old stroke  - after a prior stroke the patient is reported to have a left-sided facial droop  - daughter feels this is exacerbated at this time   - CT Head without acute hemorrhage or infarct   - during ED stay, BP was elevated as high as 206/89   - Neuro consulted       NPH (normal pressure hydrocephalus)  - chronic, stable   - Per Outpatient  notes this was first diagnosed 6/1/22 and confirmed w/ LP on 7/12/21  - after LP, her symptoms improved, and she was last documented at her PCP visit on 2/10/22 to be planning to get a  shunt  - she follows w/ Dr. León and NS, Dr. Espinoza  - today's imaging shows ventriculometry suspicion for normal pressure hydrocephalus       Type 2 diabetes mellitus without complication, without long-term current use of insulin  - last A1C:   Lab Results   Component Value Date    HGBA1C 7.1 (H) 11/12/2021    HGBA1C 7.1 (H) 11/12/2021    - A1C pending for AM   - hold oral antidiabetic meds   - Diabetic diet   - SSI with accuchecks AC/HS        Essential hypertension  - markedly elevated in the ED   - hypertensive at present, but improved compared to prior readings   - home meds: amlodipine 10 mg QD, lisinopril 40 mg QD, metoprolol succinate 100 mg QD   - monitor     Hyperlipidemia  - continue atorvastatin 10 mg QD       Meningioma  - chronic, stable   - follows w/ Neurosurgery   - Outpatient notes indicate it has been stable at 3.2 cm, which is fairly consistent with size on today's imaging     Depression  - home meds: venlafaxine 150 mg QHS     VTE Risk Mitigation (From admission, onward)         Ordered     IP VTE HIGH RISK PATIENT  Once         06/27/22 1938     Place sequential compression device  Until discontinued         06/27/22 1938                   Stephany Carias PA-C  Department of Hospital Medicine   [facility]

## 2022-06-28 NOTE — PLAN OF CARE
Unable to collect sputum, cup left in room, will pass on in report that patient needs specimen collected.

## 2022-06-28 NOTE — PLAN OF CARE
Initial Discharge Planning Assessment:  Patient admitted on: 6/27/22     Chart reviewed, Care plan discussed with treatment team,  attending      PCP updated in Epic: Dr. Chung  Pharmacy, updated in Epic:     DME at home: Walker     Current dispo:Home w/family      Transportation: Daughter to provide transportation home.     Power of  or Living Will: Unknown, will address     Anticipated DC needs from CM perspective: None   06/28/22 2582   Discharge Assessment   Assessment Type Discharge Planning Assessment   Confirmed/corrected address, phone number and insurance Yes   Confirmed Demographics Correct on Facesheet   Source of Information family   If unable to respond/provide information was family/caregiver contacted? Yes   Contact Name/Number Colleen Soares (daughter)   Lives With child(sammy), adult   Do you expect to return to your current living situation? Yes   Do you have help at home or someone to help you manage your care at home? Yes   Prior to hospitilization cognitive status: Not Oriented to Time;Not Oriented to Place  (Some periods of confusion throughout day)   Current cognitive status: Not Oriented to Place;Not Oriented to Time  (Some periods of confusion throughout day)   Walking or Climbing Stairs Difficulty ambulation difficulty, requires equipment   Mobility Management walker   Equipment Currently Used at Home walker, standard   Readmission within 30 days? No   Do you currently have service(s) that help you manage your care at home? Yes   Name and Contact number of agency private sitter   Do you take prescription medications? Yes   Do you have prescription coverage? Yes   Do you have any problems affording any of your prescribed medications? No   Is the patient taking medications as prescribed? yes   Who is going to help you get home at discharge? Daughter Colleen Soares   How do you get to doctors appointments? family or friend will provide   Are you on dialysis? No   Do you take  coumadin? No   DME Needed Upon Discharge  walker, standard   Discharge Plan discussed with: Caregiver   Name(s) and Number(s) Colleen Soares [phone]   Discharge Barriers Identified None   [facility] ([place])  Initial Discharge Assessment       Primary Care Provider: Vonda Chung MD    Admission Diagnosis: Dehydration [E86.0]  Arrhythmia [I49.9]  Facial droop [R29.810]  Hypoxia [R09.02]  Altered mental status, unspecified altered mental status type [R41.82]  Pneumonia of left lower lobe due to infectious organism [J18.9]    Admission Date: 6/27/2022  Expected Discharge Date:     Discharge Barriers Identified: (P) None    Payor: [employer] / Plan: [employer] / Product Type: Medicare Advantage /     Extended Emergency Contact Information  Primary Emergency Contact: Colleen Soares  Mobile Phone: [phone]  Relation: Daughter              [facility] [place] [address] [place] [address]  Phone: [phone] Fax: [phone]    [id number] - [place], [place] - [address]  [address]  [facility] [address]  Phone: [phone] Fax: [phone]      Initial Assessment (most recent)       Adult Discharge Assessment - 06/28/22 1552          Discharge Assessment    Assessment Type Discharge Planning Assessment     Confirmed/corrected address, phone number and insurance Yes     Confirmed Demographics Correct on Facesheet (P)      Source of Information family     If unable to respond/provide information was family/caregiver contacted? Yes     Contact Name/Number Colleen Soares (daughter)     Lives With child(sammy), adult     Do you expect to return to your current living situation? Yes     Do you have help at home or someone to help you manage your care at home? Yes     Prior to hospitilization cognitive status: Not Oriented to Time;Not Oriented to Place   Some periods of confusion throughout day    Current cognitive  status: Not Oriented to Place;Not Oriented to Time   Some periods of confusion throughout day    Walking or Climbing Stairs Difficulty ambulation difficulty, requires equipment     Mobility Management walker     Equipment Currently Used at Home walker, standard     Readmission within 30 days? No     Do you currently have service(s) that help you manage your care at home? Yes (P)      Name and Contact number of agency private sitter (P)      Do you take prescription medications? Yes (P)      Do you have prescription coverage? Yes (P)      Do you have any problems affording any of your prescribed medications? No (P)      Is the patient taking medications as prescribed? yes (P)      Who is going to help you get home at discharge? Daughter Colleen Soares (P)      How do you get to doctors appointments? family or friend will provide     Are you on dialysis? No     Do you take coumadin? No     DME Needed Upon Discharge  walker, standard     Discharge Plan discussed with: Caregiver (P)      Name(s) and Number(s) Colleen Soares [phone] (P)      Discharge Barriers Identified None (P)

## 2022-06-28 NOTE — HOSPITAL COURSE
Patient is 87-year-old woman with prior stroke with residual left-sided facial droop and history of dementia due in part to suspected normal pressure hydrocephalus who presents to the hospital with worsening mental status due to suspected infection with pneumonia.  Clinically improving intravenous antibiotics.  Neurology consulted recommended MRI of the brain which ruled out small stroke along with additional laboratory studies (B12, folate, and TSH level) which were unremarkable.  In addition to concern for pneumonia suspect worsening mental status also due in part due to degree of volume depletion due to recent poor oral intake.  Patient treated venous fluids.  Patient euvolemic at this time and mental status back to recent baseline.  Patient stable to be discharged home to complete a course of oral antibiotics to treat pneumonia.  Patient's mental status may also potentially benefit from  shunt placement to treat suspected normal pressure hydrocephalus.  Neurology recommends outpatient follow-up with Neurosurgery for consideration of possible shunt.

## 2022-06-28 NOTE — ASSESSMENT & PLAN NOTE
- after a prior stroke the patient is reported to have a left-sided facial droop  - daughter feels this is exacerbated at this time   - CT Head without acute hemorrhage or infarct   - during ED stay, BP was elevated as high as 206/89   - Neuro consulted

## 2022-06-28 NOTE — CONSULTS
Inpatient Tele-Neurology Consultation    Requesting physician: Dr. Paez    Impression:  1. Possible encephalopathy superimposed on dementia: mild volume depletion related to nausea/vomiting possible.  Since no obvious source, MRI brain reasonable.  Progression of #2 possible.   2. 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 Chronic L lower facial weakness with worsening due to #1  4. 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 Incidental posterior fossa mass, likely meningioma: stable  6. Cervical spondylosis  7. Macrocytosis    Plan:  1. MRI brain w/ w/out  2. B12/folate/TSH  3. F/U with Dr. Espinoza for shunt if family interested (for NPH)      CC:  L facial weakness    HPI:  86 y/o F admitted for reduced interaction with family.  She has a history of dementia with suspected NPH.  No new focal signs/symptoms but family apparently felt chronic L lower facial weakness was worse.  I saw her in clinic in May '21 for gait disorder.  L facial weakness was noted on exam at that time.  I referred her to Dr. Espinoza for NPH eval and he recommended shunt in July '21 (improved after LP).      CXR showed ground glass opacity LL lobe  U/A 2+ ketones    Past Medical History:   Diagnosis Date    Age-related osteoporosis with current pathological fracture with routine healing     Allergic rhinitis     Carotid atherosclerosis, bilateral     Chronic gout of multiple sites     Chronic low back pain with bilateral sciatica     Closed impacted fracture of left femoral neck with routine healing s/p percutaneous screw fixation on 11/21/2020 11/20/2020    DDD (degenerative disc disease), lumbar 8/17/2020    Diverticulosis     Essential hypertension 11/25/2019    Facet arthropathy     GERD (gastroesophageal reflux disease) 11/25/2019    Hearing loss     History of transient ischemic attack (TIA) 11/25/2019    IBS (irritable bowel syndrome)     Insomnia     Lumbar radiculopathy 8/17/2020    Lumbar spondylosis 8/17/2020    Lumbar stenosis      Meningioma     Paroxysmal SVT (supraventricular tachycardia)     Primary open angle glaucoma (POAG) of both eyes     Pure hypercholesterolemia 11/25/2019    PVD (peripheral vascular disease)     30% prox stenosis of celiac trunk and 20% stnosis Prox SMA - per Abd/SMA Arteriogram 4/3/08    Recurrent major depressive disorder, in full remission     Type 2 diabetes mellitus without complication, without long-term current use of insulin 11/25/2019    Vitamin D deficiency 11/23/2020      Past Surgical History:   Procedure Laterality Date    CATARACT EXTRACTION      CAUDAL EPIDURAL STEROID INJECTION N/A 8/27/2020    Procedure: Injection-steroid-epidural-caudal with catheter;  Surgeon: Johnathan Gonzalez Jr., MD;  Location: [facility];  Service: Pain Management;  Laterality: N/A;    EPIDURAL STEROID INJECTION INTO LUMBAR SPINE      LUMBAR LAMINECTOMY  2009    PERCUTANEOUS PINNING OF HIP Left 11/21/2020    Procedure: Left- Percutaneous Screws- Large  arm Clock side;  Surgeon: Leandro White MD;  Location: [facility] OR [address] [place] [address];  Service: Orthopedics;  Laterality: Left;    TOTAL ABDOMINAL HYSTERECTOMY W/ BILATERAL SALPINGOOPHORECTOMY        Current Facility-Administered Medications   Medication Dose Route Frequency Provider Last Rate Last Admin    0.9%  NaCl infusion   Intravenous Continuous Stephany Carias PA-C 125 mL/hr at 06/27/22 2333 New Bag at 06/27/22 2333    ampicillin-sulbactam 3 g in sodium chloride 0.9 % 100 mL IVPB (ready to mix system)  3 g Intravenous Q8H Stephany Carias PA-C   Stopped at 06/28/22 0741      Review of patient's allergies indicates:   Allergen Reactions    Baclofen      AMS and distorted dremas      Family History   Problem Relation Age of Onset    Hypertension Mother     Diabetes Mother     Hypertension Father     Stroke Father         60's    Hypertension Daughter     Hypothyroidism Daughter     Hypertension Son     Hyperlipidemia Son       Social History  "    Tobacco Use    Smoking status: Never Smoker    Smokeless tobacco: Never Used   Substance Use Topics    Alcohol use: Yes     Comment: socially    Drug use: Never     Review Of Systems:  General: Negative for fever   HENT: Negative for tinnitus, nose bleeds, neck stiffness   Cardiac Negative for palpitations   Vascular: Negative for easy bruising   Pulmonary: Negative for cough   Gastrointestinal: Negative for constipation   Urinary: Negative for incomplete bladder emptying   Musculoskeletal: Negative for muscle aches   Neurological: As above. Otherwise negative for abnormal movements   Psychiatric:  Negative for depression     BP (!) 117/53   Pulse 94   Temp 98 °F (36.7 °C)   Resp 16   Ht 5' 7" (1.702 m)   Wt 69.5 kg (153 lb 3.5 oz)   SpO2 98%   Breastfeeding No   BMI 24.00 kg/m²   Well developed, well nourished female    Mental status:   Mildly sleepy oriented to self - not oriented to age/month   Impaired recent and remote memory; recalls 0/3 at 5 min   Decreased attention and concentration   No dysarthria; paucity of output  Cranial nerves:   EOMF without nystagmus   VFF   Normal facial sensation   L lower facial weakness  Motor:   Both UEs drop to bed symmetrically but maintains antigravity during FNF   LE power symmetric (antigravity at least)    Normal muscle tone, bulk and power   No abnormal movements  Sensory   Intact to LT  Coordination   Intact to FNF  Gait   NT    Data Reviewed:    Lab Results   Component Value Date    WBC 7.36 06/28/2022    HGB 14.9 06/28/2022    HCT 43.8 06/28/2022     (H) 06/28/2022     06/28/2022       CMP  Sodium   Date Value Ref Range Status   06/28/2022 139 136 - 145 mmol/L Final     Potassium   Date Value Ref Range Status   06/28/2022 4.4 3.5 - 5.1 mmol/L Final     Chloride   Date Value Ref Range Status   06/28/2022 105 95 - 110 mmol/L Final     CO2   Date Value Ref Range Status   06/28/2022 24 23 - 29 mmol/L Final     Glucose   Date Value Ref Range " Status   06/28/2022 148 (H) 70 - 110 mg/dL Final     BUN   Date Value Ref Range Status   06/28/2022 12 8 - 23 mg/dL Final     Creatinine   Date Value Ref Range Status   06/28/2022 0.9 0.5 - 1.4 mg/dL Final     Calcium   Date Value Ref Range Status   06/28/2022 9.9 8.7 - 10.5 mg/dL Final     Total Protein   Date Value Ref Range Status   06/27/2022 9.2 (H) 6.0 - 8.4 g/dL Final     Albumin   Date Value Ref Range Status   06/27/2022 4.4 3.5 - 5.2 g/dL Final     Total Bilirubin   Date Value Ref Range Status   06/27/2022 0.7 0.1 - 1.0 mg/dL Final     Comment:     For infants and newborns, interpretation of results should be based  on gestational age, weight and in agreement with clinical  observations.    Premature Infant recommended reference ranges:  Up to 24 hours.............<8.0 mg/dL  Up to 48 hours............<12.0 mg/dL  3-5 days..................<15.0 mg/dL  6-29 days.................<15.0 mg/dL       Alkaline Phosphatase   Date Value Ref Range Status   06/27/2022 68 55 - 135 U/L Final     AST   Date Value Ref Range Status   06/27/2022 39 10 - 40 U/L Final     ALT   Date Value Ref Range Status   06/27/2022 16 10 - 44 U/L Final     Anion Gap   Date Value Ref Range Status   06/28/2022 10 8 - 16 mmol/L Final     eGFR if    Date Value Ref Range Status   06/28/2022 >60 >60 mL/min/1.73 m^2 Final     eGFR if non    Date Value Ref Range Status   06/28/2022 58 (A) >60 mL/min/1.73 m^2 Final     Comment:     Calculation used to obtain the estimated glomerular filtration  rate (eGFR) is the CKD-EPI equation.        Lab Results   Component Value Date    [id number] 420 04/29/2021       Alejandro Cervantes MD

## 2022-06-28 NOTE — ASSESSMENT & PLAN NOTE
Possibly related to infection with airspace disease noted on the left lower lobe.  Otherwise however without very convincing signs and symptoms of infection pneumonia.  Normal saturation on room air.  Procalcitonin level normal.  If cultures remain negative and otherwise she continues to improve with discontinue further empiric therapy.  Further neurological workup as recommended by Dr. Alejandro Cervantes.

## 2022-06-28 NOTE — ASSESSMENT & PLAN NOTE
- last A1C:   Lab Results   Component Value Date    HGBA1C 7.1 (H) 11/12/2021    HGBA1C 7.1 (H) 11/12/2021    - A1C pending for AM   - hold oral antidiabetic meds   - Diabetic diet   - SSI with accuchecks AC/HS

## 2022-06-28 NOTE — PROGRESS NOTES
Pharmacist Renal Dose Adjustment Note    Farheen Soares is a 87 y.o. female being treated with the medication unasyn    Patient Data:    Vital Signs (Most Recent):  Temp: 98.1 °F (36.7 °C) (06/27/22 1458)  Pulse: 78 (06/27/22 2001)  Resp: 19 (06/27/22 2001)  BP: 127/60 (06/27/22 2001)  SpO2: 95 % (06/27/22 1958) Vital Signs (72h Range):  Temp:  [98.1 °F (36.7 °C)]   Pulse:  [78-93]   Resp:  [16-20]   BP: (112-206)/(58-89)   SpO2:  [94 %-99 %]      Recent Labs   Lab 06/27/22  1514   CREATININE 1.0     Serum creatinine: 1 mg/dL 06/27/22 1514  Estimated creatinine clearance: 38 mL/min     Medication Dose Route Frequency   Previous Order Unasyn 3g q6h   New Order Unasyn 3g q8h     Renal dose adjustments performed as noted above.  We will continue monitoring and adjusting as necessary.    Pharmacist: Swati Palmer, PharmD  [phone]

## 2022-06-28 NOTE — SUBJECTIVE & OBJECTIVE
Interval History: No acute events overnight.  Patient more awake, alert, and interactive this morning.    Review of Systems   Constitutional:  Negative for chills and fever.   Respiratory:  Negative for shortness of breath.    Cardiovascular:  Negative for chest pain.   Gastrointestinal:  Negative for abdominal pain, nausea and vomiting.   Genitourinary:  Negative for dysuria and frequency.     Objective:     Vital Signs (Most Recent):  Temp: 98.1 °F (36.7 °C) (06/28/22 1115)  Pulse: 83 (06/28/22 1200)  Resp: 16 (06/28/22 1115)  BP: (!) 168/79 (06/28/22 1115)  SpO2: (!) 93 % (06/28/22 1115)   Vital Signs (24h Range):  Temp:  [97.9 °F (36.6 °C)-98.8 °F (37.1 °C)] 98.1 °F (36.7 °C)  Pulse:  [71-96] 83  Resp:  [15-20] 16  SpO2:  [93 %-100 %] 93 %  BP: (112-206)/(53-95) 168/79     Weight: 69.5 kg (153 lb 3.5 oz)  Body mass index is 24 kg/m².    Intake/Output Summary (Last 24 hours) at 6/28/2022 1452  Last data filed at 6/28/2022 0937  Gross per 24 hour   Intake 646.19 ml   Output 950 ml   Net -303.81 ml      Physical Exam  Constitutional:       General: She is not in acute distress.     Comments: Hard of hearing, pleasant. and follows commands   HENT:      Head: Atraumatic.   Eyes:      Conjunctiva/sclera: Conjunctivae normal.   Cardiovascular:      Rate and Rhythm: Normal rate and regular rhythm.      Heart sounds: Normal heart sounds. No murmur heard.  Pulmonary:      Effort: Pulmonary effort is normal.      Breath sounds: Normal breath sounds. No wheezing.   Abdominal:      General: Bowel sounds are normal. There is no distension.      Palpations: Abdomen is soft.      Tenderness: There is no abdominal tenderness.   Musculoskeletal:         General: No deformity. Normal range of motion.      Cervical back: Neck supple.   Neurological:      Mental Status: She is alert. She is disoriented.      Comments: Oriented to situation but otherwise not oriented to time       Significant Labs: All pertinent labs within the past  24 hours have been reviewed.    Significant Imaging: I have reviewed all pertinent imaging results/findings within the past 24 hours.

## 2022-06-28 NOTE — ED NOTES
Pt checked for wounds, no wounds noted to back or sacrum with Ed tech, pt cleaned and changed with new chucks pads. Updated on plan of care, transported via bed, remains on 3L NC, pt also reports using walker to ambulate around home

## 2022-06-28 NOTE — ASSESSMENT & PLAN NOTE
- markedly elevated in the ED   - hypertensive at present, but improved compared to prior readings   - home meds: amlodipine 10 mg QD, lisinopril 40 mg QD, metoprolol succinate 100 mg QD   - monitor

## 2022-06-28 NOTE — PLAN OF CARE
Problem: Adult Inpatient Plan of Care  Goal: Plan of Care Review  Outcome: Ongoing, Progressing  Goal: Patient-Specific Goal (Individualized)  Outcome: Ongoing, Progressing  Goal: Absence of Hospital-Acquired Illness or Injury  Outcome: Ongoing, Progressing  Goal: Optimal Comfort and Wellbeing  Outcome: Ongoing, Progressing  Goal: Readiness for Transition of Care  Outcome: Ongoing, Progressing     Problem: Diabetes Comorbidity  Goal: Blood Glucose Level Within Targeted Range  Outcome: Ongoing, Progressing     Problem: Fluid Imbalance (Pneumonia)  Goal: Fluid Balance  Outcome: Ongoing, Progressing     Problem: Infection (Pneumonia)  Goal: Resolution of Infection Signs and Symptoms  Outcome: Ongoing, Progressing     Problem: Respiratory Compromise (Pneumonia)  Goal: Effective Oxygenation and Ventilation  Outcome: Ongoing, Progressing     Problem: Impaired Wound Healing  Goal: Optimal Wound Healing  Outcome: Ongoing, Progressing     Problem: Skin Injury Risk Increased  Goal: Skin Health and Integrity  Outcome: Ongoing, Progressing

## 2022-06-28 NOTE — ASSESSMENT & PLAN NOTE
- chronic, stable   - follows w/ Neurosurgery   - Outpatient notes indicate it has been stable at 3.2 cm, which is fairly consistent with size on today's imaging

## 2022-06-28 NOTE — PROGRESS NOTES
"[facility]  Progress Note    Patient Name: Farheen Soares  MRN: [id number]  Patient Class: OP- Observation   Admission Date: 6/27/2022  Length of Stay: 0 days  Attending Physician: Deo Paez MD  Primary Care Provider: Vonda Chung MD        Subjective:     Principal Problem:Acute metabolic encephalopathy        HPI:  Per NOAH BurgosC:    "Ms. Farheen Soares is a 87 y.o. female, with PMH of T2DM, HTN, HLD, GERD, PVD, NPH, osteoporosis, gout, GERD, benign calcified brain mass and dementia, who [place] [facility] [place]27[place]22 due to nausea, vomiting and altered mental status. Her daughter notes that she appears drowsy and less verbally responsive today. Additionally, she has been having upper abdominal pain, with post-prandial vomiting of clear (initially) to green (at present) fluid. She was treated for a UTI 2 weeks ago with antibiotic, the name of which the daughter can not recall.  She denies the patient has had a fever, cough, difficulty breathing. EMS was called to bring her to the hospital and noted that her rhythm was A. Fib. She was evaluated in the ED where she was found to have a left sided facial droop, which started 1 year ago after a dental procedure, but was more noticeable today.  with labs showing hyperglycemia without DKA. Lactic acid was elevated at 2.4.  UA was negative for UTI. A CXR showed LLL GGO. A CT Head showed no new hemorrhage or infarct, a stable appearing posterior fossa extra-cranial mass, and ventriculometry concerning for hydrocephalus and chronic small vessel ischemic change. She was treated for CAP."      Overview/Hospital Course:  Patient is 87-year-old woman with prior stroke with residual left-sided facial droop and history of dementia due in part to suspected normal pressure hydrocephalus who presents to the hospital with worsening mental status due to suspected infection with pneumonia.  Clinically improving " intravenous antibiotics.  Neurology consulted recommended MRI of the brain to rule out new small stroke along with additional laboratory studies (B12, folate, and TSH level).  Outpatient follow-up with her surgery also recommended for consideration of possible shunt.      Interval History: No acute events overnight.  Patient more awake, alert, and interactive this morning.    Review of Systems   Constitutional:  Negative for chills and fever.   Respiratory:  Negative for shortness of breath.    Cardiovascular:  Negative for chest pain.   Gastrointestinal:  Negative for abdominal pain, nausea and vomiting.   Genitourinary:  Negative for dysuria and frequency.     Objective:     Vital Signs (Most Recent):  Temp: 98.1 °F (36.7 °C) (06/28/22 1115)  Pulse: 83 (06/28/22 1200)  Resp: 16 (06/28/22 1115)  BP: (!) 168/79 (06/28/22 1115)  SpO2: (!) 93 % (06/28/22 1115)   Vital Signs (24h Range):  Temp:  [97.9 °F (36.6 °C)-98.8 °F (37.1 °C)] 98.1 °F (36.7 °C)  Pulse:  [71-96] 83  Resp:  [15-20] 16  SpO2:  [93 %-100 %] 93 %  BP: (112-206)/(53-95) 168/79     Weight: 69.5 kg (153 lb 3.5 oz)  Body mass index is 24 kg/m².    Intake/Output Summary (Last 24 hours) at 6/28/2022 1452  Last data filed at 6/28/2022 0937  Gross per 24 hour   Intake 646.19 ml   Output 950 ml   Net -303.81 ml      Physical Exam  Constitutional:       General: She is not in acute distress.     Comments: Hard of hearing, pleasant. and follows commands   HENT:      Head: Atraumatic.   Eyes:      Conjunctiva/sclera: Conjunctivae normal.   Cardiovascular:      Rate and Rhythm: Normal rate and regular rhythm.      Heart sounds: Normal heart sounds. No murmur heard.  Pulmonary:      Effort: Pulmonary effort is normal.      Breath sounds: Normal breath sounds. No wheezing.   Abdominal:      General: Bowel sounds are normal. There is no distension.      Palpations: Abdomen is soft.      Tenderness: There is no abdominal tenderness.   Musculoskeletal:         General:  No deformity. Normal range of motion.      Cervical back: Neck supple.   Neurological:      Mental Status: She is alert. She is disoriented.      Comments: Oriented to situation but otherwise not oriented to time       Significant Labs: All pertinent labs within the past 24 hours have been reviewed.    Significant Imaging: I have reviewed all pertinent imaging results/findings within the past 24 hours.      Assessment/Plan:      * Acute metabolic encephalopathy  Possibly related to infection with airspace disease noted on the left lower lobe.  Otherwise however without very convincing signs and symptoms of infection pneumonia.  Normal saturation on room air.  Procalcitonin level normal.  If cultures remain negative and otherwise she continues to improve with discontinue further empiric therapy.  Further neurological workup as recommended by Dr. Alejandro Cervantes.    Depression  Stable.  Restart home regimen.    NPH (normal pressure hydrocephalus)  Chronic and stable.  May benefit from  shunt.  Follow-up with Neurosurgery for consideration of  shunt placement.    Hyperlipidemia  Continue home statin therapy.    Meningioma  Stable.  Outpatient follow-up advised.    Essential hypertension  Hypertensive.  Restart home blood pressure medications.    Type 2 diabetes mellitus without complication, without long-term current use of insulin  Sliding scale insulin for now.      VTE Risk Mitigation (From admission, onward)         Ordered     enoxaparin injection 40 mg  Daily         06/28/22 1507     IP VTE HIGH RISK PATIENT  Once         06/27/22 1938     Place sequential compression device  Until discontinued         06/27/22 1938                  Deo Paez MD  Department of Hospital Medicine   [facility] ([place])

## 2022-06-28 NOTE — PT/OT/SLP PROGRESS
Occupational Therapy      Patient Name:  Farheen Soares   MRN:  [id number]    Patient not seen today (13:39 time of attempt) secondary to about to be transported off floor for MRI per RN (Maryam)  . Will follow-up tomorrow 6/29.    TORI Lama  6/28/2022

## 2022-06-29 VITALS
SYSTOLIC BLOOD PRESSURE: 131 MMHG | HEIGHT: 67 IN | DIASTOLIC BLOOD PRESSURE: 65 MMHG | WEIGHT: 153.25 LBS | TEMPERATURE: 98 F | OXYGEN SATURATION: 93 % | RESPIRATION RATE: 16 BRPM | BODY MASS INDEX: 24.05 KG/M2 | HEART RATE: 70 BPM

## 2022-06-29 LAB
ANION GAP SERPL CALC-SCNC: 10 MMOL/L (ref 8–16)
BASOPHILS # BLD AUTO: 0.06 K/UL (ref 0–0.2)
BASOPHILS NFR BLD: 0.8 % (ref 0–1.9)
BUN SERPL-MCNC: 11 MG/DL (ref 8–23)
CALCIUM SERPL-MCNC: 9.7 MG/DL (ref 8.7–10.5)
CHLORIDE SERPL-SCNC: 106 MMOL/L (ref 95–110)
CO2 SERPL-SCNC: 23 MMOL/L (ref 23–29)
CREAT SERPL-MCNC: 0.8 MG/DL (ref 0.5–1.4)
DIFFERENTIAL METHOD: ABNORMAL
EOSINOPHIL # BLD AUTO: 0.1 K/UL (ref 0–0.5)
EOSINOPHIL NFR BLD: 0.8 % (ref 0–8)
ERYTHROCYTE [DISTWIDTH] IN BLOOD BY AUTOMATED COUNT: 14.2 % (ref 11.5–14.5)
EST. GFR  (AFRICAN AMERICAN): >60 ML/MIN/1.73 M^2
EST. GFR  (NON AFRICAN AMERICAN): >60 ML/MIN/1.73 M^2
GLUCOSE SERPL-MCNC: 184 MG/DL (ref 70–110)
HCT VFR BLD AUTO: 41.8 % (ref 37–48.5)
HGB BLD-MCNC: 14.1 G/DL (ref 12–16)
IMM GRANULOCYTES # BLD AUTO: 0.02 K/UL (ref 0–0.04)
IMM GRANULOCYTES NFR BLD AUTO: 0.3 % (ref 0–0.5)
LYMPHOCYTES # BLD AUTO: 2.7 K/UL (ref 1–4.8)
LYMPHOCYTES NFR BLD: 35.5 % (ref 18–48)
MAGNESIUM SERPL-MCNC: 1.8 MG/DL (ref 1.6–2.6)
MCH RBC QN AUTO: 34.4 PG (ref 27–31)
MCHC RBC AUTO-ENTMCNC: 33.7 G/DL (ref 32–36)
MCV RBC AUTO: 102 FL (ref 82–98)
MONOCYTES # BLD AUTO: 0.9 K/UL (ref 0.3–1)
MONOCYTES NFR BLD: 11.4 % (ref 4–15)
NEUTROPHILS # BLD AUTO: 4 K/UL (ref 1.8–7.7)
NEUTROPHILS NFR BLD: 51.2 % (ref 38–73)
NRBC BLD-RTO: 0 /100 WBC
PLATELET # BLD AUTO: 248 K/UL (ref 150–450)
PMV BLD AUTO: 10.5 FL (ref 9.2–12.9)
POCT GLUCOSE: 182 MG/DL (ref 70–110)
POCT GLUCOSE: 218 MG/DL (ref 70–110)
POTASSIUM SERPL-SCNC: 4.2 MMOL/L (ref 3.5–5.1)
RBC # BLD AUTO: 4.1 M/UL (ref 4–5.4)
SODIUM SERPL-SCNC: 139 MMOL/L (ref 136–145)
WBC # BLD AUTO: 7.71 K/UL (ref 3.9–12.7)

## 2022-06-29 PROCEDURE — 97535 SELF CARE MNGMENT TRAINING: CPT

## 2022-06-29 PROCEDURE — 25000003 PHARM REV CODE 250: Performed by: PHYSICIAN ASSISTANT

## 2022-06-29 PROCEDURE — 99217 PR OBSERVATION CARE DISCHARGE: CPT | Mod: ,,, | Performed by: HOSPITALIST

## 2022-06-29 PROCEDURE — 25000003 PHARM REV CODE 250: Performed by: HOSPITALIST

## 2022-06-29 PROCEDURE — 97530 THERAPEUTIC ACTIVITIES: CPT

## 2022-06-29 PROCEDURE — 97162 PT EVAL MOD COMPLEX 30 MIN: CPT

## 2022-06-29 PROCEDURE — 85025 COMPLETE CBC W/AUTO DIFF WBC: CPT | Performed by: PHYSICIAN ASSISTANT

## 2022-06-29 PROCEDURE — 27000221 HC OXYGEN, UP TO 24 HOURS

## 2022-06-29 PROCEDURE — G0378 HOSPITAL OBSERVATION PER HR: HCPCS

## 2022-06-29 PROCEDURE — 80048 BASIC METABOLIC PNL TOTAL CA: CPT | Performed by: PHYSICIAN ASSISTANT

## 2022-06-29 PROCEDURE — 94761 N-INVAS EAR/PLS OXIMETRY MLT: CPT

## 2022-06-29 PROCEDURE — 97166 OT EVAL MOD COMPLEX 45 MIN: CPT

## 2022-06-29 PROCEDURE — 99217 PR OBSERVATION CARE DISCHARGE: ICD-10-PCS | Mod: ,,, | Performed by: HOSPITALIST

## 2022-06-29 PROCEDURE — 96366 THER/PROPH/DIAG IV INF ADDON: CPT

## 2022-06-29 PROCEDURE — 83735 ASSAY OF MAGNESIUM: CPT | Performed by: PHYSICIAN ASSISTANT

## 2022-06-29 PROCEDURE — 36415 COLL VENOUS BLD VENIPUNCTURE: CPT | Performed by: PHYSICIAN ASSISTANT

## 2022-06-29 PROCEDURE — 63600175 PHARM REV CODE 636 W HCPCS: Performed by: PHYSICIAN ASSISTANT

## 2022-06-29 RX ORDER — AMOXICILLIN AND CLAVULANATE POTASSIUM 875; 125 MG/1; MG/1
1 TABLET, FILM COATED ORAL 2 TIMES DAILY
Qty: 10 TABLET | Refills: 0 | Status: SHIPPED | OUTPATIENT
Start: 2022-06-29 | End: 2022-07-04

## 2022-06-29 RX ADMIN — AMLODIPINE BESYLATE 10 MG: 5 TABLET ORAL at 08:06

## 2022-06-29 RX ADMIN — AMPICILLIN SODIUM AND SULBACTAM SODIUM 3 G: 2; 1 INJECTION, POWDER, FOR SOLUTION INTRAMUSCULAR; INTRAVENOUS at 06:06

## 2022-06-29 RX ADMIN — ALLOPURINOL 300 MG: 300 TABLET ORAL at 08:06

## 2022-06-29 RX ADMIN — CLOPIDOGREL 75 MG: 75 TABLET, FILM COATED ORAL at 08:06

## 2022-06-29 RX ADMIN — METOPROLOL SUCCINATE 100 MG: 50 TABLET, EXTENDED RELEASE ORAL at 08:06

## 2022-06-29 RX ADMIN — LISINOPRIL 40 MG: 20 TABLET ORAL at 08:06

## 2022-06-29 RX ADMIN — ATORVASTATIN CALCIUM 10 MG: 10 TABLET, FILM COATED ORAL at 08:06

## 2022-06-29 NOTE — PT/OT/SLP EVAL
Physical Therapy Evaluation and Treatment    Patient Name:  Farheen Soares   MRN:  [id number]    Recommendations:     Discharge Recommendations:  home health PT, home health OT (initial 24/7 assistance)   Discharge Equipment Recommendations: none   Barriers to discharge: None - pt reports has 24/7 assistance    Assessment:     Farheen Soares is a 87 y.o. female admitted with a medical diagnosis of Acute metabolic encephalopathy. Pmhx significant for DM-T2, HTN, PVD, dementia, brain mass. She presents with the following impairments/functional limitations:  weakness, impaired endurance, impaired self care skills, impaired functional mobilty, gait instability, impaired balance, impaired cognition, impaired fine motor.    Patient evaluated by PT and goals established. Patient more alert and interactive than attempted PT evaluation yesterday. Performed ambulation with RW and CGA, requires Breezy for transfers. Discussed need for assistance at home, pt reports son or daughters always with her. PT will continue to follow and progress as tolerated. Rec for dc to home with HH PT/OT and initial 24/7 assistance.     Rehab Prognosis: Good; patient would benefit from acute skilled PT services to address these deficits and reach maximum level of function.    Recent Surgery: * No surgery found *      Plan:     During this hospitalization, patient to be seen 5 x/week to address the identified rehab impairments via gait training, therapeutic activities, therapeutic exercises, neuromuscular re-education and progress toward the following goals:    · Plan of Care Expires:  07/13/22    Subjective     Chief Complaint: feeling cold, reports she doesn't know why she's in the hospital  Patient/Family Comments/goals: Goal to return home; Patient agreeable to evaluation.  Pain/Comfort:  · Pain Rating 1: 0/10  · Pain Rating Post-Intervention 1: 0/10    Patients cultural, spiritual, Faith conflicts given the current situation:  no    Living Environment:  · Pt lives with her son in a single story home with no steps to enter.   · Upon discharge, patient will have assistance from her son or dtrs (per CM, son has MS and dtrs do not reside with pt).  Prior level of function:  · Ambulation: Indep for household mobility, mod(I) with RW for community mobility  · ADL's: Indep for chosen tasks  · IADLs:   · Enjoys watching TV  · Equipment used at home: bedside commode, shower chair, walker, rolling.      Objective:     Communicated with JOSE R Kaur prior to session.  Patient found HOB elevated with peripheral IV, PureWick, bed alarm  upon PT entry to room.    General Precautions: Standard, fall   Orthopedic Precautions:N/A   Braces: N/A  Respiratory Status: Room air    Patient donned yellow socks and gait belt for OOB mobility.    Exams:  · Cognition:   · Patient is oriented to person, , place, confused about situation.  · Pt follows one and two step commands.   · Impaired hearing limits understanding of some commands, pt consistently requests for repeated commands when not understanding  · Mood: Pleasant and cooperative.   · Safety Awareness: Impaired  · Musculoskeletal:  · BMI: 24  · Posture:  Forward head, rounded shoulders  · LE ROM/Strength:   · R ROM: WFL  · L ROM: WFL  · R Strength: WFL  · L Strength: WFL  · Neuromuscular:  · Coordination/Tone/Reflexes: No impairments identified with functional mobility. No formal testing performed.   · Balance:   · Static sitting: Good  · Static standing: Fair  · Dynamic standing: Fair  · Visual-vestibular: No impairments identified with functional mobility. No formal testing performed.  · Integument: Visible skin intact  · Cardiopulmonary:  · O2 Requirements: RA  · Edema: None noted    Functional Mobility:  · Bed Mobility:     · Supine to Sit: contact guard assistance  · Transfers:     · Sit to Stand:  minimum assistance with rolling walker  · Difficulty with hip/knee extension 2/2 weakness  · Attempts to  pull up on RW  · Multiple attempts to come to standing  · Gait: 2x30 ft with RW and Breezy progressing to SBA.   · Initial gait instability and difficulty with RW management.   · As gait continued, progressed to stable gait without hands on assisstaance with appropriate use of RW.   · Limited endurance for longer gait bouts    Therapeutic Activities and Exercises:  ·  TA:  · Cueing for hand placement during transfers  · Second gait bout for further strengthening and endurance training  PT educated patient re:   PT plan of care/role of PT  Safety with OOB mobility  Use of RW, call light  Discharge disposition    Pt verbalized understanding       AM-PAC 6 CLICK MOBILITY  Total Score:17     Patient left up in chair with all lines intact, call button in reach, RN Natasha notified and white board updated. Bed linens stripped d/t urinary incontinence. .    GOALS:   Multidisciplinary Problems     Physical Therapy Goals        Problem: Physical Therapy    Goal Priority Disciplines Outcome Goal Variances Interventions   Physical Therapy Goal     PT, PT/OT Ongoing, Progressing     Description: Goals to be met by: 22    Patient will increase functional independence with mobility by performin. Sit<>stand with supervision with RW.  2. Gait x 100 feet with supervision with RW or no AD.  3. Ascend/descend threshold step(s) with least restrictive assistive device and CGA.                    History:     Past Medical History:   Diagnosis Date    Age-related osteoporosis with current pathological fracture with routine healing     Allergic rhinitis     Carotid atherosclerosis, bilateral     Chronic gout of multiple sites     Chronic low back pain with bilateral sciatica     Closed impacted fracture of left femoral neck with routine healing s/p percutaneous screw fixation on 2020    DDD (degenerative disc disease), lumbar 2020    Diverticulosis     Essential hypertension 2019    Facet  arthropathy     GERD (gastroesophageal reflux disease) 11/25/2019    Hearing loss     History of transient ischemic attack (TIA) 11/25/2019    IBS (irritable bowel syndrome)     Insomnia     Lumbar radiculopathy 8/17/2020    Lumbar spondylosis 8/17/2020    Lumbar stenosis     Meningioma     Paroxysmal SVT (supraventricular tachycardia)     Primary open angle glaucoma (POAG) of both eyes     Pure hypercholesterolemia 11/25/2019    PVD (peripheral vascular disease)     30% prox stenosis of celiac trunk and 20% stnosis Prox SMA - per Abd/SMA Arteriogram 4/3/08    Recurrent major depressive disorder, in full remission     Type 2 diabetes mellitus without complication, without long-term current use of insulin 11/25/2019    Vitamin D deficiency 11/23/2020       Past Surgical History:   Procedure Laterality Date    CATARACT EXTRACTION      CAUDAL EPIDURAL STEROID INJECTION N/A 8/27/2020    Procedure: Injection-steroid-epidural-caudal with catheter;  Surgeon: Johnathan Gonzalez Jr., MD;  Location: [facility];  Service: Pain Management;  Laterality: N/A;    EPIDURAL STEROID INJECTION INTO LUMBAR SPINE      LUMBAR LAMINECTOMY  2009    PERCUTANEOUS PINNING OF HIP Left 11/21/2020    Procedure: Left- Percutaneous Screws- Large  arm Clock side;  Surgeon: Leandro White MD;  Location: [facility] OR [address] [place] [address];  Service: Orthopedics;  Laterality: Left;    TOTAL ABDOMINAL HYSTERECTOMY W/ BILATERAL SALPINGOOPHORECTOMY         Time Tracking:     PT Received On: 06/29/22  PT Start Time: 0922     PT Stop Time: 0938  PT Total Time (min): 16 min     Billable Minutes: Evaluation 8 and Therapeutic Activity 8      06/29/2022

## 2022-06-29 NOTE — PLAN OF CARE
Patient educated on discharge instructions and verbalized understanding.  All questions answered to patient's satisfaction.  IV removed without complication. Patient to be transported off unit via wheelchair.

## 2022-06-29 NOTE — PT/OT/SLP EVAL
Occupational Therapy   Evaluation and Treatment    Name: Farheen Soares  MRN: [id number]  Admitting Diagnosis:  Acute metabolic encephalopathy  Recent Surgery: * No surgery found *      Recommendations:     Discharge Recommendations: home health OT, home health PT (24/7 family/caregiver assist)  Discharge Equipment Recommendations:  none  Barriers to discharge:  None    Assessment:     Farheen Soares is a 87 y.o. female with a medical diagnosis of Acute metabolic encephalopathy.  She presents alert and agreeable to participating in OT evaluation and treatment session.  Pt reporting she always has assist from one of her children throughout the day.  Pt without any complaints except dizziness that comes on suddenly and affects her ability to do things on her own. Performance deficits affecting function: weakness, impaired endurance, impaired self care skills, impaired functional mobilty, impaired balance, gait instability, impaired cognition, impaired fine motor.      Rehab Prognosis: Good to return to [facility]; patient would benefit from acute skilled OT services to address these deficits and reach maximum level of function.       Plan:     Patient to be seen 4 x/week to address the above listed problems via self-care/home management, therapeutic activities, therapeutic exercises  · Plan of Care Expires: 07/29/22  · Plan of Care Reviewed with: patient    Subjective     Chief Complaint: Dizziness that comes on suddenly.  Patient/Family Comments/goals: To return home, for dizziness to cease/be cured.    Occupational Profile:  Living Environment: Lives in [facility] with son (has M.S.), tub/shower  Previous level of function: Needing hands-on assist for transfers and ambulation due to dizziness, goes with family to grocery store, able to prepare simple meals such as cereal for breakfast, sandwich for lunch and assist with dinner meal.  Roles and Routines: Watches TV, enjoys playing card games with her  friends  Equipment Used at Home:  walker, rolling, shower chair, bedside commode  Assistance upon Discharge: Son and daughters    Pain/Comfort:  · Pain Rating 1: 0/10  · Pain Rating Post-Intervention 1: 0/10    Patients cultural, spiritual, Amish conflicts given the current situation: no    Objective:     Communicated with: PTWendy, prior to session.  Patient found up in chair with peripheral IV, PureWick, chair check upon OT entry to room.    General Precautions: Standard, fall (Dementia)   Orthopedic Precautions:N/A   Braces: N/A  Respiratory Status: Room air    Occupational Performance:    Bed Mobility:    · NT    Functional Mobility/Transfers:  · Sit to stand: Min A from low chair with RW  · Transfers: Min A with RW, cues for safety  · Functional Mobility: LOB posteriorly with pt needing assist to recover    Activities of Daily Living:  · Grooming: Set up while seated  · LB Dressing: Min A for balance in standing, Set up for doffing/donning socks  · Toileting: Min A; Using Pure Wick      Cognitive/Visual Perceptual:  Cognitive/Psychosocial Skills:     -       Oriented to: Person and Place   -       Follows Commands/attention:Follows one-step commands  -       Communication: clear/fluent  -       Memory: Dementia  -       Safety awareness/insight to disability: No impulsivity, verbalizes understanding of use of call button for assist and to not attempt getting out of chair on her own   -       Mood/Affect/Coping skills/emotional control: Cooperative and Pleasant    Physical Exam/Evaluation:   Sitting Balance: Good  Standing Balance: Fair- with RW  UE AROM/Strength/Coordination: WFL for self care tasks  Sensation: Denies paresthesias  Edema: None noted  Skin: visible skin intact    AMPAC 6 Click ADL:  AMPAC Total Score: 19    Treatment & Education:  Role of OT, POC, use of call button for all needs/wants and when needing to stand/transfer/ambulate, discharge recs    Education:    Patient left up in chair  with all lines intact, call button in reach, chair alarm on and nurse notified    GOALS:   Multidisciplinary Problems     Occupational Therapy Goals        Problem: Occupational Therapy    Goal Priority Disciplines Outcome Interventions   Occupational Therapy Goal     OT, PT/OT Ongoing, Progressing    Description: Goals to be met by: 7/7/22    Patient will increase functional independence with ADLs by performing:    Grooming standing at sink at SBA.  LB dressing at SBA.  Toileting at SBA.  Toilet and chair transfers at SBA.                     History:     Past Medical History:   Diagnosis Date    Age-related osteoporosis with current pathological fracture with routine healing     Allergic rhinitis     Carotid atherosclerosis, bilateral     Chronic gout of multiple sites     Chronic low back pain with bilateral sciatica     Closed impacted fracture of left femoral neck with routine healing s/p percutaneous screw fixation on 11/21/2020 11/20/2020    DDD (degenerative disc disease), lumbar 8/17/2020    Diverticulosis     Essential hypertension 11/25/2019    Facet arthropathy     GERD (gastroesophageal reflux disease) 11/25/2019    Hearing loss     History of transient ischemic attack (TIA) 11/25/2019    IBS (irritable bowel syndrome)     Insomnia     Lumbar radiculopathy 8/17/2020    Lumbar spondylosis 8/17/2020    Lumbar stenosis     Meningioma     Paroxysmal SVT (supraventricular tachycardia)     Primary open angle glaucoma (POAG) of both eyes     Pure hypercholesterolemia 11/25/2019    PVD (peripheral vascular disease)     30% prox stenosis of celiac trunk and 20% stnosis Prox SMA - per Abd/SMA Arteriogram 4/3/08    Recurrent major depressive disorder, in full remission     Type 2 diabetes mellitus without complication, without long-term current use of insulin 11/25/2019    Vitamin D deficiency 11/23/2020       Past Surgical History:   Procedure Laterality Date    CATARACT EXTRACTION       CAUDAL EPIDURAL STEROID INJECTION N/A 8/27/2020    Procedure: Injection-steroid-epidural-caudal with catheter;  Surgeon: Johnathan Gonzalez Jr., MD;  Location: [facility];  Service: Pain Management;  Laterality: N/A;    EPIDURAL STEROID INJECTION INTO LUMBAR SPINE      LUMBAR LAMINECTOMY  2009    PERCUTANEOUS PINNING OF HIP Left 11/21/2020    Procedure: Left- Percutaneous Screws- Large  arm Clock side;  Surgeon: Leandro White MD;  Location: [address];  Service: Orthopedics;  Laterality: Left;    TOTAL ABDOMINAL HYSTERECTOMY W/ BILATERAL SALPINGOOPHORECTOMY         Time Tracking:     OT Date of Treatment: 06/29/22  OT Start Time: 1100  OT Stop Time: 1127  OT Total Time (min): 27 min    Billable Minutes:Evaluation 17  Self Care/Home Management 10    6/29/2022

## 2022-06-29 NOTE — PLAN OF CARE
Problem: Occupational Therapy  Goal: Occupational Therapy Goal  Description: Goals to be met by: 7/7/22    Patient will increase functional independence with ADLs by performing:    Grooming standing at sink at SBA.  LB dressing at SBA.  Toileting at SBA.  Toilet and chair transfers at SBA.    Outcome: Ongoing, Progressing   Evaluation complete and goals established.  Pt with posterior LOB in standing and reporting dizziness when sitting and standing.  Pt reports that her son and daughters provide assistance 24/7.  Recommend discharge to home with 24/7 assist and Home Health OT and PT.

## 2022-06-29 NOTE — PLAN OF CARE
[facility]  HOME HEALTH ORDERS  FACE TO FACE ENCOUNTER    Patient Name: Farheen Soares  YOB: 1935  PCP: Vonda Chung MD   PCP Address: [address] [facility] [address]  PCP Phone Number: [phone]  PCP Fax: [phone]    Encounter Date: 6/27/22    Admit to Home Health    Diagnoses:  Active Hospital Problems    Diagnosis  POA    *Acute metabolic encephalopathy [G93.41]  Yes     Priority: 1 - High    Depression [F32.A]  Yes     Priority: 10     NPH (normal pressure hydrocephalus) [G91.2]  Yes    Hyperlipidemia [E78.5]  Yes    Meningioma [D32.9]  Yes    Essential hypertension [I10]  Yes    Type 2 diabetes mellitus without complication, without long-term current use of insulin [E11.9]  Yes      Resolved Hospital Problems   No resolved problems to display.       Follow Up Appointments:  Future Appointments   Date Time Provider Department Center   8/10/2022  2:30 PM Vonda Chung MD [facility]       Allergies:  Review of patient's allergies indicates:   Allergen Reactions    Baclofen      AMS and distorted dremas       Medications: Review discharge medications with patient and family and provide education.    Current Discharge Medication List      START taking these medications    Details   amoxicillin-clavulanate 875-125mg (AUGMENTIN) 875-125 mg per tablet Take 1 tablet by mouth 2 (two) times daily. for 5 days  Qty: 10 tablet, Refills: 0         CONTINUE these medications which have NOT CHANGED    Details   acetaminophen (TYLENOL) 500 MG tablet Take 2 tablets (1,000 mg total) by mouth every 8 (eight) hours as needed (Mild pain).  Refills: 0      alendronate (FOSAMAX) 70 MG tablet Take 1 tablet (70 mg total) by mouth every 7 days.  Qty: 12 tablet, Refills: 1    Associated Diagnoses: Osteoporosis, unspecified osteoporosis type, unspecified pathological fracture presence      allopurinoL (ZYLOPRIM) 300 MG tablet Take 1 tablet (300 mg  total) by mouth once daily.  Qty: 90 tablet, Refills: 1    Associated Diagnoses: Gout, unspecified cause, unspecified chronicity, unspecified site      amLODIPine (NORVASC) 10 MG tablet Take 1 tablet (10 mg total) by mouth once daily.  Qty: 90 tablet, Refills: 1    Comments: .  Associated Diagnoses: Hypertension, unspecified type      atorvastatin (LIPITOR) 10 MG tablet Take 1 tablet (10 mg total) by mouth once daily.  Qty: 90 tablet, Refills: 1    Associated Diagnoses: Hyperlipidemia, unspecified hyperlipidemia type      clopidogreL (PLAVIX) 75 mg tablet Take 1 tablet (75 mg total) by mouth once daily.  Qty: 90 tablet, Refills: 3    Associated Diagnoses: Cerebrovascular disease      lisinopriL (PRINIVIL,ZESTRIL) 40 MG tablet Take 1 tablet (40 mg total) by mouth once daily.  Qty: 90 tablet, Refills: 1    Comments: .  Associated Diagnoses: Hypertension, unspecified type      metFORMIN (GLUCOPHAGE-XR) 500 MG ER 24hr tablet Take 1 tablet (500 mg total) by mouth once daily.  Qty: 90 tablet, Refills: 1    Associated Diagnoses: Type 2 diabetes mellitus without complication, without long-term current use of insulin      metoprolol succinate (TOPROL-XL) 100 MG 24 hr tablet Take 1 tablet (100 mg total) by mouth once daily.  Qty: 90 tablet, Refills: 1    Comments: .  Associated Diagnoses: Hypertension, unspecified type      multivitamin capsule Take 1 capsule by mouth once daily.      venlafaxine (EFFEXOR-XR) 150 MG Cp24 TAKE 1 CAPSULE (150 MG TOTAL) BY MOUTH EVERY EVENING.  Qty: 90 capsule, Refills: 2    Associated Diagnoses: Depression, unspecified depression type         STOP taking these medications       acetaminophen (TYLENOL) 650 MG TbSR Comments:   Reason for Stopping:         blood sugar diagnostic Strp Comments:   Reason for Stopping:         blood-glucose meter kit Comments:   Reason for Stopping:         calcium carbonate (OS-ELODIA) 500 mg calcium (1,250 mg) tablet Comments:   Reason for Stopping:          fexofenadine (ALLEGRA) 180 MG tablet Comments:   Reason for Stopping:         fluticasone (FLONASE) 50 mcg/actuation nasal spray Comments:   Reason for Stopping:         lancets Misc Comments:   Reason for Stopping:                 I have seen and examined this patient within the last 30 days. My clinical findings that support the need for the home health skilled services and home bound status are the following:no   Weakness/numbness causing balance and gait disturbance due to Weakness/Debility making it taxing to leave home.     Diet:   cardiac diet    Referrals/ Consults  Physical Therapy to evaluate and treat. Evaluate for home safety and equipment needs; Establish/upgrade home exercise program. Perform / instruct on therapeutic exercises, gait training, transfer training, and Range of Motion.  Occupational Therapy to evaluate and treat. Evaluate home environment for safety and equipment needs. Perform/Instruct on transfers, ADL training, ROM, and therapeutic exercises.  Speech Therapy  to evaluate and treat for  Swallowing.   to evaluate for community resources/long-range planning.  Aide to provide assistance with personal care, ADLs, and vital signs.    Activities:   activity as tolerated    Nursing:   Agency to admit patient within 24 hours of hospital discharge unless specified on physician order or at patient request    SN to complete comprehensive assessment including routine vital signs. Instruct on disease process and s/s of complications to report to MD. Review/verify medication list sent home with the patient at time of discharge  and instruct patient/caregiver as needed. Frequency may be adjusted depending on start of care date.     Skilled nurse to perform up to 3 visits PRN for symptoms related to diagnosis    Notify MD if SBP > 160 or < 90; DBP > 90 or < 50; HR > 120 or < 50; Temp > 101; O2 < 88%    Ok to schedule additional visits based on staff availability and patient request on  consecutive days within the home health episode.    When multiple disciplines ordered:    Start of Care occurs on Sunday - Wednesday schedule remaining discipline evaluations as ordered on separate consecutive days following the start of care.    Thursday SOC -schedule subsequent evaluations Friday and Monday the following week.     Friday - Saturday SOC - schedule subsequent discipline evaluations on consecutive days starting Monday of the following week.    For all post-discharge communication and subsequent orders please contact patient's primary care physician.    Home Health Aide:  Nursing Weekly, Physical Therapy Three times weekly, Occupational Therapy Three times weekly, Speech Language Pathology Three times weekly and Home Health Aide Twice weekly      I certify that this patient is confined to her home and needs intermittent skilled nursing care, physical therapy, speech therapy and occupational therapy.

## 2022-06-29 NOTE — PLAN OF CARE
Patient lives at home with son. Daughter lives in different apartment but same complex. Follow up appointments scheduled and added to AVS. Wheelchair van set up for 1530 to transport to home. Family will be home after 3pm to receive patient. Pharmacy to call daughter for needed medication copay.  Home Health has been set up through [facility] and they will notify patient with agency.        06/29/22 1322   Final Note   Assessment Type Final Discharge Note   Anticipated Discharge Disposition Home-Health   Hospital Resources/Appts/Education Provided Provided patient/caregiver with written discharge plan information;Appointments scheduled and added to AVS;Appointments scheduled by Navigator/Coordinator   Post-Acute Status   Patient choice form signed by patient/caregiver List with quality metrics by geographic area provided;List from CMS Compare;List from System Post-Acute Care  (verbal consent received from daughter)   Discharge Delays None known at this time   [facility]  Discharge Final Note    Primary Care Provider: Vonda Chung MD    Expected Discharge Date: 6/29/2022    Final Discharge Note (most recent)       Final Note - 06/29/22 1322          Final Note    Assessment Type Final Discharge Note (P)      Anticipated Discharge Disposition Home-Health Care Svc (P)      Hospital Resources/Appts/Education Provided Provided patient/caregiver with written discharge plan information;Appointments scheduled and added to AVS;Appointments scheduled by Navigator/Coordinator (P)         Post-Acute Status    Patient choice form signed by patient/caregiver List with quality metrics by geographic area provided;List from CMS Compare;List from System Post-Acute Care (P)    verbal consent received from daughter    Discharge Delays None known at this time (P)                      Important Message from Medicare

## 2022-06-29 NOTE — PLAN OF CARE
Problem: Physical Therapy  Goal: Physical Therapy Goal  Description: Goals to be met by: 22    Patient will increase functional independence with mobility by performin. Sit<>stand with supervision with RW.  2. Gait x 100 feet with supervision with RW or no AD.  3. Ascend/descend threshold step(s) with least restrictive assistive device and CGA.   Outcome: Ongoing, Progressing     Patient evaluated by PT and goals established. Patient more alert and interactive than attempted PT evaluation yesterday. Performed ambulation with RW and CGA, requires Breezy for transfers. Discussed need for assistance at home, pt reports son or daughters always with her. PT will continue to follow and progress as tolerated. Rec for dc to home with HH PT/OT and initial  assistance. Please see progress note for detailed plan of care and recommendations.

## 2022-06-30 LAB
ENTEROVIRUS/RHINOVIRUS: NOT DETECTED
HUMAN BOCAVIRUS: NOT DETECTED
HUMAN CORONAVIRUS, COMMON COLD VIRUS: NOT DETECTED
INFLUENZA A - H1N1-09: NOT DETECTED
L PNEUMO AG UR QL IA: NEGATIVE
PARAINFLUENZA: NOT DETECTED
RVP - ADENOVIRUS: NOT DETECTED
RVP - HUMAN METAPNEUMOVIRUS (HMPV): NOT DETECTED
RVP - INFLUENZA A: NOT DETECTED
RVP - INFLUENZA B: NOT DETECTED
RVP - RESPIRATORY SYNCTIAL VIRUS (RSV) A: NOT DETECTED
RVP - RESPIRATORY VIRAL PANEL, SOURCE: NORMAL

## 2022-06-30 PROCEDURE — G0180 PR HOME HEALTH MD CERTIFICATION: ICD-10-PCS | Mod: ,,, | Performed by: HOSPITALIST

## 2022-06-30 PROCEDURE — G0180 MD CERTIFICATION HHA PATIENT: HCPCS | Mod: ,,, | Performed by: HOSPITALIST

## 2022-06-30 NOTE — DISCHARGE SUMMARY
"[facility] Medicine  Discharge Summary      Patient Name: Farheen Soares  MRN: [id number]  Patient Class: OP- Observation  Admission Date: 6/27/2022  Hospital Length of Stay: 0 days  Discharge Date and Time: 6/29/2022  4:00 PM  Attending Physician: Deo Paez MD   Discharging Provider: Deo Paez MD  Primary Care Provider: Vonda Chung MD      HPI:   NOAH HarveyC:    "Ms. Farheen Soares is a 87 y.o. female, with PMH of T2DM, HTN, HLD, GERD, PVD, NPH, osteoporosis, gout, GERD, benign calcified brain mass and dementia, who presented to [facility] ED on 6/27/22 due to nausea, vomiting and altered mental status. Her daughter notes that she appears drowsy and less verbally responsive today. Additionally, she has been having upper abdominal pain, with post-prandial vomiting of clear (initially) to green (at present) fluid. She was treated for a UTI 2 weeks ago with antibiotic, the name of which the daughter can not recall.  She denies the patient has had a fever, cough, difficulty breathing. EMS was called to bring her to the hospital and noted that her rhythm was A. Fib. She was evaluated in the ED where she was found to have a left sided facial droop, which started 1 year ago after a dental procedure, but was more noticeable today.  with labs showing hyperglycemia without DKA. Lactic acid was elevated at 2.4.  UA was negative for UTI. A CXR showed LLL GGO. A CT Head showed no new hemorrhage or infarct, a stable appearing posterior fossa extra-cranial mass, and ventriculometry concerning for hydrocephalus and chronic small vessel ischemic change. She was treated for CAP."    Hospital Course:   Patient is 87-year-old woman with prior stroke with residual left-sided facial droop and history of dementia due in part to suspected normal pressure hydrocephalus who presents to the hospital with worsening mental status due to suspected infection with pneumonia.  " Clinically improving intravenous antibiotics.  Neurology consulted recommended MRI of the brain which ruled out small stroke along with additional laboratory studies (B12, folate, and TSH level) which were unremarkable.  In addition to concern for pneumonia suspect worsening mental status also due in part due to degree of volume depletion due to recent poor oral intake.  Patient treated venous fluids.  Patient euvolemic at this time and mental status back to recent baseline.  Patient stable to be discharged home to complete a course of oral antibiotics to treat pneumonia.  Patient's mental status may also potentially benefit from  shunt placement to treat suspected normal pressure hydrocephalus.  Neurology recommends outpatient follow-up with Neurosurgery for consideration of possible shunt.       Goals of Care Treatment Preferences:  Code Status: Full Code      Consults:   Consults (From admission, onward)        Status Ordering Provider     Inpatient Consult Tele-Vascular Neurology  Once        Provider:  Alejandro Cervantes MD    Completed BRISEYDA CARDOSO          Final Active Diagnoses:    Diagnosis Date Noted POA    PRINCIPAL PROBLEM:  Acute metabolic encephalopathy [G93.41] 06/27/2022 Yes    Depression [F32.A] 02/08/2021 Yes    NPH (normal pressure hydrocephalus) [G91.2] 07/12/2021 Yes    Hyperlipidemia [E78.5] 02/08/2021 Yes    Meningioma [D32.9] 11/22/2020 Yes    Essential hypertension [I10] 11/25/2019 Yes    Type 2 diabetes mellitus without complication, without long-term current use of insulin [E11.9] 11/25/2019 Yes      Problems Resolved During this Admission:       Discharged Condition: Stable    Disposition: Home-Health Care [facility]    Follow Up:    Patient Instructions:      Ambulatory referral/consult to Neurosurgery   Standing Status: Future   Referral Priority: Urgent Referral Type: Consultation   Referral Reason: Specialty Services Required   Requested Specialty: Neurosurgery   Number of  Visits Requested: 1     Diet diabetic     Activity as tolerated        Medications:  Reconciled Home Medications:      Medication List      START taking these medications    amoxicillin-clavulanate 875-125mg 875-125 mg per tablet  Commonly known as: AUGMENTIN  Take 1 tablet by mouth 2 (two) times daily. for 5 days        CHANGE how you take these medications    acetaminophen 500 MG tablet  Commonly known as: TYLENOL  Take 2 tablets (1,000 mg total) by mouth every 8 (eight) hours as needed (Mild pain).  What changed: Another medication with the same name was removed. Continue taking this medication, and follow the directions you see here.        CONTINUE taking these medications    alendronate 70 MG tablet  Commonly known as: FOSAMAX  Take 1 tablet (70 mg total) by mouth every 7 days.     allopurinoL 300 MG tablet  Commonly known as: ZYLOPRIM  Take 1 tablet (300 mg total) by mouth once daily.     amLODIPine 10 MG tablet  Commonly known as: NORVASC  Take 1 tablet (10 mg total) by mouth once daily.     atorvastatin 10 MG tablet  Commonly known as: LIPITOR  Take 1 tablet (10 mg total) by mouth once daily.     clopidogreL 75 mg tablet  Commonly known as: PLAVIX  Take 1 tablet (75 mg total) by mouth once daily.     lisinopriL 40 MG tablet  Commonly known as: PRINIVIL,ZESTRIL  Take 1 tablet (40 mg total) by mouth once daily.     metFORMIN 500 MG ER 24hr tablet  Commonly known as: GLUCOPHAGE-XR  Take 1 tablet (500 mg total) by mouth once daily.     metoprolol succinate 100 MG 24 hr tablet  Commonly known as: TOPROL-XL  Take 1 tablet (100 mg total) by mouth once daily.     multivitamin capsule  Take 1 capsule by mouth once daily.     venlafaxine 150 MG Cp24  Commonly known as: EFFEXOR-XR  TAKE 1 CAPSULE (150 MG TOTAL) BY MOUTH EVERY EVENING.        STOP taking these medications    blood sugar diagnostic Strp     blood-glucose meter kit     calcium carbonate 500 mg calcium (1,250 mg) tablet  Commonly known as: OS-ELODIA      fexofenadine 180 MG tablet  Commonly known as: ALLEGRA     fluticasone propionate 50 mcg/actuation nasal spray  Commonly known as: FLONASE     lancets Misc            Indwelling Lines/Drains at time of discharge:   Lines/Drains/Airways     None                 Time spent on the discharge of patient: 35 minutes         Deo Paez MD  Department of Hospital Medicine  [facility])

## 2022-07-01 ENCOUNTER — TELEPHONE (OUTPATIENT)
Dept: NEUROSURGERY | Facility: CLINIC | Age: 87
End: 2022-07-01
Payer: MEDICARE

## 2022-07-01 NOTE — TELEPHONE ENCOUNTER
Attempted to contact pt regarding referral , however was not available ,   Lvm on pt phone requesting call back .  Referral/ message on Dr Espinoza desk as well to get his thoughts given pt age and previous condition.         ----- Message from Amina Xiao MA sent at 7/1/2022 12:38 PM CDT -----  Regarding: Referral EP  Hi Olga Chong did a LP on this pt in June 2021 and a new referral has been placed after a recent trip to the ED for altered mental status.   Neurology consulted and Billy recommended the pt see Olga for possible  shunt.  Would you ladies be able to help with this?    Thanks,  May

## 2022-07-02 LAB
BACTERIA BLD CULT: NORMAL
BACTERIA BLD CULT: NORMAL

## 2022-07-05 ENCOUNTER — TELEPHONE (OUTPATIENT)
Dept: NEUROSURGERY | Facility: CLINIC | Age: 87
End: 2022-07-05
Payer: MEDICARE

## 2022-07-05 NOTE — TELEPHONE ENCOUNTER
----- Message from Amina Xiao MA sent at 7/1/2022 12:38 PM CDT -----  Regarding: Referral EP  Olga Mckeon did a LP on this pt in June 2021 and a new referral has been placed after a recent trip to the ED for altered mental status.   Neurology consulted and Billy recommended the pt see Olga for possible  shunt.  Would you ladies be able to help with this?    Thanks,  May

## 2022-07-15 ENCOUNTER — TELEPHONE (OUTPATIENT)
Dept: NEUROSURGERY | Facility: CLINIC | Age: 87
End: 2022-07-15
Payer: MEDICARE

## 2022-07-20 ENCOUNTER — OFFICE VISIT (OUTPATIENT)
Dept: NEUROSURGERY | Facility: CLINIC | Age: 87
End: 2022-07-20
Payer: MEDICARE

## 2022-07-20 VITALS
SYSTOLIC BLOOD PRESSURE: 125 MMHG | HEART RATE: 62 BPM | BODY MASS INDEX: 22.76 KG/M2 | DIASTOLIC BLOOD PRESSURE: 56 MMHG | WEIGHT: 145 LBS | HEIGHT: 67 IN

## 2022-07-20 DIAGNOSIS — G91.9 HYDROCEPHALUS IN ADULT: ICD-10-CM

## 2022-07-20 DIAGNOSIS — G91.2 NPH (NORMAL PRESSURE HYDROCEPHALUS): Primary | ICD-10-CM

## 2022-07-20 DIAGNOSIS — R26.9 GAIT DISTURBANCE: ICD-10-CM

## 2022-07-20 PROCEDURE — 1126F PR PAIN SEVERITY QUANTIFIED, NO PAIN PRESENT: ICD-10-PCS | Mod: CPTII,S$GLB,, | Performed by: NEUROLOGICAL SURGERY

## 2022-07-20 PROCEDURE — 1160F PR REVIEW ALL MEDS BY PRESCRIBER/CLIN PHARMACIST DOCUMENTED: ICD-10-PCS | Mod: CPTII,S$GLB,, | Performed by: NEUROLOGICAL SURGERY

## 2022-07-20 PROCEDURE — 1100F PTFALLS ASSESS-DOCD GE2>/YR: CPT | Mod: CPTII,S$GLB,, | Performed by: NEUROLOGICAL SURGERY

## 2022-07-20 PROCEDURE — 1160F RVW MEDS BY RX/DR IN RCRD: CPT | Mod: CPTII,S$GLB,, | Performed by: NEUROLOGICAL SURGERY

## 2022-07-20 PROCEDURE — 3288F PR FALLS RISK ASSESSMENT DOCUMENTED: ICD-10-PCS | Mod: CPTII,S$GLB,, | Performed by: NEUROLOGICAL SURGERY

## 2022-07-20 PROCEDURE — 99214 OFFICE O/P EST MOD 30 MIN: CPT | Mod: S$GLB,,, | Performed by: NEUROLOGICAL SURGERY

## 2022-07-20 PROCEDURE — 99214 PR OFFICE/OUTPT VISIT, EST, LEVL IV, 30-39 MIN: ICD-10-PCS | Mod: S$GLB,,, | Performed by: NEUROLOGICAL SURGERY

## 2022-07-20 PROCEDURE — 3078F DIAST BP <80 MM HG: CPT | Mod: CPTII,S$GLB,, | Performed by: NEUROLOGICAL SURGERY

## 2022-07-20 PROCEDURE — 3074F SYST BP LT 130 MM HG: CPT | Mod: CPTII,S$GLB,, | Performed by: NEUROLOGICAL SURGERY

## 2022-07-20 PROCEDURE — 1100F PR PT FALLS ASSESS DOC 2+ FALLS/FALL W/INJURY/YR: ICD-10-PCS | Mod: CPTII,S$GLB,, | Performed by: NEUROLOGICAL SURGERY

## 2022-07-20 PROCEDURE — 99999 PR PBB SHADOW E&M-EST. PATIENT-LVL IV: ICD-10-PCS | Mod: PBBFAC,,, | Performed by: NEUROLOGICAL SURGERY

## 2022-07-20 PROCEDURE — 3288F FALL RISK ASSESSMENT DOCD: CPT | Mod: CPTII,S$GLB,, | Performed by: NEUROLOGICAL SURGERY

## 2022-07-20 PROCEDURE — 99999 PR PBB SHADOW E&M-EST. PATIENT-LVL IV: CPT | Mod: PBBFAC,,, | Performed by: NEUROLOGICAL SURGERY

## 2022-07-20 PROCEDURE — 1159F PR MEDICATION LIST DOCUMENTED IN MEDICAL RECORD: ICD-10-PCS | Mod: CPTII,S$GLB,, | Performed by: NEUROLOGICAL SURGERY

## 2022-07-20 PROCEDURE — 1126F AMNT PAIN NOTED NONE PRSNT: CPT | Mod: CPTII,S$GLB,, | Performed by: NEUROLOGICAL SURGERY

## 2022-07-20 PROCEDURE — 1159F MED LIST DOCD IN RCRD: CPT | Mod: CPTII,S$GLB,, | Performed by: NEUROLOGICAL SURGERY

## 2022-07-20 PROCEDURE — 3074F PR MOST RECENT SYSTOLIC BLOOD PRESSURE < 130 MM HG: ICD-10-PCS | Mod: CPTII,S$GLB,, | Performed by: NEUROLOGICAL SURGERY

## 2022-07-20 PROCEDURE — 3078F PR MOST RECENT DIASTOLIC BLOOD PRESSURE < 80 MM HG: ICD-10-PCS | Mod: CPTII,S$GLB,, | Performed by: NEUROLOGICAL SURGERY

## 2022-07-20 NOTE — PROGRESS NOTES
Subjective:   I, Azul Trammell, attest that this documentation has been prepared under the direction and in the presence of Redd Espinoza MD.     Patient ID: Farheen Soares is a 87 y.o. female     Chief Complaint: Follow-up (NPH)        HPI  MsCristopher Soares is a 87 y.o. woman with with hearing loss, type II DM, DDD, lumbar stenosis s/p lumbar puncture done on 7/12/2021, as well as a history of meningioma and TIA, who presents today for follow up with MRI brain. At the time of our last visit on 7/14/2021, the pt reports that she is doing well. She had an improved gait after the lumbar puncture.     The daughter, who was present during today's visit, reports the patient has been experiencing multiple episodes of dizziness. Notes she was dizzy to the point of nausea and vomiting. Patient was admitted to the ED on 6/27/2022 for what was thought to be pneumonia and was given antibiotics with no relief. Patient arrived in personal wheelchair.    Review of Systems   Constitutional: Negative for activity change, appetite change, fatigue, fever and unexpected weight change.   HENT: Negative for facial swelling.    Eyes: Negative.    Respiratory: Negative.    Cardiovascular: Negative.    Gastrointestinal: Positive for nausea and vomiting. Negative for diarrhea.   Endocrine: Negative.    Genitourinary: Negative.    Musculoskeletal: Negative for back pain, joint swelling, myalgias and neck pain.   Neurological: Negative for dizziness, seizures, weakness, numbness and headaches.   Psychiatric/Behavioral: Negative.       Past Medical History:   Diagnosis Date    Age-related osteoporosis with current pathological fracture with routine healing     Allergic rhinitis     Carotid atherosclerosis, bilateral     Chronic gout of multiple sites     Chronic low back pain with bilateral sciatica     Closed impacted fracture of left femoral neck with routine healing s/p percutaneous screw fixation on 11/21/2020  11/20/2020    DDD (degenerative disc disease), lumbar 8/17/2020    Diverticulosis     Essential hypertension 11/25/2019    Facet arthropathy     GERD (gastroesophageal reflux disease) 11/25/2019    Hearing loss     History of transient ischemic attack (TIA) 11/25/2019    IBS (irritable bowel syndrome)     Insomnia     Lumbar radiculopathy 8/17/2020    Lumbar spondylosis 8/17/2020    Lumbar stenosis     Meningioma     Paroxysmal SVT (supraventricular tachycardia)     Primary open angle glaucoma (POAG) of both eyes     Pure hypercholesterolemia 11/25/2019    PVD (peripheral vascular disease)     30% prox stenosis of celiac trunk and 20% stnosis Prox SMA - per Abd/SMA Arteriogram 4/3/08    Recurrent major depressive disorder, in full remission     Type 2 diabetes mellitus without complication, without long-term current use of insulin 11/25/2019    Vitamin D deficiency 11/23/2020       Objective:      Vitals:    07/20/22 1028   BP: (!) 125/56   Pulse: 62      Physical Exam  Constitutional:       General: She is not in acute distress.     Appearance: Normal appearance.   HENT:      Head: Normocephalic and atraumatic.   Pulmonary:      Effort: Pulmonary effort is normal.   Musculoskeletal:      Cervical back: Neck supple.   Neurological:      Mental Status: She is alert and oriented to person, place, and time.      GCS: GCS eye subscore is 4. GCS verbal subscore is 5. GCS motor subscore is 6.      Cranial Nerves: No cranial nerve deficit.            IMAGING:  MRI Brain W WO Contrast (6/28/2022):  No acute intracranial abnormality seen.  No detrimental change from prior. Stable ventriculomegaly which is out of proportion to the degree of volume loss which can be seen with normal pressure hydrocephalus. Stable calcified extra-axial mass right posterior fossa typical of a meningioma.    I have personally reviewed the images with the pt.      I, Dr. Redd Espinoza, personally performed the services described  in this documentation. All medical record entries made by the scribe, Azul Trammell, were at my direction and in my presence.  I have reviewed the chart and agree that the record reflects my personal performance and is accurate and complete. Redd Espinoza MD. 07/20/2022    Assessment:       Normal pressure hydrocephalus     Plan:   I have personally reviewed the MRI brain with the pt which shows no acute intracranial abnormality seen.  No detrimental change from prior. Stable ventriculomegaly which is out of proportion to the degree of volume loss which can be seen with normal pressure hydrocephalus. Stable calcified extra-axial mass right posterior fossa typical of a meningioma.    I recommend  shunt for NPH. I have discussed the risks/benefits, indications, and alternatives for the proposed procedure in detail. I have answered all of their questions and patient wish to proceed with surgery. We will schedule patient.

## 2022-07-21 ENCOUNTER — TELEPHONE (OUTPATIENT)
Dept: NEUROSURGERY | Facility: CLINIC | Age: 87
End: 2022-07-21
Payer: MEDICARE

## 2022-07-21 DIAGNOSIS — G91.2 NPH (NORMAL PRESSURE HYDROCEPHALUS): Primary | ICD-10-CM

## 2022-07-22 ENCOUNTER — TELEPHONE (OUTPATIENT)
Dept: PREADMISSION TESTING | Facility: HOSPITAL | Age: 87
End: 2022-07-22
Payer: MEDICARE

## 2022-07-22 NOTE — TELEPHONE ENCOUNTER
----- Message from Vonda Chung MD sent at 7/22/2022  1:56 PM CDT -----  Ok to hold For 7 days    Dr. COHEN  ----- Message -----  From: Marya Ventura RN  Sent: 7/22/2022   1:38 PM CDT  To: Marya Ventura RN, Vonda Chung MD, #    Patient is scheduled for  shunt insertion in 8/11 with . (approximately  130 minutes of general anesthesia. )    I will need plavix instructions.   Please provide stop time instructions, by entering a note in EPIC.    Thanks!

## 2022-07-26 DIAGNOSIS — Z01.818 PREOPERATIVE TESTING: Primary | ICD-10-CM

## 2022-07-26 DIAGNOSIS — M79.609 PAIN IN EXTREMITY, UNSPECIFIED EXTREMITY: ICD-10-CM

## 2022-07-26 NOTE — ANESTHESIA PAT ROS NOTE
07/26/2022  Farheen Soares is a 87 y.o., female.      Pre-op Assessment          Review of Systems         Anesthesia Assessment: Preoperative EQUATION    Planned Procedure: Procedure(s) (LRB):  INSERTION, SHUNT, VENTRICULOPERITONEAL, ENDOSCOPIC, USING STEALTH (Right)  Requested Anesthesia Type:General  Surgeon: Redd Espinoza MD  Service: Neurosurgery  Known or anticipated Date of Surgery:8/11/2022    Surgeon notes: reviewed    Electronic QUestionnaire Assessment completed via nurse interview with patient.        Triage considerations:       Previous anesthesia records:No problems and GENERAL  7/12/2021    Lumbar Puncture      Airway/Jaw/Neck:  Airway Findings: Mouth Opening: Normal Tongue: Normal  General Airway Assessment: Adult  Mallampati: II  Improves to II with phonation.      Dental:  Dental Findings: Edentulous, Upper Dentures, Lower Dentures     Last PCP note: 6-12 months ago , within [facility]   Subspecialty notes: Neurosurgery    Other important co-morbidities:PER Epic:  DM2, GERD, HLD, HTN and NPH, L-DDD, OSTEOPOROSIS, DIVERTICULOSIS, PVD, GLAUCOMA      Tests already available:  Available tests,  within 3 months , within [facility] .6/29/2022 MG, BMP, CBC, 6/28/2022 TSH, MRI BRAIN W W/O CONTRAST, 6/27/2022 HGA1C, UA, CT HEAD W/O CONTRAST             Instructions given. (See in Nurse's note)    Optimization:  Anesthesia Preop Clinic Assessment  Indicated    Medical Opinion Indicated          Plan:    Testing:  EKG, PT/INR, PTT, T&C and T&S   Pre-anesthesia  visit       Visit focus: concerns in complex and/or prolonged anesthesia, COMORBIDITIES     Consultation:IM Perioperative Hospitalist OR NP     Patient  has previously scheduled Medical Appointment:8/1 CT, 8/10 DR. BARKER  Navigation: Tests Scheduled. TBD             Consults scheduled.TBD             Results will be tracked by Preop  Clinic.  7/29 ----- Message from Vonda Chung MD sent at 7/22/2022  1:56 PM CDT -----  Ok to hold For 7 days     Dr. COHEN  ----- Message -----  From: Marya Ventura RN  Sent: 7/22/2022   1:38 PM CDT  To: Marya Ventura RN, Vonda Chung MD, #     Patient is scheduled for  shunt insertion in 8/11 with . (approximately  130 minutes of general anesthesia. )    I will need plavix instructions.   Please provide stop time instructions, by entering a note in EPIC.    Thanks!     8/3 Labs and EKG resulted and noted by Dr. Starr Yao. Medical optimization by Patti Zabala NP on 8/1.  Marya Ventura RN BSN

## 2022-07-26 NOTE — PRE-PROCEDURE INSTRUCTIONS
Spoke with patient's daughter, Colleen Soares. She has POA.She said she has not had any problem with anesthesia in the past. Will need medical optimization by  and poc appt  or NP,and labs. Our  will call to set up these appts. I will ask PCP,  for Plavix instructions.  Preop instructions given. Hold aspirin, aspirin containing products, nsaids(aleve, advil, motrin, ibuprofen, naprosyn, naproxen, voltaren, diclofenac), vitamins( Multivitamin) and supplements one week prior to surgery.     May take Tylenol.  Await instructions from Dr. Chung for Plavix.     Your surgery has been scheduled for:Thursday 8/11/2022__________________________________________  Perioperative Center( [facility]) [phone]  You should report to:  ____[facility], located on the [place] side of the first floor of the           [facility] ([phone])  _x___The Second Floor Surgery Center, located on the [facility] side of the            Second floor of the [facility] ([phone])  ____3rd Floor [facility] located on the [facility] side of the [facility] [phone]  Please Note   · Tell your doctor if you take Aspirin, products containing Aspirin, herbal medications  or blood thinners, such as Coumadin, Ticlid, or Plavix.  (Consult your provider regarding holding or stopping before surgery).  · Arrange for someone to drive you home following surgery.  You will not be allowed to leave the surgical facility alone or drive yourself home following sedation and anesthesia.  Before Surgery  · Stop taking all vitamins/ herbal medications 14days prior to surgery  · No Motrin/Advil (Ibuprofen) 7 day before surgery  · No Aleve (Naproxen) 7 days before surgery  · Stop Taking Asprin, products containing Asprin __7___days before surgery  · Stop taking blood thinners_______days before surgery  · No Goody's/BC  Powder 7 days before  surgery  · Refrain from drinking alcoholic beverages for 24hours before and after surgery  · Stop or limit smoking _________days before surgery  · You may take Tylenol for pain  Night before Surgery  · Nothing to eat or drink after midnight.  · Take a shower or bath (shower is recommended).  Bathe with Hibiclens soap or an antibacterial soap from the neck down.  If not supplied by your surgeon, hibiclens soap will need to be purchased over the counter in pharmacy.  Rinse soap off thoroughly.  · Shampoo your hair with your regular shampoo  The Day of Surgery  · NOTHING TO  DRINK 2 hours before arrival time. If you are told to take medication on the morning of surgery, it may be taken with a sip of water.   · Take another bath or shower with hibiclens or any antibacterial soap, to reduce the chance of infection.  · Take heart and blood pressure medications with a small sip of water, as advised by the perioperative team.  · Do not take fluid pills  · You may brush your teeth and rinse your mouth, but do not swall any additional water.   · Do not apply perfumes, powder, body lotions or deodorant on the day of surgery.  · Nail polish should be removed.  · Do not wear makeup or moisturizer  · Wear comfortable clothes, such as a button front shirt and loose fitting pants.  · Leave all jewelry, including body piercings, and valuables at home.    · Bring any devices you will neeed after surgery such as crutches or canes.  · If you have sleep apnea, please bring your CPAP machine  In the event that your physical condition changes including the onset of a cold or respiratory illness, or if you have to delay or cancel your surgery, please notify your surgeon.    Medication instructions given:  Take as scheduled:Fosamax     Take in the pm before surgery: Allopurinol, Norvasc,Atorvastatin, Lisinopril, Toprol XL, Effexor     Hold in Pm before surgery: Metformin    Stated knows where the surgery center is located.Verbalizes  understanding. Sent preop and med instruction to [facility] portal..

## 2022-07-27 ENCOUNTER — TELEPHONE (OUTPATIENT)
Dept: PREADMISSION TESTING | Facility: HOSPITAL | Age: 87
End: 2022-07-27
Payer: MEDICARE

## 2022-07-28 NOTE — TELEPHONE ENCOUNTER
----- Message from Marya Ventura RN sent at 7/28/2022 10:01 AM CDT -----  Second request:  Surgery 8/11   Please schedule  and poc appt or NP, and labs. Talk with her daughter, Colleen Soares, has POA. ( She handles all of his medical, in 's note- POA)  Thanks!

## 2022-07-31 PROBLEM — I73.9 PVD (PERIPHERAL VASCULAR DISEASE): Status: ACTIVE | Noted: 2022-07-31

## 2022-07-31 NOTE — ASSESSMENT & PLAN NOTE
Treated with: venlafaxine nightly; followed per PCP.   Denies suicidal/homicidal ideations  Recommend good sleep (7-8 hrs), healthy eating, and limit use of alcohol.

## 2022-07-31 NOTE — ASSESSMENT & PLAN NOTE
Encouraged healthy diet (DASH/Mediterranean) and exercise.   Patient should exercise 30 minutes at least five times weekly. Limit alcohol.  Treated with: atorvastatin 10 mg

## 2022-07-31 NOTE — ASSESSMENT & PLAN NOTE
Number of TIAs: 2  Most recent stroke:  2019, daughter unsure of first TIA date  There are no residual deficits.   Managed by PCP   Taking Plavix daily; will hold 7 days before surgery. Last dose should be 8/3/22.    Carotid Studies: None available

## 2022-07-31 NOTE — ASSESSMENT & PLAN NOTE
Treated with: allopurinol daily .    One episode to great toe and knee; none in past 3 years.  Encouraged diet with plenty of fruits and veggies and low-fat dairy products. Maintain adequate hydration and limit alcohol use.

## 2022-07-31 NOTE — ASSESSMENT & PLAN NOTE
Not currently treated and no recent symptoms.   Encouraged to elevate HOB and avoid laying down for 2-3 hours after meals.

## 2022-07-31 NOTE — ASSESSMENT & PLAN NOTE
Currently takes: metformin 500 mg XR  once daily  Most recent A1c is 7.2 .     No regular accuchecks.       Denies peripheral neuropathy. Denies visual changes. Overdue for an eye exam.  Micro changes: Denies- Retinopathy, Nephropathy   Macro changes: Denies-  Carotid, Coronary; Has Peripheral disease     Maintain healthy weight. Exercise at least 150 minutes weekly. Encouraged diet rich in nutrients such as fruits, vegetables, and whole grains; reduce sugar intake from cakes, candy, and sugared drinks.

## 2022-07-31 NOTE — ASSESSMENT & PLAN NOTE
Reports current pain; taking Tylenol prn.   Has urge incontinence; denies  bowel control    Denies N/T to buttocks, perineum and inner surfaces of the thighs (saddle anesthesia)    Patient was followed per Pain Management; last seen in 2020.   No recent imaging available

## 2022-07-31 NOTE — ASSESSMENT & PLAN NOTE
Current BP  not at goal today.    Taking: amlodipine/lisinopril  Daughter reports home BP readings of: usually normal   Encouraged keeping a healthy weight and BMI    Lifestyle changes to reduce systolic BP:   exercise 30 minutes per day,  5 days per week or 150 minutes weekly; sodium reduction and avoidance of high salt foods such as processed meats, frozen meals and  fast foods.     BP acceptable for surgery. I recommend monitoring BP during perioperative period as well as uncontrolled pain which can elevated blood pressure.

## 2022-08-01 ENCOUNTER — OFFICE VISIT (OUTPATIENT)
Dept: INTERNAL MEDICINE | Facility: CLINIC | Age: 87
End: 2022-08-01
Payer: MEDICARE

## 2022-08-01 ENCOUNTER — HOSPITAL ENCOUNTER (OUTPATIENT)
Dept: RADIOLOGY | Facility: HOSPITAL | Age: 87
Discharge: HOME | End: 2022-08-01
Attending: NEUROLOGICAL SURGERY
Payer: MEDICARE

## 2022-08-01 ENCOUNTER — HOSPITAL ENCOUNTER (OUTPATIENT)
Dept: CARDIOLOGY | Facility: CLINIC | Age: 87
Discharge: HOME | End: 2022-08-01
Payer: MEDICARE

## 2022-08-01 VITALS
DIASTOLIC BLOOD PRESSURE: 68 MMHG | SYSTOLIC BLOOD PRESSURE: 142 MMHG | OXYGEN SATURATION: 97 % | BODY MASS INDEX: 22.76 KG/M2 | TEMPERATURE: 98 F | HEART RATE: 58 BPM | WEIGHT: 145 LBS | HEIGHT: 67 IN

## 2022-08-01 DIAGNOSIS — F32.A DEPRESSION, UNSPECIFIED DEPRESSION TYPE: ICD-10-CM

## 2022-08-01 DIAGNOSIS — I10 ESSENTIAL HYPERTENSION: ICD-10-CM

## 2022-08-01 DIAGNOSIS — E78.5 HYPERLIPIDEMIA, UNSPECIFIED HYPERLIPIDEMIA TYPE: ICD-10-CM

## 2022-08-01 DIAGNOSIS — I73.9 PVD (PERIPHERAL VASCULAR DISEASE): ICD-10-CM

## 2022-08-01 DIAGNOSIS — D75.89 MACROCYTOSIS: ICD-10-CM

## 2022-08-01 DIAGNOSIS — G91.2 NPH (NORMAL PRESSURE HYDROCEPHALUS): ICD-10-CM

## 2022-08-01 DIAGNOSIS — D32.9 MENINGIOMA: ICD-10-CM

## 2022-08-01 DIAGNOSIS — K21.9 GASTROESOPHAGEAL REFLUX DISEASE, UNSPECIFIED WHETHER ESOPHAGITIS PRESENT: ICD-10-CM

## 2022-08-01 DIAGNOSIS — M10.9 GOUT, UNSPECIFIED CAUSE, UNSPECIFIED CHRONICITY, UNSPECIFIED SITE: ICD-10-CM

## 2022-08-01 DIAGNOSIS — M51.36 DDD (DEGENERATIVE DISC DISEASE), LUMBAR: ICD-10-CM

## 2022-08-01 DIAGNOSIS — Z86.73 HISTORY OF TRANSIENT ISCHEMIC ATTACK (TIA): ICD-10-CM

## 2022-08-01 DIAGNOSIS — E11.9 TYPE 2 DIABETES MELLITUS WITHOUT COMPLICATION, WITHOUT LONG-TERM CURRENT USE OF INSULIN: ICD-10-CM

## 2022-08-01 DIAGNOSIS — Z01.818 PREOPERATIVE TESTING: ICD-10-CM

## 2022-08-01 DIAGNOSIS — G89.29 OTHER CHRONIC PAIN: ICD-10-CM

## 2022-08-01 PROCEDURE — 99999 PR PBB SHADOW E&M-EST. PATIENT-LVL IV: CPT | Mod: PBBFAC,,, | Performed by: NURSE PRACTITIONER

## 2022-08-01 PROCEDURE — 70450 CT HEAD/BRAIN W/O DYE: CPT | Mod: TC

## 2022-08-01 PROCEDURE — 3078F DIAST BP <80 MM HG: CPT | Mod: CPTII,S$GLB,, | Performed by: NURSE PRACTITIONER

## 2022-08-01 PROCEDURE — 86920 COMPATIBILITY TEST SPIN: CPT | Performed by: ANESTHESIOLOGY

## 2022-08-01 PROCEDURE — 70450 CT HEAD STEALTH W/O CONTRAST: ICD-10-PCS | Mod: 26,,, | Performed by: RADIOLOGY

## 2022-08-01 PROCEDURE — 3077F PR MOST RECENT SYSTOLIC BLOOD PRESSURE >= 140 MM HG: ICD-10-PCS | Mod: CPTII,S$GLB,, | Performed by: NURSE PRACTITIONER

## 2022-08-01 PROCEDURE — 3077F SYST BP >= 140 MM HG: CPT | Mod: CPTII,S$GLB,, | Performed by: NURSE PRACTITIONER

## 2022-08-01 PROCEDURE — 93010 ELECTROCARDIOGRAM REPORT: CPT | Mod: S$GLB,,, | Performed by: INTERNAL MEDICINE

## 2022-08-01 PROCEDURE — 1159F PR MEDICATION LIST DOCUMENTED IN MEDICAL RECORD: ICD-10-PCS | Mod: CPTII,S$GLB,, | Performed by: NURSE PRACTITIONER

## 2022-08-01 PROCEDURE — 1159F MED LIST DOCD IN RCRD: CPT | Mod: CPTII,S$GLB,, | Performed by: NURSE PRACTITIONER

## 2022-08-01 PROCEDURE — 99214 OFFICE O/P EST MOD 30 MIN: CPT | Mod: S$GLB,,, | Performed by: NURSE PRACTITIONER

## 2022-08-01 PROCEDURE — 93005 ELECTROCARDIOGRAM TRACING: CPT | Mod: S$GLB,,, | Performed by: ANESTHESIOLOGY

## 2022-08-01 PROCEDURE — 99214 PR OFFICE/OUTPT VISIT, EST, LEVL IV, 30-39 MIN: ICD-10-PCS | Mod: S$GLB,,, | Performed by: NURSE PRACTITIONER

## 2022-08-01 PROCEDURE — 93010 EKG 12-LEAD: ICD-10-PCS | Mod: S$GLB,,, | Performed by: INTERNAL MEDICINE

## 2022-08-01 PROCEDURE — 93005 EKG 12-LEAD: ICD-10-PCS | Mod: S$GLB,,, | Performed by: ANESTHESIOLOGY

## 2022-08-01 PROCEDURE — 1160F PR REVIEW ALL MEDS BY PRESCRIBER/CLIN PHARMACIST DOCUMENTED: ICD-10-PCS | Mod: CPTII,S$GLB,, | Performed by: NURSE PRACTITIONER

## 2022-08-01 PROCEDURE — 3078F PR MOST RECENT DIASTOLIC BLOOD PRESSURE < 80 MM HG: ICD-10-PCS | Mod: CPTII,S$GLB,, | Performed by: NURSE PRACTITIONER

## 2022-08-01 PROCEDURE — 70450 CT HEAD/BRAIN W/O DYE: CPT | Mod: 26,,, | Performed by: RADIOLOGY

## 2022-08-01 PROCEDURE — 99499 RISK ADDL DX/OHS AUDIT: ICD-10-PCS | Mod: S$GLB,,, | Performed by: NURSE PRACTITIONER

## 2022-08-01 PROCEDURE — 1160F RVW MEDS BY RX/DR IN RCRD: CPT | Mod: CPTII,S$GLB,, | Performed by: NURSE PRACTITIONER

## 2022-08-01 PROCEDURE — 99999 PR PBB SHADOW E&M-EST. PATIENT-LVL IV: ICD-10-PCS | Mod: PBBFAC,,, | Performed by: NURSE PRACTITIONER

## 2022-08-01 PROCEDURE — 99499 UNLISTED E&M SERVICE: CPT | Mod: S$GLB,,, | Performed by: NURSE PRACTITIONER

## 2022-08-01 NOTE — PROGRESS NOTES
[facility]  Progress Note    Patient Name: Farheen Soares  MRN: [id number]  Date of Evaluation- 08/01/2022  PCP- Vonda Chung MD    Future cases for Isis Soares [[id number]]     Case ID Status Date Time Patrice Procedure Provider Location    [id number] [facility] 8/11/2022 10:30  INSERTION, SHUNT, VENTRICULOPERITONEAL, ENDOSCOPIC, USING STEALTH Redd Espinoza MD [[id number]] [facility] OR 2ND FLR          HPI:   This is a 87 y.o. female  who presents today with daughter for a preoperative evaluation in preparation for a Neurosurgery  procedure.  Scheduled for  insertion of a  shunt.  Surgery is indicated for normal pressure hydrocephalus.   Patient is new to me.  Details of current problem: The duration of problem is  2 years. Daughter reports slow gait,  dizziness with N/V, short term memory loss and loss of balance. S/P lumbar puncture in July 2021 which improved symptoms for a while but has now returned. She was hospitalized in June 2022 for altered mental status and suspected pneumonia. She was treated with antibiotics at that time. MRI brain done with results revealing no acute intracranial abnormality.  There are no aggravating or relieving factors.   Denies pain; uses walker at home and a wheelchair while going to appointments.     The history has been obtained by speaking with the patient and daughter as well as by reviewing the electronic medical record and/or outside health information. Significant health conditions for the perioperative period are discussed below in assessment and plan.     Patient reports current health status to be Fair.  Denies any new symptoms before surgery.          Subjective/ Objective:     Chief Complaint: Preoperative evaulation, perioperative medical management, and complication reduction plan.     Functional Capacity: No regular exercise regimen- difficulty with being active due to dizziness and gait problem. Can walk around in house with walker and without  CP/SOB. Performs chair exercises 3x weekly: leg and knee lifts. Before increased dizziness- patient was very active with walking.       Anesthesia issues: None    Difficulty mouth opening: No    Steroid use in the last 12 months: No      Dental Issues: No    Family anesthesia difficulty: None       Family Hx of Thrombosis: None    Past Medical History:   Diagnosis Date    Age-related osteoporosis with current pathological fracture with routine healing     Allergic rhinitis     Anemia     Carotid atherosclerosis, bilateral     Chronic gout of multiple sites     Chronic low back pain with bilateral sciatica     Closed impacted fracture of left femoral neck with routine healing s/p percutaneous screw fixation on 11/21/2020 11/20/2020    DDD (degenerative disc disease), lumbar 8/17/2020    Diverticulosis     Essential hypertension 11/25/2019    Facet arthropathy     GERD (gastroesophageal reflux disease) 11/25/2019    Hearing loss     History of transient ischemic attack (TIA) 11/25/2019    IBS (irritable bowel syndrome)     Insomnia     Lumbar radiculopathy 8/17/2020    Lumbar spondylosis 8/17/2020    Lumbar stenosis     Meningioma     Paroxysmal SVT (supraventricular tachycardia)     Primary open angle glaucoma (POAG) of both eyes     Pure hypercholesterolemia 11/25/2019    PVD (peripheral vascular disease)     30% prox stenosis of celiac trunk and 20% stnosis Prox SMA - per Abd/SMA Arteriogram 4/3/08    Recurrent major depressive disorder, in full remission     Type 2 diabetes mellitus without complication, without long-term current use of insulin 11/25/2019    Vitamin D deficiency 11/23/2020         Past Medical History Pertinent Negatives:   Diagnosis Date Noted    Anxiety 08/01/2022    CHF (congestive heart failure) 08/01/2022    COPD (chronic obstructive pulmonary disease) 08/01/2022    Coronary artery disease 08/01/2022    Deep vein thrombosis 08/01/2022    Disorder of kidney and  ureter 08/01/2022    Pulmonary embolism 08/01/2022    Seizures 08/01/2022    Thyroid disease 08/01/2022         Past Surgical History:   Procedure Laterality Date    CATARACT EXTRACTION      CAUDAL EPIDURAL STEROID INJECTION N/A 8/27/2020    Procedure: Injection-steroid-epidural-caudal with catheter;  Surgeon: Johnathan Gonzalez Jr., MD;  Location: [facility];  Service: Pain Management;  Laterality: N/A;    EPIDURAL STEROID INJECTION INTO LUMBAR SPINE      LUMBAR LAMINECTOMY  2009    PERCUTANEOUS PINNING OF HIP Left 11/21/2020    Procedure: Left- Percutaneous Screws- Large  arm Clock side;  Surgeon: Leandro White MD;  Location: [facility] OR [address] [place] [address];  Service: Orthopedics;  Laterality: Left;    TOTAL ABDOMINAL HYSTERECTOMY W/ BILATERAL SALPINGOOPHORECTOMY         Review of Systems   Constitutional: Negative for chills, fatigue, fever and unexpected weight change.   HENT: Positive for hearing loss (wears bilateral hearing aids). Negative for dental problem, postnasal drip, rhinorrhea, sore throat, tinnitus and trouble swallowing.    Eyes: Negative for photophobia, pain, discharge and visual disturbance.   Respiratory: Negative for apnea, cough, chest tightness, shortness of breath and wheezing.    Cardiovascular: Positive for leg swelling (occasional). Negative for chest pain and palpitations.   Gastrointestinal: Positive for nausea (with dizziness) and vomiting. Negative for abdominal pain, blood in stool and constipation.        Denies Fatty liver, Hepatitis   Endocrine: Negative for cold intolerance and heat intolerance.   Genitourinary: Positive for urgency. Negative for decreased urine volume, difficulty urinating, dysuria, frequency and hematuria.        Incontinence   Musculoskeletal: Positive for back pain and gait problem. Negative for arthralgias, neck pain and neck stiffness.   Skin: Negative for rash and wound.   Allergic/Immunologic: Positive for environmental allergies (Allegra/Flonase prn).  "  Neurological: Positive for dizziness, weakness (BLE) and headaches. Negative for tremors, seizures, syncope and numbness.   Hematological: Negative for adenopathy. Does not bruise/bleed easily.   Psychiatric/Behavioral: Negative for sleep disturbance and suicidal ideas.              VITALS  Visit Vitals  BP (!) 142/68 (BP Location: Left arm, Patient Position: Sitting)   Pulse (!) 58   Temp 97.5 °F (36.4 °C) (Oral)   Ht 5' 7" (1.702 m)   Wt 65.8 kg (145 lb)   SpO2 97%   BMI 22.71 kg/m²          Physical Exam  Vitals reviewed.   Constitutional:       General: She is not in acute distress.     Appearance: She is well-developed.   HENT:      Head: Normocephalic.      Nose: Nose normal.      Mouth/Throat:      Pharynx: No oropharyngeal exudate.   Eyes:      General:         Right eye: No discharge.         Left eye: No discharge.      Conjunctiva/sclera: Conjunctivae normal.      Pupils: Pupils are equal, round, and reactive to light.   Neck:      Thyroid: No thyromegaly.      Vascular: No carotid bruit or JVD.      Trachea: No tracheal deviation.   Cardiovascular:      Rate and Rhythm: Regular rhythm. Bradycardia present.      Pulses:           Carotid pulses are 2+ on the right side and 2+ on the left side.       Dorsalis pedis pulses are 2+ on the right side and 2+ on the left side.        Posterior tibial pulses are 2+ on the right side and 2+ on the left side.      Heart sounds: Normal heart sounds. No murmur heard.  Pulmonary:      Effort: Pulmonary effort is normal. No respiratory distress.      Breath sounds: Normal breath sounds. No stridor. No wheezing, rhonchi or rales.   Abdominal:      General: Bowel sounds are normal. There is no distension.      Palpations: Abdomen is soft.      Tenderness: There is no abdominal tenderness. There is no guarding.   Musculoskeletal:      Cervical back: Normal range of motion.      Right lower leg: No edema.      Left lower leg: No edema.   Lymphadenopathy:      Cervical: " No cervical adenopathy.   Skin:     General: Skin is warm and dry.      Capillary Refill: Capillary refill takes less than 2 seconds.      Findings: No erythema or rash.   Neurological:      Mental Status: She is alert and oriented to person, place, and time.      Motor: Weakness present.      Coordination: Coordination normal.      Gait: Gait abnormal.          Significant Labs:  Lab Results   Component Value Date    WBC 7.71 06/29/2022    HGB 14.1 06/29/2022    HCT 41.8 06/29/2022     06/29/2022    CHOL 103 (L) 11/12/2021    TRIG 136 11/12/2021    HDL 34 (L) 11/12/2021    ALT 16 06/27/2022    AST 39 06/27/2022     06/29/2022    K 4.2 06/29/2022     06/29/2022    CREATININE 0.8 06/29/2022    BUN 11 06/29/2022    CO2 23 06/29/2022    TSH 0.750 06/28/2022    INR 1.0 08/01/2022    HGBA1C 7.2 (H) 06/27/2022       Diagnostic Studies: No relevant studies.    EKG:   Results for orders placed or performed during the hospital encounter of 08/01/22   EKG 12-lead    Collection Time: 08/01/22 11:13 AM    Narrative    Test Reason : Z01.818,    Vent. Rate : 073 BPM     Atrial Rate : 073 BPM     P-R Int : 152 ms          QRS Dur : 092 ms      QT Int : 390 ms       P-R-T Axes : 057 -15 072 degrees     QTc Int : 429 ms    Normal sinus rhythm  Nonspecific T wave abnormality  Abnormal ECG  When compared with ECG of 27-JUN-2022 16:01,  Nonspecific T wave abnormality now evident in Inferior leads  Confirmed by ORVILLE ZEE MD ([id number]) on 8/1/2022 3:14:00 PM    Referred By: RENUKA PARDO           Confirmed By:ORVILLE ZEE MD         Imaging   MRI Brain 6/28/22    Impression:     No acute intracranial abnormality seen.  No detrimental change from prior.     Stable ventriculomegaly which is out of proportion to the degree of volume loss which can be seen with normal pressure hydrocephalus.     Stable calcified extra-axial mass right posterior fossa typical of a meningioma.        Electronically signed by: Renuka  Zane  Date:                                            06/28/2022  Time:                                           14:54          MRI C-Spine 11/2020  Impression:     1. No evidence of CT occult acute or subacute cervical spine fracture.  No evidence of ligamentous injury.  2. Multilevel degenerative spinal canal foraminal narrowing, as described in detail in the report body.  3. Areas of equivocal T2 weighted signal abnormality are noted at the C3-4 and C6-7 levels which while potentially artifactual, may be on the basis of myelomalacia given suggested mild volume loss of the cord.  However,  acute edema in the setting of severe trauma could also appear similar by imaging.  As such, clinical correlation would be recommended to assess severity of trauma.  4. Additional details, as per report body.        Electronically signed by: Sukhdeep Marquis  Date:                                            11/21/2020  Time:                                           00:43        CT C-Spine 11/20/20  Impression:     No acute process, specifically without evidence of intracranial hemorrhage, calvarial fracture, or acute cervical spine fracture.     3.2 cm calcified extra-axial lesion which appears to arise from the right tentorial leaflet favored to represent a meningioma.  Associated mild localized mass effect and vasogenic edema within the subjacent brain parenchyma without significant midline shift or evidence of herniation.     Dilation throughout the ventricular system out of proportion to the degree of generalized cerebral volume loss, findings which may represent normal pressure hydrocephalus.     Patchy and confluent areas of hypoattenuation in the periventricular white matter bilaterally, some of which may be consistent with chronic microvascular ischemic disease though a component may represent transependymal/interstitial cerebral edema.     Advanced multilevel degenerative changes throughout the cervical spine  resulting in mild central canal stenosis at several levels and up to severe focal neural foraminal narrowing, as further detailed above.  Ossification involving interspinous and transverse ligaments.     This report was flagged in Epic as abnormal.     The information above was relayed by Dr. Nguyen by telephone to Dr. Subramanian on 11/20/2020 at approximately 21:00.     Electronically signed by resident: Kenny Nguyen  Date:                                            11/20/2020  Time:                                           20:44      Active Cardiac Conditions: None      Revised Cardiac Risk Index   High -Risk Surgery  Intraperitoneal; Intrathoracic; suprainguinal vascular Yes- + 1 No- 0   History of Ischemic Heart Disease   (Hx of MI/positive exercise test/current chest pain due to ischemia/use of nitrate therapy/EKG with pathological Q waves) Yes- + 1 No- 0   History of CHF  (Pulmonary edema/bilateral rales or S3 gallop/PND/CXR showing pulmonary vascular redistribution) Yes- + 1 No- 0   History of CVA   (Prior stroke or TIA) Yes- + 1 No- 0   Pre-operative treatment with insulin Yes- + 1 No- 0   Pre-operative creatinine > 2mg/dl Yes- + 1 No- 0   Total: 1      Risk Status:  Estimated risk of cardiac complications after non-cardiac surgery using the Revised Cardiac Risk Index for Preoperative risk is 6.0 %      ARISCAT (Canet) risk index: Intermediate: 13.3% risk of post-op pulmonary complications.    American Society of Anesthesiologists Physical Status classification (ASA): 3    [facility] respiratory failure index: 4.2 %           No further cardiac workup needed prior to surgery.                   Assessment/Plan:     NPH (normal pressure hydrocephalus)  Scheduled with Dr. Espinoza for insertion of  shunt on 8/11/22.    DDD (degenerative disc disease), lumbar  Reports current pain; taking Tylenol prn.   Has urge incontinence; denies  bowel control    Denies N/T to buttocks, perineum and inner surfaces of the thighs  (saddle anesthesia)    Patient was followed per Pain Management; last seen in 2020.   No recent imaging available    Chronic pain  Location: back  Last Pain Management visit in 2020    Depression  Treated with: venlafaxine nightly; followed per PCP.   Denies suicidal/homicidal ideations  Recommend good sleep (7-8 hrs), healthy eating, and limit use of alcohol.     Hyperlipidemia  Encouraged healthy diet (DASH/Mediterranean) and exercise.   Patient should exercise 30 minutes at least five times weekly. Limit alcohol.  Treated with: atorvastatin 10 mg    Essential hypertension  Current BP  not at goal today.    Taking: amlodipine/lisinopril  Daughter reports home BP readings of: usually normal   Encouraged keeping a healthy weight and BMI    Lifestyle changes to reduce systolic BP:   exercise 30 minutes per day,  5 days per week or 150 minutes weekly; sodium reduction and avoidance of high salt foods such as processed meats, frozen meals and  fast foods.     BP acceptable for surgery. I recommend monitoring BP during perioperative period as well as uncontrolled pain which can elevated blood pressure.           Type 2 diabetes mellitus without complication, without long-term current use of insulin  Currently takes: metformin 500 mg XR  once daily  Most recent A1c is 7.2 .     No regular accuchecks.       Denies peripheral neuropathy. Denies visual changes. Overdue for an eye exam.  Micro changes: Denies- Retinopathy, Nephropathy   Macro changes: Denies-  Carotid, Coronary; Has Peripheral disease     Maintain healthy weight. Exercise at least 150 minutes weekly. Encouraged diet rich in nutrients such as fruits, vegetables, and whole grains; reduce sugar intake from cakes, candy, and sugared drinks.    GERD (gastroesophageal reflux disease)  Not currently treated and no recent symptoms.   Encouraged to elevate HOB and avoid laying down for 2-3 hours after meals.       Gout  Treated with: allopurinol daily .    One  episode to great toe and knee; none in past 3 years.  Encouraged diet with plenty of fruits and veggies and low-fat dairy products. Maintain adequate hydration and limit alcohol use.     History of transient ischemic attack (TIA)  Number of TIAs: 2  Most recent stroke:  2019, daughter unsure of first TIA date  There are no residual deficits.   Managed by PCP   Taking Plavix daily; will hold 7 days before surgery. Last dose should be 8/3/22.    Carotid Studies: None available    PVD (peripheral vascular disease)  Denies skin color changes/claudication/cool skin or pain    DINA results: None available    Meningioma  Stable; followed per Neurosurgery.     Macrocytosis  Possibly due to Metformin use.  B12 and Folate are normal  Recommend continued follow-up with PCP.         Discussion/Management of Perioperative Care    Thromboembolic prophylaxis (VTE) Care: Risk factors for thrombosis include: age and surgical procedure.  I recommend prophylaxis of thromboembolism with the use of compression stockings/pneumatic devices, and/or pharmacologic agents. The benefits should outweigh the risks for pharmacologic prophylaxis in the perioperative period. I also encourage early ambulation if not contraindicated during the post-operative period.    Risk factors for post-operative pulmonary complications include:age > 65 years, diabetes mellitus, HTN and vascular disease. To reduce the risk of pulmonary complications, prophylactic recommendations include: incentive spirometry use/deep breathing, end tidal carbon dioxide monitoring and pain control.    I recommend monitoring sodium during the perioperative period. Pt. is currently on an SNRI which can be associated with SIADH. Surgical procedures are associated with hypersecretion of ADH as well.    Risk factors for renal complications include: age, diabetes mellitus, HTN and vascular disease. To reduce the risk of postoperative renal complications, I recommend the patient maintain  adequate fluid volume status by drinking 2 liters of water daily.  Avoid/reduce NSAIDS and VALDEZ-2 inhibitors use as well as IV contrast for renal protection.    I recommend the use of appropriate prophylactic antibiotics to reduce the risk of surgical site infections.    Delirium risk factors include advanced age and decreased mobility. I recommend to avoid/reduce use of benzodiazepine use (not for patients who take on a regular basis), anticholinergics, Benadryl,  and agents that may cause postoperative serotonin syndrome.  Controlled pain can decrease the risk for postop delirium and since opioids are used for postoperative pain control, I suggest using the lowest dose for the shortest amount of time necessary for pain management.     The patient is at an increased risk for urinary retention due to : advanced age. I recommend to avoid/decrease the use of benzodiazepines, anticholinergics, and Benadryl in the perioperative period. I also recommend using opioids for the shortest period of time if possible.          This visit was focused on Preoperative evaluation, Perioperative Medical management, complication reduction plans. I suggest that the patient follows up with primary care or relevant sub specialists for ongoing health care.    I appreciate the opportunity to be involved in this patients care. Please feel free to contact me if there were any questions about this consultation.    Patient is optimized for surgery.     Patti Zabala NP  Perioperative Medicine  [facility]

## 2022-08-01 NOTE — PRE-PROCEDURE INSTRUCTIONS
Spoke with her daughter, Colleen Soares. Instructed to hold Plavix 7 days prior to surgery per Dr. Zee Chung. ( last dose 8/3) Verbalizes understanding.

## 2022-08-01 NOTE — ASSESSMENT & PLAN NOTE
Possibly due to Metformin use.  B12 and Folate are normal  Recommend continued follow-up with PCP.

## 2022-08-01 NOTE — HPI
This is a 87 y.o. female  who presents today with daughter for a preoperative evaluation in preparation for a Neurosurgery  procedure.  Scheduled for  insertion of a  shunt.  Surgery is indicated for normal pressure hydrocephalus.   Patient is new to me.  Details of current problem: The duration of problem is  2 years. Daughter reports slow gait,  dizziness with N/V, short term memory loss and loss of balance. S/P lumbar puncture in July 2021 which improved symptoms for a while but has now returned. She was hospitalized in June 2022 for altered mental status and suspected pneumonia. She was treated with antibiotics at that time. MRI brain done with results revealing no acute intracranial abnormality.  There are no aggravating or relieving factors.   Denies pain; uses walker at home and a wheelchair while going to appointments.     The history has been obtained by speaking with the patient and daughter as well as by reviewing the electronic medical record and/or outside health information. Significant health conditions for the perioperative period are discussed below in assessment and plan.     Patient reports current health status to be Fair.  Denies any new symptoms before surgery.

## 2022-08-01 NOTE — OUTPATIENT SUBJECTIVE & OBJECTIVE
Outpatient Subjective & Objective      Chief Complaint: Preoperative evaulation, perioperative medical management, and complication reduction plan.     Functional Capacity: No regular exercise regimen- difficulty with being active due to dizziness and gait problem. Can walk around in house with walker and without CP/SOB. Performs chair exercises 3x weekly: leg and knee lifts. Before increased dizziness- patient was very active with walking.       Anesthesia issues: None    Difficulty mouth opening: No    Steroid use in the last 12 months: No      Dental Issues: No    Family anesthesia difficulty: None       Family Hx of Thrombosis: None    Past Medical History:   Diagnosis Date    Age-related osteoporosis with current pathological fracture with routine healing     Allergic rhinitis     Anemia     Carotid atherosclerosis, bilateral     Chronic gout of multiple sites     Chronic low back pain with bilateral sciatica     Closed impacted fracture of left femoral neck with routine healing s/p percutaneous screw fixation on 11/21/2020 11/20/2020    DDD (degenerative disc disease), lumbar 8/17/2020    Diverticulosis     Essential hypertension 11/25/2019    Facet arthropathy     GERD (gastroesophageal reflux disease) 11/25/2019    Hearing loss     History of transient ischemic attack (TIA) 11/25/2019    IBS (irritable bowel syndrome)     Insomnia     Lumbar radiculopathy 8/17/2020    Lumbar spondylosis 8/17/2020    Lumbar stenosis     Meningioma     Paroxysmal SVT (supraventricular tachycardia)     Primary open angle glaucoma (POAG) of both eyes     Pure hypercholesterolemia 11/25/2019    PVD (peripheral vascular disease)     30% prox stenosis of celiac trunk and 20% stnosis Prox SMA - per Abd/SMA Arteriogram 4/3/08    Recurrent major depressive disorder, in full remission     Type 2 diabetes mellitus without complication, without long-term current use of insulin 11/25/2019    Vitamin D deficiency  11/23/2020         Past Medical History Pertinent Negatives:   Diagnosis Date Noted    Anxiety 08/01/2022    CHF (congestive heart failure) 08/01/2022    COPD (chronic obstructive pulmonary disease) 08/01/2022    Coronary artery disease 08/01/2022    Deep vein thrombosis 08/01/2022    Disorder of kidney and ureter 08/01/2022    Pulmonary embolism 08/01/2022    Seizures 08/01/2022    Thyroid disease 08/01/2022         Past Surgical History:   Procedure Laterality Date    CATARACT EXTRACTION      CAUDAL EPIDURAL STEROID INJECTION N/A 8/27/2020    Procedure: Injection-steroid-epidural-caudal with catheter;  Surgeon: Johnathan Gonzalez Jr., MD;  Location: [facility] [place];  Service: Pain Management;  Laterality: N/A;    EPIDURAL STEROID INJECTION INTO LUMBAR SPINE      LUMBAR LAMINECTOMY  2009    PERCUTANEOUS PINNING OF HIP Left 11/21/2020    Procedure: Left- Percutaneous Screws- Large  arm Clock side;  Surgeon: Leandro White MD;  Location: [facility] OR [address] [place] [address];  Service: Orthopedics;  Laterality: Left;    TOTAL ABDOMINAL HYSTERECTOMY W/ BILATERAL SALPINGOOPHORECTOMY         Review of Systems   Constitutional: Negative for chills, fatigue, fever and unexpected weight change.   HENT: Positive for hearing loss (wears bilateral hearing aids). Negative for dental problem, postnasal drip, rhinorrhea, sore throat, tinnitus and trouble swallowing.    Eyes: Negative for photophobia, pain, discharge and visual disturbance.   Respiratory: Negative for apnea, cough, chest tightness, shortness of breath and wheezing.    Cardiovascular: Positive for leg swelling (occasional). Negative for chest pain and palpitations.   Gastrointestinal: Positive for nausea (with dizziness) and vomiting. Negative for abdominal pain, blood in stool and constipation.        Denies Fatty liver, Hepatitis   Endocrine: Negative for cold intolerance and heat intolerance.   Genitourinary: Positive for urgency. Negative for decreased urine  "volume, difficulty urinating, dysuria, frequency and hematuria.        Incontinence   Musculoskeletal: Positive for back pain and gait problem. Negative for arthralgias, neck pain and neck stiffness.   Skin: Negative for rash and wound.   Allergic/Immunologic: Positive for environmental allergies (Allegra/Flonase prn).   Neurological: Positive for dizziness, weakness (BLE) and headaches. Negative for tremors, seizures, syncope and numbness.   Hematological: Negative for adenopathy. Does not bruise/bleed easily.   Psychiatric/Behavioral: Negative for sleep disturbance and suicidal ideas.              VITALS  Visit Vitals  BP (!) 142/68 (BP Location: Left arm, Patient Position: Sitting)   Pulse (!) 58   Temp 97.5 °F (36.4 °C) (Oral)   Ht 5' 7" (1.702 m)   Wt 65.8 kg (145 lb)   SpO2 97%   BMI 22.71 kg/m²          Physical Exam  Vitals reviewed.   Constitutional:       General: She is not in acute distress.     Appearance: She is well-developed.   HENT:      Head: Normocephalic.      Nose: Nose normal.      Mouth/Throat:      Pharynx: No oropharyngeal exudate.   Eyes:      General:         Right eye: No discharge.         Left eye: No discharge.      Conjunctiva/sclera: Conjunctivae normal.      Pupils: Pupils are equal, round, and reactive to light.   Neck:      Thyroid: No thyromegaly.      Vascular: No carotid bruit or JVD.      Trachea: No tracheal deviation.   Cardiovascular:      Rate and Rhythm: Regular rhythm. Bradycardia present.      Pulses:           Carotid pulses are 2+ on the right side and 2+ on the left side.       Dorsalis pedis pulses are 2+ on the right side and 2+ on the left side.        Posterior tibial pulses are 2+ on the right side and 2+ on the left side.      Heart sounds: Normal heart sounds. No murmur heard.  Pulmonary:      Effort: Pulmonary effort is normal. No respiratory distress.      Breath sounds: Normal breath sounds. No stridor. No wheezing, rhonchi or rales.   Abdominal:      " General: Bowel sounds are normal. There is no distension.      Palpations: Abdomen is soft.      Tenderness: There is no abdominal tenderness. There is no guarding.   Musculoskeletal:      Cervical back: Normal range of motion.      Right lower leg: No edema.      Left lower leg: No edema.   Lymphadenopathy:      Cervical: No cervical adenopathy.   Skin:     General: Skin is warm and dry.      Capillary Refill: Capillary refill takes less than 2 seconds.      Findings: No erythema or rash.   Neurological:      Mental Status: She is alert and oriented to person, place, and time.      Motor: Weakness present.      Coordination: Coordination normal.      Gait: Gait abnormal.          Significant Labs:  Lab Results   Component Value Date    WBC 7.71 06/29/2022    HGB 14.1 06/29/2022    HCT 41.8 06/29/2022     06/29/2022    CHOL 103 (L) 11/12/2021    TRIG 136 11/12/2021    HDL 34 (L) 11/12/2021    ALT 16 06/27/2022    AST 39 06/27/2022     06/29/2022    K 4.2 06/29/2022     06/29/2022    CREATININE 0.8 06/29/2022    BUN 11 06/29/2022    CO2 23 06/29/2022    TSH 0.750 06/28/2022    INR 1.0 08/01/2022    HGBA1C 7.2 (H) 06/27/2022       Diagnostic Studies: No relevant studies.    EKG:   Results for orders placed or performed during the hospital encounter of 08/01/22   EKG 12-lead    Collection Time: 08/01/22 11:13 AM    Narrative    Test Reason : Z01.818,    Vent. Rate : 073 BPM     Atrial Rate : 073 BPM     P-R Int : 152 ms          QRS Dur : 092 ms      QT Int : 390 ms       P-R-T Axes : 057 -15 072 degrees     QTc Int : 429 ms    Normal sinus rhythm  Nonspecific T wave abnormality  Abnormal ECG  When compared with ECG of 27-JUN-2022 16:01,  Nonspecific T wave abnormality now evident in Inferior leads  Confirmed by ORVILLE ZEE MD ([id number]) on 8/1/2022 3:14:00 PM    Referred By: SAM PARDO           Confirmed By:ORVILLE ZEE MD         Imaging   MRI Brain 6/28/22    Impression:     No acute  intracranial abnormality seen.  No detrimental change from prior.     Stable ventriculomegaly which is out of proportion to the degree of volume loss which can be seen with normal pressure hydrocephalus.     Stable calcified extra-axial mass right posterior fossa typical of a meningioma.        Electronically signed by: Renuka Degroot  Date:                                            06/28/2022  Time:                                           14:54          MRI C-Spine 11/2020  Impression:     1. No evidence of CT occult acute or subacute cervical spine fracture.  No evidence of ligamentous injury.  2. Multilevel degenerative spinal canal foraminal narrowing, as described in detail in the report body.  3. Areas of equivocal T2 weighted signal abnormality are noted at the C3-4 and C6-7 levels which while potentially artifactual, may be on the basis of myelomalacia given suggested mild volume loss of the cord.  However,  acute edema in the setting of severe trauma could also appear similar by imaging.  As such, clinical correlation would be recommended to assess severity of trauma.  4. Additional details, as per report body.        Electronically signed by: Sukhdeep Marquis  Date:                                            11/21/2020  Time:                                           00:43        CT C-Spine 11/20/20  Impression:     No acute process, specifically without evidence of intracranial hemorrhage, calvarial fracture, or acute cervical spine fracture.     3.2 cm calcified extra-axial lesion which appears to arise from the right tentorial leaflet favored to represent a meningioma.  Associated mild localized mass effect and vasogenic edema within the subjacent brain parenchyma without significant midline shift or evidence of herniation.     Dilation throughout the ventricular system out of proportion to the degree of generalized cerebral volume loss, findings which may represent normal pressure hydrocephalus.      Patchy and confluent areas of hypoattenuation in the periventricular white matter bilaterally, some of which may be consistent with chronic microvascular ischemic disease though a component may represent transependymal/interstitial cerebral edema.     Advanced multilevel degenerative changes throughout the cervical spine resulting in mild central canal stenosis at several levels and up to severe focal neural foraminal narrowing, as further detailed above.  Ossification involving interspinous and transverse ligaments.     This report was flagged in Epic as abnormal.     The information above was relayed by Dr. Nguyen by telephone to Dr. Subramanian on 11/20/2020 at approximately 21:00.     Electronically signed by resident: Kenny Nguyen  Date:                                            11/20/2020  Time:                                           20:44      Active Cardiac Conditions: None      Revised Cardiac Risk Index   High -Risk Surgery  Intraperitoneal; Intrathoracic; suprainguinal vascular Yes- + 1 No- 0   History of Ischemic Heart Disease   (Hx of MI/positive exercise test/current chest pain due to ischemia/use of nitrate therapy/EKG with pathological Q waves) Yes- + 1 No- 0   History of CHF  (Pulmonary edema/bilateral rales or S3 gallop/PND/CXR showing pulmonary vascular redistribution) Yes- + 1 No- 0   History of CVA   (Prior stroke or TIA) Yes- + 1 No- 0   Pre-operative treatment with insulin Yes- + 1 No- 0   Pre-operative creatinine > 2mg/dl Yes- + 1 No- 0   Total: 1      Risk Status:  Estimated risk of cardiac complications after non-cardiac surgery using the Revised Cardiac Risk Index for Preoperative risk is 6.0 %      ARISCAT (Canet) risk index: Intermediate: 13.3% risk of post-op pulmonary complications.    American Society of Anesthesiologists Physical Status classification (ASA): 3    Arozull respiratory failure index: 4.2 %           No further cardiac workup needed prior to surgery.    Outpatient  Subjective & Objective

## 2022-08-10 ENCOUNTER — TELEPHONE (OUTPATIENT)
Dept: NEUROSURGERY | Facility: CLINIC | Age: 87
End: 2022-08-10
Payer: MEDICARE

## 2022-08-10 ENCOUNTER — ANESTHESIA EVENT (OUTPATIENT)
Dept: SURGERY | Facility: HOSPITAL | Age: 87
DRG: 033 | End: 2022-08-10
Payer: MEDICARE

## 2022-08-10 NOTE — ANESTHESIA PREPROCEDURE EVALUATION
Procedure(s) (LRB):  INSERTION, SHUNT, VENTRICULOPERITONEAL, ENDOSCOPIC, USING STEALTH (Right)      Farheen Soares is a 87 y.o. female of HTN, HLD, DM2, h/o TIA, h/o meningioma, DDD, Depression w/ concern for NPH going for the above procedure.     Placement Date: 11/21/20; Placement Time: 0843 (created via procedure documentation); Method of Intubation: Direct laryngoscopy; Mask Ventilation: Easy; Intubated: Postinduction; Blade: Bower #2; Airway Device Size: 7.0; Cuff Inflation: Minimal occlusive pressure; Placement Verified By: Capnometry; Complicating Factors: None; Intubation Findings: Bilateral breath sounds; Complications: None; Removal Date: 11/21/20;  Removal Time: 1002    Patient Active Problem List   Diagnosis    Age-related osteoporosis with current pathological fracture with routine healing    Chronic gout of multiple sites    Chronic low back pain with bilateral sciatica    Recurrent major depressive disorder, in full remission    Pure hypercholesterolemia    Type 2 diabetes mellitus without complication, without long-term current use of insulin    History of transient ischemic attack (TIA)    Essential hypertension    GERD (gastroesophageal reflux disease)    Lumbar spondylosis    DDD (degenerative disc disease), lumbar    Lumbar radiculopathy    Chronic pain    Meningioma    Vitamin D deficiency    Depression    Hyperlipidemia    Gout    Urinary urgency    Gait disturbance    Cerebral ventriculomegaly    NPH (normal pressure hydrocephalus)    Cognitive changes    Acute metabolic encephalopathy    PVD (peripheral vascular disease)    Macrocytosis       Review of patient's allergies indicates:   Allergen Reactions    Baclofen      AMS and distorted dremas        Current Outpatient Medications on File Prior to Visit   Medication Sig Dispense Refill    acetaminophen (TYLENOL) 500 MG tablet Take 2 tablets (1,000 mg total) by mouth every 8 (eight)  hours as needed (Mild pain).  0    alendronate (FOSAMAX) 70 MG tablet Take 1 tablet (70 mg total) by mouth every 7 days. (Patient taking differently: Take 70 mg by mouth every 7 days. Takes on Sundays) 12 tablet 1    allopurinoL (ZYLOPRIM) 300 MG tablet Take 1 tablet (300 mg total) by mouth once daily. 90 tablet 1    amLODIPine (NORVASC) 10 MG tablet Take 1 tablet (10 mg total) by mouth once daily. 90 tablet 1    atorvastatin (LIPITOR) 10 MG tablet Take 1 tablet (10 mg total) by mouth once daily. 90 tablet 1    clopidogreL (PLAVIX) 75 mg tablet Take 1 tablet (75 mg total) by mouth once daily. 90 tablet 3    lisinopriL (PRINIVIL,ZESTRIL) 40 MG tablet Take 1 tablet (40 mg total) by mouth once daily. 90 tablet 1    metFORMIN (GLUCOPHAGE-XR) 500 MG ER 24hr tablet Take 1 tablet (500 mg total) by mouth once daily. 90 tablet 1    metoprolol succinate (TOPROL-XL) 100 MG 24 hr tablet Take 1 tablet (100 mg total) by mouth once daily. 90 tablet 1    multivitamin capsule Take 1 capsule by mouth once daily.      venlafaxine (EFFEXOR-XR) 150 MG Cp24 TAKE 1 CAPSULE (150 MG TOTAL) BY MOUTH EVERY EVENING. 90 capsule 2    [DISCONTINUED] blood-glucose meter kit To check BG three times daily, to use with insurance preferred meter (Patient not taking: Reported on 5/29/2022) 1 each 0    [DISCONTINUED] calcium carbonate (OS-ELODIA) 500 mg calcium (1,250 mg) tablet Take 1 tablet (500 mg total) by mouth 2 (two) times daily.  0    [DISCONTINUED] fexofenadine (ALLEGRA) 180 MG tablet Take 1 tablet (180 mg total) by mouth daily as needed. 90 tablet 1     No current facility-administered medications on file prior to visit.       Past Surgical History:   Procedure Laterality Date    CATARACT EXTRACTION      CAUDAL EPIDURAL STEROID INJECTION N/A 8/27/2020    Procedure: Injection-steroid-epidural-caudal with catheter;  Surgeon: Johnathan Gonzalez Jr., MD;  Location: [facility];  Service: Pain Management;  Laterality: N/A;    EPIDURAL  STEROID INJECTION INTO LUMBAR SPINE      LUMBAR LAMINECTOMY  2009    PERCUTANEOUS PINNING OF HIP Left 11/21/2020    Procedure: Left- Percutaneous Screws- Large  arm Clock side;  Surgeon: Leandro White MD;  Location: [facility] OR [address] [place] [address];  Service: Orthopedics;  Laterality: Left;    TOTAL ABDOMINAL HYSTERECTOMY W/ BILATERAL SALPINGOOPHORECTOMY         Social History     Socioeconomic History    Marital status:    Tobacco Use    Smoking status: Never Smoker    Smokeless tobacco: Never Used   Substance and Sexual Activity    Alcohol use: Yes     Comment: socially    Drug use: Never    Sexual activity: Not Currently     Social Determinants of Health     Financial Resource Strain: Low Risk     Difficulty of Paying Living Expenses: Not hard at all   Food Insecurity: No Food Insecurity    Worried About Running Out of Food in the Last Year: Never true    Ran Out of Food in the Last Year: Never true   Transportation Needs: No Transportation Needs    Lack of Transportation (Medical): No    Lack of Transportation (Non-Medical): No   Physical Activity: Inactive    Days of Exercise per Week: 0 days    Minutes of Exercise per Session: 0 min   Stress: No Stress Concern Present    Feeling of Stress : Not at all   Social Connections: Socially Isolated    Frequency of Communication with Friends and Family: Twice a week    Frequency of Social Gatherings with Friends and Family: More than three times a week    Attends Confucianism Services: Never    Active Member of Clubs or Organizations: No    Attends Club or Organization Meetings: Never    Marital Status:    Housing Stability: Low Risk     Unable to Pay for Housing in the Last Year: No    Number of Places Lived in the Last Year: 1    Unstable Housing in the Last Year: No         Vital Signs Range (Last 24H):         CBC: No results for input(s): WBC, RBC, HGB, HCT, PLT, MCV, MCH, MCHC in the last 72 hours.    CMP: No results for input(s): NA, K,  CL, CO2, BUN, CREATININE, GLU, MG, PHOS, CALCIUM, ALBUMIN, PROT, ALKPHOS, ALT, AST, BILITOT in the last 72 hours.    INR  No results for input(s): PT, INR, PROTIME, APTT in the last 72 hours.        Diagnostic Studies:      EKG:  Vent. Rate : 090 BPM     Atrial Rate : 090 BPM      P-R Int : 150 ms          QRS Dur : 090 ms       QT Int : 366 ms       P-R-T Axes : 066 -07 078 degrees      QTc Int : 447 ms     Normal sinus rhythm   Nonspecific T wave abnormality   Abnormal ECG   When compared with ECG of 08-MAR-2020 19:30,   No significant change was found   Confirmed by Zee Najera MD ([id number]) on 11/21/2020 12:58:25 PM         Pre-op Assessment    I have reviewed the Patient Summary Reports.    I have reviewed the Nursing Notes. I have reviewed the NPO Status.      Review of Systems  Anesthesia Hx:  No problems with previous Anesthesia  History of prior surgery of interest to airway management or planning: Denies Family Hx of Anesthesia complications.   Denies Personal Hx of Anesthesia complications.   EENT/Dental:EENT/Dental Normal   Cardiovascular:   Hypertension    Pulmonary:  Pulmonary Normal    Hepatic/GI:   GERD    Endocrine:   Diabetes    Psych:   Psychiatric History          Physical Exam  General:  Well nourished and Obesity      Airway/Jaw/Neck:  Airway Findings: Mouth Opening: Normal   Tongue: Normal   General Airway Assessment: Adult Mallampati: II  Improves to II with phonation.      Dental:  Dental Findings: Edentulous, Upper Dentures, Lower Dentures          Mental Status:  Mental Status Findings:  Cooperative, Somnolent         Anesthesia Plan  Type of Anesthesia, risks & benefits discussed:  Anesthesia Type:  MAC, general    Patient's Preference:   Plan Factors:          Intra-op Monitoring Plan: standard ASA monitors and arterial line  Intra-op Monitoring Plan Comments:   Post Op Pain Control Plan: per primary service following discharge from PACU  Post Op Pain Control Plan Comments:     Induction:    IV  Beta Blocker:         Informed Consent: Informed consent signed with the Patient and all parties understand the risks and agree with anesthesia plan.  All questions answered.  Anesthesia consent signed with patient.  ASA Score: 3     Day of Surgery Review of History & Physical:              Ready For Surgery From Anesthesia Perspective.           Physical Exam  General: Well nourished and Obesity    Airway:  Mallampati: II / II  Mouth Opening: Normal  Tongue: Normal    Dental:  Edentulous, Upper Dentures, Lower Dentures          Anesthesia Plan  Type of Anesthesia, risks & benefits discussed:    Anesthesia Type: MAC, general  Intra-op Monitoring Plan: standard ASA monitors and arterial line  Post Op Pain Control Plan: per primary service following discharge from PACU  Induction:  IV  Informed Consent: Informed consent signed with the Patient and all parties understand the risks and agree with anesthesia plan.  All questions answered.   ASA Score: 3    Ready For Surgery From Anesthesia Perspective.       .

## 2022-08-10 NOTE — TELEPHONE ENCOUNTER
Patient contacted. Advised to arrive for surgery at 5 am,DOSC,NPO after MN the night before ,shower x 2 with Hibiclens or Dial antibacterial soap .Understanding verbalized by patient.

## 2022-08-11 ENCOUNTER — HOSPITAL ENCOUNTER (INPATIENT)
Facility: HOSPITAL | Age: 87
LOS: 1 days | Discharge: HOME HEALTH | DRG: 033 | End: 2022-08-12
Attending: NEUROLOGICAL SURGERY | Admitting: NEUROLOGICAL SURGERY
Payer: MEDICARE

## 2022-08-11 ENCOUNTER — ANESTHESIA (OUTPATIENT)
Dept: SURGERY | Facility: HOSPITAL | Age: 87
DRG: 033 | End: 2022-08-11
Payer: MEDICARE

## 2022-08-11 DIAGNOSIS — G91.2 NPH (NORMAL PRESSURE HYDROCEPHALUS): Primary | ICD-10-CM

## 2022-08-11 DIAGNOSIS — Z01.818 PREOPERATIVE TESTING: ICD-10-CM

## 2022-08-11 LAB
POCT GLUCOSE: 191 MG/DL (ref 70–110)
POCT GLUCOSE: 205 MG/DL (ref 70–110)

## 2022-08-11 PROCEDURE — 63600175 PHARM REV CODE 636 W HCPCS: Performed by: STUDENT IN AN ORGANIZED HEALTH CARE EDUCATION/TRAINING PROGRAM

## 2022-08-11 PROCEDURE — 82962 GLUCOSE BLOOD TEST: CPT | Performed by: NEUROLOGICAL SURGERY

## 2022-08-11 PROCEDURE — 25000003 PHARM REV CODE 250: Performed by: SURGERY

## 2022-08-11 PROCEDURE — 94761 N-INVAS EAR/PLS OXIMETRY MLT: CPT

## 2022-08-11 PROCEDURE — D9220A PRA ANESTHESIA: ICD-10-PCS | Mod: ,,, | Performed by: ANESTHESIOLOGY

## 2022-08-11 PROCEDURE — 25000003 PHARM REV CODE 250: Performed by: NEUROLOGICAL SURGERY

## 2022-08-11 PROCEDURE — D9220A PRA ANESTHESIA: Mod: ,,, | Performed by: ANESTHESIOLOGY

## 2022-08-11 PROCEDURE — 62223 PR CREATE SHUNT:VENTRIC-PERITONEAL: ICD-10-PCS | Mod: 62,,, | Performed by: NEUROLOGICAL SURGERY

## 2022-08-11 PROCEDURE — 71000033 HC RECOVERY, INTIAL HOUR: Performed by: NEUROLOGICAL SURGERY

## 2022-08-11 PROCEDURE — 62223 PR CREATE SHUNT:VENTRIC-PERITONEAL: ICD-10-PCS | Mod: 62,,, | Performed by: SURGERY

## 2022-08-11 PROCEDURE — 61781 SCAN PROC CRANIAL INTRA: CPT | Mod: ,,, | Performed by: NEUROLOGICAL SURGERY

## 2022-08-11 PROCEDURE — 71000015 HC POSTOP RECOV 1ST HR: Performed by: NEUROLOGICAL SURGERY

## 2022-08-11 PROCEDURE — 36000712 HC OR TIME LEV V 1ST 15 MIN: Performed by: NEUROLOGICAL SURGERY

## 2022-08-11 PROCEDURE — 62223 ESTABLISH BRAIN CAVITY SHUNT: CPT | Mod: 62,,, | Performed by: SURGERY

## 2022-08-11 PROCEDURE — 25000003 PHARM REV CODE 250: Performed by: STUDENT IN AN ORGANIZED HEALTH CARE EDUCATION/TRAINING PROGRAM

## 2022-08-11 PROCEDURE — 63600175 PHARM REV CODE 636 W HCPCS: Performed by: NEUROLOGICAL SURGERY

## 2022-08-11 PROCEDURE — 62223 ESTABLISH BRAIN CAVITY SHUNT: CPT | Mod: 62,,, | Performed by: NEUROLOGICAL SURGERY

## 2022-08-11 PROCEDURE — 27201037 HC PRESSURE MONITORING SET UP

## 2022-08-11 PROCEDURE — 37000008 HC ANESTHESIA 1ST 15 MINUTES: Performed by: NEUROLOGICAL SURGERY

## 2022-08-11 PROCEDURE — C1729 CATH, DRAINAGE: HCPCS | Performed by: NEUROLOGICAL SURGERY

## 2022-08-11 PROCEDURE — 71000016 HC POSTOP RECOV ADDL HR: Performed by: NEUROLOGICAL SURGERY

## 2022-08-11 PROCEDURE — 27201423 OPTIME MED/SURG SUP & DEVICES STERILE SUPPLY: Performed by: NEUROLOGICAL SURGERY

## 2022-08-11 PROCEDURE — 36000713 HC OR TIME LEV V EA ADD 15 MIN: Performed by: NEUROLOGICAL SURGERY

## 2022-08-11 PROCEDURE — 11000001 HC ACUTE MED/SURG PRIVATE ROOM

## 2022-08-11 PROCEDURE — 61781 PR STEREOTACTIC COMP ASSIST PROC,CRANIAL,INTRADURAL: ICD-10-PCS | Mod: ,,, | Performed by: NEUROLOGICAL SURGERY

## 2022-08-11 PROCEDURE — 37000009 HC ANESTHESIA EA ADD 15 MINS: Performed by: NEUROLOGICAL SURGERY

## 2022-08-11 DEVICE — CATH BACTISEAL PERITONEAL: Type: IMPLANTABLE DEVICE | Site: ABDOMEN | Status: FUNCTIONAL

## 2022-08-11 DEVICE — CATH BACTISEAL VENTRICULAR: Type: IMPLANTABLE DEVICE | Site: CRANIAL | Status: FUNCTIONAL

## 2022-08-11 DEVICE — SHUNT CHPV INLINE SIPHON: Type: IMPLANTABLE DEVICE | Site: CRANIAL | Status: FUNCTIONAL

## 2022-08-11 RX ORDER — ONDANSETRON 2 MG/ML
4 INJECTION INTRAMUSCULAR; INTRAVENOUS DAILY PRN
Status: DISCONTINUED | OUTPATIENT
Start: 2022-08-11 | End: 2022-08-11 | Stop reason: HOSPADM

## 2022-08-11 RX ORDER — METOPROLOL SUCCINATE 100 MG/1
100 TABLET, EXTENDED RELEASE ORAL DAILY
Status: DISCONTINUED | OUTPATIENT
Start: 2022-08-11 | End: 2022-08-13 | Stop reason: HOSPADM

## 2022-08-11 RX ORDER — INSULIN ASPART 100 [IU]/ML
0-5 INJECTION, SOLUTION INTRAVENOUS; SUBCUTANEOUS
Status: DISCONTINUED | OUTPATIENT
Start: 2022-08-11 | End: 2022-08-13 | Stop reason: HOSPADM

## 2022-08-11 RX ORDER — ROCURONIUM BROMIDE 10 MG/ML
INJECTION, SOLUTION INTRAVENOUS
Status: DISCONTINUED | OUTPATIENT
Start: 2022-08-11 | End: 2022-08-11

## 2022-08-11 RX ORDER — ONDANSETRON 2 MG/ML
4 INJECTION INTRAMUSCULAR; INTRAVENOUS EVERY 6 HOURS PRN
Status: DISCONTINUED | OUTPATIENT
Start: 2022-08-11 | End: 2022-08-13 | Stop reason: HOSPADM

## 2022-08-11 RX ORDER — TALC
6 POWDER (GRAM) TOPICAL NIGHTLY PRN
Status: DISCONTINUED | OUTPATIENT
Start: 2022-08-11 | End: 2022-08-13 | Stop reason: HOSPADM

## 2022-08-11 RX ORDER — AMOXICILLIN 250 MG
2 CAPSULE ORAL NIGHTLY PRN
Status: DISCONTINUED | OUTPATIENT
Start: 2022-08-11 | End: 2022-08-13 | Stop reason: HOSPADM

## 2022-08-11 RX ORDER — HEPARIN SODIUM 5000 [USP'U]/ML
5000 INJECTION, SOLUTION INTRAVENOUS; SUBCUTANEOUS EVERY 8 HOURS
Status: DISCONTINUED | OUTPATIENT
Start: 2022-08-12 | End: 2022-08-13 | Stop reason: HOSPADM

## 2022-08-11 RX ORDER — BACITRACIN ZINC 500 UNIT/G
OINTMENT (GRAM) TOPICAL
Status: DISCONTINUED | OUTPATIENT
Start: 2022-08-11 | End: 2022-08-11 | Stop reason: HOSPADM

## 2022-08-11 RX ORDER — VANCOMYCIN HYDROCHLORIDE 1 G/20ML
INJECTION, POWDER, LYOPHILIZED, FOR SOLUTION INTRAVENOUS
Status: DISCONTINUED | OUTPATIENT
Start: 2022-08-11 | End: 2022-08-11 | Stop reason: HOSPADM

## 2022-08-11 RX ORDER — ACETAMINOPHEN 500 MG
1000 TABLET ORAL EVERY 8 HOURS
Status: DISCONTINUED | OUTPATIENT
Start: 2022-08-11 | End: 2022-08-13 | Stop reason: HOSPADM

## 2022-08-11 RX ORDER — AMLODIPINE BESYLATE 10 MG/1
10 TABLET ORAL DAILY
Status: DISCONTINUED | OUTPATIENT
Start: 2022-08-11 | End: 2022-08-13 | Stop reason: HOSPADM

## 2022-08-11 RX ORDER — DOCUSATE SODIUM 100 MG/1
100 CAPSULE, LIQUID FILLED ORAL 2 TIMES DAILY
Status: DISCONTINUED | OUTPATIENT
Start: 2022-08-11 | End: 2022-08-13 | Stop reason: HOSPADM

## 2022-08-11 RX ORDER — BUPIVACAINE HYDROCHLORIDE AND EPINEPHRINE 5; 5 MG/ML; UG/ML
INJECTION, SOLUTION EPIDURAL; INTRACAUDAL; PERINEURAL
Status: DISCONTINUED | OUTPATIENT
Start: 2022-08-11 | End: 2022-08-11 | Stop reason: HOSPADM

## 2022-08-11 RX ORDER — SODIUM CHLORIDE 0.9 % (FLUSH) 0.9 %
10 SYRINGE (ML) INJECTION
Status: DISCONTINUED | OUTPATIENT
Start: 2022-08-11 | End: 2022-08-13 | Stop reason: HOSPADM

## 2022-08-11 RX ORDER — GLUCAGON 1 MG
1 KIT INJECTION
Status: DISCONTINUED | OUTPATIENT
Start: 2022-08-11 | End: 2022-08-13 | Stop reason: HOSPADM

## 2022-08-11 RX ORDER — PHENYLEPHRINE HYDROCHLORIDE 10 MG/ML
INJECTION INTRAVENOUS
Status: DISCONTINUED | OUTPATIENT
Start: 2022-08-11 | End: 2022-08-11

## 2022-08-11 RX ORDER — ONDANSETRON 2 MG/ML
INJECTION INTRAMUSCULAR; INTRAVENOUS
Status: DISCONTINUED | OUTPATIENT
Start: 2022-08-11 | End: 2022-08-11

## 2022-08-11 RX ORDER — NEOSTIGMINE METHYLSULFATE 0.5 MG/ML
INJECTION, SOLUTION INTRAVENOUS
Status: DISCONTINUED | OUTPATIENT
Start: 2022-08-11 | End: 2022-08-11

## 2022-08-11 RX ORDER — CEFAZOLIN SODIUM/WATER 2 G/20 ML
2 SYRINGE (ML) INTRAVENOUS
Status: DISCONTINUED | OUTPATIENT
Start: 2022-08-11 | End: 2022-08-11 | Stop reason: HOSPADM

## 2022-08-11 RX ORDER — LIDOCAINE HYDROCHLORIDE 10 MG/ML
1 INJECTION INFILTRATION; PERINEURAL ONCE
Status: COMPLETED | OUTPATIENT
Start: 2022-08-11 | End: 2022-08-11

## 2022-08-11 RX ORDER — CEFAZOLIN SODIUM 1 G/3ML
INJECTION, POWDER, FOR SOLUTION INTRAMUSCULAR; INTRAVENOUS
Status: DISCONTINUED | OUTPATIENT
Start: 2022-08-11 | End: 2022-08-11

## 2022-08-11 RX ORDER — MAG HYDROX/ALUMINUM HYD/SIMETH 200-200-20
30 SUSPENSION, ORAL (FINAL DOSE FORM) ORAL EVERY 4 HOURS PRN
Status: DISCONTINUED | OUTPATIENT
Start: 2022-08-11 | End: 2022-08-13 | Stop reason: HOSPADM

## 2022-08-11 RX ORDER — IBUPROFEN 200 MG
16 TABLET ORAL
Status: DISCONTINUED | OUTPATIENT
Start: 2022-08-11 | End: 2022-08-13 | Stop reason: HOSPADM

## 2022-08-11 RX ORDER — HYDROMORPHONE HYDROCHLORIDE 1 MG/ML
0.5 INJECTION, SOLUTION INTRAMUSCULAR; INTRAVENOUS; SUBCUTANEOUS EVERY 6 HOURS PRN
Status: DISCONTINUED | OUTPATIENT
Start: 2022-08-11 | End: 2022-08-13 | Stop reason: HOSPADM

## 2022-08-11 RX ORDER — ATORVASTATIN CALCIUM 10 MG/1
10 TABLET, FILM COATED ORAL DAILY
Status: DISCONTINUED | OUTPATIENT
Start: 2022-08-11 | End: 2022-08-13 | Stop reason: HOSPADM

## 2022-08-11 RX ORDER — SODIUM CHLORIDE 9 MG/ML
INJECTION, SOLUTION INTRAVENOUS CONTINUOUS PRN
Status: DISCONTINUED | OUTPATIENT
Start: 2022-08-11 | End: 2022-08-11

## 2022-08-11 RX ORDER — LISINOPRIL 20 MG/1
40 TABLET ORAL DAILY
Status: DISCONTINUED | OUTPATIENT
Start: 2022-08-11 | End: 2022-08-13 | Stop reason: HOSPADM

## 2022-08-11 RX ORDER — MUPIROCIN 20 MG/G
OINTMENT TOPICAL
Status: DISCONTINUED | OUTPATIENT
Start: 2022-08-11 | End: 2022-08-11 | Stop reason: HOSPADM

## 2022-08-11 RX ORDER — OXYCODONE HYDROCHLORIDE 5 MG/1
5 TABLET ORAL EVERY 6 HOURS PRN
Status: DISCONTINUED | OUTPATIENT
Start: 2022-08-11 | End: 2022-08-13 | Stop reason: HOSPADM

## 2022-08-11 RX ORDER — FENTANYL CITRATE 50 UG/ML
INJECTION, SOLUTION INTRAMUSCULAR; INTRAVENOUS
Status: DISCONTINUED | OUTPATIENT
Start: 2022-08-11 | End: 2022-08-11

## 2022-08-11 RX ORDER — FENTANYL CITRATE 50 UG/ML
25 INJECTION, SOLUTION INTRAMUSCULAR; INTRAVENOUS EVERY 5 MIN PRN
Status: DISCONTINUED | OUTPATIENT
Start: 2022-08-11 | End: 2022-08-11 | Stop reason: HOSPADM

## 2022-08-11 RX ORDER — IBUPROFEN 200 MG
24 TABLET ORAL
Status: DISCONTINUED | OUTPATIENT
Start: 2022-08-11 | End: 2022-08-13 | Stop reason: HOSPADM

## 2022-08-11 RX ORDER — POLYETHYLENE GLYCOL 3350 17 G/17G
17 POWDER, FOR SOLUTION ORAL DAILY
Status: DISCONTINUED | OUTPATIENT
Start: 2022-08-11 | End: 2022-08-13 | Stop reason: HOSPADM

## 2022-08-11 RX ORDER — HYDROMORPHONE HYDROCHLORIDE 1 MG/ML
0.2 INJECTION, SOLUTION INTRAMUSCULAR; INTRAVENOUS; SUBCUTANEOUS EVERY 5 MIN PRN
Status: DISCONTINUED | OUTPATIENT
Start: 2022-08-11 | End: 2022-08-11 | Stop reason: HOSPADM

## 2022-08-11 RX ORDER — DEXAMETHASONE SODIUM PHOSPHATE 4 MG/ML
INJECTION, SOLUTION INTRA-ARTICULAR; INTRALESIONAL; INTRAMUSCULAR; INTRAVENOUS; SOFT TISSUE
Status: DISCONTINUED | OUTPATIENT
Start: 2022-08-11 | End: 2022-08-11

## 2022-08-11 RX ORDER — PROPOFOL 10 MG/ML
VIAL (ML) INTRAVENOUS
Status: DISCONTINUED | OUTPATIENT
Start: 2022-08-11 | End: 2022-08-11

## 2022-08-11 RX ORDER — BUPIVACAINE HYDROCHLORIDE 2.5 MG/ML
INJECTION, SOLUTION EPIDURAL; INFILTRATION; INTRACAUDAL
Status: DISCONTINUED | OUTPATIENT
Start: 2022-08-11 | End: 2022-08-11 | Stop reason: HOSPADM

## 2022-08-11 RX ORDER — VENLAFAXINE HYDROCHLORIDE 150 MG/1
150 CAPSULE, EXTENDED RELEASE ORAL NIGHTLY
Status: DISCONTINUED | OUTPATIENT
Start: 2022-08-11 | End: 2022-08-13 | Stop reason: HOSPADM

## 2022-08-11 RX ORDER — MUPIROCIN 20 MG/G
1 OINTMENT TOPICAL 2 TIMES DAILY
Status: DISCONTINUED | OUTPATIENT
Start: 2022-08-11 | End: 2022-08-11 | Stop reason: HOSPADM

## 2022-08-11 RX ORDER — LIDOCAINE HCL/EPINEPHRINE/PF 2%-1:200K
VIAL (ML) INJECTION
Status: DISCONTINUED | OUTPATIENT
Start: 2022-08-11 | End: 2022-08-11 | Stop reason: HOSPADM

## 2022-08-11 RX ADMIN — ACETAMINOPHEN 1000 MG: 500 TABLET ORAL at 02:08

## 2022-08-11 RX ADMIN — ONDANSETRON 4 MG: 2 INJECTION INTRAMUSCULAR; INTRAVENOUS at 08:08

## 2022-08-11 RX ADMIN — ATORVASTATIN CALCIUM 10 MG: 10 TABLET, FILM COATED ORAL at 10:08

## 2022-08-11 RX ADMIN — LIDOCAINE HYDROCHLORIDE 30 MG: 10 INJECTION, SOLUTION EPIDURAL; INFILTRATION; INTRACAUDAL at 07:08

## 2022-08-11 RX ADMIN — MUPIROCIN: 20 OINTMENT TOPICAL at 06:08

## 2022-08-11 RX ADMIN — DEXAMETHASONE SODIUM PHOSPHATE 4 MG: 4 INJECTION, SOLUTION INTRAMUSCULAR; INTRAVENOUS at 07:08

## 2022-08-11 RX ADMIN — PROPOFOL 100 MG: 10 INJECTION, EMULSION INTRAVENOUS at 07:08

## 2022-08-11 RX ADMIN — AMLODIPINE BESYLATE 10 MG: 10 TABLET ORAL at 10:08

## 2022-08-11 RX ADMIN — ACETAMINOPHEN 1000 MG: 500 TABLET ORAL at 09:08

## 2022-08-11 RX ADMIN — PHENYLEPHRINE HYDROCHLORIDE 100 MCG: 10 INJECTION INTRAVENOUS at 07:08

## 2022-08-11 RX ADMIN — SUGAMMADEX 200 MG: 100 INJECTION, SOLUTION INTRAVENOUS at 08:08

## 2022-08-11 RX ADMIN — INSULIN ASPART 1 UNITS: 100 INJECTION, SOLUTION INTRAVENOUS; SUBCUTANEOUS at 08:08

## 2022-08-11 RX ADMIN — CEFTRIAXONE 2 G: 2 INJECTION, SOLUTION INTRAVENOUS at 08:08

## 2022-08-11 RX ADMIN — ROCURONIUM BROMIDE 5 MG: 10 INJECTION, SOLUTION INTRAVENOUS at 07:08

## 2022-08-11 RX ADMIN — ROCURONIUM BROMIDE 35 MG: 10 INJECTION, SOLUTION INTRAVENOUS at 07:08

## 2022-08-11 RX ADMIN — NEOSTIGMINE METHYLSULFATE 4 MG: 0.5 INJECTION INTRAVENOUS at 08:08

## 2022-08-11 RX ADMIN — METOPROLOL SUCCINATE 100 MG: 100 TABLET, EXTENDED RELEASE ORAL at 09:08

## 2022-08-11 RX ADMIN — CEFAZOLIN 2 G: 330 INJECTION, POWDER, FOR SOLUTION INTRAMUSCULAR; INTRAVENOUS at 07:08

## 2022-08-11 RX ADMIN — ROCURONIUM BROMIDE 10 MG: 10 INJECTION, SOLUTION INTRAVENOUS at 07:08

## 2022-08-11 RX ADMIN — OXYCODONE 5 MG: 5 TABLET ORAL at 10:08

## 2022-08-11 RX ADMIN — DOCUSATE SODIUM 100 MG: 100 CAPSULE ORAL at 10:08

## 2022-08-11 RX ADMIN — GLYCOPYRROLATE 0.6 MG: 0.2 INJECTION, SOLUTION INTRAMUSCULAR; INTRAVITREAL at 08:08

## 2022-08-11 RX ADMIN — SODIUM CHLORIDE: 0.9 INJECTION, SOLUTION INTRAVENOUS at 06:08

## 2022-08-11 RX ADMIN — LISINOPRIL 40 MG: 20 TABLET ORAL at 09:08

## 2022-08-11 RX ADMIN — DOCUSATE SODIUM 100 MG: 100 CAPSULE ORAL at 08:08

## 2022-08-11 RX ADMIN — VENLAFAXINE HYDROCHLORIDE 150 MG: 150 CAPSULE, EXTENDED RELEASE ORAL at 08:08

## 2022-08-11 RX ADMIN — FENTANYL CITRATE 100 MCG: 50 INJECTION, SOLUTION INTRAMUSCULAR; INTRAVENOUS at 07:08

## 2022-08-11 NOTE — INTERVAL H&P NOTE
The patient has been examined and the H&P has been reviewed:    I concur with the findings and no changes have occurred since H&P was written.    Surgery risks, benefits and alternative options discussed and understood by patient/family.          Active Hospital Problems    Diagnosis  POA    *NPH (normal pressure hydrocephalus) [G91.2]  Yes      Resolved Hospital Problems   No resolved problems to display.

## 2022-08-11 NOTE — HOSPITAL COURSE
The patient presented to [facility] [place]/11/2022 for elective  shunt placement (Hakim valve setting of 120 mm H2O). The patient tolerated the procedure well and there were no intra-operative complications. After surgery, the patient was extubated and taken to recovery in stable condition.  After observation in recovery, the patient was transferred to the neurosurgical floor.  Post-op shunt series XR demonstrated tubing continuity and hakim verified at 120 mm H20. Post op CT head wo contrast with proximal catheter in place. PT/OT were consulted, the patient progressed, and was cleared to go home with home health. On 8/12/2022, she was discharged home with pain medication and follow up appointments. The patient was tolerating a regular diet and voiding without difficulty. Activity as tolerated. At the time of discharge, the patient was neurologically stable, was afebrile, and vital signs were stable. Discharge instructions were given verbally/written to the patient and their family, including wound care and follow-up appointments, and all of their questions were answered. The patient was also given a discharge instruction sheet explaining the aforementioned discussion.  The patient verbalized understanding of instructions and agreed to the plan. They were encouraged to call the clinic with any questions they might have prior to the follow up appointments.     Physical Exam 8/12/2022:  General: well developed, well nourished, no distress.   Head: normocephalic, cranial incision with surgical dressing C/D/I  Neurologic: Alert and oriented. Thought content appropriate.  GCS: Motor: 6/Verbal: 5/Eyes: 4 GCS Total: 15  Language: No aphasia  Speech: No dysarthria  Cranial nerves: face symmetric, tongue midline, CN II-XII grossly intact.   Eyes: pupils equal, round, reactive to light with accommodation, EOMI.   Pulmonary: normal respirations, no signs of respiratory distress  Abdomen: soft, non-distended, not tender to palpation.  Trochar incisions with dermabond intact  Skin: Skin is warm, dry and intact.  Sensory: intact to light touch throughout  Motor Strength:Moves all extremities spontaneously with good tone. Deconditioned, 4/5 strength upper and lower extremities. No abnormal movements seen.  Pronator drift: absent bilaterally

## 2022-08-11 NOTE — OP NOTE
DATE OF PROCEDURE:  8/11/2022     SURGEON:  Redd Espinoza M.D., Ph.D.     ASSISTANT: Sukhdeep Henley M.D. (the assistant is a [facility] resident)    CO-SURGEON:  Maco Lizarraga M.D.     PREOPERATIVE DIAGNOSES:  Normal pressure hydrocephalus.      POSTOPERATIVE DIAGNOSES:  Normal pressure hydrocephalus.      PROCEDURES:  1.  Placement of ventriculoperitoneal shunt.  2.  Stealth navigation.  3.  Laparoscopic placement of peritoneal catheter.     INDICATIONS IN DETAIL:   Ms. Farheen Soares is a 87 y.o. woman with with hearing loss, type II DM, DDD, lumbar stenosis s/p lumbar puncture done on 7/12/2021, as well as a history of meningioma and TIA, who presents today for follow up with MRI brain. At the time of our last visit on 7/14/2021, the pt reports that she is doing well. She had an improved gait after the lumbar puncture.  After the risks, benefits, and alternatives were described, she in the in the wished proceed with placement of a ventriculoperitoneal shunt.     PROCEDURE IN DETAIL:    The patient was seen in the pretreatment area and the risks, benefits and alternatives were described.  The patient and the patient's family wished to proceed.  The patient was brought to the operating room and a general   anesthetic was administered.  All proper lines were placed.  The patient was placed in the supine position with a bump underneath the right shoulderand the arms were tucked.  The patient's head was turned to the left to   allow access to the right parietal region.  The Stealth navigation system was brought to the field and accurate registration was achieved using the tracer function and an MRI that the patient had previously.  The starting point in the right parietal region had been chosen.  This was marked on the patient's head.  The target was the area above the Foramen of Ferro.  The patient's hair was clipped in the area of the starting region and the parietal and posterior auricular  region.  The patient's head, neck, chest and abdomen were cleaned, prepped and draped in the usual fashion.  A lidocaine-bupivacaine mix was injected around a curvilinear line made near the starting site.  An incision was made along that aforementioned line and carried down to the galea using Bovie cautery.  The flap was dissected and a pocket was made in the posterior auricular region.  A Codman Hakim valve was obtained and set at 120.  This was attached to a peritoneal catheter.  Using a shunt passer, a path between the posterior auricular region and the abdomen was made.  Using a #2 tie, we attached this to   the peritoneal catheter and pulled the peritoneal catheter and the shunt valve in the subcutaneous region down to the abdomen.  Once this was performed, the pericranium was removed from the bone using Bovie cautery.  Using a high-speed Equivalent DATA drill with an acMarketshot zeeshan, a zeeshan hole was made.  The dura was cut and coagulated as well as the mickey.  The ventricular catheter was placed over a shunt probe and this was passed through the substance of the brain into the ventricle and towards the midline using the Stealth navigation system.  Once this was performed, the catheter was cut to the appropriate length and attached to the valve.      At this point, Dr. Lizarraga entered the room for placement of the peritoneal catheter.  Please see his notes for the details of this.  Once this was complete, the wounds were irrigated copiously.  All bleeding points were   coagulated.  The incisions were closed in layers with a 4-0 Monocryl in the skin.  Clean dressings were placed and the patient was then awakened by Anesthesia staff.  At this point, the patient was awake and following commands and she was extubated.     Estimated blood loss was less than 5 mL.     There were no intraprocedural complications.     All counts were correct at the end of surgery.     Dr. Redd Espinoza was present during the entire procedure.

## 2022-08-11 NOTE — NURSING TRANSFER
Nursing Transfer Note      8/11/2022     Reason patient is being transferred: meets criteria    Transfer To: NPU    Transfer via stretcher    Transfer with o2    Transported by PCT    Medicines sent: rocephin through tube system    Any special needs or follow-up needed: none    Chart send with patient: Yes    Notified: daughter    Patient reassessed at: 8/11 at 1843

## 2022-08-11 NOTE — OP NOTE
Surgery Date: 8/11/2022     Surgeon(s) and Role:     * Redd Espinoza MD - Primary     * Sukhdeep Henley MD - Resident - Assisting     * Maco Lizarraga MD - Co-Surgeon    Pre-op Diagnosis:  NPH (normal pressure hydrocephalus) [G91.2]    Post-op Diagnosis:  NPH (normal pressure hydrocephalus) [G91.2]    Procedure(s) (LRB):  INSERTION, SHUNT, VENTRICULOPERITONEAL, ENDOSCOPIC, USING STEALTH (Right)    Anesthesia: General    PROCEDURE: The patient was placed under general anesthesia. The patient was   prepped and draped in the usual manner. The access to the peritoneum was gained  through ruq abdomen using Optiview trocar under direct vision.   Pneumoperitoneum to 15 mmHg with CO2 gas was attained. The shunt was in the   right upper quadrant. It was brought into the abdominal cavity on a mosquito. The suture passer was placed through the right upper quadrant under direct vision and pulled the shunt into the abdominal cavity to its full extent. The abdomen was inspected for hemostasis. Pneumoperitoneum was released. The trocar was removed. The skin incisions were closed with 4-0 plain catgut, and reinforced   with Dermabond. The patient tolerated the procedure  well, was brought to Recovery Room in stable condition. Sponge and needle   counts were correct at the end of the case.    PATHOLOGY:  for my part of the procedure is none.  COMPLICATIONS: None.  BLOOD LOSS: Minimal.

## 2022-08-11 NOTE — DISCHARGE INSTRUCTIONS
Neurosurgery Patient Information  -Return to work will be determined on an individual basis.  -Do not take any OTC products containing acetaminophen at the same time as you take your narcotic pain medication. Medications that may contain acetaminophen include but are not limited to: Excedrin and other headache medications, arthritis medications, cold and sinus medications, etc. Please review the list of active ingredients in any OTC medication prior to taking it.  -Do not take any Aspirin or Aspirin-containing products for 2 weeks after surgery.  -Do not take any Aleve, Naprosyn, Naproxen, Ibuprofen, Advil or any other nonsteroidal anti-inflammatory drug (NSAID) for 2 weeks after surgery.  -Do not take any herbal supplements for 2 weeks after surgery.   -Do not consume any alcoholic beverages until released by your neurosurgeon  -Do not perform any excessive bending over or leaning forward as this is a fall hazard.  -Do not perform any heavy lifting or lifting more than 5-10 lbs from the ground level as this is a fall hazard.  -Slowly increase your ambulation [walking] over the next 2 weeks as tolerated. The goal is to be walking 1-2 miles by the time of your 2 week post op appointment.   -Walk on paved surfaces only. It is okay to walk up and down stairs while holding onto a side rail.      Contact the Neurosurgery Office immediately if:  If you begin to notice any neurologic changes such as:           -Sudden onset of lethargy or sleepiness           -Sudden confusion, trouble speaking, or understanding            -Sudden trouble seeing in one or both eyes            -Sudden trouble walking, dizziness, loss of coordination            -Sudden severe headache with no known cause            -Sudden onset of numbness or weakness     Wound Care:  Keep your incision open to air. You may shower on the 2nd day after your surgery. Keep the incision clean and dry at all times. Do not allow the force of water to hit the  incision. If the incision gets damp, gently pat it dry. Do not rub or scrub the incision. You cannot take a bath/swim/submerge the incision until 8 weeks after surgery.    The incision does not need to be cleaned with any water, soap, alcohol, peroxide, or other substance.    Call your doctor or go to the Emergency Room for any signs of infection including: increased redness, drainage, pain or fever (temperature greater than or equal to 101.4).       Miscellaneous:  -Hold your plavix until 8/21/22.  -Follow up in neurosurgery clinic in 2 weeks for a wound check. Appointment will be mailed to you.        [facility] Neurosurgery Office: [phone]              [facility] Neurosurgery Office: [phone]

## 2022-08-11 NOTE — PROGRESS NOTES
Patient meets requirements for next phase of care. No acute complaints of nausea, pain, or dyspnea. Surgical site dry. Family notified.

## 2022-08-11 NOTE — BRIEF OP NOTE
Operative Note       Surgery Date: 8/11/2022     Surgeon(s) and Role:     * Redd Espinoza MD - Primary     * Sukhdeep Henley MD - Resident - Assisting     * Maco Lizarraga MD - Co-Surgeon    Pre-op Diagnosis:  NPH (normal pressure hydrocephalus) [G91.2]    Post-op Diagnosis:  NPH (normal pressure hydrocephalus) [G91.2]    Procedure(s) (LRB):  INSERTION, SHUNT, VENTRICULOPERITONEAL, ENDOSCOPIC, USING STEALTH (Right)    Anesthesia: General    Procedure in Detail/Findings:  No adhesions noted in the upper abdomen,  without apparent complication    Estimated Blood Loss: Minimal           Specimens (From admission, onward)    None        Implants:   Implant Name Type Inv. Item Serial No.  Lot No. LRB No. Used Action   CATH BACTISEAL VENTRICULAR - [id number]  CATH BACTISEAL VENTRICULAR  [employer] [id number] Right 1 Implanted   CATH BACTISEAL PERITONEAL - [id number]  CATH BACTISEAL PERITONEAL  [employer] [facility] [id number] N/A 1 Implanted   SHUNT CHPV INLINE SIPHON - [id number]  SHUNT CHPV INLINE SIPHON  [employer] [id number] Right 1 Implanted              Disposition: PACU - hemodynamically stable.           Condition: Good    Attestation:  I was present and scrubbed for the entire procedure.

## 2022-08-11 NOTE — ANESTHESIA PROCEDURE NOTES
Intubation    Date/Time: 8/11/2022 7:15 AM  Performed by: Thaddeus Hall MD  Authorized by: Brandan Pringle MD     Intubation:     Induction:  Intravenous    Intubated:  Postinduction    Mask Ventilation:  Easy mask    Attempts:  1    Attempted By:  Resident anesthesiologist    Method of Intubation:  Direct    Blade:  Bower 2    Laryngeal View Grade: Grade I - full view of cords      Difficult Airway Encountered?: No      Complications:  None    Airway Device:  Oral endotracheal tube    Airway Device Size:  7.5    Style/Cuff Inflation:  Cuffed (inflated to minimal occlusive pressure)    Inflation Amount (mL):  5    Tube secured:  21    Secured at:  The teeth    Placement Verified By:  Capnometry    Complicating Factors:  None    Findings Post-Intubation:  BS equal bilateral and atraumatic/condition of teeth unchanged

## 2022-08-11 NOTE — BRIEF OP NOTE
[facility] - Surgery ([facility])  Brief Operative Note    SUMMARY     Surgery Date: 8/11/2022     Surgeon(s) and Role:     * Redd Espinoza MD - Primary     * Sukhdeep Henley MD - Resident - Assisting     * Maco Lizarraga MD - Co-Surgeon        Pre-op Diagnosis:  NPH (normal pressure hydrocephalus) [G91.2]    Post-op Diagnosis:  Post-Op Diagnosis Codes:     * NPH (normal pressure hydrocephalus) [G91.2]    Procedure(s) (LRB):  INSERTION, SHUNT, VENTRICULOPERITONEAL, ENDOSCOPIC, USING STEALTH (Right)    Anesthesia: General    Operative Findings: Hakim valve programmed to 120. Placed without issue. Adequate hemostasis achieved. Patient tolerated procedure well and transported to PACU in stable condition.     Estimated Blood Loss: see op note    Estimated Blood Loss has been documented.         Specimens:   Specimen (24h ago, onward)            None          [id number]

## 2022-08-11 NOTE — HPI
Farheen Soares is a 87 y.o. female with h/o hearing loss, type II DM, DDD, meningioma, TIA, and NPH who presents today for elective  shunt placement with Dr. Espinoza. This is a patient with issues with her gait that has caused her to fall several times. She currently uses a wheelchair. Her gait began to change about 2 years ago where she was shuffling her gait. She states pt has been having urinary incontinence. She also has issues with short-term memory. Pt lives with her daughter and has a sitter who cares for her during the day. Patient underwent high volume lumbar puncture with Dr. Espinoza and had improvement of gait afterwards. After the risks, benefits, and alternatives were described, she in the in the wished proceed with placement of a ventriculoperitoneal shunt.

## 2022-08-11 NOTE — TRANSFER OF CARE
"Anesthesia Transfer of Care Note    Patient: Farheen Soares    Procedure(s) Performed: Procedure(s) (LRB):  INSERTION, SHUNT, VENTRICULOPERITONEAL, ENDOSCOPIC, USING STEALTH (Right)    Patient location: PACU    Anesthesia Type: general    Transport from OR: Transported from OR on 6-10 L/min O2 by face mask with adequate spontaneous ventilation    Post pain: adequate analgesia    Post assessment: no apparent anesthetic complications    Post vital signs: stable    Level of consciousness: responds to stimulation    Nausea/Vomiting: no nausea/vomiting    Complications: none    Transfer of care protocol was followed      Last vitals:   Visit Vitals  BP (!) 118/58 (BP Location: Right arm, Patient Position: Lying)   Pulse 62   Temp 36.1 °C (97 °F) (Temporal)   Resp 16   Ht 5' 4" (1.626 m)   Wt 65.8 kg (145 lb)   SpO2 100%   Breastfeeding No   BMI 24.89 kg/m²     "

## 2022-08-11 NOTE — PROGRESS NOTES
Patient CT Scan and Shunt series done without incidence. Patient back to PACU slot 5 in stable condition. VS stable at this time. Will continue to monitor patient.

## 2022-08-11 NOTE — PLAN OF CARE
Patient was prepared for surgery.    Blood extension sent to Blood Bank.    Patient needs Anesthesia consent.    Daughter Colleen is an OR nurse. She is patient's historian and signs consents. Patient is alert and able to answer most questions approp. But is very hard of hearing and frail.

## 2022-08-12 VITALS
HEIGHT: 64 IN | SYSTOLIC BLOOD PRESSURE: 143 MMHG | OXYGEN SATURATION: 97 % | DIASTOLIC BLOOD PRESSURE: 61 MMHG | TEMPERATURE: 97 F | HEART RATE: 63 BPM | RESPIRATION RATE: 18 BRPM | BODY MASS INDEX: 24.75 KG/M2 | WEIGHT: 145 LBS

## 2022-08-12 DIAGNOSIS — G91.2 NPH (NORMAL PRESSURE HYDROCEPHALUS): Primary | ICD-10-CM

## 2022-08-12 LAB
POCT GLUCOSE: 103 MG/DL (ref 70–110)
POCT GLUCOSE: 153 MG/DL (ref 70–110)
POCT GLUCOSE: 160 MG/DL (ref 70–110)

## 2022-08-12 PROCEDURE — 94761 N-INVAS EAR/PLS OXIMETRY MLT: CPT

## 2022-08-12 PROCEDURE — 25000003 PHARM REV CODE 250: Performed by: STUDENT IN AN ORGANIZED HEALTH CARE EDUCATION/TRAINING PROGRAM

## 2022-08-12 PROCEDURE — 99024 PR POST-OP FOLLOW-UP VISIT: ICD-10-PCS | Mod: ,,, | Performed by: PHYSICIAN ASSISTANT

## 2022-08-12 PROCEDURE — 63600175 PHARM REV CODE 636 W HCPCS: Performed by: STUDENT IN AN ORGANIZED HEALTH CARE EDUCATION/TRAINING PROGRAM

## 2022-08-12 PROCEDURE — 99024 POSTOP FOLLOW-UP VISIT: CPT | Mod: ,,, | Performed by: PHYSICIAN ASSISTANT

## 2022-08-12 RX ORDER — HYDRALAZINE HYDROCHLORIDE 20 MG/ML
10 INJECTION INTRAMUSCULAR; INTRAVENOUS ONCE
Status: COMPLETED | OUTPATIENT
Start: 2022-08-12 | End: 2022-08-12

## 2022-08-12 RX ORDER — HYDROCODONE BITARTRATE AND ACETAMINOPHEN 5; 325 MG/1; MG/1
1 TABLET ORAL EVERY 6 HOURS PRN
Qty: 56 TABLET | Refills: 0 | Status: ON HOLD | OUTPATIENT
Start: 2022-08-12 | End: 2023-01-12 | Stop reason: HOSPADM

## 2022-08-12 RX ADMIN — DOCUSATE SODIUM 100 MG: 100 CAPSULE ORAL at 08:08

## 2022-08-12 RX ADMIN — LISINOPRIL 40 MG: 20 TABLET ORAL at 08:08

## 2022-08-12 RX ADMIN — CEFTRIAXONE 2 G: 2 INJECTION, SOLUTION INTRAVENOUS at 05:08

## 2022-08-12 RX ADMIN — ATORVASTATIN CALCIUM 10 MG: 10 TABLET, FILM COATED ORAL at 08:08

## 2022-08-12 RX ADMIN — AMLODIPINE BESYLATE 10 MG: 10 TABLET ORAL at 08:08

## 2022-08-12 RX ADMIN — OXYCODONE 5 MG: 5 TABLET ORAL at 03:08

## 2022-08-12 RX ADMIN — HYDRALAZINE HYDROCHLORIDE 10 MG: 20 INJECTION, SOLUTION INTRAMUSCULAR; INTRAVENOUS at 05:08

## 2022-08-12 RX ADMIN — HEPARIN SODIUM 5000 UNITS: 5000 INJECTION INTRAVENOUS; SUBCUTANEOUS at 08:08

## 2022-08-12 RX ADMIN — ACETAMINOPHEN 1000 MG: 500 TABLET ORAL at 05:08

## 2022-08-12 RX ADMIN — METOPROLOL SUCCINATE 100 MG: 100 TABLET, EXTENDED RELEASE ORAL at 08:08

## 2022-08-12 NOTE — PLAN OF CARE
Problem: Adult Inpatient Plan of Care  Goal: Plan of Care Review  Outcome: Met  Goal: Patient-Specific Goal (Individualized)  Outcome: Met  Goal: Absence of Hospital-Acquired Illness or Injury  Outcome: Met  Goal: Optimal Comfort and Wellbeing  Outcome: Met  Goal: Readiness for Transition of Care  Outcome: Met     Problem: Diabetes Comorbidity  Goal: Blood Glucose Level Within Targeted Range  Outcome: Met     Problem: Impaired Wound Healing  Goal: Optimal Wound Healing  Outcome: Met     Problem: Skin Injury Risk Increased  Goal: Skin Health and Integrity  Outcome: Met     Problem: Fall Injury Risk  Goal: Absence of Fall and Fall-Related Injury  Outcome: Met       Patient is ready for discharge. All goals have been adequately met.

## 2022-08-12 NOTE — NURSING
Patient successfully transferred to room 957 on NPU. Patient Head to toe assessment complete and was oriented to everything except for time. Patient understands exactly why she is in the hospital as well. Vital signs taken, BP slightly elevated. SCDs to be placed as soon as available. Patient on 2LNC sats 97%  Suction at bedside.

## 2022-08-12 NOTE — DISCHARGE SUMMARY
[facility] - Neurosurgery ([facility])  Neurosurgery  Discharge Summary      Patient Name: Farheen Soares  MRN: [id number]  Admission Date: 8/11/2022  Hospital Length of Stay: 1 days  Discharge Date and Time:  08/12/2022 2:48 PM  Attending Physician: Redd Espinoza MD   Discharging Provider: Savannah Mckenna PA-C  Primary Care Provider: Vonda Chung MD    HPI:   Farheen Soares is a 87 y.o. female with h/o hearing loss, type II DM, DDD, meningioma, TIA, and NPH who presents today for elective  shunt placement with Dr. Espinoza. This is a patient with issues with her gait that has caused her to fall several times. She currently uses a wheelchair. Her gait began to change about 2 years ago where she was shuffling her gait. She states pt has been having urinary incontinence. She also has issues with short-term memory. Pt lives with her daughter and has a sitter who cares for her during the day. Patient underwent high volume lumbar puncture with Dr. Espinoza and had improvement of gait afterwards. After the risks, benefits, and alternatives were described, she in the in the wished proceed with placement of a ventriculoperitoneal shunt.      Procedure(s) (LRB):  INSERTION, SHUNT, VENTRICULOPERITONEAL, ENDOSCOPIC, USING STEALTH (Right)     Hospital Course: The patient presented to [facility] on 8/11/2022 for elective  shunt placement (Hakim valve setting of 120 mm H2O). The patient tolerated the procedure well and there were no intra-operative complications. After surgery, the patient was extubated and taken to recovery in stable condition.  After observation in recovery, the patient was transferred to the neurosurgical floor.  Post-op shunt series XR demonstrated tubing continuity and hakim verified at 120 mm H20. Post op CT head wo contrast with proximal catheter in place. PT/OT were consulted, the patient progressed, and was cleared to go home with home health. On 8/12/2022, she was discharged home with pain  medication and follow up appointments. The patient was tolerating a regular diet and voiding without difficulty. Activity as tolerated. At the time of discharge, the patient was neurologically stable, was afebrile, and vital signs were stable. Discharge instructions were given verbally/written to the patient and their family, including wound care and follow-up appointments, and all of their questions were answered. The patient was also given a discharge instruction sheet explaining the aforementioned discussion.  The patient verbalized understanding of instructions and agreed to the plan. They were encouraged to call the clinic with any questions they might have prior to the follow up appointments.     Physical Exam 8/12/2022:  General: well developed, well nourished, no distress.   Head: normocephalic, cranial incision with surgical dressing C/D/I  Neurologic: Alert and oriented. Thought content appropriate.  GCS: Motor: 6/Verbal: 5/Eyes: 4 GCS Total: 15  Language: No aphasia  Speech: No dysarthria  Cranial nerves: face symmetric, tongue midline, CN II-XII grossly intact.   Eyes: pupils equal, round, reactive to light with accommodation, EOMI.   Pulmonary: normal respirations, no signs of respiratory distress  Abdomen: soft, non-distended, not tender to palpation. Trochar incisions with dermabond intact  Skin: Skin is warm, dry and intact.  Sensory: intact to light touch throughout  Motor Strength:Moves all extremities spontaneously with good tone. Deconditioned, 4/5 strength upper and lower extremities. No abnormal movements seen.  Pronator drift: absent bilaterally               Goals of Care Treatment Preferences:  Code Status: Full Code      Consults: PT/OT    Significant Diagnostic Studies: Labs: All labs within the past 24 hours have been reviewed     Radiology:     XR SHUNT SERIES:  A series of radiographs was obtained to evaluate the extracranial portion of the patient's  shunt.  The shunt appears intact,  with no focal areas of discontinuity noted, the shunt tip located in the right lower abdominal quadrant.  Electronically signed by: Norm Morales MD  Date:                                            08/11/2022  Time:                                           10:08    CT HEAD WITHOUT CONTRAST  Interval placement of right sided ventriculoperitoneal shunt with its tip between the frontal horns of the lateral ventricles.  Stable appearance of the ventricles. No significant intracranial hemorrhage or new intracranial process otherwise identified. Markedly calcified posterior fossa mass again identified. Decreased attenuation within the periventricular white matter is nonspecific and similar to prior studies and may reflect chronic small vessel ischemic changes or other encephalopathy. Marked atherosclerotic vascular calcifications are noted at the skull base.    Pending Diagnostic Studies:     None        Final Active Diagnoses:    Diagnosis Date Noted POA    PRINCIPAL PROBLEM:  NPH (normal pressure hydrocephalus) [G91.2] 07/12/2021 Yes      Problems Resolved During this Admission:      Discharged Condition: stable     Disposition: Home-Health Care [facility]    Follow Up:   Follow-up Information     [facility] - Neuro[facility] 8th Fl Follow up on 8/25/2022.    Specialty: Neurosurgery  Why: at 11:00 AM, For wound re-check  Contact information:  [address]  [facility] [address] [phone]  Additional information:  8th Floor [facility]   Please park in [facility].   Check in desk is located in the lobby. Please take the C elevator to 8th floor which opens to the lobby.                     Patient Instructions:      Ambulatory referral/consult to Home Health   Standing Status: Future   Referral Priority: Routine Referral Type: Home Health   Referral Reason: Specialty Services Required   Requested Specialty: Home Health Services   Number of Visits Requested: 1     Notify your health care provider if you  experience any of the following:  increased confusion or weakness     Notify your health care provider if you experience any of the following:  persistent dizziness, light-headedness, or visual disturbances     Notify your health care provider if you experience any of the following:  severe persistent headache     Notify your health care provider if you experience any of the following:  worsening rash     Notify your health care provider if you experience any of the following:  difficulty breathing or increased cough     Notify your health care provider if you experience any of the following:  redness, tenderness, or signs of infection (pain, swelling, redness, odor or green/yellow discharge around incision site)     Notify your health care provider if you experience any of the following:  severe uncontrolled pain     Notify your health care provider if you experience any of the following:  temperature >100.4     Notify your health care provider if you experience any of the following:  persistent nausea and vomiting or diarrhea     Activity as tolerated     Medications:  Reconciled Home Medications:      Medication List      START taking these medications    HYDROcodone-acetaminophen 5-325 mg per tablet  Commonly known as: NORCO  Take 1 tablet by mouth every 6 (six) hours as needed for Pain.        CHANGE how you take these medications    alendronate 70 MG tablet  Commonly known as: FOSAMAX  Take 1 tablet (70 mg total) by mouth every 7 days.  What changed: additional instructions        CONTINUE taking these medications    acetaminophen 500 MG tablet  Commonly known as: TYLENOL  Take 2 tablets (1,000 mg total) by mouth every 8 (eight) hours as needed (Mild pain).     allopurinoL 300 MG tablet  Commonly known as: ZYLOPRIM  Take 1 tablet (300 mg total) by mouth once daily.     amLODIPine 10 MG tablet  Commonly known as: NORVASC  Take 1 tablet (10 mg total) by mouth once daily.     atorvastatin 10 MG tablet  Commonly  known as: LIPITOR  Take 1 tablet (10 mg total) by mouth once daily.     *HOLD until 8/21/22* clopidogreL 75 mg tablet  Commonly known as: PLAVIX  Take 1 tablet (75 mg total) by mouth once daily.     lisinopriL 40 MG tablet  Commonly known as: PRINIVIL,ZESTRIL  Take 1 tablet (40 mg total) by mouth once daily.     metFORMIN 500 MG ER 24hr tablet  Commonly known as: GLUCOPHAGE-XR  Take 1 tablet (500 mg total) by mouth once daily.     metoprolol succinate 100 MG 24 hr tablet  Commonly known as: TOPROL-XL  Take 1 tablet (100 mg total) by mouth once daily.     multivitamin capsule  Take 1 capsule by mouth once daily.     venlafaxine 150 MG Cp24  Commonly known as: EFFEXOR-XR  TAKE 1 CAPSULE (150 MG TOTAL) BY MOUTH EVERY EVENING.        STOP taking these medications    blood-glucose meter kit     calcium carbonate 500 mg calcium (1,250 mg) tablet  Commonly known as: OS-ELODIA     fexofenadine 180 MG tablet  Commonly known as: CAREY Mckenna PA-C  Neurosurgery  [facility]surgery [facility])

## 2022-08-12 NOTE — PLAN OF CARE
[facility] Neurosurgery (Hospital)  Discharge Final Note    Primary Care Provider: Vonda Chung MD    Expected Discharge Date: 8/12/2022     Patient to be discharged home.  CM faxed orders to [facility] for assignment of hh.  Patient current with [facility].  Family to provide transportation home.  Neurosurgery clinic to schedule follow up appointment.    UPDATE:  Guardian assigned per [facility].    Future Appointments   Date Time Provider Department Center   8/25/2022 11:00 AM Sisi Curtis RN [facility] NEUROS8 [facility]       Final Discharge Note (most recent)     Final Note - 08/12/22 1221        Final Note    Assessment Type Final Discharge Note     Anticipated Discharge Disposition Home-Health Care Svc        Post-Acute Status    Post-Acute Authorization Home Health     Home Health Status Referrals Sent     Discharge Delays None known at this time                 Important Message from Medicare             Contact Info     [facility] 8th Fl   Specialty: Neurosurgery    [address]  [facility] [address]   Phone: [phone]       Next Steps: Follow up on 8/25/2022    Instructions: at 11:00 AM, For wound re-check

## 2022-08-12 NOTE — PLAN OF CARE
[facility] Neurosurgery (Hospital)  Initial Discharge Assessment       Primary Care Provider: Vonda Chung MD    Admission Diagnosis: NPH (normal pressure hydrocephalus) [G91.2]    Admission Date: 8/11/2022  Expected Discharge Date: 8/12/2022    Discharge Barriers Identified: None    Payor: [employer] / Plan: [place] 65 / Product Type: Medicare Advantage /     Extended Emergency Contact Information  Primary Emergency Contact: Colleen Soares  Mobile Phone: [phone]  Relation: Daughter    Discharge Plan A: Home with family, Home Health  Discharge Plan B: Home with family      [facility]  [address]  [facility] [address]  Phone: [phone] Fax: [phone]    [id number] - [place], [place] - [address]  [address]  [place] [address]  Phone: [phone] Fax: [phone]    [facility]  [address]  [place] [address]  Phone: [phone] Fax: [phone]    CM obtained discharge planning assessment from daughter.      Initial Assessment (most recent)     Adult Discharge Assessment - 08/12/22 1153        Discharge Assessment    Assessment Type Discharge Planning Assessment     Confirmed/corrected address, phone number and insurance Yes     Confirmed Demographics Correct on Facesheet     Source of Information family;patient     Communicated GONZALEZ with patient/caregiver Yes     Reason For Admission nph     Lives With child(sammy), adult     Do you expect to return to your current living situation? Yes     Do you have help at home or someone to help you manage your care at home? Yes     Who are your caregiver(s) and their phone number(s)? Colleen Soares - daughter [phone]     Prior to hospitilization cognitive status: Alert/Oriented     Current cognitive status: Alert/Oriented     Walking or Climbing Stairs Difficulty ambulation difficulty, requires equipment     Mobility Management  walekr and rollator     Dressing/Bathing Difficulty bathing difficulty, assistance 1 person     Equipment Currently Used at Home walker, standard;rollator;other (see comments)   transport chair    Readmission within 30 days? No     Patient currently being followed by outpatient case management? No     Do you currently have service(s) that help you manage your care at home? Yes     Name and Contact number of agency [facility]     Is the pt/caregiver preference to resume services with current agency Yes     Do you take prescription medications? Yes     Do you have prescription coverage? Yes     Coverage [employer] - [employer] 65     Do you have any problems affording any of your prescribed medications? No     Is the patient taking medications as prescribed? yes     Who is going to help you get home at discharge? family     How do you get to doctors appointments? family or friend will provide     Are you on dialysis? No     Do you take coumadin? No     Discharge Plan A Home with family;Home Health     Discharge Plan B Home with family     DME Needed Upon Discharge  none     Discharge Plan discussed with: Adult children     Discharge Barriers Identified None        Relationship/Environment    Name(s) of Who Lives With Patient daughter and son

## 2022-08-13 LAB
BLD PROD TYP BPU: NORMAL
BLD PROD TYP BPU: NORMAL
BLOOD UNIT EXPIRATION DATE: NORMAL
BLOOD UNIT EXPIRATION DATE: NORMAL
BLOOD UNIT TYPE CODE: 600
BLOOD UNIT TYPE CODE: 600
BLOOD UNIT TYPE: NORMAL
BLOOD UNIT TYPE: NORMAL
CODING SYSTEM: NORMAL
CODING SYSTEM: NORMAL
DISPENSE STATUS: NORMAL
DISPENSE STATUS: NORMAL
TRANS ERYTHROCYTES VOL PATIENT: NORMAL ML
TRANS ERYTHROCYTES VOL PATIENT: NORMAL ML

## 2022-08-15 ENCOUNTER — DOCUMENT SCAN (OUTPATIENT)
Dept: HOME HEALTH SERVICES | Facility: HOSPITAL | Age: 87
End: 2022-08-15

## 2022-08-15 ENCOUNTER — EXTERNAL HOME HEALTH (OUTPATIENT)
Dept: HOME HEALTH SERVICES | Facility: HOSPITAL | Age: 87
End: 2022-08-15
Payer: MEDICARE

## 2022-08-15 ENCOUNTER — DOCUMENT SCAN (OUTPATIENT)
Dept: HOME HEALTH SERVICES | Facility: HOSPITAL | Age: 87
End: 2022-08-15
Payer: MEDICARE

## 2022-08-22 NOTE — ANESTHESIA POSTPROCEDURE EVALUATION
Anesthesia Post Evaluation    Patient: Farheen Soares    Procedure(s) Performed: Procedure(s) (LRB):  INSERTION, SHUNT, VENTRICULOPERITONEAL, ENDOSCOPIC, USING STEALTH (Right)    Final Anesthesia Type: general      Patient location during evaluation: PACU  Patient participation: Yes- Able to Participate  Level of consciousness: awake and alert and oriented  Post-procedure vital signs: reviewed and stable  Pain management: adequate  Airway patency: patent    PONV status at discharge: No PONV  Anesthetic complications: no      Cardiovascular status: hemodynamically stable  Respiratory status: unassisted, spontaneous ventilation and room air  Hydration status: euvolemic  Follow-up not needed.          Vitals Value Taken Time   /61 08/12/22 0735   Temp 36.1 °C (97 °F) 08/12/22 0735   Pulse 63 08/12/22 0735   Resp 18 08/12/22 0735   SpO2 97 % 08/12/22 0735         Event Time   Out of Recovery 09:15:00         Pain/Jacob Score: No data recorded

## 2022-08-25 ENCOUNTER — CLINICAL SUPPORT (OUTPATIENT)
Dept: NEUROSURGERY | Facility: CLINIC | Age: 87
End: 2022-08-25
Payer: MEDICARE

## 2022-08-25 NOTE — PROGRESS NOTES
Neurosurgery  Wound Check         Ms. Isis Soares is a neptali 86 y/o female who underwent  Shunt insertion with Dr Espinoza on 8/1/22. (For complete diagnosis and procedure, see operative note.) She is seen today via virtual visit for her 2 week post-op wound check with her daughter.    Overall, daughter states doing very well. Walking is much better and thought processes clearer. Though pt is in bed during VV, she is alert and responds to questions, though clearly [place]. Sitter gets her up for ADLs and she stays up in chair.    Denies pain, H/A.    Incision visible during visit. Looks C/D/I, very well healed. Daughter is an RN      Mailed 2 wk P/O Instructions as well as her Shunt Card.     · She may shower but keep incision dry and open to air.   · Contact us with any questions/concerns before then, especially including bouts of   ? fever >100.4,   ? redness, tenderness or drainage at incision site  ? severe, persistent headache       Follow up appts made.    Sisi Curtis RN  Neurosurgery

## 2022-08-29 ENCOUNTER — DOCUMENT SCAN (OUTPATIENT)
Dept: HOME HEALTH SERVICES | Facility: HOSPITAL | Age: 87
End: 2022-08-29
Payer: MEDICARE

## 2022-09-29 ENCOUNTER — HOSPITAL ENCOUNTER (OUTPATIENT)
Dept: RADIOLOGY | Facility: HOSPITAL | Age: 87
Discharge: HOME | End: 2022-09-29
Attending: PHYSICIAN ASSISTANT
Payer: MEDICARE

## 2022-09-29 DIAGNOSIS — G91.2 NPH (NORMAL PRESSURE HYDROCEPHALUS): ICD-10-CM

## 2022-09-29 PROCEDURE — 70450 CT HEAD/BRAIN W/O DYE: CPT | Mod: 26,,, | Performed by: RADIOLOGY

## 2022-09-29 PROCEDURE — 70450 CT HEAD/BRAIN W/O DYE: CPT | Mod: TC

## 2022-09-29 PROCEDURE — 70450 CT HEAD WITHOUT CONTRAST: ICD-10-PCS | Mod: 26,,, | Performed by: RADIOLOGY

## 2022-11-02 ENCOUNTER — OFFICE VISIT (OUTPATIENT)
Dept: NEUROSURGERY | Facility: CLINIC | Age: 87
End: 2022-11-02
Payer: MEDICARE

## 2022-11-02 VITALS
HEIGHT: 64 IN | WEIGHT: 145 LBS | HEART RATE: 67 BPM | BODY MASS INDEX: 24.75 KG/M2 | DIASTOLIC BLOOD PRESSURE: 65 MMHG | SYSTOLIC BLOOD PRESSURE: 116 MMHG | TEMPERATURE: 97 F

## 2022-11-02 DIAGNOSIS — G91.2 NPH (NORMAL PRESSURE HYDROCEPHALUS): Primary | ICD-10-CM

## 2022-11-02 PROCEDURE — 1126F PR PAIN SEVERITY QUANTIFIED, NO PAIN PRESENT: ICD-10-PCS | Mod: CPTII,S$GLB,, | Performed by: PHYSICIAN ASSISTANT

## 2022-11-02 PROCEDURE — 1126F AMNT PAIN NOTED NONE PRSNT: CPT | Mod: CPTII,S$GLB,, | Performed by: PHYSICIAN ASSISTANT

## 2022-11-02 PROCEDURE — 1159F MED LIST DOCD IN RCRD: CPT | Mod: CPTII,S$GLB,, | Performed by: PHYSICIAN ASSISTANT

## 2022-11-02 PROCEDURE — 99024 POSTOP FOLLOW-UP VISIT: CPT | Mod: S$GLB,,, | Performed by: PHYSICIAN ASSISTANT

## 2022-11-02 PROCEDURE — 99999 PR PBB SHADOW E&M-EST. PATIENT-LVL III: CPT | Mod: PBBFAC,,, | Performed by: PHYSICIAN ASSISTANT

## 2022-11-02 PROCEDURE — 3074F PR MOST RECENT SYSTOLIC BLOOD PRESSURE < 130 MM HG: ICD-10-PCS | Mod: CPTII,S$GLB,, | Performed by: PHYSICIAN ASSISTANT

## 2022-11-02 PROCEDURE — 3288F FALL RISK ASSESSMENT DOCD: CPT | Mod: CPTII,S$GLB,, | Performed by: PHYSICIAN ASSISTANT

## 2022-11-02 PROCEDURE — 3078F DIAST BP <80 MM HG: CPT | Mod: CPTII,S$GLB,, | Performed by: PHYSICIAN ASSISTANT

## 2022-11-02 PROCEDURE — 3074F SYST BP LT 130 MM HG: CPT | Mod: CPTII,S$GLB,, | Performed by: PHYSICIAN ASSISTANT

## 2022-11-02 PROCEDURE — 1101F PT FALLS ASSESS-DOCD LE1/YR: CPT | Mod: CPTII,S$GLB,, | Performed by: PHYSICIAN ASSISTANT

## 2022-11-02 PROCEDURE — 1159F PR MEDICATION LIST DOCUMENTED IN MEDICAL RECORD: ICD-10-PCS | Mod: CPTII,S$GLB,, | Performed by: PHYSICIAN ASSISTANT

## 2022-11-02 PROCEDURE — 3288F PR FALLS RISK ASSESSMENT DOCUMENTED: ICD-10-PCS | Mod: CPTII,S$GLB,, | Performed by: PHYSICIAN ASSISTANT

## 2022-11-02 PROCEDURE — 99999 PR PBB SHADOW E&M-EST. PATIENT-LVL III: ICD-10-PCS | Mod: PBBFAC,,, | Performed by: PHYSICIAN ASSISTANT

## 2022-11-02 PROCEDURE — 3078F PR MOST RECENT DIASTOLIC BLOOD PRESSURE < 80 MM HG: ICD-10-PCS | Mod: CPTII,S$GLB,, | Performed by: PHYSICIAN ASSISTANT

## 2022-11-02 PROCEDURE — 1101F PR PT FALLS ASSESS DOC 0-1 FALLS W/OUT INJ PAST YR: ICD-10-PCS | Mod: CPTII,S$GLB,, | Performed by: PHYSICIAN ASSISTANT

## 2022-11-02 PROCEDURE — 99024 PR POST-OP FOLLOW-UP VISIT: ICD-10-PCS | Mod: S$GLB,,, | Performed by: PHYSICIAN ASSISTANT

## 2022-11-02 NOTE — PROGRESS NOTES
Neurosurgery  Established Patient    SUBJECTIVE:     History of Present Illness:  Farheen Soares is a 87 y.o. female with h/o hearing loss, type II DM, DDD, meningioma, TIA, and NPH who presents for 6 week follow up s/p elective  shunt placement with Dr. Espinoza on 8/11/22. Hakim valve set at 120. Patient presents accompanied by daughter today. Patient and daughter report vast improvement in gait and memory. They are very happy with the results of surgery and have no complaints.     Review of patient's allergies indicates:   Allergen Reactions    Baclofen      AMS and distorted dremas       Current Outpatient Medications   Medication Sig Dispense Refill    acetaminophen (TYLENOL) 500 MG tablet Take 2 tablets (1,000 mg total) by mouth every 8 (eight) hours as needed (Mild pain).  0    alendronate (FOSAMAX) 70 MG tablet Take 1 tablet (70 mg total) by mouth every 7 days. (Patient taking differently: Take 70 mg by mouth every 7 days. Takes on Sundays) 12 tablet 1    allopurinoL (ZYLOPRIM) 300 MG tablet Take 1 tablet (300 mg total) by mouth once daily. 90 tablet 1    amLODIPine (NORVASC) 10 MG tablet Take 1 tablet (10 mg total) by mouth once daily. 90 tablet 1    atorvastatin (LIPITOR) 10 MG tablet Take 1 tablet (10 mg total) by mouth once daily. 90 tablet 1    clopidogreL (PLAVIX) 75 mg tablet Take 1 tablet (75 mg total) by mouth once daily. 90 tablet 3    lisinopriL (PRINIVIL,ZESTRIL) 40 MG tablet Take 1 tablet (40 mg total) by mouth once daily. 90 tablet 1    metoprolol succinate (TOPROL-XL) 100 MG 24 hr tablet Take 1 tablet (100 mg total) by mouth once daily. 90 tablet 1    venlafaxine (EFFEXOR-XR) 150 MG Cp24 TAKE 1 CAPSULE (150 MG TOTAL) BY MOUTH EVERY EVENING. 90 capsule 2    HYDROcodone-acetaminophen (NORCO) 5-325 mg per tablet Take 1 tablet by mouth every 6 (six) hours as needed for Pain. (Patient not taking: Reported on 11/2/2022) 56 tablet 0    metFORMIN (GLUCOPHAGE-XR) 500 MG ER 24hr tablet Take 1  tablet (500 mg total) by mouth once daily. (Patient not taking: Reported on 11/2/2022) 90 tablet 1    multivitamin capsule Take 1 capsule by mouth once daily.       No current facility-administered medications for this visit.       Past Medical History:   Diagnosis Date    Age-related osteoporosis with current pathological fracture with routine healing     Allergic rhinitis     Anemia     Carotid atherosclerosis, bilateral     Chronic gout of multiple sites     Chronic low back pain with bilateral sciatica     Closed impacted fracture of left femoral neck with routine healing s/p percutaneous screw fixation on 11/21/2020 11/20/2020    DDD (degenerative disc disease), lumbar 8/17/2020    Diverticulosis     Essential hypertension 11/25/2019    Facet arthropathy     GERD (gastroesophageal reflux disease) 11/25/2019    Hearing loss     History of transient ischemic attack (TIA) 11/25/2019    IBS (irritable bowel syndrome)     Insomnia     Lumbar radiculopathy 8/17/2020    Lumbar spondylosis 8/17/2020    Lumbar stenosis     Meningioma     Paroxysmal SVT (supraventricular tachycardia)     Primary open angle glaucoma (POAG) of both eyes     Pure hypercholesterolemia 11/25/2019    PVD (peripheral vascular disease)     30% prox stenosis of celiac trunk and 20% stnosis Prox SMA - per Abd/SMA Arteriogram 4/3/08    Recurrent major depressive disorder, in full remission     Type 2 diabetes mellitus without complication, without long-term current use of insulin 11/25/2019    Vitamin D deficiency 11/23/2020     Past Surgical History:   Procedure Laterality Date    CATARACT EXTRACTION      CAUDAL EPIDURAL STEROID INJECTION N/A 8/27/2020    Procedure: Injection-steroid-epidural-caudal with catheter;  Surgeon: Johnathan Gonzalez Jr., MD;  [facility];  Service: Pain Management;  Laterality: N/A;    EPIDURAL STEROID INJECTION INTO LUMBAR SPINE      LUMBAR LAMINECTOMY  2009    PERCUTANEOUS PINNING OF HIP Left 11/21/2020     Procedure: Left- Percutaneous Screws- Large  arm Clock side;  Surgeon: Leandro White MD;  Location: [facility] OR [address] [place] [address];  Service: Orthopedics;  Laterality: Left;    TOTAL ABDOMINAL HYSTERECTOMY W/ BILATERAL SALPINGOOPHORECTOMY       Family History       Problem Relation (Age of Onset)    Diabetes Mother    Hyperlipidemia Son    Hypertension Mother, Father, Daughter, Son    Hypothyroidism Daughter    Multiple sclerosis Son    Stroke Father          Social History     Socioeconomic History    Marital status:    Tobacco Use    Smoking status: Never    Smokeless tobacco: Never   Substance and Sexual Activity    Alcohol use: Yes     Comment: socially    Drug use: Never    Sexual activity: Not Currently     Social Determinants of Health     Financial Resource Strain: Low Risk     Difficulty of Paying Living Expenses: Not hard at all   Food Insecurity: No Food Insecurity    Worried About Running Out of Food in the Last Year: Never true    Ran Out of Food in the Last Year: Never true   Transportation Needs: No Transportation Needs    Lack of Transportation (Medical): No    Lack of Transportation (Non-Medical): No   Physical Activity: Inactive    Days of Exercise per Week: 0 days    Minutes of Exercise per Session: 0 min   Stress: No Stress Concern Present    Feeling of Stress : Not at all   Social Connections: Socially Isolated    Frequency of Communication with Friends and Family: Twice a week    Frequency of Social Gatherings with Friends and Family: More than three times a week    Attends Zoroastrianism Services: Never    Active Member of Clubs or Organizations: No    Attends Club or Organization Meetings: Never    Marital Status:    Housing Stability: Low Risk     Unable to Pay for Housing in the Last Year: No    Number of Places Lived in the Last Year: 1    Unstable Housing in the Last Year: No       Review of Systems   Constitutional:  Negative for chills, fatigue and fever.   Eyes:  Negative for  "photophobia and visual disturbance.   Respiratory:  Negative for cough and shortness of breath.    Cardiovascular:  Negative for chest pain, palpitations and leg swelling.   Gastrointestinal:  Negative for constipation, diarrhea, nausea and vomiting.   Genitourinary:  Positive for frequency. Negative for difficulty urinating, dysuria and urgency.   Musculoskeletal:  Positive for gait problem. Negative for back pain and neck pain.   Neurological:  Negative for dizziness, seizures, speech difficulty, weakness, numbness and headaches.   Psychiatric/Behavioral:  Positive for confusion. The patient is not nervous/anxious.      OBJECTIVE:     Vital Signs  Temp: 97 °F (36.1 °C)  Pulse: 67  BP: 116/65  Pain Score: 0-No pain  Height: 5' 4" (162.6 cm)  Weight: 65.8 kg (145 lb)  Body mass index is 24.89 kg/m².    Neurosurgery Physical Exam  General: well developed, well nourished, no distress.   Head: normocephalic, atraumatic. Cranial incision is C/D/I - reservoirs pump and refills briskly   Neurologic: Alert and oriented. Thought content appropriate.  Language: No aphasia  Speech: No dysarthria  Cranial nerves: face symmetric, tongue midline, CN II-XII grossly intact.   Eyes: pupils equal, round, reactive to light with accommodation, EOMI.   Pulmonary: normal respirations, no signs of respiratory distress  Abdomen: soft, non-distended, not tender to palpation; trochar incision are well-healed   Skin: Skin is warm, dry and intact.  Sensory: intact to light touch throughout  Motor Strength:Moves all extremities spontaneously with good tone.  Full strength upper and lower extremities. No abnormal movements seen.   Pronator drift: absent bilaterally      Diagnostic Results:  I have personally reviewed imaging and agree with the findings    CT head wo contrast (9/29/22): Right-sided ventricular shunt in place. Tip of the catheter in stable position. Ventricles are stable in size, with 3rd ventricle diameter again measuring on the " order of 1.2 cm. Stable sulcal definition over the cerebral convexities. Stable appearance of the known peripherally calcified extra-axial mass centered above the right cerebellum. Lesion again measuring the order of 3.3 cm maximal transverse dimension.  ASSESSMENT/PLAN:     86 y/o female with NPH who presents for 6 week follow up s/p elective  shunt placement with Dr. Espinoza on 8/11/22. Hakim valve set at 120. Patient doing great postoperatively with improvement in gait and memory.     -I would like the patient to follow-up in clinic as need. I have encouraged her to contact the clinic with any questions, concerns, or adverse clinical changes. She verbalized understanding.       Savannah Mckenna PA-C  Neurosurgery         Note dictated with voice recognition software, please excuse any grammatical errors.

## 2022-11-15 DIAGNOSIS — I10 HYPERTENSION, UNSPECIFIED TYPE: ICD-10-CM

## 2022-11-15 DIAGNOSIS — E11.9 TYPE 2 DIABETES MELLITUS WITHOUT COMPLICATION, WITHOUT LONG-TERM CURRENT USE OF INSULIN: ICD-10-CM

## 2022-11-15 DIAGNOSIS — M81.0 OSTEOPOROSIS, UNSPECIFIED OSTEOPOROSIS TYPE, UNSPECIFIED PATHOLOGICAL FRACTURE PRESENCE: ICD-10-CM

## 2022-11-15 DIAGNOSIS — M10.9 GOUT, UNSPECIFIED CAUSE, UNSPECIFIED CHRONICITY, UNSPECIFIED SITE: ICD-10-CM

## 2022-11-15 RX ORDER — ALENDRONATE SODIUM 70 MG/1
70 TABLET ORAL
Qty: 12 TABLET | Refills: 0 | Status: SHIPPED | OUTPATIENT
Start: 2022-11-15 | End: 2023-02-23 | Stop reason: SDUPTHER

## 2022-11-15 RX ORDER — METOPROLOL SUCCINATE 100 MG/1
100 TABLET, EXTENDED RELEASE ORAL DAILY
Qty: 90 TABLET | Refills: 0 | Status: SHIPPED | OUTPATIENT
Start: 2022-11-15 | End: 2023-02-23 | Stop reason: SDUPTHER

## 2022-11-15 RX ORDER — AMLODIPINE BESYLATE 10 MG/1
10 TABLET ORAL DAILY
Qty: 90 TABLET | Refills: 0 | Status: SHIPPED | OUTPATIENT
Start: 2022-11-15 | End: 2023-02-23 | Stop reason: SDUPTHER

## 2022-11-15 RX ORDER — METFORMIN HYDROCHLORIDE 500 MG/1
500 TABLET, EXTENDED RELEASE ORAL DAILY
Qty: 90 TABLET | Refills: 0 | Status: ON HOLD | OUTPATIENT
Start: 2022-11-15 | End: 2023-01-12 | Stop reason: HOSPADM

## 2022-11-15 RX ORDER — LISINOPRIL 40 MG/1
40 TABLET ORAL DAILY
Qty: 90 TABLET | Refills: 0 | Status: ON HOLD | OUTPATIENT
Start: 2022-11-15 | End: 2023-01-12 | Stop reason: HOSPADM

## 2022-11-15 RX ORDER — ALLOPURINOL 300 MG/1
300 TABLET ORAL DAILY
Qty: 90 TABLET | Refills: 0 | Status: SHIPPED | OUTPATIENT
Start: 2022-11-15 | End: 2023-02-23 | Stop reason: SDUPTHER

## 2022-11-15 NOTE — TELEPHONE ENCOUNTER
Care Due:                  Date            Visit Type   Department     Provider  --------------------------------------------------------------------------------                                EP -                              PRIMARY      [facility] PRIMARY  Last Visit: 02-      CARE ([facility])   CARE           Vonda MERCEDES                              FOLLOWUP/OF  [facility] PRIMARY  Next Visit: 02-      FICE VISIT   CARE           Vonda Chung                                                            Last  Test          Frequency    Reason                     Performed    Due Date  --------------------------------------------------------------------------------    HBA1C.......  6 months...  metFORMIN................  06- 12-    Lipid Panel.  12 months..  atorvastatin.............  11- 11-    Uric Acid...  12 months..  allopurinoL..............  03- 03-    [facility] Embedded Care Gaps. Reference number: [id number]. 11/15/2022   12:08:25 PM CST

## 2022-12-02 ENCOUNTER — HOSPITAL ENCOUNTER (EMERGENCY)
Facility: HOSPITAL | Age: 87
Discharge: HOME | End: 2022-12-02
Attending: EMERGENCY MEDICINE
Payer: MEDICARE

## 2022-12-02 VITALS
RESPIRATION RATE: 18 BRPM | SYSTOLIC BLOOD PRESSURE: 188 MMHG | DIASTOLIC BLOOD PRESSURE: 92 MMHG | TEMPERATURE: 98 F | BODY MASS INDEX: 24.89 KG/M2 | OXYGEN SATURATION: 96 % | HEART RATE: 66 BPM | WEIGHT: 145 LBS

## 2022-12-02 DIAGNOSIS — M54.50 ACUTE MIDLINE LOW BACK PAIN WITHOUT SCIATICA: ICD-10-CM

## 2022-12-02 DIAGNOSIS — Z98.2 VP (VENTRICULOPERITONEAL) SHUNT STATUS: ICD-10-CM

## 2022-12-02 DIAGNOSIS — R29.6 FALL IN ELDERLY PATIENT: Primary | ICD-10-CM

## 2022-12-02 LAB
ALBUMIN SERPL BCP-MCNC: 3.8 G/DL (ref 3.5–5.2)
ALP SERPL-CCNC: 73 U/L (ref 55–135)
ALT SERPL W/O P-5'-P-CCNC: 10 U/L (ref 10–44)
ANION GAP SERPL CALC-SCNC: 10 MMOL/L (ref 8–16)
AST SERPL-CCNC: 28 U/L (ref 10–40)
BASOPHILS # BLD AUTO: 0.06 K/UL (ref 0–0.2)
BASOPHILS NFR BLD: 0.6 % (ref 0–1.9)
BILIRUB SERPL-MCNC: 0.5 MG/DL (ref 0.1–1)
BILIRUB UR QL STRIP: NEGATIVE
BUN SERPL-MCNC: 14 MG/DL (ref 8–23)
CALCIUM SERPL-MCNC: 9.6 MG/DL (ref 8.7–10.5)
CHLORIDE SERPL-SCNC: 104 MMOL/L (ref 95–110)
CLARITY UR REFRACT.AUTO: CLEAR
CO2 SERPL-SCNC: 20 MMOL/L (ref 23–29)
COLOR UR AUTO: YELLOW
CREAT SERPL-MCNC: 0.9 MG/DL (ref 0.5–1.4)
DIFFERENTIAL METHOD: ABNORMAL
EOSINOPHIL # BLD AUTO: 0.1 K/UL (ref 0–0.5)
EOSINOPHIL NFR BLD: 0.9 % (ref 0–8)
ERYTHROCYTE [DISTWIDTH] IN BLOOD BY AUTOMATED COUNT: 15.9 % (ref 11.5–14.5)
EST. GFR  (NO RACE VARIABLE): >60 ML/MIN/1.73 M^2
GLUCOSE SERPL-MCNC: 168 MG/DL (ref 70–110)
GLUCOSE UR QL STRIP: NEGATIVE
HCT VFR BLD AUTO: 45.6 % (ref 37–48.5)
HGB BLD-MCNC: 15.4 G/DL (ref 12–16)
HGB UR QL STRIP: NEGATIVE
IMM GRANULOCYTES # BLD AUTO: 0.02 K/UL (ref 0–0.04)
IMM GRANULOCYTES NFR BLD AUTO: 0.2 % (ref 0–0.5)
KETONES UR QL STRIP: ABNORMAL
LEUKOCYTE ESTERASE UR QL STRIP: NEGATIVE
LYMPHOCYTES # BLD AUTO: 2.1 K/UL (ref 1–4.8)
LYMPHOCYTES NFR BLD: 21.8 % (ref 18–48)
MCH RBC QN AUTO: 33.4 PG (ref 27–31)
MCHC RBC AUTO-ENTMCNC: 33.8 G/DL (ref 32–36)
MCV RBC AUTO: 99 FL (ref 82–98)
MONOCYTES # BLD AUTO: 0.6 K/UL (ref 0.3–1)
MONOCYTES NFR BLD: 6.6 % (ref 4–15)
NEUTROPHILS # BLD AUTO: 6.7 K/UL (ref 1.8–7.7)
NEUTROPHILS NFR BLD: 69.9 % (ref 38–73)
NITRITE UR QL STRIP: NEGATIVE
NRBC BLD-RTO: 0 /100 WBC
PH UR STRIP: 6 [PH] (ref 5–8)
PLATELET # BLD AUTO: 247 K/UL (ref 150–450)
PMV BLD AUTO: 10.5 FL (ref 9.2–12.9)
POTASSIUM SERPL-SCNC: 4.4 MMOL/L (ref 3.5–5.1)
PROT SERPL-MCNC: 8.4 G/DL (ref 6–8.4)
PROT UR QL STRIP: ABNORMAL
RBC # BLD AUTO: 4.61 M/UL (ref 4–5.4)
SODIUM SERPL-SCNC: 134 MMOL/L (ref 136–145)
SP GR UR STRIP: 1.01 (ref 1–1.03)
URN SPEC COLLECT METH UR: ABNORMAL
WBC # BLD AUTO: 9.55 K/UL (ref 3.9–12.7)

## 2022-12-02 PROCEDURE — 93005 ELECTROCARDIOGRAM TRACING: CPT

## 2022-12-02 PROCEDURE — 85025 COMPLETE CBC W/AUTO DIFF WBC: CPT | Performed by: STUDENT IN AN ORGANIZED HEALTH CARE EDUCATION/TRAINING PROGRAM

## 2022-12-02 PROCEDURE — 99285 EMERGENCY DEPT VISIT HI MDM: CPT | Mod: 25

## 2022-12-02 PROCEDURE — 25000003 PHARM REV CODE 250: Performed by: STUDENT IN AN ORGANIZED HEALTH CARE EDUCATION/TRAINING PROGRAM

## 2022-12-02 PROCEDURE — 99285 EMERGENCY DEPT VISIT HI MDM: CPT | Mod: ,,, | Performed by: EMERGENCY MEDICINE

## 2022-12-02 PROCEDURE — 80053 COMPREHEN METABOLIC PANEL: CPT | Performed by: EMERGENCY MEDICINE

## 2022-12-02 PROCEDURE — 93010 ELECTROCARDIOGRAM REPORT: CPT | Mod: ,,, | Performed by: INTERNAL MEDICINE

## 2022-12-02 PROCEDURE — 81003 URINALYSIS AUTO W/O SCOPE: CPT | Performed by: STUDENT IN AN ORGANIZED HEALTH CARE EDUCATION/TRAINING PROGRAM

## 2022-12-02 PROCEDURE — 63600175 PHARM REV CODE 636 W HCPCS: Performed by: STUDENT IN AN ORGANIZED HEALTH CARE EDUCATION/TRAINING PROGRAM

## 2022-12-02 PROCEDURE — 96374 THER/PROPH/DIAG INJ IV PUSH: CPT

## 2022-12-02 PROCEDURE — 99285 PR EMERGENCY DEPT VISIT,LEVEL V: ICD-10-PCS | Mod: ,,, | Performed by: EMERGENCY MEDICINE

## 2022-12-02 PROCEDURE — 93010 EKG 12-LEAD: ICD-10-PCS | Mod: ,,, | Performed by: INTERNAL MEDICINE

## 2022-12-02 RX ORDER — METOPROLOL SUCCINATE 50 MG/1
100 TABLET, EXTENDED RELEASE ORAL DAILY
Status: DISCONTINUED | OUTPATIENT
Start: 2022-12-02 | End: 2022-12-02 | Stop reason: HOSPADM

## 2022-12-02 RX ORDER — ACETAMINOPHEN 500 MG
1000 TABLET ORAL
Status: COMPLETED | OUTPATIENT
Start: 2022-12-02 | End: 2022-12-02

## 2022-12-02 RX ORDER — LISINOPRIL 20 MG/1
40 TABLET ORAL DAILY
Status: DISCONTINUED | OUTPATIENT
Start: 2022-12-02 | End: 2022-12-02 | Stop reason: HOSPADM

## 2022-12-02 RX ORDER — AMLODIPINE BESYLATE 10 MG/1
10 TABLET ORAL DAILY
Status: DISCONTINUED | OUTPATIENT
Start: 2022-12-02 | End: 2022-12-02 | Stop reason: HOSPADM

## 2022-12-02 RX ORDER — ONDANSETRON 2 MG/ML
4 INJECTION INTRAMUSCULAR; INTRAVENOUS
Status: COMPLETED | OUTPATIENT
Start: 2022-12-02 | End: 2022-12-02

## 2022-12-02 RX ADMIN — LISINOPRIL 40 MG: 20 TABLET ORAL at 06:12

## 2022-12-02 RX ADMIN — AMLODIPINE BESYLATE 10 MG: 10 TABLET ORAL at 06:12

## 2022-12-02 RX ADMIN — ONDANSETRON 4 MG: 2 INJECTION INTRAMUSCULAR; INTRAVENOUS at 06:12

## 2022-12-02 RX ADMIN — ACETAMINOPHEN 1000 MG: 500 TABLET ORAL at 04:12

## 2022-12-02 RX ADMIN — METOPROLOL SUCCINATE 100 MG: 50 TABLET, EXTENDED RELEASE ORAL at 06:12

## 2022-12-02 NOTE — ED NOTES
Farheen Soares, a 87 y.o. female presents to the ED w/ complaint of     Triage note:  Chief Complaint   Patient presents with    Fall     Slip and trip in bathroom. Pt denies hitting head or LOC, but pt has dementia. Family reports that patient alert but confused immediately after, returned to baseline. On plavix. Has  shunt. C/o back pain     Review of patient's allergies indicates:   Allergen Reactions    Baclofen      AMS and distorted dremas     Past Medical History:   Diagnosis Date    Age-related osteoporosis with current pathological fracture with routine healing     Allergic rhinitis     Anemia     Carotid atherosclerosis, bilateral     Chronic gout of multiple sites     Chronic low back pain with bilateral sciatica     Closed impacted fracture of left femoral neck with routine healing s/p percutaneous screw fixation on 11/21/2020 11/20/2020    DDD (degenerative disc disease), lumbar 8/17/2020    Diverticulosis     Essential hypertension 11/25/2019    Facet arthropathy     GERD (gastroesophageal reflux disease) 11/25/2019    Hearing loss     History of transient ischemic attack (TIA) 11/25/2019    IBS (irritable bowel syndrome)     Insomnia     Lumbar radiculopathy 8/17/2020    Lumbar spondylosis 8/17/2020    Lumbar stenosis     Meningioma     Paroxysmal SVT (supraventricular tachycardia)     Primary open angle glaucoma (POAG) of both eyes     Pure hypercholesterolemia 11/25/2019    PVD (peripheral vascular disease)     30% prox stenosis of celiac trunk and 20% stnosis Prox SMA - per Abd/SMA Arteriogram 4/3/08    Recurrent major depressive disorder, in full remission     Type 2 diabetes mellitus without complication, without long-term current use of insulin 11/25/2019    Vitamin D deficiency 11/23/2020    Patient identifiers for Farheen Soares checked and correct.    LOC: The patient is awake, alert and aware of environment with an appropriate affect, the patient is oriented x 4 and speaking  appropriately.  APPEARANCE: Patient resting comfortably and in no acute distress, patient is clean and well groomed, patient's clothing is properly fastened.  SKIN: The skin is warm and dry, color consistent with ethnicity, patient has normal skin turgor and moist mucus membranes, skin intact, no breakdown or bruising noted.  MUSCULOSKELETAL: Patient moving all extremities well, no obvious swelling or deformities noted. +back pain  RESPIRATORY: Airway is open and patent, respirations are spontaneous and even, patient has a normal effort and rate.  CARDIAC: Patient has a normal rate and rhythm, no periphreal edema noted, capillary refill < 3 seconds. Normal +2 pedal pulses present.  ABDOMEN: Soft and non tender to palpation, no distention noted. Patient denies any nausea, vomiting, diarrhea, or constipation.   NEUROLOGIC: Eyes open spontaneously, PERRL, behavior appropriate to situation, follows commands, facial expression symmetrical, bilateral hand grasp equal and even, purposeful motor response noted, normal sensation in all extremities.

## 2022-12-02 NOTE — ED PROVIDER NOTES
ED Resident HAND-OFF NOTE:    I received signout from the previous provider.     Pertinent history and exam:  Farheen Soares is a 87 y.o. female with pertinent PMH of dementia (a&o x1 at baseline), HTN, GERD, peripheral vascular disease, history of TIA, DM, HLD, meningioma, s/p  shunt presents for unwitnessed fall with immediate confusion afterward.  Per family, patient quickly returned to baseline mental status.  Physical exam notable for lumbar spinal tenderness.  Patient had episode of emesis while in CT scanner.  She has been hypertensive and is currently receiving her oral home antihypertensive medications.    Vitals:    12/02/22 0513   BP: (!) 201/106   Pulse: 76   Resp: 18   Temp:        Pending Items:  - Imaging  - Labs  - Reassessment of blood pressure after home meds    Imaging Studies:    CT Head Without Contrast    (Results Pending)   X-Ray Shunt Series    (Results Pending)   CT Cervical Spine Without Contrast    (Results Pending)   CT Lumbar Spine Without Contrast    (Results Pending)   CT Pelvis Without Contrast    (Results Pending)       Medications Given:  Medications   amLODIPine tablet 10 mg (10 mg Oral Given 12/2/22 0603)   lisinopriL tablet 40 mg (40 mg Oral Given 12/2/22 0604)   metoprolol succinate (TOPROL-XL) 24 hr tablet 100 mg (100 mg Oral Given 12/2/22 0604)   acetaminophen tablet 1,000 mg (1,000 mg Oral Given 12/2/22 0441)   ondansetron injection 4 mg (4 mg Intravenous Given 12/2/22 0604)       ED Course:  CBC and CMP are within normal limits.  UA is negative for UTI.  CT scans are unremarkable.  On reassessment, patient is at her baseline mental status.  She is alert and nontoxic appearing.  Patient's daughter called and informed of lab and imaging results.  Patient discharged home with return precautions.  Follow up with PCP advised.  All questions answered.    Diagnostic Impression:  1. Fall in elderly patient    2. Acute midline low back pain without sciatica    3.   (ventriculoperitoneal) shunt status        Dispo:  Discharge    I have discussed and counseled the patient and/or family regarding exam, results, diagnosis, treatment, and plan. Patient and/or family understands the plan and is in agreement, verbalized understanding, and had questions answered.      ______________________  Jeanie Jacques MD   Emergency Medicine Resident  12/2/2022          Jeanie Jacques MD  Resident  12/02/22 0957

## 2022-12-02 NOTE — ED PROVIDER NOTES
Encounter Date: 12/2/2022       History     Chief Complaint   Patient presents with    Fall     Slip and trip in bathroom. Pt denies hitting head or LOC, but pt has dementia. Family reports that patient alert but confused immediately after, returned to baseline. On plavix. Has  shunt. C/o back pain     87 y.o. female with dementia, HTN, GERD, peripheral vascular disease, history of TIA, DM, HLD presents for unwitnessed fall that occurred just prior to arrival.  Per daughter, patient fell while in the bathroom.  The fall was unwitnessed.  Patient was confused and less responsive immediately following the fall, but has returned to her mental status baseline which is oriented to person only.  Patient reports some lower back pain but denies any other symptoms.    The history is provided by the patient and medical records. The history is limited by the condition of the patient.   Review of patient's allergies indicates:   Allergen Reactions    Baclofen      AMS and distorted dremas     Past Medical History:   Diagnosis Date    Age-related osteoporosis with current pathological fracture with routine healing     Allergic rhinitis     Anemia     Carotid atherosclerosis, bilateral     Chronic gout of multiple sites     Chronic low back pain with bilateral sciatica     Closed impacted fracture of left femoral neck with routine healing s/p percutaneous screw fixation on 11/21/2020 11/20/2020    DDD (degenerative disc disease), lumbar 8/17/2020    Diverticulosis     Essential hypertension 11/25/2019    Facet arthropathy     GERD (gastroesophageal reflux disease) 11/25/2019    Hearing loss     History of transient ischemic attack (TIA) 11/25/2019    IBS (irritable bowel syndrome)     Insomnia     Lumbar radiculopathy 8/17/2020    Lumbar spondylosis 8/17/2020    Lumbar stenosis     Meningioma     Paroxysmal SVT (supraventricular tachycardia)     Primary open angle glaucoma (POAG) of both eyes     Pure hypercholesterolemia  11/25/2019    PVD (peripheral vascular disease)     30% prox stenosis of celiac trunk and 20% stnosis Prox SMA - per Abd/SMA Arteriogram 4/3/08    Recurrent major depressive disorder, in full remission     Type 2 diabetes mellitus without complication, without long-term current use of insulin 11/25/2019    Vitamin D deficiency 11/23/2020     Past Surgical History:   Procedure Laterality Date    CATARACT EXTRACTION      CAUDAL EPIDURAL STEROID INJECTION N/A 8/27/2020    Procedure: Injection-steroid-epidural-caudal with catheter;  Surgeon: Johnathan Gonzalez Jr., MD;  [facility]: [facility];  Service: Pain Management;  Laterality: N/A;    EPIDURAL STEROID INJECTION INTO LUMBAR SPINE      LUMBAR LAMINECTOMY  2009    PERCUTANEOUS PINNING OF HIP Left 11/21/2020    Procedure: Left- Percutaneous Screws- Large  arm Clock side;  Surgeon: Leandro White MD;  Location: [facility] OR [address] [place] [address];  Service: Orthopedics;  Laterality: Left;    TOTAL ABDOMINAL HYSTERECTOMY W/ BILATERAL SALPINGOOPHORECTOMY       Family History   Problem Relation Age of Onset    Hypertension Mother     Diabetes Mother     Hypertension Father     Stroke Father         60's    Hypertension Daughter     Hypothyroidism Daughter     Hypertension Son     Hyperlipidemia Son     Multiple sclerosis Son      Social History     Tobacco Use    Smoking status: Never    Smokeless tobacco: Never   Substance Use Topics    Alcohol use: Yes     Comment: socially    Drug use: Never     Review of Systems   Unable to perform ROS: Dementia     Physical Exam     Initial Vitals [12/02/22 0159]   BP Pulse Resp Temp SpO2   (!) 208/102 70 18 97.9 °F (36.6 °C) 97 %      MAP       --         Physical Exam    Nursing note and vitals reviewed.  Constitutional:   Alert, frail appearing, normal work of breathing   HENT:   Head: Normocephalic and atraumatic.   Mouth/Throat: Oropharynx is clear and moist.   Eyes: Conjunctivae and EOM are normal.   Cardiovascular:  Normal rate, regular  rhythm and intact distal pulses.           Pulmonary/Chest: Breath sounds normal. No stridor. No respiratory distress.   Abdominal: Abdomen is soft. She exhibits no distension. There is no abdominal tenderness.   Musculoskeletal:      Comments: No tenderness, step-offs, deformities, or tenderness to the C or T-spine  Moderate tenderness to the lumbar spine  Normal range of motion of all extremities without pain  No bony tenderness or deformity of the trunk or extremities  No open wounds or discolorations       Neurological: She is alert. She has normal strength. No cranial nerve deficit.   Oriented to person only   Skin: Skin is warm and dry. No rash noted.   Psychiatric: She has a normal mood and affect. Thought content normal.       ED Course   Procedures  Labs Reviewed   CBC W/ AUTO DIFFERENTIAL - Abnormal; Notable for the following components:       Result Value    MCV 99 (*)     MCH 33.4 (*)     RDW 15.9 (*)     All other components within normal limits   COMPREHENSIVE METABOLIC PANEL   URINALYSIS, REFLEX TO URINE CULTURE          Imaging Results              X-Ray Shunt Series (In process)                      CT Pelvis Without Contrast (In process)                      CT Lumbar Spine Without Contrast (In process)                      CT Cervical Spine Without Contrast (In process)  Result time 12/02/22 06:17:49                     CT Head Without Contrast (In process)                      Medications   amLODIPine tablet 10 mg (10 mg Oral Given 12/2/22 0603)   lisinopriL tablet 40 mg (40 mg Oral Given 12/2/22 0604)   metoprolol succinate (TOPROL-XL) 24 hr tablet 100 mg (100 mg Oral Given 12/2/22 0604)   acetaminophen tablet 1,000 mg (1,000 mg Oral Given 12/2/22 0441)   ondansetron injection 4 mg (4 mg Intravenous Given 12/2/22 0604)     Medical Decision Making:   History:   Old Medical Records: I decided to obtain old medical records.  Old Records Summarized: records from clinic visits and records from  previous admission(s).  Initial Assessment:   87 y.o. female with dementia, HTN, GERD, peripheral vascular disease, history of TIA, DM, HLD presents for unwitnessed fall that occurred just prior to arrival  Airway, breathing, circulation intact, pulses intact to all extremities. Hemodynamically stable.  Patient alert, at baseline confusion, without focal neurological deficits  Cause of injury is unclear, fall was unwitnessed, no evidence of LOC, seizure, syncope who responded to the room after she fell  Injuries on secondary assessment include lumbar spinal tenderness  Differentials include syncope, lumbar fracture, pelvic fracture, sacral fracture  Patient at baseline mental status on arrival to the ED  Clinical Tests:   Lab Tests: Ordered and Reviewed  Radiological Study: Ordered and Reviewed  Medical Tests: Ordered and Reviewed           ED Course as of 12/02/22 0632   Fri Dec 02, 2022   0631 Patient care handed off to oncoming team pending labs, imaging, and final dispo [OK]      ED Course User Index  [OK] Ariel Sheth MD                 Clinical Impression:   Final diagnoses:  [R29.6] Fall in elderly patient (Primary)  [M54.50] Acute midline low back pain without sciatica  [Z98.2]  (ventriculoperitoneal) shunt status               Ariel Sheth MD  Resident  12/02/22 0632

## 2022-12-02 NOTE — DISCHARGE INSTRUCTIONS
Diagnosis:   1. Fall in elderly patient    2. Acute midline low back pain without sciatica    3.  (ventriculoperitoneal) shunt status      Home Care Instructions:  - Medications: Continue taking your home medications as prescribed    Follow-Up Plan:  - Follow-up with: Primary care doctor within 3 days  - Additional testing and/or evaluation will be directed by your primary doctor    Return to the Emergency Department for symptoms including but not limited to: worsening symptoms, severe back pain, shortness of breath or chest pain, vomiting with inability to hold down fluids, blood in vomit or poop, fevers greater than 100.4°F, passing out/fainting/unconsciousness, or other concerning symptoms.     If you do not have a primary care doctor, you may contact the one listed on your discharge paperwork or you may also call the [facility] Appointment Desk at [phone] to schedule an appointment and establish care with one. It is important to your health that you have a primary care doctor.    Please take all medications as directed. All medications may potentially have side-effects and it is impossible to predict which medications may give you side-effects or what side-effects (if any) they will give you. If you feel that you are having a negative effect or side-effect of any medication you should immediately stop taking them and seek medical attention. If you feel that you are having a life-threatening reaction call [phone].

## 2023-01-09 ENCOUNTER — HOSPITAL ENCOUNTER (OUTPATIENT)
Facility: HOSPITAL | Age: 88
Discharge: HOME HEALTH | End: 2023-01-12
Attending: EMERGENCY MEDICINE | Admitting: EMERGENCY MEDICINE
Payer: MEDICARE

## 2023-01-09 DIAGNOSIS — D32.9 MENINGIOMA: ICD-10-CM

## 2023-01-09 DIAGNOSIS — I67.9 CEREBROVASCULAR DISEASE: ICD-10-CM

## 2023-01-09 DIAGNOSIS — G91.2 NPH (NORMAL PRESSURE HYDROCEPHALUS): ICD-10-CM

## 2023-01-09 DIAGNOSIS — G93.41 ACUTE METABOLIC ENCEPHALOPATHY: ICD-10-CM

## 2023-01-09 DIAGNOSIS — R41.82 ALTERED MENTAL STATUS: Primary | ICD-10-CM

## 2023-01-09 DIAGNOSIS — R41.82 ALTERED MENTAL STATUS, UNSPECIFIED ALTERED MENTAL STATUS TYPE: ICD-10-CM

## 2023-01-09 DIAGNOSIS — G91.2 NORMAL PRESSURE HYDROCEPHALUS: ICD-10-CM

## 2023-01-09 DIAGNOSIS — Z92.89 HISTORY OF VENTRICULOPERITONEAL SHUNTING: ICD-10-CM

## 2023-01-09 LAB
ALBUMIN SERPL BCP-MCNC: 4.2 G/DL (ref 3.5–5.2)
ALP SERPL-CCNC: 74 U/L (ref 55–135)
ALT SERPL W/O P-5'-P-CCNC: 12 U/L (ref 10–44)
ANION GAP SERPL CALC-SCNC: 14 MMOL/L (ref 8–16)
AST SERPL-CCNC: 24 U/L (ref 10–40)
BASOPHILS # BLD AUTO: 0.03 K/UL (ref 0–0.2)
BASOPHILS NFR BLD: 0.2 % (ref 0–1.9)
BILIRUB SERPL-MCNC: 0.7 MG/DL (ref 0.1–1)
BUN SERPL-MCNC: 21 MG/DL (ref 8–23)
CALCIUM SERPL-MCNC: 10.9 MG/DL (ref 8.7–10.5)
CHLORIDE SERPL-SCNC: 100 MMOL/L (ref 95–110)
CO2 SERPL-SCNC: 21 MMOL/L (ref 23–29)
CREAT SERPL-MCNC: 1.2 MG/DL (ref 0.5–1.4)
CREAT SERPL-MCNC: 1.4 MG/DL (ref 0.5–1.4)
CTP QC/QA: YES
DIFFERENTIAL METHOD: ABNORMAL
EOSINOPHIL # BLD AUTO: 0 K/UL (ref 0–0.5)
EOSINOPHIL NFR BLD: 0.1 % (ref 0–8)
ERYTHROCYTE [DISTWIDTH] IN BLOOD BY AUTOMATED COUNT: 14.2 % (ref 11.5–14.5)
EST. GFR  (NO RACE VARIABLE): 37 ML/MIN/1.73 M^2
GLUCOSE SERPL-MCNC: 198 MG/DL (ref 70–110)
HCO3 UR-SCNC: 27.2 MMOL/L (ref 24–28)
HCT VFR BLD AUTO: 44.1 % (ref 37–48.5)
HCT VFR BLD CALC: 49 %PCV (ref 36–54)
HGB BLD-MCNC: 15.8 G/DL (ref 12–16)
HGB BLD-MCNC: 17 G/DL
IMM GRANULOCYTES # BLD AUTO: 0.09 K/UL (ref 0–0.04)
IMM GRANULOCYTES NFR BLD AUTO: 0.6 % (ref 0–0.5)
LYMPHOCYTES # BLD AUTO: 1.1 K/UL (ref 1–4.8)
LYMPHOCYTES NFR BLD: 7 % (ref 18–48)
MAGNESIUM SERPL-MCNC: 1.8 MG/DL (ref 1.6–2.6)
MCH RBC QN AUTO: 34.8 PG (ref 27–31)
MCHC RBC AUTO-ENTMCNC: 35.8 G/DL (ref 32–36)
MCV RBC AUTO: 97 FL (ref 82–98)
MONOCYTES # BLD AUTO: 1 K/UL (ref 0.3–1)
MONOCYTES NFR BLD: 6.4 % (ref 4–15)
NEUTROPHILS # BLD AUTO: 13.2 K/UL (ref 1.8–7.7)
NEUTROPHILS NFR BLD: 85.7 % (ref 38–73)
NRBC BLD-RTO: 0 /100 WBC
PCO2 BLDA: 42.9 MMHG (ref 35–45)
PH SMN: 7.41 [PH] (ref 7.35–7.45)
PLATELET # BLD AUTO: 249 K/UL (ref 150–450)
PMV BLD AUTO: 10.8 FL (ref 9.2–12.9)
PO2 BLDA: 15 MMHG (ref 40–60)
POC BE: 3 MMOL/L
POC IONIZED CALCIUM: 1.29 MMOL/L (ref 1.06–1.42)
POC MOLECULAR INFLUENZA A AGN: NEGATIVE
POC MOLECULAR INFLUENZA B AGN: NEGATIVE
POC SATURATED O2: 19 % (ref 95–100)
POC TCO2: 29 MMOL/L (ref 24–29)
POCT GLUCOSE: 248 MG/DL (ref 70–110)
POTASSIUM BLD-SCNC: 5.1 MMOL/L (ref 3.5–5.1)
POTASSIUM SERPL-SCNC: 5.1 MMOL/L (ref 3.5–5.1)
PROT SERPL-MCNC: 9 G/DL (ref 6–8.4)
RBC # BLD AUTO: 4.54 M/UL (ref 4–5.4)
SAMPLE: ABNORMAL
SAMPLE: NORMAL
SODIUM BLD-SCNC: 137 MMOL/L (ref 136–145)
SODIUM SERPL-SCNC: 135 MMOL/L (ref 136–145)
TROPONIN I SERPL DL<=0.01 NG/ML-MCNC: <0.006 NG/ML (ref 0–0.03)
TSH SERPL DL<=0.005 MIU/L-ACNC: 1.32 UIU/ML (ref 0.4–4)
WBC # BLD AUTO: 15.43 K/UL (ref 3.9–12.7)

## 2023-01-09 PROCEDURE — 82565 ASSAY OF CREATININE: CPT

## 2023-01-09 PROCEDURE — 93005 ELECTROCARDIOGRAM TRACING: CPT

## 2023-01-09 PROCEDURE — 93010 ELECTROCARDIOGRAM REPORT: CPT | Mod: ,,, | Performed by: INTERNAL MEDICINE

## 2023-01-09 PROCEDURE — 80053 COMPREHEN METABOLIC PANEL: CPT | Performed by: EMERGENCY MEDICINE

## 2023-01-09 PROCEDURE — 85025 COMPLETE CBC W/AUTO DIFF WBC: CPT | Performed by: EMERGENCY MEDICINE

## 2023-01-09 PROCEDURE — 84443 ASSAY THYROID STIM HORMONE: CPT | Performed by: EMERGENCY MEDICINE

## 2023-01-09 PROCEDURE — 84132 ASSAY OF SERUM POTASSIUM: CPT | Mod: 91

## 2023-01-09 PROCEDURE — 25000003 PHARM REV CODE 250: Performed by: EMERGENCY MEDICINE

## 2023-01-09 PROCEDURE — P9612 CATHETERIZE FOR URINE SPEC: HCPCS

## 2023-01-09 PROCEDURE — 25500020 PHARM REV CODE 255: Performed by: EMERGENCY MEDICINE

## 2023-01-09 PROCEDURE — 82330 ASSAY OF CALCIUM: CPT

## 2023-01-09 PROCEDURE — 82803 BLOOD GASES ANY COMBINATION: CPT

## 2023-01-09 PROCEDURE — 87502 INFLUENZA DNA AMP PROBE: CPT

## 2023-01-09 PROCEDURE — 83735 ASSAY OF MAGNESIUM: CPT | Performed by: EMERGENCY MEDICINE

## 2023-01-09 PROCEDURE — 84484 ASSAY OF TROPONIN QUANT: CPT | Performed by: EMERGENCY MEDICINE

## 2023-01-09 PROCEDURE — 96360 HYDRATION IV INFUSION INIT: CPT | Mod: 59

## 2023-01-09 PROCEDURE — 99900035 HC TECH TIME PER 15 MIN (STAT)

## 2023-01-09 PROCEDURE — 82962 GLUCOSE BLOOD TEST: CPT

## 2023-01-09 PROCEDURE — 82800 BLOOD PH: CPT

## 2023-01-09 PROCEDURE — 85014 HEMATOCRIT: CPT

## 2023-01-09 PROCEDURE — 93010 EKG 12-LEAD: ICD-10-PCS | Mod: ,,, | Performed by: INTERNAL MEDICINE

## 2023-01-09 PROCEDURE — 99285 EMERGENCY DEPT VISIT HI MDM: CPT | Mod: 25

## 2023-01-09 RX ADMIN — SODIUM CHLORIDE 500 ML: 0.9 INJECTION, SOLUTION INTRAVENOUS at 11:01

## 2023-01-09 RX ADMIN — IOHEXOL 100 ML: 350 INJECTION, SOLUTION INTRAVENOUS at 10:01

## 2023-01-10 PROBLEM — Z92.89 HISTORY OF VENTRICULOPERITONEAL SHUNTING: Status: ACTIVE | Noted: 2023-01-10

## 2023-01-10 PROBLEM — K59.00 CONSTIPATION: Status: ACTIVE | Noted: 2023-01-10

## 2023-01-10 PROBLEM — R41.82 ALTERED MENTAL STATUS: Status: ACTIVE | Noted: 2023-01-10

## 2023-01-10 LAB
BILIRUB UR QL STRIP: NEGATIVE
CLARITY UR: CLEAR
COLOR UR: YELLOW
ESTIMATED AVG GLUCOSE: 131 MG/DL (ref 68–131)
GLUCOSE UR QL STRIP: NEGATIVE
HBA1C MFR BLD: 6.2 % (ref 4–5.6)
HCV AB SERPL QL IA: NORMAL
HGB UR QL STRIP: NEGATIVE
HIV 1+2 AB+HIV1 P24 AG SERPL QL IA: NORMAL
KETONES UR QL STRIP: NEGATIVE
LEUKOCYTE ESTERASE UR QL STRIP: NEGATIVE
NITRITE UR QL STRIP: NEGATIVE
PH UR STRIP: 6 [PH] (ref 5–8)
POCT GLUCOSE: 118 MG/DL (ref 70–110)
POCT GLUCOSE: 120 MG/DL (ref 70–110)
POCT GLUCOSE: 129 MG/DL (ref 70–110)
PROT UR QL STRIP: ABNORMAL
SARS-COV-2 RDRP RESP QL NAA+PROBE: NEGATIVE
SP GR UR STRIP: <=1.005 (ref 1–1.03)
URN SPEC COLLECT METH UR: ABNORMAL
UROBILINOGEN UR STRIP-ACNC: 1 EU/DL

## 2023-01-10 PROCEDURE — G0378 HOSPITAL OBSERVATION PER HR: HCPCS

## 2023-01-10 PROCEDURE — 99223 1ST HOSP IP/OBS HIGH 75: CPT | Mod: ,,,

## 2023-01-10 PROCEDURE — 62252 PR REPROGRAMMING,PROGRAMMABLE CSF SHUNT: ICD-10-PCS | Mod: 26,,,

## 2023-01-10 PROCEDURE — 25000003 PHARM REV CODE 250: Performed by: STUDENT IN AN ORGANIZED HEALTH CARE EDUCATION/TRAINING PROGRAM

## 2023-01-10 PROCEDURE — 83036 HEMOGLOBIN GLYCOSYLATED A1C: CPT | Performed by: STUDENT IN AN ORGANIZED HEALTH CARE EDUCATION/TRAINING PROGRAM

## 2023-01-10 PROCEDURE — 99223 1ST HOSP IP/OBS HIGH 75: CPT | Mod: ,,, | Performed by: STUDENT IN AN ORGANIZED HEALTH CARE EDUCATION/TRAINING PROGRAM

## 2023-01-10 PROCEDURE — 82962 GLUCOSE BLOOD TEST: CPT | Mod: 91

## 2023-01-10 PROCEDURE — 86803 HEPATITIS C AB TEST: CPT | Performed by: PHYSICIAN ASSISTANT

## 2023-01-10 PROCEDURE — 63600175 PHARM REV CODE 636 W HCPCS: Performed by: STUDENT IN AN ORGANIZED HEALTH CARE EDUCATION/TRAINING PROGRAM

## 2023-01-10 PROCEDURE — U0002 COVID-19 LAB TEST NON-CDC: HCPCS | Performed by: EMERGENCY MEDICINE

## 2023-01-10 PROCEDURE — 87040 BLOOD CULTURE FOR BACTERIA: CPT | Mod: 59 | Performed by: STUDENT IN AN ORGANIZED HEALTH CARE EDUCATION/TRAINING PROGRAM

## 2023-01-10 PROCEDURE — 62252 CSF SHUNT REPROGRAM: CPT | Mod: 26,,,

## 2023-01-10 PROCEDURE — 99223 PR INITIAL HOSPITAL CARE,LEVL III: ICD-10-PCS | Mod: ,,,

## 2023-01-10 PROCEDURE — 99223 PR INITIAL HOSPITAL CARE,LEVL III: ICD-10-PCS | Mod: ,,, | Performed by: STUDENT IN AN ORGANIZED HEALTH CARE EDUCATION/TRAINING PROGRAM

## 2023-01-10 PROCEDURE — 87389 HIV-1 AG W/HIV-1&-2 AB AG IA: CPT | Performed by: PHYSICIAN ASSISTANT

## 2023-01-10 PROCEDURE — 96372 THER/PROPH/DIAG INJ SC/IM: CPT | Performed by: STUDENT IN AN ORGANIZED HEALTH CARE EDUCATION/TRAINING PROGRAM

## 2023-01-10 PROCEDURE — 81003 URINALYSIS AUTO W/O SCOPE: CPT | Performed by: EMERGENCY MEDICINE

## 2023-01-10 RX ORDER — POLYETHYLENE GLYCOL 3350 17 G/17G
17 POWDER, FOR SOLUTION ORAL DAILY PRN
COMMUNITY

## 2023-01-10 RX ORDER — SODIUM CHLORIDE 0.9 % (FLUSH) 0.9 %
10 SYRINGE (ML) INJECTION
Status: DISCONTINUED | OUTPATIENT
Start: 2023-01-10 | End: 2023-01-12 | Stop reason: HOSPADM

## 2023-01-10 RX ORDER — VENLAFAXINE HYDROCHLORIDE 75 MG/1
150 CAPSULE, EXTENDED RELEASE ORAL NIGHTLY
Status: DISCONTINUED | OUTPATIENT
Start: 2023-01-10 | End: 2023-01-12 | Stop reason: HOSPADM

## 2023-01-10 RX ORDER — ACETAMINOPHEN 325 MG/1
650 TABLET ORAL EVERY 8 HOURS PRN
Status: DISCONTINUED | OUTPATIENT
Start: 2023-01-10 | End: 2023-01-12 | Stop reason: HOSPADM

## 2023-01-10 RX ORDER — POLYETHYLENE GLYCOL 3350 17 G/17G
17 POWDER, FOR SOLUTION ORAL DAILY PRN
Status: DISCONTINUED | OUTPATIENT
Start: 2023-01-10 | End: 2023-01-12 | Stop reason: HOSPADM

## 2023-01-10 RX ORDER — LISINOPRIL 20 MG/1
40 TABLET ORAL DAILY
Status: DISCONTINUED | OUTPATIENT
Start: 2023-01-10 | End: 2023-01-10

## 2023-01-10 RX ORDER — CLOPIDOGREL BISULFATE 75 MG/1
75 TABLET ORAL DAILY
Status: DISCONTINUED | OUTPATIENT
Start: 2023-01-11 | End: 2023-01-12 | Stop reason: HOSPADM

## 2023-01-10 RX ORDER — TALC
6 POWDER (GRAM) TOPICAL NIGHTLY PRN
Status: DISCONTINUED | OUTPATIENT
Start: 2023-01-10 | End: 2023-01-12 | Stop reason: HOSPADM

## 2023-01-10 RX ORDER — GLUCAGON 1 MG
1 KIT INJECTION
Status: DISCONTINUED | OUTPATIENT
Start: 2023-01-10 | End: 2023-01-12 | Stop reason: HOSPADM

## 2023-01-10 RX ORDER — SODIUM CHLORIDE 0.9 % (FLUSH) 0.9 %
10 SYRINGE (ML) INJECTION EVERY 12 HOURS PRN
Status: DISCONTINUED | OUTPATIENT
Start: 2023-01-10 | End: 2023-01-12 | Stop reason: HOSPADM

## 2023-01-10 RX ORDER — INSULIN ASPART 100 [IU]/ML
0-5 INJECTION, SOLUTION INTRAVENOUS; SUBCUTANEOUS
Status: DISCONTINUED | OUTPATIENT
Start: 2023-01-10 | End: 2023-01-12 | Stop reason: HOSPADM

## 2023-01-10 RX ORDER — ONDANSETRON 8 MG/1
8 TABLET, ORALLY DISINTEGRATING ORAL EVERY 8 HOURS PRN
Status: DISCONTINUED | OUTPATIENT
Start: 2023-01-10 | End: 2023-01-12 | Stop reason: HOSPADM

## 2023-01-10 RX ORDER — ATORVASTATIN CALCIUM 10 MG/1
10 TABLET, FILM COATED ORAL DAILY
Status: DISCONTINUED | OUTPATIENT
Start: 2023-01-11 | End: 2023-01-12 | Stop reason: HOSPADM

## 2023-01-10 RX ORDER — METOPROLOL TARTRATE 50 MG/1
50 TABLET ORAL 2 TIMES DAILY
Status: DISCONTINUED | OUTPATIENT
Start: 2023-01-10 | End: 2023-01-12 | Stop reason: HOSPADM

## 2023-01-10 RX ORDER — SENNOSIDES 8.6 MG/1
8.6 TABLET ORAL DAILY
Status: DISCONTINUED | OUTPATIENT
Start: 2023-01-10 | End: 2023-01-11

## 2023-01-10 RX ORDER — TALC
6 POWDER (GRAM) TOPICAL NIGHTLY PRN
Status: DISCONTINUED | OUTPATIENT
Start: 2023-01-10 | End: 2023-01-10

## 2023-01-10 RX ORDER — ALLOPURINOL 300 MG/1
300 TABLET ORAL DAILY
Status: DISCONTINUED | OUTPATIENT
Start: 2023-01-11 | End: 2023-01-12 | Stop reason: HOSPADM

## 2023-01-10 RX ORDER — IBUPROFEN 200 MG
24 TABLET ORAL
Status: DISCONTINUED | OUTPATIENT
Start: 2023-01-10 | End: 2023-01-12 | Stop reason: HOSPADM

## 2023-01-10 RX ORDER — PROCHLORPERAZINE EDISYLATE 5 MG/ML
5 INJECTION INTRAMUSCULAR; INTRAVENOUS EVERY 6 HOURS PRN
Status: DISCONTINUED | OUTPATIENT
Start: 2023-01-10 | End: 2023-01-12 | Stop reason: HOSPADM

## 2023-01-10 RX ORDER — AMLODIPINE BESYLATE 10 MG/1
10 TABLET ORAL DAILY
Status: DISCONTINUED | OUTPATIENT
Start: 2023-01-10 | End: 2023-01-12 | Stop reason: HOSPADM

## 2023-01-10 RX ORDER — HYDRALAZINE HYDROCHLORIDE 25 MG/1
25 TABLET, FILM COATED ORAL EVERY 8 HOURS PRN
Status: DISCONTINUED | OUTPATIENT
Start: 2023-01-10 | End: 2023-01-12 | Stop reason: HOSPADM

## 2023-01-10 RX ORDER — IBUPROFEN 200 MG
16 TABLET ORAL
Status: DISCONTINUED | OUTPATIENT
Start: 2023-01-10 | End: 2023-01-12 | Stop reason: HOSPADM

## 2023-01-10 RX ORDER — ENOXAPARIN SODIUM 100 MG/ML
40 INJECTION SUBCUTANEOUS EVERY 24 HOURS
Status: DISCONTINUED | OUTPATIENT
Start: 2023-01-10 | End: 2023-01-12 | Stop reason: HOSPADM

## 2023-01-10 RX ORDER — POLYETHYLENE GLYCOL 3350 17 G/17G
17 POWDER, FOR SOLUTION ORAL DAILY
Status: DISCONTINUED | OUTPATIENT
Start: 2023-01-10 | End: 2023-01-12 | Stop reason: HOSPADM

## 2023-01-10 RX ORDER — NALOXONE HCL 0.4 MG/ML
0.02 VIAL (ML) INJECTION
Status: DISCONTINUED | OUTPATIENT
Start: 2023-01-10 | End: 2023-01-12 | Stop reason: HOSPADM

## 2023-01-10 RX ADMIN — SENNOSIDES 8.6 MG: 8.6 TABLET, FILM COATED ORAL at 02:01

## 2023-01-10 RX ADMIN — METOPROLOL TARTRATE 50 MG: 50 TABLET, FILM COATED ORAL at 08:01

## 2023-01-10 RX ADMIN — ENOXAPARIN SODIUM 40 MG: 40 INJECTION SUBCUTANEOUS at 04:01

## 2023-01-10 RX ADMIN — POLYETHYLENE GLYCOL 3350 17 G: 17 POWDER, FOR SOLUTION ORAL at 02:01

## 2023-01-10 RX ADMIN — VENLAFAXINE HYDROCHLORIDE 150 MG: 75 CAPSULE, EXTENDED RELEASE ORAL at 08:01

## 2023-01-10 RX ADMIN — HYDRALAZINE HYDROCHLORIDE 25 MG: 25 TABLET, FILM COATED ORAL at 07:01

## 2023-01-10 RX ADMIN — AMLODIPINE BESYLATE 10 MG: 10 TABLET ORAL at 06:01

## 2023-01-10 NOTE — ED TRIAGE NOTES
"Received report from aguilar.  Assumed pt care. Pt resting in low fowlers position. Easily awakened to verbal stimuli. Denies pain or needs at this time. Pt smiling, Stated "I feel better than I thought I would."  Blankets on. Turned lights and tv on to assist with orientation.    Patient identifiers verified and correct for sonia bullard (pt stated first name/ checked arm band)  LOC: The patient is alert/ disoriented. Oriented to name. Doesn't answer all questions appropriately. Calm, cooperative  APPEARANCE: Patient appears comfortable and in no acute distress, patient is clean and well groomed.  SKIN: The skin is warm and dry, color consistent with ethnicity, patient has normal skin turgor and moist mucus membranes, skin intact  MUSCULOSKELETAL: Patient moving all extremities spontaneously, no swelling noted.  RESPIRATORY: Airway is open and patent, respirations are spontaneous, patient has a normal effort and rate, no accessory muscle use noted, pt remains on continuous pulse ox with O2 sats noted at 96% on room air.  CARDIAC: Pt remains on cardiac monitor. Patient has a normal rate and regular rhythm, no edema noted, no c/o chest pain  GASTRO: Soft and non tender to palpation, no distention noted, normoactive bowel sounds present in all four quadrants. No c/o n/v/d/ abd pain  GENITOURINARY: external female catheter in place at this time   NEURO: Pt opens eyes spontaneously, known AMS, does follows commands, facial expression appears symmetrical,  purposeful motor response noted. Unable to assess  due to pt not following commands. Pt calm, cooperative.   "

## 2023-01-10 NOTE — ED NOTES
Pt arrives to Ed from transfer from other facility due to  malfunctions. Pt AMS, oriented to self and intermittently place, disoriented to situation.pt has left sided mouth assymetrical when smiling, all other facial expression symmetrical. Pt placed on BP cycling, pulse ox, and cardiac monitoring.

## 2023-01-10 NOTE — CARE UPDATE
Procedure:  Shunt reprogramming  Indication: Hydrocephalus        The Humacytem  was placed over the shunt valve, and the setting was reset to 100 mm of H2O. The patient tolerated this well, with no complications. X-ray ordered to confirm setting.      Layla Tsai PA-C  Neurosurgery   [facility]

## 2023-01-10 NOTE — ASSESSMENT & PLAN NOTE
Farheen Soares is a 87 y.o. female with h/o hearing loss, type II DM, DDD, meningioma, TIA, and NPH who is s/p elective  shunt placement with Dr. Espinoza on 8/11/22. Hakim valve set at 120. She presents with AMS.    - Recommend admit to medicine for AMS/infectious workup.  - Q4h neurochecks.  - CTH showing grossly stable configuration of ventricles.  - X-ray shunt series showing no discontinuities or kinks in shunt catheter.   -  shunt adjusted from 120 to 100. Will f/u progress. X-ray Skull to confirm setting.  - No neurosurgical intervention is indicated at this time.   - Please contact NSGY with any decline in neurological exam.    D/w Dr. Espinoza

## 2023-01-10 NOTE — ED PROVIDER NOTES
"Encounter Date: 1/9/2023       History     Chief Complaint   Patient presents with    Altered Mental Status     Pt presents via no ems for AMS. Per daughter pt is more confused than normal. Pt has hx of dementia and is confused at baseline per ems. No other complaints reported at this time.     Transfer     Tx for " shunt malfunction"     Farheen Soares is an 87-year-old female with a past medical history of normal pressure hydrocephalus s/p  shunt, bilateral carotid atherosclerosis, meningioma, TIA, hypertension, hyperlipidemia who presents to the emergency department via transfer from outside hospital where she presented for altered mental status she was felt to be more confused than normal by her daughter.  She is not with her daughter here and history is difficult to obtain.  Patient responds to some questions but not others.  Denies any pain.    The history is provided by the patient. The history is limited by the condition of the patient. No  was used.   Review of patient's allergies indicates:   Allergen Reactions    Baclofen      AMS and distorted dremas     Past Medical History:   Diagnosis Date    Age-related osteoporosis with current pathological fracture with routine healing     Allergic rhinitis     Anemia     Carotid atherosclerosis, bilateral     Chronic gout of multiple sites     Chronic low back pain with bilateral sciatica     Closed impacted fracture of left femoral neck with routine healing s/p percutaneous screw fixation on 11/21/2020 11/20/2020    DDD (degenerative disc disease), lumbar 8/17/2020    Diverticulosis     Essential hypertension 11/25/2019    Facet arthropathy     GERD (gastroesophageal reflux disease) 11/25/2019    Hearing loss     History of transient ischemic attack (TIA) 11/25/2019    IBS (irritable bowel syndrome)     Insomnia     Lumbar radiculopathy 8/17/2020    Lumbar spondylosis 8/17/2020    Lumbar stenosis     Meningioma     Paroxysmal SVT " (supraventricular tachycardia)     Primary open angle glaucoma (POAG) of both eyes     Pure hypercholesterolemia 11/25/2019    PVD (peripheral vascular disease)     30% prox stenosis of celiac trunk and 20% stnosis Prox SMA - per Abd/SMA Arteriogram 4/3/08    Recurrent major depressive disorder, in full remission     Type 2 diabetes mellitus without complication, without long-term current use of insulin 11/25/2019    Vitamin D deficiency 11/23/2020     Past Surgical History:   Procedure Laterality Date    CATARACT EXTRACTION      CAUDAL EPIDURAL STEROID INJECTION N/A 8/27/2020    Procedure: Injection-steroid-epidural-caudal with catheter;  Surgeon: Johnathan Gonzalez Jr., MD;  Location: [facility] [place];  Service: Pain Management;  Laterality: N/A;    EPIDURAL STEROID INJECTION INTO LUMBAR SPINE      LUMBAR LAMINECTOMY  2009    PERCUTANEOUS PINNING OF HIP Left 11/21/2020    Procedure: Left- Percutaneous Screws- Large  arm Clock side;  Surgeon: Leandro White MD;  Location: [facility] OR [address] [place] [address];  Service: Orthopedics;  Laterality: Left;    TOTAL ABDOMINAL HYSTERECTOMY W/ BILATERAL SALPINGOOPHORECTOMY       Family History   Problem Relation Age of Onset    Hypertension Mother     Diabetes Mother     Hypertension Father     Stroke Father         60's    Hypertension Daughter     Hypothyroidism Daughter     Hypertension Son     Hyperlipidemia Son     Multiple sclerosis Son      Social History     Tobacco Use    Smoking status: Never    Smokeless tobacco: Never   Substance Use Topics    Alcohol use: Yes     Comment: socially    Drug use: Never     Review of Systems    Physical Exam     Initial Vitals [01/09/23 2051]   BP Pulse Resp Temp SpO2   (!) 169/78 90 14 99.1 °F (37.3 °C) 98 %      MAP       --         Physical Exam    Nursing note and vitals reviewed.  Constitutional: She appears well-developed and well-nourished.   HENT:   Head: Normocephalic.   Right Ear: External ear normal.   Left Ear: External ear normal.  "  Mouth/Throat: Oropharynx is clear and moist.   Eyes: Conjunctivae are normal. Pupils are equal, round, and reactive to light. No scleral icterus.   Neck: Neck supple.   Cardiovascular:  Normal rate and regular rhythm.           Pulmonary/Chest: Breath sounds normal. No stridor. No respiratory distress. She has no wheezes.   Abdominal: Abdomen is soft. There is no abdominal tenderness. There is no rebound and no guarding.   Musculoskeletal:         General: No edema.      Cervical back: Neck supple.     Neurological: She is alert. No sensory deficit. GCS eye subscore is 4. GCS verbal subscore is 5. GCS motor subscore is 6.   Pleasant, slow to respond, oriented to name.  Can state that she is in the hospital but responds "[place]" when asked what city.  Moving all 4 extremities, follows commands   Skin: Skin is warm and dry. Capillary refill takes less than 2 seconds. No rash noted.       ED Course   Procedures  Labs Reviewed   CBC W/ AUTO DIFFERENTIAL - Abnormal; Notable for the following components:       Result Value    WBC 15.43 (*)     MCH 34.8 (*)     Immature Granulocytes 0.6 (*)     Gran # (ANC) 13.2 (*)     Immature Grans (Abs) 0.09 (*)     Gran % 85.7 (*)     Lymph % 7.0 (*)     All other components within normal limits   COMPREHENSIVE METABOLIC PANEL - Abnormal; Notable for the following components:    Sodium 135 (*)     CO2 21 (*)     Glucose 198 (*)     Calcium 10.9 (*)     Total Protein 9.0 (*)     eGFR 37 (*)     All other components within normal limits   URINALYSIS, REFLEX TO URINE CULTURE - Abnormal; Notable for the following components:    Specific Gravity, UA <=1.005 (*)     Protein, UA Trace (*)     All other components within normal limits    Narrative:     Specimen Source->Urine   POCT GLUCOSE - Abnormal; Notable for the following components:    POCT Glucose 248 (*)     All other components within normal limits   ISTAT PROCEDURE - Abnormal; Notable for the following components:    POC PO2 15 " (*)     POC SATURATED O2 19 (*)     All other components within normal limits   TROPONIN I   TSH   MAGNESIUM   SARS-COV-2 RNA AMPLIFICATION, QUAL   HIV 1 / 2 ANTIBODY   HEPATITIS C ANTIBODY   POCT INFLUENZA A/B MOLECULAR   POCT GLUCOSE, HAND-HELD DEVICE   ISTAT CREATININE          Imaging Results              X-Ray Shunt Series (Final result)  Result time 01/10/23 03:34:53      Final result by Yossi Xavier MD (01/10/23 03:34:53)                   Impression:      Ventriculoperitoneal shunt catheter is identified, and appears intact radiographically.      Electronically signed by: Yossi Xavier  Date:    01/10/2023  Time:    03:34               Narrative:    EXAMINATION:  XR SHUNT SERIES    CLINICAL HISTORY:   shunt, ataxia, confusion;    TECHNIQUE:  Multiple radiographs are submitted, evaluation of ventriculoperitoneal shunt catheter.    COMPARISON:  December 2, 2022    FINDINGS:  A ventriculoperitoneal shunt catheter is identified, coursing along the right-side of the neck, extending to the midline of the chest, extending inferiorly to the abdomen, extending to the lower lumbar region, looping, extending inferiorly into the pelvis and looping in the lower pelvis.  The ventriculoperitoneal shunt catheter appears intact throughout its visualized course.    Aortic atherosclerotic change noted.  The lungs demonstrate chronic change, appearance of diminished depth of inspiration and atelectatic change.  There is contrast density within the collecting structures of the genitourinary system and urinary bladder, consistent with excretion of contrast from recent CT examination.  Vascular calcifications are noted.  The bowel gas pattern is nonspecific.  The osseous structures demonstrate prominent chronic change with diminished mineralization and degenerative change.  Multilevel extensive chronic change of the spine noted.                                       CT Abdomen Pelvis With Contrast (Final result)  Result  time 01/09/23 23:08:40      Final result by Anselmo Lyle MD (01/09/23 23:08:40)                   Impression:      Increased ground-glass opacity in the lung bases likely representing dependent atelectasis.    Diverticulosis without diverticulitis.    No acute findings evident the abdomen or pelvis.      Electronically signed by: Anselmo Lyle  Date:    01/09/2023  Time:    23:08               Narrative:    EXAMINATION:  CT ABDOMEN PELVIS WITH CONTRAST    CLINICAL HISTORY:  Abdominal pain, acute, nonlocalized;    TECHNIQUE:  Low dose axial images, sagittal and coronal reformations were obtained from the lung bases to the pubic symphysis following the IV administration of 100 mL of Omnipaque 350 .  Oral contrast was not given.    COMPARISON:  03/08/2020 significant spondylosis in the spine and vascular calcifications throughout the aorta and iliac system are noted.    FINDINGS:  Ground-glass opacities in the lung base are gravity dependent suggesting atelectasis. No consolidation is evident. Mild emphysematous changes present.    Minimal fluid is seen around the right lobe of the liver. Otherwise the liver demonstrates hepatic steatosis. No bile duct dilatation is evident. The gallbladder is contracted.  The spleen, pancreas, kidneys and adrenal glands appear normal with a cyst in the upper pole of the right kidney.  A 2nd cyst is noted in the posterior cortex of the left kidney.    Loops of large and small intestines appear unremarkable with minimal diverticulosis in the descending and sigmoid colon but no features of diverticulitis or obstruction.     shunt tube is present in the anterior abdominal and chest wall residing in the pelvis.  No free fluid collection or mass is seen near the tip.    The uterus is surgically absent. The urinary bladder appears unremarkable.  Orthopedic hardware in the left femur.                                       CT Head Without Contrast (Final result)  Result time  01/09/23 22:51:51      Final result by Hortensia Anderson MD (01/09/23 22:51:51)                   Impression:      Right trans parietal ventricular shunt catheter.  No acute intracranial abnormality detected.    Right tentorial meningioma.      Electronically signed by: Hortensia Anderson  Date:    01/09/2023  Time:    22:51               Narrative:    EXAMINATION:  CT OF THE HEAD WITHOUT    CLINICAL HISTORY:  Mental status change, unknown cause;    TECHNIQUE:  5 mm unenhanced axial images were obtained from the skull base to the vertex.    COMPARISON:  12/02/2022    FINDINGS:  There is a right transparietal ventricular shunt catheter with its tip in stable position.  There is stable cerebral atrophy and chronic small vessel ischemic changes.  The ventricular size is not increased.  There is no acute intracranial hemorrhage, territorial infarct or mass effect, or midline shift.  There is a right posterior fossa extra-axial calcified mass adjacent to the tentorium.  The visualized paranasal sinuses and mastoid air cells are clear.  Advanced vascular calcifications are seen involving the supraclinoid portions of the internal carotid and vertebral arteries.                                       X-Ray Chest 1 View (Final result)  Result time 01/09/23 22:41:12      Final result by Sushil Rosario MD (01/09/23 22:41:12)                   Impression:      No acute process.      Electronically signed by: Sushil Rosario MD  Date:    01/09/2023  Time:    22:41               Narrative:    EXAMINATION:  XR CHEST 1 VIEW    CLINICAL HISTORY:  Altered mental status, unspecified    TECHNIQUE:  Single frontal view of the chest was performed.    COMPARISON:  06/27/2022.    FINDINGS:  Monitoring EKG leads are present.  There is a partially visualized  shunt in place.  The trachea is unremarkable.  The cardiomediastinal silhouette is within normal limits.  There is no evidence of free air beneath the hemidiaphragms.  There are no pleural  effusions.  There is no evidence of a pneumothorax.  There is no evidence of pneumomediastinum.  No airspace opacity is present.  The osseous structures are unremarkable.                                       Medications   iohexoL (OMNIPAQUE 350) injection 100 mL (100 mLs Intravenous Given 1/9/23 2227)   sodium chloride 0.9% bolus 500 mL 500 mL (0 mLs Intravenous Stopped 1/10/23 0049)     Medical Decision Making:   History:   Old Medical Records: I decided to obtain old medical records.  Old Records Summarized: records from clinic visits, records from previous admission(s) and records from another hospital.  Differential Diagnosis:   Differential diagnoses considered include, but not limited to:  Normal pressure hydrocephalus   shunt malfunction  CVA/TIA  Encephalopathy    Clinical Tests:   Lab Tests: Ordered and Reviewed  Radiological Study: Ordered and Reviewed  ED Management:  Neurosurgery consulted and will evaluate the patient.     ED STAFF PHYSICIAN HAND-OFF NOTE:  Farheen Soares is a 87 y.o. female who presented during my shift for evaluation of AMS.   My ED shift ends 7:00 AM 1/10/2023.  During my care, Farheen Soares has received:    Dr. Webster and Dr. Jensen will continue the management of Farheen Soares, reassess symptoms, follow up pending neurosurgery recommendations, and final disposition.  I anticipate patient will be admitted for AMS.  ______________________  Charisse Guevara DO  Emergency Medicine Resident  7:00 AM 1/10/2023    Other:   I have discussed this case with another health care provider.       <> Summary of the Discussion: Discussed the patient with nurse surgery who agreed to come and evaluate her.           ED Course as of 01/10/23 0646   Mon Jan 09, 2023 2149 87-year-old female with history of normal pressure hydrocephalus, diabetes, hypertension, paroxysmal SVT, history of TIA who presents with altered mental status.    Initial differential includes sepsis,  "UTI, increased normal pressure hydrocephalus, stroke, hyperglycemia, DKA, dehydration. [RC]   2151 --  EKG Interpretation: I independently reviewed and interpreted EKG with 2149 hrs timestamp.   It shows normal sinus rhythm at 85 beats per minute, no STEMI, no significant acute ST/T abnormalities, normal intervals.  No acute change compared to prior tracing.  --   [RC]   2325 I independently reviewed and interpreted labs (CBC, CMP, troponin, point of care COVID and flu) which are notable for mild leukocytosis of 15.4k which is nonspecific, normal troponin, hyperglycemia without DKA.   I independently reviewed and interpreted CXR which shows no pneumothorax, no focal consolidation, no cardiomegaly, no acute process.    I independently reviewed and interpreted CT head which shows no acute intracranial bleeding, mass, skull fracture, or acute intracranial process, "right trans parietal ventricular shunt catheter. No acute intracranial abnormality detected. Right tentorial meningioma."     [RC]   2338 I independently reviewed and interpreted CT abdomen/pelvis, notable for no free air, fluid collection, no evidence of obstructive process, "increased ground-glass opacity in the lung bases likely representing dependent atelectasis. ... Diverticulosis without diverticulitis."  [RC]   2344 Patient's daughter clarifies that the patient was in her usual state of health until yesterday when her caretaker noted she appeared unsteady on her feet when transferring, she and her caretaker noted that the patient appears more confused than usual.  For example, she keeps reaching for items that are not there.  Her only other complaint was abdominal pain which she has complained of in the past when she has had UTIs.  Patient has chronic urinary incontinence, no obvious change in her urinary output or pattern.  Daughter denies any prior problems with the patient's shunt.   Patient currently on a purewick for urine sample collection.  " [RC]   Juan Manuelmeme Octavio 10, 2023   0151 Independently interpreted and reviewed patient's urinalysis which is unremarkable apart from low specific gravity and trace protein.  Given her history of  shunt placement, normal pressure hydrocephalus, meningioma, I ordered shunt series, placed transfer request.  [RC]   0159 Patient history, findings, results discussed with neurosurgeon Dr. Black.  He accepts the patient as ED-ED transfer.       =====================================  Critical Care:  40minutes total critical care time was personally spent by me, exclusive of procedures and separately billable time.   Critical care was necessary to treat or prevent imminent or life-threatening deterioration of the following conditions:  Altered mental status, normal pressure hydrocephalus,  shunt malfunction.    =====================================     [RC]      ED Course User Index  [RC] Alejandro Rodriguez MD                 Clinical Impression:   Final diagnoses:  [R41.82] Altered mental status (Primary)  [Z92.89] History of ventriculoperitoneal shunting  [D32.9] Meningioma  [G91.2] Normal pressure hydrocephalus - history of, s/p  shunt        ED Disposition Condition    Observation Stable                Charisse Guevara MD  Resident  01/10/23 0646

## 2023-01-10 NOTE — HPI
"Farheen Soares is a 87 y.o. female with h/o hearing loss, type II DM, DDD, meningioma, TIA, and NPH who is s/p elective  shunt placement with Dr. Espinoza on 8/11/22. Hakim valve set at 120. Patient presents as a transfer from [facility] for concern for  shunt malfunction. Per daughter, patient did well after shunt placement. Her baseline is Aox1 but is able to hold conversations easily. The daughter states that on Saturday she noticed the patient was lethargic and having some gait difficulties, requiring more assistance with care. These symptoms progressively worsened. The patient is now sleeping most of the day, confused and unable to hold conversations. She is "speaking to people that aren't there" and "reaching for things that aren't there" per the daughter. Nsgy consulted.  "

## 2023-01-10 NOTE — PROVIDER PROGRESS NOTES - EMERGENCY DEPT.
Encounter Date: 1/9/2023    ED Physician Progress Notes          ED Resident HAND-OFF NOTE:  6:57 AM 1/10/2023  Farheen Soares is a 87 y.o. female who presented to the ED on 1/10/2023 and has been managed by Dr. Guevara, who reports patient C/O AMS and concern for  shunt malfunction. I assumed care of patient from off-going ED physician team at 6:57 AM pending NSGY eval and recommendations.      On my exam, I appreciate:  BP (!) 174/80   Pulse 73   Temp 98.4 °F (36.9 °C) (Oral)   Resp 18   SpO2 96%         Disposition: TBD  I have discussed and counseled Farheen Soares regarding exam, results, diagnosis, treatment, and plan.  ______________________  Killian Webster MD   Emergency Medicine Resident  6:57 AM 1/10/2023      UPDATE:   I spoke with neurosurgery who evaluated the patient and does not think that her AMS is due to the  shunt.  They are recommending admission to Hospital Medicine.  I spoke with Hospital Medicine who will place the patient in observation with their service. She remains hemodynamically stable.       Clinical Impression  :  Altered mental status (Primary)  History of ventriculoperitoneal shunting  Meningioma  Normal pressure hydrocephalus

## 2023-01-10 NOTE — ED PROVIDER NOTES
"  Source of History:  Medical record, patient, EMS, patient's daughter.    Chief complaint:  Per triage note: "Altered Mental Status (Pt presents via no ems for AMS. Per daughter pt is more confused than normal. Pt has hx of dementia and is confused at baseline per ems. No other complaints reported at this time. )  "    HPI:    Patient presents via EMS with altered mental status. Per EMS, per daughter states that the patient is more confused than normal. She has dementia and is confused at her baseline but has been more confused recently. The patient also complains of abdominal pain she states started 30 minutes prior to arrival. She denies any other complaints.   The history is limited by the patient's mental status, no collateral initially available    This is the extent of the patient's complaints at this time.     ROS:   Unable to be obtained due to patient's mental status.     All other systems reviewed and are negative.      Review of patient's allergies indicates:   Allergen Reactions    Baclofen      AMS and distorted dremas       PMH:  As per HPI and below:  Past Medical History:   Diagnosis Date    Age-related osteoporosis with current pathological fracture with routine healing     Allergic rhinitis     Anemia     Carotid atherosclerosis, bilateral     Chronic gout of multiple sites     Chronic low back pain with bilateral sciatica     Closed impacted fracture of left femoral neck with routine healing s/p percutaneous screw fixation on 11/21/2020 11/20/2020    DDD (degenerative disc disease), lumbar 8/17/2020    Diverticulosis     Essential hypertension 11/25/2019    Facet arthropathy     GERD (gastroesophageal reflux disease) 11/25/2019    Hearing loss     History of transient ischemic attack (TIA) 11/25/2019    IBS (irritable bowel syndrome)     Insomnia     Lumbar radiculopathy 8/17/2020    Lumbar spondylosis 8/17/2020    Lumbar stenosis     Meningioma     Paroxysmal SVT (supraventricular tachycardia)  "    Primary open angle glaucoma (POAG) of both eyes     Pure hypercholesterolemia 11/25/2019    PVD (peripheral vascular disease)     30% prox stenosis of celiac trunk and 20% stnosis Prox SMA - per Abd/SMA Arteriogram 4/3/08    Recurrent major depressive disorder, in full remission     Type 2 diabetes mellitus without complication, without long-term current use of insulin 11/25/2019    Vitamin D deficiency 11/23/2020       Past Surgical History:   Procedure Laterality Date    CATARACT EXTRACTION      CAUDAL EPIDURAL STEROID INJECTION N/A 8/27/2020    Procedure: Injection-steroid-epidural-caudal with catheter;  Surgeon: Johnathan Gonzalez Jr., MD;  Location: [facility]IN [facility];  Service: Pain Management;  Laterality: N/A;    EPIDURAL STEROID INJECTION INTO LUMBAR SPINE      LUMBAR LAMINECTOMY  2009    PERCUTANEOUS PINNING OF HIP Left 11/21/2020    Procedure: Left- Percutaneous Screws- Large  arm Clock side;  Surgeon: Leandro White MD;  Location: [facility] OR [address] [place] [address];  Service: Orthopedics;  Laterality: Left;    TOTAL ABDOMINAL HYSTERECTOMY W/ BILATERAL SALPINGOOPHORECTOMY         Social History     Tobacco Use    Smoking status: Never    Smokeless tobacco: Never   Substance Use Topics    Alcohol use: Yes     Comment: socially    Drug use: Never       Physical Exam:      Nursing note and vitals reviewed.  /81 (BP Location: Right arm, Patient Position: Lying)   Pulse 78   Temp 99.1 °F (37.3 °C) (Oral)   Resp 18   SpO2 (!) 94%     Constitutional: AAOx3. No distress. Elderly, frail appearing.   Eyes: EOMI. No discharge. Anicteric.  HENT:   Neck: Normal range of motion. Neck supple.  Cardiovascular: Normal rate. No murmur, no gallop and no friction rub heard.   Pulmonary/Chest: No respiratory distress. Effort normal. No wheezes, no rales, no rhonchi.   Abdominal: Bowel sounds normal. Soft. No distension and no mass. There is no tenderness. There is no rebound, no guarding, no tenderness at McBurney's  "point.  Musculoskeletal: Normal range of motion.   Neurological: GCS 14.  Awake, alert. No gross cranial nerve, light touch or strength deficit.  Skin: Skin is warm and dry.   EXT: 2+ radial pulses.   Psychiatric:  Tired appearance.    MDM:      ED Course as of 01/10/23 0209   Mon Jan 09, 2023   2149 87-year-old female with history of normal pressure hydrocephalus, diabetes, hypertension, paroxysmal SVT, history of TIA who presents with altered mental status.    Initial differential includes sepsis, UTI, increased normal pressure hydrocephalus, stroke, hyperglycemia, DKA, dehydration. [RC]   2151 --  EKG Interpretation: I independently reviewed and interpreted EKG with 2149 hrs timestamp.   It shows normal sinus rhythm at 85 beats per minute, no STEMI, no significant acute ST/T abnormalities, normal intervals.  No acute change compared to prior tracing.  --   [RC]   5017 I independently reviewed and interpreted labs (CBC, CMP, troponin, point of care COVID and flu) which are notable for mild leukocytosis of 15.4k which is nonspecific, normal troponin, hyperglycemia without DKA.   I independently reviewed and interpreted CXR which shows no pneumothorax, no focal consolidation, no cardiomegaly, no acute process.    I independently reviewed and interpreted CT head which shows no acute intracranial bleeding, mass, skull fracture, or acute intracranial process, "right trans parietal ventricular shunt catheter. No acute intracranial abnormality detected. Right tentorial meningioma."     [RC]   2798 I independently reviewed and interpreted CT abdomen/pelvis, notable for no free air, fluid collection, no evidence of obstructive process, "increased ground-glass opacity in the lung bases likely representing dependent atelectasis. ... Diverticulosis without diverticulitis."  [RC]   7214 Patient's daughter clarifies that the patient was in her usual state of health until yesterday when her caretaker noted she appeared unsteady " on her feet when transferring, she and her caretaker noted that the patient appears more confused than usual.  For example, she keeps reaching for items that are not there.  Her only other complaint was abdominal pain which she has complained of in the past when she has had UTIs.  Patient has chronic urinary incontinence, no obvious change in her urinary output or pattern.  Daughter denies any prior problems with the patient's shunt.   Patient currently on a purewick for urine sample collection.  [RC]   Tue Octavio 10, 2023   0151 Independently interpreted and reviewed patient's urinalysis which is unremarkable apart from low specific gravity and trace protein.  Given her history of  shunt placement, normal pressure hydrocephalus, meningioma, I ordered shunt series, placed transfer request.  [RC]   0159 Patient history, findings, results discussed with neurosurgeon Dr. Black.  He accepts the patient as ED-ED transfer.       =====================================  Critical Care:  40minutes total critical care time was personally spent by me, exclusive of procedures and separately billable time.   Critical care was necessary to treat or prevent imminent or life-threatening deterioration of the following conditions:  Altered mental status, normal pressure hydrocephalus,  shunt malfunction.    =====================================     [RC]      ED Course User Index  [RC] Alejandro Rodriguez MD       I decided to obtain the patient's medical records. I reviewed patient's prior external notes / results: specialist notes (neurosurgery).         Medications   iohexoL (OMNIPAQUE 350) injection 100 mL (100 mLs Intravenous Given 1/9/23 2227)   sodium chloride 0.9% bolus 500 mL 500 mL (0 mLs Intravenous Stopped 1/10/23 0049)            ---  I, Bhavana Vaca, scribed for, and in the presence of, Dr. Rodriguez. I performed the scribed service and the documentation accurately describes the services I performed. I attest to the  accuracy of the note.     Physician Attestation for Scribe:   I, Alejandro Rodriguez MD, reviewed documentation as scribed in my presence, which is both accurate and complete.    Diagnostic Impression:    1. Altered mental status    2. History of ventriculoperitoneal shunting    3. Meningioma    4. Normal pressure hydrocephalus         ED Disposition Condition    Transfer to Another Facility Stable          Future Appointments   Date Time Provider Department Center   2/23/2023  2:30 PM Vonda Chung MD [facility]              Alejandro Rodriguez MD  01/10/23 0209

## 2023-01-10 NOTE — SUBJECTIVE & OBJECTIVE
(Not in a hospital admission)      Review of patient's allergies indicates:   Allergen Reactions    Baclofen      AMS and distorted dremas       Past Medical History:   Diagnosis Date    Age-related osteoporosis with current pathological fracture with routine healing     Allergic rhinitis     Anemia     Carotid atherosclerosis, bilateral     Chronic gout of multiple sites     Chronic low back pain with bilateral sciatica     Closed impacted fracture of left femoral neck with routine healing s/p percutaneous screw fixation on 11/21/2020 11/20/2020    DDD (degenerative disc disease), lumbar 8/17/2020    Diverticulosis     Essential hypertension 11/25/2019    Facet arthropathy     GERD (gastroesophageal reflux disease) 11/25/2019    Hearing loss     History of transient ischemic attack (TIA) 11/25/2019    IBS (irritable bowel syndrome)     Insomnia     Lumbar radiculopathy 8/17/2020    Lumbar spondylosis 8/17/2020    Lumbar stenosis     Meningioma     Paroxysmal SVT (supraventricular tachycardia)     Primary open angle glaucoma (POAG) of both eyes     Pure hypercholesterolemia 11/25/2019    PVD (peripheral vascular disease)     30% prox stenosis of celiac trunk and 20% stnosis Prox SMA - per Abd/SMA Arteriogram 4/3/08    Recurrent major depressive disorder, in full remission     Type 2 diabetes mellitus without complication, without long-term current use of insulin 11/25/2019    Vitamin D deficiency 11/23/2020     Past Surgical History:   Procedure Laterality Date    CATARACT EXTRACTION      CAUDAL EPIDURAL STEROID INJECTION N/A 8/27/2020    Procedure: Injection-steroid-epidural-caudal with catheter;  Surgeon: Johnathan Gonzalez Jr., MD;  Location: [facility] PAIN [facility]GT;  Service: Pain Management;  Laterality: N/A;    EPIDURAL STEROID INJECTION INTO LUMBAR SPINE      LUMBAR LAMINECTOMY  2009    PERCUTANEOUS PINNING OF HIP Left 11/21/2020    Procedure: Left- Percutaneous Screws- Large  arm Clock side;  Surgeon: Leandro BUNCH  MD Cindy;  Location: [facility] OR [address] [place] [address];  Service: Orthopedics;  Laterality: Left;    TOTAL ABDOMINAL HYSTERECTOMY W/ BILATERAL SALPINGOOPHORECTOMY       Family History       Problem Relation (Age of Onset)    Diabetes Mother    Hyperlipidemia Son    Hypertension Mother, Father, Daughter, Son    Hypothyroidism Daughter    Multiple sclerosis Son    Stroke Father          Tobacco Use    Smoking status: Never    Smokeless tobacco: Never   Substance and Sexual Activity    Alcohol use: Yes     Comment: socially    Drug use: Never    Sexual activity: Not Currently     Review of Systems   Unable to perform ROS: Mental status change   Objective:     Weight: 65.9 kg (145 lb 4.5 oz)  Body mass index is 26.57 kg/m².  Vital Signs (Most Recent):  Temp: 98.3 °F (36.8 °C) (01/10/23 1102)  Pulse: 86 (01/10/23 1102)  Resp: 14 (01/10/23 1102)  BP: (!) 183/81 (01/10/23 1102)  SpO2: 97 % (01/10/23 1102)   Vital Signs (24h Range):  Temp:  [98.3 °F (36.8 °C)-99.1 °F (37.3 °C)] 98.3 °F (36.8 °C)  Pulse:  [72-94] 86  Resp:  [14-22] 14  SpO2:  [91 %-98 %] 97 %  BP: (134-186)/(63-87) 183/81         Neurosurgery Physical Exam  General: Well developed, well nourished, not in acute distress.   Head: Normocephalic, atraumatic.  Neck: Full ROM.   Neurologic: Alert and oriented to self only. Unable to follow commands or answer questions. Appears to be having visual hallucinations.   GCS: Motor:5  Verbal:3  Eyes:4   GCS Total: 12  Speech: No dysarthria.  Cranial nerves: Face symmetric, tongue midline, CN II-XII grossly intact.   Eyes: Pupils equal, round, reactive to light with accomodation, EOMI. Unable to appreciate optic disc.   Pulmonary: Normal respirations, no signs of respiratory distress.  Abdomen: Soft, non-distended, non-tender to palpation.  Sensory: Intact to light touch throughout.  Motor Strength: Moves all extremities spontaneously.  Vascular: No LE edema.   Previous cranial incision healed.      Significant Labs:  Recent Labs    Lab 01/09/23 2159   *   *   K 5.1      CO2 21*   BUN 21   CREATININE 1.4   CALCIUM 10.9*   MG 1.8     Recent Labs   Lab 01/09/23 2159 01/09/23 2210   WBC 15.43*  --    HGB 15.8  --    HCT 44.1 49     --      No results for input(s): LABPT, INR, APTT in the last 48 hours.  Microbiology Results (last 7 days)       Procedure Component Value Units Date/Time    Blood culture [[id number]] Collected: 01/10/23 1029    Order Status: Sent Specimen: Blood from Peripheral, Hand, Left Updated: 01/10/23 1035    Blood culture [[id number]] Collected: 01/10/23 1029    Order Status: Sent Specimen: Blood from Peripheral, Hand, Right Updated: 01/10/23 1035          All pertinent labs from the last 24 hours have been reviewed.    Significant Diagnostics:  I have reviewed and interpreted all pertinent imaging results/findings within the past 24 hours.

## 2023-01-10 NOTE — PHARMACY MED REC
"  Admission Medication History     The home medication history was taken by Denilson Dc.    You may go to "Admission" then "Reconcile Home Medications" tabs to review and/or act upon these items.     The home medication list has been updated by the Pharmacy department.   Please read ALL comments highlighted in yellow.   Please address this information as you see fit.    Feel free to contact us if you have any questions or require assistance.      The medications listed below were removed from the home medication list. Please reorder if appropriate:  Patient reports no longer taking the following medication(s):  MULTIVITAMIN CAP    Medications listed below were obtained from: Colleen - daughter  Current Outpatient Medications on File Prior to Encounter   Medication Sig    acetaminophen (TYLENOL) 500 MG tablet Take 2 tablets (1,000 mg total) by mouth every 8 (eight) hours as needed (Mild pain).      alendronate (FOSAMAX) 70 MG tablet   Take 1 tablet (70 mg total) by mouth every 7 days.    allopurinoL (ZYLOPRIM) 300 MG tablet   Take 1 tablet (300 mg total) by mouth once daily.    amLODIPine (NORVASC) 10 MG tablet   Take 1 tablet (10 mg total) by mouth once daily.    atorvastatin (LIPITOR) 10 MG tablet   Take 1 tablet (10 mg total) by mouth once daily.    calcium polycarbophil (FIBERCON ORAL)   Mixed in 8 ounces of liquid & gave daily as needed for constipation. (Administered last Tues, Wed & Thurs).      clopidogreL (PLAVIX) 75 mg tablet   Take 1 tablet (75 mg total) by mouth once daily.    lisinopriL (PRINIVIL,ZESTRIL) 40 MG tablet   Take 1 tablet (40 mg total) by mouth once daily.    metFORMIN (GLUCOPHAGE-XR) 500 MG ER 24hr tablet   Take 1 tablet (500 mg total) by mouth once daily.    metoprolol succinate (TOPROL-XL) 100 MG 24 hr tablet   Take 1 tablet (100 mg total) by mouth once daily.    polyethylene glycol (MIRALAX) 17 gram PwPk   Take 17 g by mouth daily as needed (Constipation).    venlafaxine (EFFEXOR-XR) 150 MG " Cp24   TAKE 1 CAPSULE (150 MG TOTAL) BY MOUTH EVERY EVENING.    [DISCONTINUED] blood-glucose meter kit To check BG three times daily, to use with insurance preferred meter (Patient not taking: Reported on 5/29/2022)         Potential issues to be addressed PRIOR TO DISCHARGE  Patient reported not taking the following medications: (NORCO). These medications remain on the home medication list. Please address accordingly.   Patient requested refills for the following medications: (PLAVIX)    Denilson Dc  [id number]                .

## 2023-01-10 NOTE — CONSULTS
"[facility] - Emergency Dept  Neurosurgery  Consult Note    Inpatient consult to Neurosurgery  Consult performed by: Layla Tsai PA-C  Consult ordered by: Charisse Guevara MD        Subjective:     Chief Complaint/Reason for Admission: concern for  shunt malfunction    History of Present Illness: Farheen Soares is a 87 y.o. female with h/o hearing loss, type II DM, DDD, meningioma, TIA, and NPH who is s/p elective  shunt placement with Dr. Espinoza on 8/11/22. Hakim valve set at 120. Patient presents as a transfer from OSH for concern for  shunt malfunction. Per daughter, patient did well after shunt placement. Her baseline is Aox1 but is able to hold conversations easily. The daughter states that on Saturday she noticed the patient was lethargic and having some gait difficulties, requiring more assistance with care. These symptoms progressively worsened. The patient is now sleeping most of the day, confused and unable to hold conversations. She is "speaking to people that aren't there" and "reaching for things that aren't there" per the daughter. Nsgy consulted.      (Not in a hospital admission)      Review of patient's allergies indicates:   Allergen Reactions    Baclofen      AMS and distorted dremas       Past Medical History:   Diagnosis Date    Age-related osteoporosis with current pathological fracture with routine healing     Allergic rhinitis     Anemia     Carotid atherosclerosis, bilateral     Chronic gout of multiple sites     Chronic low back pain with bilateral sciatica     Closed impacted fracture of left femoral neck with routine healing s/p percutaneous screw fixation on 11/21/2020 11/20/2020    DDD (degenerative disc disease), lumbar 8/17/2020    Diverticulosis     Essential hypertension 11/25/2019    Facet arthropathy     GERD (gastroesophageal reflux disease) 11/25/2019    Hearing loss     History of transient ischemic attack (TIA) 11/25/2019    IBS (irritable bowel " syndrome)     Insomnia     Lumbar radiculopathy 8/17/2020    Lumbar spondylosis 8/17/2020    Lumbar stenosis     Meningioma     Paroxysmal SVT (supraventricular tachycardia)     Primary open angle glaucoma (POAG) of both eyes     Pure hypercholesterolemia 11/25/2019    PVD (peripheral vascular disease)     30% prox stenosis of celiac trunk and 20% stnosis Prox SMA - per Abd/SMA Arteriogram 4/3/08    Recurrent major depressive disorder, in full remission     Type 2 diabetes mellitus without complication, without long-term current use of insulin 11/25/2019    Vitamin D deficiency 11/23/2020     Past Surgical History:   Procedure Laterality Date    CATARACT EXTRACTION      CAUDAL EPIDURAL STEROID INJECTION N/A 8/27/2020    Procedure: Injection-steroid-epidural-caudal with catheter;  Surgeon: Johnathan Gonzalez Jr., MD;  Location: [facility];  Service: Pain Management;  Laterality: N/A;    EPIDURAL STEROID INJECTION INTO LUMBAR SPINE      LUMBAR LAMINECTOMY  2009    PERCUTANEOUS PINNING OF HIP Left 11/21/2020    Procedure: Left- Percutaneous Screws- Large  arm Clock side;  Surgeon: Leandro White MD;  Location: [facility] OR [address] [place] [address];  Service: Orthopedics;  Laterality: Left;    TOTAL ABDOMINAL HYSTERECTOMY W/ BILATERAL SALPINGOOPHORECTOMY       Family History       Problem Relation (Age of Onset)    Diabetes Mother    Hyperlipidemia Son    Hypertension Mother, Father, Daughter, Son    Hypothyroidism Daughter    Multiple sclerosis Son    Stroke Father          Tobacco Use    Smoking status: Never    Smokeless tobacco: Never   Substance and Sexual Activity    Alcohol use: Yes     Comment: socially    Drug use: Never    Sexual activity: Not Currently     Review of Systems   Unable to perform ROS: Mental status change   Objective:     Weight: 65.9 kg (145 lb 4.5 oz)  Body mass index is 26.57 kg/m².  Vital Signs (Most Recent):  Temp: 98.3 °F (36.8 °C) (01/10/23 1102)  Pulse: 86 (01/10/23 1102)  Resp:  14 (01/10/23 1102)  BP: (!) 183/81 (01/10/23 1102)  SpO2: 97 % (01/10/23 1102)   Vital Signs (24h Range):  Temp:  [98.3 °F (36.8 °C)-99.1 °F (37.3 °C)] 98.3 °F (36.8 °C)  Pulse:  [72-94] 86  Resp:  [14-22] 14  SpO2:  [91 %-98 %] 97 %  BP: (134-186)/(63-87) 183/81         Neurosurgery Physical Exam  General: Well developed, well nourished, not in acute distress.   Head: Normocephalic, atraumatic.  Neck: Full ROM.   Neurologic: Alert and oriented to self only. Unable to follow commands or answer questions. Appears to be having visual hallucinations.   GCS: Motor:5  Verbal:3  Eyes:4   GCS Total: 12  Speech: No dysarthria.  Cranial nerves: Face symmetric, tongue midline, CN II-XII grossly intact.   Eyes: Pupils equal, round, reactive to light with accomodation, EOMI. Unable to appreciate optic disc.   Pulmonary: Normal respirations, no signs of respiratory distress.  Abdomen: Soft, non-distended, non-tender to palpation.  Sensory: Intact to light touch throughout.  Motor Strength: Moves all extremities spontaneously.  Vascular: No LE edema.   Previous cranial incision healed.      Significant Labs:  Recent Labs   Lab 01/09/23 2159   *   *   K 5.1      CO2 21*   BUN 21   CREATININE 1.4   CALCIUM 10.9*   MG 1.8     Recent Labs   Lab 01/09/23 2159 01/09/23  2210   WBC 15.43*  --    HGB 15.8  --    HCT 44.1 49     --      No results for input(s): LABPT, INR, APTT in the last 48 hours.  Microbiology Results (last 7 days)       Procedure Component Value Units Date/Time    Blood culture [[id number]] Collected: 01/10/23 1029    Order Status: Sent Specimen: Blood from Peripheral, Hand, Left Updated: 01/10/23 1035    Blood culture [[id number]] Collected: 01/10/23 1029    Order Status: Sent Specimen: Blood from Peripheral, Hand, Right Updated: 01/10/23 1035          All pertinent labs from the last 24 hours have been reviewed.    Significant Diagnostics:  I have reviewed and interpreted all pertinent  imaging results/findings within the past 24 hours.    Assessment/Plan:     History of ventriculoperitoneal shunting  Farheen Soares is a 87 y.o. female with h/o hearing loss, type II DM, DDD, meningioma, TIA, and NPH who is s/p elective  shunt placement with Dr. sEpinoza on 8/11/22. Hakim valve set at 120. She presents with AMS.    - Recommend admit to medicine for AMS/infectious workup.  - Q4h neurochecks.  - CTH showing grossly stable configuration of ventricles.  - X-ray shunt series showing no discontinuities or kinks in shunt catheter.   -  shunt adjusted from 120 to 100. Will f/u progress. X-ray Skull to confirm setting.  - No neurosurgical intervention is indicated at this time.   - Please contact NSGY with any decline in neurological exam.    D/w Dr. Espinoza        Thank you for your consult. I will follow-up with patient. Please contact us if you have any additional questions.    Layla Tsai PA-C  Neurosurgery  [facility] - Emergency Dept

## 2023-01-10 NOTE — ED TRIAGE NOTES
Family reports pt has hx of dementia and is baseline confused - but today she is worse and not as responsive - in ED pt is sleepy, responds to voice, c/o generalized pain but unable to say where, disoriented x4

## 2023-01-10 NOTE — ED NOTES
Care assumed & bedside report received. Pt AAO x 2 (disoriented to situation & time due to dementia) w/ RR even and unlabored. VSS. Pt denies chest pain or SOB. Pt resting comfortably in NAD in ED bed low/ locked w/ side rails up. Call light within reach. Instructed patient to call staff for assistance. Will continue to monitor.

## 2023-01-10 NOTE — ED NOTES
Bed: [facility]  Expected date:   Expected time:   Means of arrival:   Comments:

## 2023-01-11 LAB
ALBUMIN SERPL BCP-MCNC: 3.9 G/DL (ref 3.5–5.2)
ALP SERPL-CCNC: 84 U/L (ref 55–135)
ALT SERPL W/O P-5'-P-CCNC: 9 U/L (ref 10–44)
ANION GAP SERPL CALC-SCNC: 15 MMOL/L (ref 8–16)
AST SERPL-CCNC: 21 U/L (ref 10–40)
BASOPHILS # BLD AUTO: 0.05 K/UL (ref 0–0.2)
BASOPHILS NFR BLD: 0.5 % (ref 0–1.9)
BILIRUB SERPL-MCNC: 0.8 MG/DL (ref 0.1–1)
BUN SERPL-MCNC: 17 MG/DL (ref 8–23)
CALCIUM SERPL-MCNC: 10.3 MG/DL (ref 8.7–10.5)
CHLORIDE SERPL-SCNC: 102 MMOL/L (ref 95–110)
CO2 SERPL-SCNC: 18 MMOL/L (ref 23–29)
CREAT SERPL-MCNC: 0.9 MG/DL (ref 0.5–1.4)
DIFFERENTIAL METHOD: ABNORMAL
EOSINOPHIL # BLD AUTO: 0.1 K/UL (ref 0–0.5)
EOSINOPHIL NFR BLD: 0.6 % (ref 0–8)
ERYTHROCYTE [DISTWIDTH] IN BLOOD BY AUTOMATED COUNT: 14.2 % (ref 11.5–14.5)
EST. GFR  (NO RACE VARIABLE): >60 ML/MIN/1.73 M^2
GLUCOSE SERPL-MCNC: 110 MG/DL (ref 70–110)
HCT VFR BLD AUTO: 41.3 % (ref 37–48.5)
HGB BLD-MCNC: 13.5 G/DL (ref 12–16)
IMM GRANULOCYTES # BLD AUTO: 0.03 K/UL (ref 0–0.04)
IMM GRANULOCYTES NFR BLD AUTO: 0.3 % (ref 0–0.5)
LYMPHOCYTES # BLD AUTO: 1.7 K/UL (ref 1–4.8)
LYMPHOCYTES NFR BLD: 18 % (ref 18–48)
MAGNESIUM SERPL-MCNC: 1.9 MG/DL (ref 1.6–2.6)
MCH RBC QN AUTO: 32.9 PG (ref 27–31)
MCHC RBC AUTO-ENTMCNC: 32.7 G/DL (ref 32–36)
MCV RBC AUTO: 101 FL (ref 82–98)
MONOCYTES # BLD AUTO: 0.9 K/UL (ref 0.3–1)
MONOCYTES NFR BLD: 8.8 % (ref 4–15)
NEUTROPHILS # BLD AUTO: 6.9 K/UL (ref 1.8–7.7)
NEUTROPHILS NFR BLD: 71.8 % (ref 38–73)
NRBC BLD-RTO: 0 /100 WBC
PHOSPHATE SERPL-MCNC: 2.6 MG/DL (ref 2.7–4.5)
PLATELET # BLD AUTO: 218 K/UL (ref 150–450)
PMV BLD AUTO: 11.2 FL (ref 9.2–12.9)
POCT GLUCOSE: 116 MG/DL (ref 70–110)
POCT GLUCOSE: 126 MG/DL (ref 70–110)
POCT GLUCOSE: 130 MG/DL (ref 70–110)
POCT GLUCOSE: 148 MG/DL (ref 70–110)
POTASSIUM SERPL-SCNC: 4.5 MMOL/L (ref 3.5–5.1)
PROCALCITONIN SERPL IA-MCNC: 0.05 NG/ML
PROT SERPL-MCNC: 8.1 G/DL (ref 6–8.4)
RBC # BLD AUTO: 4.1 M/UL (ref 4–5.4)
SODIUM SERPL-SCNC: 135 MMOL/L (ref 136–145)
WBC # BLD AUTO: 9.62 K/UL (ref 3.9–12.7)

## 2023-01-11 PROCEDURE — 99233 SBSQ HOSP IP/OBS HIGH 50: CPT | Mod: ,,, | Performed by: PHYSICIAN ASSISTANT

## 2023-01-11 PROCEDURE — 99232 PR SUBSEQUENT HOSPITAL CARE,LEVL II: ICD-10-PCS | Mod: ,,, | Performed by: STUDENT IN AN ORGANIZED HEALTH CARE EDUCATION/TRAINING PROGRAM

## 2023-01-11 PROCEDURE — 99232 SBSQ HOSP IP/OBS MODERATE 35: CPT | Mod: ,,, | Performed by: STUDENT IN AN ORGANIZED HEALTH CARE EDUCATION/TRAINING PROGRAM

## 2023-01-11 PROCEDURE — 84145 PROCALCITONIN (PCT): CPT | Performed by: STUDENT IN AN ORGANIZED HEALTH CARE EDUCATION/TRAINING PROGRAM

## 2023-01-11 PROCEDURE — 96372 THER/PROPH/DIAG INJ SC/IM: CPT | Performed by: STUDENT IN AN ORGANIZED HEALTH CARE EDUCATION/TRAINING PROGRAM

## 2023-01-11 PROCEDURE — 80053 COMPREHEN METABOLIC PANEL: CPT | Performed by: STUDENT IN AN ORGANIZED HEALTH CARE EDUCATION/TRAINING PROGRAM

## 2023-01-11 PROCEDURE — 25000003 PHARM REV CODE 250: Performed by: STUDENT IN AN ORGANIZED HEALTH CARE EDUCATION/TRAINING PROGRAM

## 2023-01-11 PROCEDURE — 85025 COMPLETE CBC W/AUTO DIFF WBC: CPT | Performed by: STUDENT IN AN ORGANIZED HEALTH CARE EDUCATION/TRAINING PROGRAM

## 2023-01-11 PROCEDURE — G0378 HOSPITAL OBSERVATION PER HR: HCPCS

## 2023-01-11 PROCEDURE — 99233 PR SUBSEQUENT HOSPITAL CARE,LEVL III: ICD-10-PCS | Mod: ,,, | Performed by: PHYSICIAN ASSISTANT

## 2023-01-11 PROCEDURE — 63600175 PHARM REV CODE 636 W HCPCS: Performed by: STUDENT IN AN ORGANIZED HEALTH CARE EDUCATION/TRAINING PROGRAM

## 2023-01-11 PROCEDURE — 84100 ASSAY OF PHOSPHORUS: CPT | Performed by: STUDENT IN AN ORGANIZED HEALTH CARE EDUCATION/TRAINING PROGRAM

## 2023-01-11 PROCEDURE — 36415 COLL VENOUS BLD VENIPUNCTURE: CPT | Performed by: STUDENT IN AN ORGANIZED HEALTH CARE EDUCATION/TRAINING PROGRAM

## 2023-01-11 PROCEDURE — 83735 ASSAY OF MAGNESIUM: CPT | Performed by: STUDENT IN AN ORGANIZED HEALTH CARE EDUCATION/TRAINING PROGRAM

## 2023-01-11 RX ORDER — SENNOSIDES 8.6 MG/1
8.6 TABLET ORAL
Status: DISCONTINUED | OUTPATIENT
Start: 2023-01-11 | End: 2023-01-12 | Stop reason: HOSPADM

## 2023-01-11 RX ORDER — GUAIFENESIN 600 MG/1
600 TABLET, EXTENDED RELEASE ORAL 2 TIMES DAILY
Status: DISCONTINUED | OUTPATIENT
Start: 2023-01-11 | End: 2023-01-12 | Stop reason: HOSPADM

## 2023-01-11 RX ORDER — LEVALBUTEROL 1.25 MG/.5ML
1.25 SOLUTION, CONCENTRATE RESPIRATORY (INHALATION) EVERY 8 HOURS PRN
Status: DISCONTINUED | OUTPATIENT
Start: 2023-01-11 | End: 2023-01-12 | Stop reason: HOSPADM

## 2023-01-11 RX ORDER — BISACODYL 10 MG
10 SUPPOSITORY, RECTAL RECTAL DAILY PRN
Status: DISCONTINUED | OUTPATIENT
Start: 2023-01-11 | End: 2023-01-12 | Stop reason: HOSPADM

## 2023-01-11 RX ADMIN — CLOPIDOGREL BISULFATE 75 MG: 75 TABLET ORAL at 08:01

## 2023-01-11 RX ADMIN — SENNOSIDES 8.6 MG: 8.6 TABLET, FILM COATED ORAL at 08:01

## 2023-01-11 RX ADMIN — POLYETHYLENE GLYCOL 3350 17 G: 17 POWDER, FOR SOLUTION ORAL at 08:01

## 2023-01-11 RX ADMIN — ALLOPURINOL 300 MG: 300 TABLET ORAL at 08:01

## 2023-01-11 RX ADMIN — ACETAMINOPHEN 650 MG: 325 TABLET ORAL at 10:01

## 2023-01-11 RX ADMIN — ATORVASTATIN CALCIUM 10 MG: 10 TABLET, FILM COATED ORAL at 08:01

## 2023-01-11 RX ADMIN — ENOXAPARIN SODIUM 40 MG: 40 INJECTION SUBCUTANEOUS at 05:01

## 2023-01-11 RX ADMIN — VENLAFAXINE HYDROCHLORIDE 150 MG: 75 CAPSULE, EXTENDED RELEASE ORAL at 10:01

## 2023-01-11 RX ADMIN — METOPROLOL TARTRATE 50 MG: 50 TABLET, FILM COATED ORAL at 10:01

## 2023-01-11 RX ADMIN — SENNOSIDES 8.6 MG: 8.6 TABLET, FILM COATED ORAL at 04:01

## 2023-01-11 RX ADMIN — BISACODYL 10 MG: 10 SUPPOSITORY RECTAL at 05:01

## 2023-01-11 RX ADMIN — GUAIFENESIN 600 MG: 600 TABLET, EXTENDED RELEASE ORAL at 05:01

## 2023-01-11 RX ADMIN — Medication 6 MG: at 10:01

## 2023-01-11 NOTE — ASSESSMENT & PLAN NOTE
- Placed for hydrocephalus  - Neurosurgery consulted   --  shunt adjusted from 120-110  -- Follow-up x-ray ordered

## 2023-01-11 NOTE — ASSESSMENT & PLAN NOTE
Patient has persistent depression which is moderate and is currently controlled. Will Continue anti-depressant medications. We will not consult psychiatry at this time. Patient does not display psychosis at this time. Continue to monitor closely and adjust plan of care as needed.    - Continue venlafaxine

## 2023-01-11 NOTE — H&P
"[facility]  [facility]  History & Physical    Patient Name: Farheen Soares  MRN: [id number]  Patient Class: OP- Observation  Admission Date: 1/9/2023  Attending Physician: Leroy Morocho MD   Primary Care Provider: Vonda Chung MD         Patient information was obtained from relative(s), past medical records and ER records.     Subjective:     Principal Problem:Altered mental status    Chief Complaint:   Chief Complaint   Patient presents with    Altered Mental Status     Pt presents via no ems for AMS. Per daughter pt is more confused than normal. Pt has hx of dementia and is confused at baseline per ems. No other complaints reported at this time.     Transfer     Tx for " shunt malfunction"        HPI: Mrs. Soares is a 87 yoF with a h/o hearing loss, type II DM, DDD, meningioma, TIA, and NPH s/p  shunt who presents with 2 days of altered mental status.  History obtained from patient's daughter.  Patient was noted to have increased fatigue and AVH to prior to admission.  It is reported that she was talking to people who were not in the room.  The following days no patient had difficulty with balance and able to walk with her walker.  Was also noted to have decreased oral intake, weakness, and constipation.  Denies complaints of pain, dysuria, dyspnea, headache, and nausea.  Given concern for possible complications of  shunt, patient was brought to emergency room for further evaluation.    In the ED, /71, HR 79, afebrile, and on room air.  Labs notable for creatinine 1.4, K 5.1, WBC 15.4, hemoglobin 16, sodium 135, TSH 1.3, troponin negative, COVID negative, flu negative, UA unremarkable, and .  X-ray series of shunt unremarkable.  CT abdomen/pelvis unremarkable.  CT head unremarkable.  Chest x-ray unremarkable. Neurosurgery consulted for further evaluation of  shunt.   shunt adjusted in the emergency room by Neurosurgery and follow-up x-ray obtained.  " Patient and mother for further evaluation.      Past Medical History:   Diagnosis Date    Age-related osteoporosis with current pathological fracture with routine healing     Allergic rhinitis     Anemia     Carotid atherosclerosis, bilateral     Chronic gout of multiple sites     Chronic low back pain with bilateral sciatica     Closed impacted fracture of left femoral neck with routine healing s/p percutaneous screw fixation on 11/21/2020 11/20/2020    DDD (degenerative disc disease), lumbar 8/17/2020    Diverticulosis     Essential hypertension 11/25/2019    Facet arthropathy     GERD (gastroesophageal reflux disease) 11/25/2019    Hearing loss     History of transient ischemic attack (TIA) 11/25/2019    IBS (irritable bowel syndrome)     Insomnia     Lumbar radiculopathy 8/17/2020    Lumbar spondylosis 8/17/2020    Lumbar stenosis     Meningioma     Paroxysmal SVT (supraventricular tachycardia)     Primary open angle glaucoma (POAG) of both eyes     Pure hypercholesterolemia 11/25/2019    PVD (peripheral vascular disease)     30% prox stenosis of celiac trunk and 20% stnosis Prox SMA - per Abd/SMA Arteriogram 4/3/08    Recurrent major depressive disorder, in full remission     Type 2 diabetes mellitus without complication, without long-term current use of insulin 11/25/2019    Vitamin D deficiency 11/23/2020       Past Surgical History:   Procedure Laterality Date    CATARACT EXTRACTION      CAUDAL EPIDURAL STEROID INJECTION N/A 8/27/2020    Procedure: Injection-steroid-epidural-caudal with catheter;  Surgeon: Johnathan Gonzalez Jr., MD;  [place] [facility] [place]  Service: Pain Management;  Laterality: N/A;    EPIDURAL STEROID INJECTION INTO LUMBAR SPINE      LUMBAR LAMINECTOMY  2009    PERCUTANEOUS PINNING OF HIP Left 11/21/2020    Procedure: Left- Percutaneous Screws- Large  arm Clock side;  Surgeon: Leandro White MD;  Location: [facility] OR [address] [place] [address];  Service: Orthopedics;   Laterality: Left;    TOTAL ABDOMINAL HYSTERECTOMY W/ BILATERAL SALPINGOOPHORECTOMY         Review of patient's allergies indicates:   Allergen Reactions    Baclofen      AMS and distorted dremas       No current facility-administered medications on file prior to encounter.     Current Outpatient Medications on File Prior to Encounter   Medication Sig    acetaminophen (TYLENOL) 500 MG tablet Take 2 tablets (1,000 mg total) by mouth every 8 (eight) hours as needed (Mild pain).    alendronate (FOSAMAX) 70 MG tablet Take 1 tablet (70 mg total) by mouth every 7 days.    allopurinoL (ZYLOPRIM) 300 MG tablet Take 1 tablet (300 mg total) by mouth once daily.    amLODIPine (NORVASC) 10 MG tablet Take 1 tablet (10 mg total) by mouth once daily.    atorvastatin (LIPITOR) 10 MG tablet Take 1 tablet (10 mg total) by mouth once daily.    calcium polycarbophil (FIBERCON ORAL) Mixed in 8 ounces of liquid & gave daily as needed for constipation. (Administered last Tue, Wed & Thurs)    clopidogreL (PLAVIX) 75 mg tablet Take 1 tablet (75 mg total) by mouth once daily.    lisinopriL (PRINIVIL,ZESTRIL) 40 MG tablet Take 1 tablet (40 mg total) by mouth once daily.    metFORMIN (GLUCOPHAGE-XR) 500 MG ER 24hr tablet Take 1 tablet (500 mg total) by mouth once daily.    metoprolol succinate (TOPROL-XL) 100 MG 24 hr tablet Take 1 tablet (100 mg total) by mouth once daily.    polyethylene glycol (MIRALAX) 17 gram PwPk Take 17 g by mouth daily as needed (Constipation).    venlafaxine (EFFEXOR-XR) 150 MG Cp24 TAKE 1 CAPSULE (150 MG TOTAL) BY MOUTH EVERY EVENING.    HYDROcodone-acetaminophen (NORCO) 5-325 mg per tablet Take 1 tablet by mouth every 6 (six) hours as needed for Pain. (Patient not taking: Reported on 11/2/2022)    [DISCONTINUED] blood-glucose meter kit To check BG three times daily, to use with insurance preferred meter (Patient not taking: Reported on 5/29/2022)    [DISCONTINUED] calcium carbonate (OS-ELODIA) 500 mg  calcium (1,250 mg) tablet Take 1 tablet (500 mg total) by mouth 2 (two) times daily.    [DISCONTINUED] fexofenadine (ALLEGRA) 180 MG tablet Take 1 tablet (180 mg total) by mouth daily as needed.    [DISCONTINUED] multivitamin capsule Take 1 capsule by mouth once daily.     Family History       Problem Relation (Age of Onset)    Diabetes Mother    Hyperlipidemia Son    Hypertension Mother, Father, Daughter, Son    Hypothyroidism Daughter    Multiple sclerosis Son    Stroke Father          Tobacco Use    Smoking status: Never    Smokeless tobacco: Never   Substance and Sexual Activity    Alcohol use: Yes     Comment: socially    Drug use: Never    Sexual activity: Not Currently     Review of Systems   Unable to perform ROS: Mental status change   Objective:     Vital Signs (Most Recent):  Temp: 98.3 °F (36.8 °C) (01/10/23 1102)  Pulse: 98 (01/10/23 1838)  Resp: 18 (01/10/23 1838)  BP: (!) 190/84 (01/10/23 1838)  SpO2: 98 % (01/10/23 1838)   Vital Signs (24h Range):  Temp:  [98.3 °F (36.8 °C)-99.1 °F (37.3 °C)] 98.3 °F (36.8 °C)  Pulse:  [72-98] 98  Resp:  [14-22] 18  SpO2:  [91 %-98 %] 98 %  BP: (134-193)/(63-87) 190/84     Weight: 65.9 kg (145 lb 4.5 oz)  Body mass index is 26.57 kg/m².    Physical Exam  Constitutional:       General: She is not in acute distress.     Appearance: Normal appearance.   HENT:      Head: Normocephalic and atraumatic.      Mouth/Throat:      Mouth: Mucous membranes are moist.   Eyes:      Extraocular Movements: Extraocular movements intact.      Conjunctiva/sclera: Conjunctivae normal.   Cardiovascular:      Rate and Rhythm: Normal rate and regular rhythm.   Pulmonary:      Effort: Pulmonary effort is normal. No respiratory distress.      Breath sounds: Normal breath sounds. No wheezing or rales.   Abdominal:      General: Abdomen is flat. Bowel sounds are normal. There is no distension.      Palpations: Abdomen is soft.      Tenderness: There is no abdominal tenderness. There is  no guarding.   Musculoskeletal:         General: No swelling or tenderness.   Skin:     Findings: No rash.   Neurological:      Mental Status: She is alert. She is disoriented.   Psychiatric:         Mood and Affect: Mood normal.         Behavior: Behavior normal.           Significant Labs: CBC:   Recent Labs   Lab 01/09/23 2159 01/09/23 2210   WBC 15.43*  --    HGB 15.8  --    HCT 44.1 49     --      CMP:   Recent Labs   Lab 01/09/23 2159   *   K 5.1      CO2 21*   *   BUN 21   CREATININE 1.4   CALCIUM 10.9*   PROT 9.0*   ALBUMIN 4.2   BILITOT 0.7   ALKPHOS 74   AST 24   ALT 12   ANIONGAP 14     Troponin:   Recent Labs   Lab 01/09/23 2159   TROPONINI <0.006     TSH:   Recent Labs   Lab 01/09/23 2159   TSH 1.316     Urine Studies:   Recent Labs   Lab 01/10/23  0041   COLORU Yellow   APPEARANCEUA Clear   PHUR 6.0   SPECGRAV <=1.005*   PROTEINUA Trace*   GLUCUA Negative   KETONESU Negative   BILIRUBINUA Negative   OCCULTUA Negative   NITRITE Negative   UROBILINOGEN 1.0   LEUKOCYTESUR Negative       Significant Imaging: I have reviewed all pertinent imaging results/findings within the past 24 hours.    Assessment/Plan:     * Altered mental status  Patient presenting with 2 days of altered mental status of fatigue, AVH, and weakness.  No infectious symptoms prior to admission.  Concern for possible complications of hydrocephalus.   -  shunt adjusted by Neurosurgery on admission.  - WBC 15.4 on admission  - UA unremarkable   - Blood cultures obtained   - Follow-up procalcitonin   - Holding antibiotics at this time   - Electrolytes within normal limits   - TSH unremarkable   - PT/OT consulted  - Holding sedating medications          Essential hypertension  - Continue home amlodipine  - Holding lisinopril given hyperkalemia on admission   - As needed hydralazine for SBP> 160      History of ventriculoperitoneal shunting  - Placed for hydrocephalus  - Neurosurgery consulted   --  shunt  adjusted from 120-110  -- Follow-up x-ray ordered        Type 2 diabetes mellitus without complication, without long-term current use of insulin  Patient's FSGs are controlled on current medication regimen.  Last A1c reviewed-   Lab Results   Component Value Date    HGBA1C 6.2 (H) 01/10/2023     Most recent fingerstick glucose reviewed-   Recent Labs   Lab 01/09/23  2154 01/10/23  1000 01/10/23  1038   POCTGLUCOSE 248* 120* 129*     Current correctional scale  Medium  Maintain anti-hyperglycemic dose as follows-   Antihyperglycemics (From admission, onward)    Start     Stop Route Frequency Ordered    01/10/23 1044  insulin aspart U-100 pen 0-5 Units         -- SubQ Before meals & nightly PRN 01/10/23 0944        Hold Oral hypoglycemics while patient is in the hospital.    Constipation  - Continue bowel regimen      Gout  - Continue home allopurinol      History of transient ischemic attack (TIA)  - Continue home Plavix      Pure hypercholesterolemia  - Continue home atorvastatin      Recurrent major depressive disorder, in full remission  Patient has persistent depression which is moderate and is currently controlled. Will Continue anti-depressant medications. We will not consult psychiatry at this time. Patient does not display psychosis at this time. Continue to monitor closely and adjust plan of care as needed.    - Continue venlafaxine        VTE Risk Mitigation (From admission, onward)         Ordered     IP VTE HIGH RISK PATIENT  Once         01/10/23 1844     Place sequential compression device  Until discontinued         01/10/23 1844     enoxaparin injection 40 mg  Daily         01/10/23 0943     Place sequential compression device  Until discontinued         01/10/23 0943                   Leroy Morocho MD  Department of Hospital Medicine   [facility] Dept

## 2023-01-11 NOTE — SUBJECTIVE & OBJECTIVE
Past Medical History:   Diagnosis Date    Age-related osteoporosis with current pathological fracture with routine healing     Allergic rhinitis     Anemia     Carotid atherosclerosis, bilateral     Chronic gout of multiple sites     Chronic low back pain with bilateral sciatica     Closed impacted fracture of left femoral neck with routine healing s/p percutaneous screw fixation on 11/21/2020 11/20/2020    DDD (degenerative disc disease), lumbar 8/17/2020    Diverticulosis     Essential hypertension 11/25/2019    Facet arthropathy     GERD (gastroesophageal reflux disease) 11/25/2019    Hearing loss     History of transient ischemic attack (TIA) 11/25/2019    IBS (irritable bowel syndrome)     Insomnia     Lumbar radiculopathy 8/17/2020    Lumbar spondylosis 8/17/2020    Lumbar stenosis     Meningioma     Paroxysmal SVT (supraventricular tachycardia)     Primary open angle glaucoma (POAG) of both eyes     Pure hypercholesterolemia 11/25/2019    PVD (peripheral vascular disease)     30% prox stenosis of celiac trunk and 20% stnosis Prox SMA - per Abd/SMA Arteriogram 4/3/08    Recurrent major depressive disorder, in full remission     Type 2 diabetes mellitus without complication, without long-term current use of insulin 11/25/2019    Vitamin D deficiency 11/23/2020       Past Surgical History:   Procedure Laterality Date    CATARACT EXTRACTION      CAUDAL EPIDURAL STEROID INJECTION N/A 8/27/2020    Procedure: Injection-steroid-epidural-caudal with catheter;  Surgeon: Johnathan Gonzalez Jr., MD;  Location: [facility];  Service: Pain Management;  Laterality: N/A;    EPIDURAL STEROID INJECTION INTO LUMBAR SPINE      LUMBAR LAMINECTOMY  2009    PERCUTANEOUS PINNING OF HIP Left 11/21/2020    Procedure: Left- Percutaneous Screws- Large  arm Clock side;  Surgeon: Leandro White MD;  Location: [address];  Service: Orthopedics;  Laterality: Left;    TOTAL ABDOMINAL HYSTERECTOMY W/ BILATERAL SALPINGOOPHORECTOMY          Review of patient's allergies indicates:   Allergen Reactions    Baclofen      AMS and distorted dremas       No current facility-administered medications on file prior to encounter.     Current Outpatient Medications on File Prior to Encounter   Medication Sig    acetaminophen (TYLENOL) 500 MG tablet Take 2 tablets (1,000 mg total) by mouth every 8 (eight) hours as needed (Mild pain).    alendronate (FOSAMAX) 70 MG tablet Take 1 tablet (70 mg total) by mouth every 7 days.    allopurinoL (ZYLOPRIM) 300 MG tablet Take 1 tablet (300 mg total) by mouth once daily.    amLODIPine (NORVASC) 10 MG tablet Take 1 tablet (10 mg total) by mouth once daily.    atorvastatin (LIPITOR) 10 MG tablet Take 1 tablet (10 mg total) by mouth once daily.    calcium polycarbophil (FIBERCON ORAL) Mixed in 8 ounces of liquid & gave daily as needed for constipation. (Administered last Tue, Wed & Thurs)    clopidogreL (PLAVIX) 75 mg tablet Take 1 tablet (75 mg total) by mouth once daily.    lisinopriL (PRINIVIL,ZESTRIL) 40 MG tablet Take 1 tablet (40 mg total) by mouth once daily.    metFORMIN (GLUCOPHAGE-XR) 500 MG ER 24hr tablet Take 1 tablet (500 mg total) by mouth once daily.    metoprolol succinate (TOPROL-XL) 100 MG 24 hr tablet Take 1 tablet (100 mg total) by mouth once daily.    polyethylene glycol (MIRALAX) 17 gram PwPk Take 17 g by mouth daily as needed (Constipation).    venlafaxine (EFFEXOR-XR) 150 MG Cp24 TAKE 1 CAPSULE (150 MG TOTAL) BY MOUTH EVERY EVENING.    HYDROcodone-acetaminophen (NORCO) 5-325 mg per tablet Take 1 tablet by mouth every 6 (six) hours as needed for Pain. (Patient not taking: Reported on 11/2/2022)    [DISCONTINUED] blood-glucose meter kit To check BG three times daily, to use with insurance preferred meter (Patient not taking: Reported on 5/29/2022)    [DISCONTINUED] calcium carbonate (OS-ELODIA) 500 mg calcium (1,250 mg) tablet Take 1 tablet (500 mg total) by mouth 2 (two) times daily.    [DISCONTINUED]  fexofenadine (ALLEGRA) 180 MG tablet Take 1 tablet (180 mg total) by mouth daily as needed.    [DISCONTINUED] multivitamin capsule Take 1 capsule by mouth once daily.     Family History       Problem Relation (Age of Onset)    Diabetes Mother    Hyperlipidemia Son    Hypertension Mother, Father, Daughter, Son    Hypothyroidism Daughter    Multiple sclerosis Son    Stroke Father          Tobacco Use    Smoking status: Never    Smokeless tobacco: Never   Substance and Sexual Activity    Alcohol use: Yes     Comment: socially    Drug use: Never    Sexual activity: Not Currently     Review of Systems   Unable to perform ROS: Mental status change   Objective:     Vital Signs (Most Recent):  Temp: 98.3 °F (36.8 °C) (01/10/23 1102)  Pulse: 98 (01/10/23 1838)  Resp: 18 (01/10/23 1838)  BP: (!) 190/84 (01/10/23 1838)  SpO2: 98 % (01/10/23 1838)   Vital Signs (24h Range):  Temp:  [98.3 °F (36.8 °C)-99.1 °F (37.3 °C)] 98.3 °F (36.8 °C)  Pulse:  [72-98] 98  Resp:  [14-22] 18  SpO2:  [91 %-98 %] 98 %  BP: (134-193)/(63-87) 190/84     Weight: 65.9 kg (145 lb 4.5 oz)  Body mass index is 26.57 kg/m².    Physical Exam  Constitutional:       General: She is not in acute distress.     Appearance: Normal appearance.   HENT:      Head: Normocephalic and atraumatic.      Mouth/Throat:      Mouth: Mucous membranes are moist.   Eyes:      Extraocular Movements: Extraocular movements intact.      Conjunctiva/sclera: Conjunctivae normal.   Cardiovascular:      Rate and Rhythm: Normal rate and regular rhythm.   Pulmonary:      Effort: Pulmonary effort is normal. No respiratory distress.      Breath sounds: Normal breath sounds. No wheezing or rales.   Abdominal:      General: Abdomen is flat. Bowel sounds are normal. There is no distension.      Palpations: Abdomen is soft.      Tenderness: There is no abdominal tenderness. There is no guarding.   Musculoskeletal:         General: No swelling or tenderness.   Skin:     Findings: No rash.    Neurological:      Mental Status: She is alert. She is disoriented.   Psychiatric:         Mood and Affect: Mood normal.         Behavior: Behavior normal.           Significant Labs: CBC:   Recent Labs   Lab 01/09/23 2159 01/09/23 2210   WBC 15.43*  --    HGB 15.8  --    HCT 44.1 49     --      CMP:   Recent Labs   Lab 01/09/23 2159   *   K 5.1      CO2 21*   *   BUN 21   CREATININE 1.4   CALCIUM 10.9*   PROT 9.0*   ALBUMIN 4.2   BILITOT 0.7   ALKPHOS 74   AST 24   ALT 12   ANIONGAP 14     Troponin:   Recent Labs   Lab 01/09/23 2159   TROPONINI <0.006     TSH:   Recent Labs   Lab 01/09/23 2159   TSH 1.316     Urine Studies:   Recent Labs   Lab 01/10/23  0041   COLORU Yellow   APPEARANCEUA Clear   PHUR 6.0   SPECGRAV <=1.005*   PROTEINUA Trace*   GLUCUA Negative   KETONESU Negative   BILIRUBINUA Negative   OCCULTUA Negative   NITRITE Negative   UROBILINOGEN 1.0   LEUKOCYTESUR Negative       Significant Imaging: I have reviewed all pertinent imaging results/findings within the past 24 hours.

## 2023-01-11 NOTE — SUBJECTIVE & OBJECTIVE
Interval History:   No events overnight. Patient remains pleasantly confused without complaints. Follow up shunt imaging appropriate post adjustment by NSGY. Blood cultures no growth. Leukocytosis resolved.       Review of Systems   Unable to perform ROS: Dementia   Objective:     Vital Signs (Most Recent):  Temp: 96.6 °F (35.9 °C) (01/11/23 1106)  Pulse: 73 (01/11/23 1106)  Resp: 18 (01/11/23 1106)  BP: 134/63 (01/11/23 1106)  SpO2: 96 % (01/11/23 1106) Vital Signs (24h Range):  Temp:  [96.6 °F (35.9 °C)-98.3 °F (36.8 °C)] 96.6 °F (35.9 °C)  Pulse:  [71-98] 73  Resp:  [16-18] 18  SpO2:  [92 %-98 %] 96 %  BP: (126-193)/(57-91) 134/63     Weight: 65.9 kg (145 lb 4.5 oz)  Body mass index is 26.57 kg/m².  No intake or output data in the 24 hours ending 01/11/23 1214   Physical Exam  Constitutional:       General: She is not in acute distress.     Appearance: Normal appearance.   HENT:      Head: Normocephalic and atraumatic.      Mouth/Throat:      Mouth: Mucous membranes are moist.   Eyes:      Extraocular Movements: Extraocular movements intact.      Conjunctiva/sclera: Conjunctivae normal.   Cardiovascular:      Rate and Rhythm: Normal rate and regular rhythm.   Pulmonary:      Effort: Pulmonary effort is normal. No respiratory distress.      Breath sounds: Normal breath sounds. No wheezing or rales.   Abdominal:      General: Abdomen is flat. Bowel sounds are normal. There is no distension.      Palpations: Abdomen is soft.      Tenderness: There is no abdominal tenderness. There is no guarding.   Musculoskeletal:         General: No swelling or tenderness.   Skin:     Findings: No rash.   Neurological:      Mental Status: She is alert. She is disoriented.      Comments: AO to self   Psychiatric:         Mood and Affect: Mood normal.         Behavior: Behavior normal.       Significant Labs: CBC:   Recent Labs   Lab 01/09/23  2159 01/09/23  2210 01/11/23  0306   WBC 15.43*  --  9.62   HGB 15.8  --  13.5   HCT 44.1  49 41.3     --  218     CMP:   Recent Labs   Lab 01/09/23  2159 01/11/23  0306   * 135*   K 5.1 4.5    102   CO2 21* 18*   * 110   BUN 21 17   CREATININE 1.4 0.9   CALCIUM 10.9* 10.3   PROT 9.0* 8.1   ALBUMIN 4.2 3.9   BILITOT 0.7 0.8   ALKPHOS 74 84   AST 24 21   ALT 12 9*   ANIONGAP 14 15       Significant Imaging: I have reviewed all pertinent imaging results/findings within the past 24 hours.

## 2023-01-11 NOTE — ASSESSMENT & PLAN NOTE
Patient's FSGs are controlled on current medication regimen.  Last A1c reviewed-   Lab Results   Component Value Date    HGBA1C 6.2 (H) 01/10/2023     Most recent fingerstick glucose reviewed-   Recent Labs   Lab 01/09/23  2154 01/10/23  1000 01/10/23  1038   POCTGLUCOSE 248* 120* 129*     Current correctional scale  Medium  Maintain anti-hyperglycemic dose as follows-   Antihyperglycemics (From admission, onward)    Start     Stop Route Frequency Ordered    01/10/23 1044  insulin aspart U-100 pen 0-5 Units         -- SubQ Before meals & nightly PRN 01/10/23 0944        Hold Oral hypoglycemics while patient is in the hospital.

## 2023-01-11 NOTE — ASSESSMENT & PLAN NOTE
Patient presenting with 2 days of altered mental status of fatigue, AVH, and weakness.  No infectious symptoms prior to admission.  Concern for possible complications of hydrocephalus.   -  shunt adjusted by Neurosurgery on admission.  - WBC 15.4 on admission  - UA unremarkable   - Blood cultures obtained   - Follow-up procalcitonin   - Holding antibiotics at this time   - Electrolytes within normal limits   - TSH unremarkable   - PT/OT consulted  - Holding sedating medications

## 2023-01-11 NOTE — SUBJECTIVE & OBJECTIVE
Interval History: Mental status vastly improved from this morning's exam. Patient now oriented to person and place which is baseline for her. Daughter at bedside and agrees with improvement. Reports productive cough.     Medications:  Continuous Infusions:  Scheduled Meds:   allopurinoL  300 mg Oral Daily    amLODIPine  10 mg Oral Daily    atorvastatin  10 mg Oral Daily    clopidogreL  75 mg Oral Daily    enoxaparin  40 mg Subcutaneous Daily    metoprolol tartrate  50 mg Oral BID    polyethylene glycol  17 g Oral Daily    senna  8.6 mg Oral Daily    venlafaxine  150 mg Oral QHS     PRN Meds:acetaminophen, dextrose 10%, dextrose 10%, glucagon (human recombinant), glucose, glucose, hydrALAZINE, insulin aspart U-100, melatonin, naloxone, ondansetron, polyethylene glycol, prochlorperazine, sodium chloride 0.9%, sodium chloride 0.9%     Review of Systems  Objective:     Weight: 65.9 kg (145 lb 4.5 oz)  Body mass index is 26.57 kg/m².  Vital Signs (Most Recent):  Temp: 97.8 °F (36.6 °C) (01/11/23 1506)  Pulse: 71 (01/11/23 1506)  Resp: 18 (01/11/23 1506)  BP: (!) 142/63 (01/11/23 1506)  SpO2: (!) 94 % (01/11/23 1506)   Vital Signs (24h Range):  Temp:  [96.6 °F (35.9 °C)-98.3 °F (36.8 °C)] 97.8 °F (36.6 °C)  Pulse:  [71-98] 71  Resp:  [16-18] 18  SpO2:  [92 %-98 %] 94 %  BP: (126-193)/(57-91) 142/63     Neurosurgery Physical Exam  General: Well developed, well nourished, not in acute distress.   Head: Normocephalic, atraumatic.  Neurologic: Alert and oriented to person and and place (baseline); though content appropriate. Follows commands   GCS: Motor:6  Verbal:4  Eyes:4   GCS Total: 14  Speech: No dysarthria.  Cranial nerves: Face symmetric, tongue midline, CN II-XII grossly intact.   Eyes: Pupils equal, round, reactive to light with accomodation, EOMI. Unable to appreciate optic disc.   Pulmonary: Normal respirations, no signs of respiratory distress.  Sensory: Intact to light touch throughout.  Motor Strength: Moves  all extremities spontaneously.  Vascular: No LE edema.   Previous cranial incision healed.    Significant Labs:  Recent Labs   Lab 01/09/23 2159 01/11/23  0306   * 110   * 135*   K 5.1 4.5    102   CO2 21* 18*   BUN 21 17   CREATININE 1.4 0.9   CALCIUM 10.9* 10.3   MG 1.8 1.9     Recent Labs   Lab 01/09/23 2159 01/09/23  2210 01/11/23  0306   WBC 15.43*  --  9.62   HGB 15.8  --  13.5   HCT 44.1 49 41.3     --  218     No results for input(s): LABPT, INR, APTT in the last 48 hours.  Microbiology Results (last 7 days)       Procedure Component Value Units Date/Time    Blood culture [[id number]] Collected: 01/10/23 1029    Order Status: Completed Specimen: Blood from Peripheral, Hand, Right Updated: 01/11/23 1212     Blood Culture, Routine No growth to date      No Growth to date    Blood culture [[id number]] Collected: 01/10/23 1029    Order Status: Completed Specimen: Blood from Peripheral, Hand, Left Updated: 01/11/23 1212     Blood Culture, Routine No growth to date      No Growth to date          All pertinent labs from the last 24 hours have been reviewed.    Significant Diagnostics:  I have reviewed all pertinent imaging results/findings within the past 24 hours.

## 2023-01-11 NOTE — PROGRESS NOTES
[facility] Medicine  Progress Note    Patient Name: Farheen Soares  MRN: [id number]  Patient Class: OP- Observation   Admission Date: 1/9/2023  Length of Stay: 0 days  Attending Physician: Leroy Morocho MD  Primary Care Provider: Vonda Chung MD        Subjective:     Principal Problem:Altered mental status        HPI:  Mrs. Soares is a 87 yoF with a h/o hearing loss, type II DM, DDD, meningioma, TIA, and NPH s/p  shunt who presents with 2 days of altered mental status.  History obtained from patient's daughter.  Patient was noted to have increased fatigue and AVH to prior to admission.  It is reported that she was talking to people who were not in the room.  The following days no patient had difficulty with balance and able to walk with her walker.  Was also noted to have decreased oral intake, weakness, and constipation.  Denies complaints of pain, dysuria, dyspnea, headache, and nausea.  Given concern for possible complications of  shunt, patient was brought to emergency room for further evaluation.    In the ED, /71, HR 79, afebrile, and on room air.  Labs notable for creatinine 1.4, K 5.1, WBC 15.4, hemoglobin 16, sodium 135, TSH 1.3, troponin negative, COVID negative, flu negative, UA unremarkable, and .  X-ray series of shunt unremarkable.  CT abdomen/pelvis unremarkable.  CT head unremarkable.  Chest x-ray unremarkable. Neurosurgery consulted for further evaluation of  shunt.   shunt adjusted in the emergency room by Neurosurgery and follow-up x-ray obtained.  Patient and mother for further evaluation.      Overview/Hospital Course:  No notes on file    Interval History:   No events overnight. Patient remains pleasantly confused without complaints. Follow up shunt imaging appropriate post adjustment by NSGY. Blood cultures no growth. Leukocytosis resolved.       Review of Systems   Unable to perform ROS: Dementia   Objective:     Vital Signs (Most  Recent):  Temp: 96.6 °F (35.9 °C) (01/11/23 1106)  Pulse: 73 (01/11/23 1106)  Resp: 18 (01/11/23 1106)  BP: 134/63 (01/11/23 1106)  SpO2: 96 % (01/11/23 1106) Vital Signs (24h Range):  Temp:  [96.6 °F (35.9 °C)-98.3 °F (36.8 °C)] 96.6 °F (35.9 °C)  Pulse:  [71-98] 73  Resp:  [16-18] 18  SpO2:  [92 %-98 %] 96 %  BP: (126-193)/(57-91) 134/63     Weight: 65.9 kg (145 lb 4.5 oz)  Body mass index is 26.57 kg/m².  No intake or output data in the 24 hours ending 01/11/23 1214   Physical Exam  Constitutional:       General: She is not in acute distress.     Appearance: Normal appearance.   HENT:      Head: Normocephalic and atraumatic.      Mouth/Throat:      Mouth: Mucous membranes are moist.   Eyes:      Extraocular Movements: Extraocular movements intact.      Conjunctiva/sclera: Conjunctivae normal.   Cardiovascular:      Rate and Rhythm: Normal rate and regular rhythm.   Pulmonary:      Effort: Pulmonary effort is normal. No respiratory distress.      Breath sounds: Normal breath sounds. No wheezing or rales.   Abdominal:      General: Abdomen is flat. Bowel sounds are normal. There is no distension.      Palpations: Abdomen is soft.      Tenderness: There is no abdominal tenderness. There is no guarding.   Musculoskeletal:         General: No swelling or tenderness.   Skin:     Findings: No rash.   Neurological:      Mental Status: She is alert. She is disoriented.      Comments: AO to self   Psychiatric:         Mood and Affect: Mood normal.         Behavior: Behavior normal.       Significant Labs: CBC:   Recent Labs   Lab 01/09/23 2159 01/09/23 2210 01/11/23  0306   WBC 15.43*  --  9.62   HGB 15.8  --  13.5   HCT 44.1 49 41.3     --  218     CMP:   Recent Labs   Lab 01/09/23 2159 01/11/23  0306   * 135*   K 5.1 4.5    102   CO2 21* 18*   * 110   BUN 21 17   CREATININE 1.4 0.9   CALCIUM 10.9* 10.3   PROT 9.0* 8.1   ALBUMIN 4.2 3.9   BILITOT 0.7 0.8   ALKPHOS 74 84   AST 24 21   ALT 12 9*    ANIONGAP 14 15       Significant Imaging: I have reviewed all pertinent imaging results/findings within the past 24 hours.      Assessment/Plan:      * Altered mental status  Patient presenting with 2 days of altered mental status of fatigue, AVH, and weakness.  No infectious symptoms prior to admission.  Concern for possible complications of hydrocephalus.   -  shunt adjusted by Neurosurgery on admission.  - WBC 15.4 on admission, improved without intervetion  - UA unremarkable   - Blood cultures NGTD  - Procalcitonin normal  - Holding antibiotics at this time   - Electrolytes within normal limits   - TSH unremarkable   - PT/OT consulted  - Holding sedating medications    Essential hypertension  - Continue home amlodipine  - Holding lisinopril given hyperkalemia on admission   - As needed hydralazine for SBP> 160      History of ventriculoperitoneal shunting  - Placed for hydrocephalus  - Neurosurgery consulted   --  shunt adjusted from 120-110  -- Follow-up x-ray ordered        Type 2 diabetes mellitus without complication, without long-term current use of insulin  Patient's FSGs are controlled on current medication regimen.  Last A1c reviewed-   Lab Results   Component Value Date    HGBA1C 6.2 (H) 01/10/2023     Most recent fingerstick glucose reviewed-   Recent Labs   Lab 01/09/23  2154 01/10/23  1000 01/10/23  1038   POCTGLUCOSE 248* 120* 129*     Current correctional scale  Medium  Maintain anti-hyperglycemic dose as follows-   Antihyperglycemics (From admission, onward)    Start     Stop Route Frequency Ordered    01/10/23 1044  insulin aspart U-100 pen 0-5 Units         -- SubQ Before meals & nightly PRN 01/10/23 0944        Hold Oral hypoglycemics while patient is in the hospital.    Constipation  - Continue bowel regimen      Gout  - Continue home allopurinol      History of transient ischemic attack (TIA)  - Continue home Plavix      Pure hypercholesterolemia  - Continue home  atorvastatin      Recurrent major depressive disorder, in full remission  Patient has persistent depression which is moderate and is currently controlled. Will Continue anti-depressant medications. We will not consult psychiatry at this time. Patient does not display psychosis at this time. Continue to monitor closely and adjust plan of care as needed.    - Continue venlafaxine        VTE Risk Mitigation (From admission, onward)         Ordered     IP VTE HIGH RISK PATIENT  Once         01/10/23 1844     Place sequential compression device  Until discontinued         01/10/23 1844     enoxaparin injection 40 mg  Daily         01/10/23 0943     Place sequential compression device  Until discontinued         01/10/23 0943                Discharge Planning   GONZALEZ: 1/12/2023     Code Status: Full Code   Is the patient medically ready for discharge?: No    Reason for patient still in hospital (select all that apply): Patient trending condition, Laboratory test, PT / OT recommendations and Pending disposition                     Leroy Morocho MD  Department of Hospital Medicine   [facility]

## 2023-01-11 NOTE — ASSESSMENT & PLAN NOTE
Patient presenting with 2 days of altered mental status of fatigue, AVH, and weakness.  No infectious symptoms prior to admission.  Concern for possible complications of hydrocephalus.   -  shunt adjusted by Neurosurgery on admission.  - WBC 15.4 on admission, improved without intervetion  - UA unremarkable   - Blood cultures NGTD  - Procalcitonin normal  - Holding antibiotics at this time   - Electrolytes within normal limits   - TSH unremarkable   - PT/OT consulted  - Holding sedating medications

## 2023-01-11 NOTE — NURSING
Patient recieved from ED at this time. Patient educated on how to use the call light and instructed to call with any needs they may have. Patient hypertensive at 188/91, but VSS otherwise. Bed locked in the lowest position with personal items within reach. Refer to flowsheets for full assessment.

## 2023-01-11 NOTE — PLAN OF CARE
[facility] Med Surg  Initial Discharge Assessment       Primary Care Provider: Vonda Chung MD    Admission Diagnosis: Normal pressure hydrocephalus [G91.2]  Altered mental status [R41.82]  Meningioma [D32.9]  Altered mental status, unspecified altered mental status type [R41.82]  History of ventriculoperitoneal shunting [Z92.89]    Admission Date: 1/9/2023  Expected Discharge Date: 1/12/2023    Discharge Barriers Identified: None    Payor: [employer] MEDICARE / Plan: [employer] 65 / Product Type: Medicare Advantage /     Extended Emergency Contact Information  Primary Emergency Contact: Colleen Soares  Mobile Phone: [phone]  Relation: Daughter    Discharge Plan A: Home Health  Discharge Plan B: Home with family      [facility]  [address]  [facility] [address]  Phone: [phone] Fax: [phone]    [id number] - [place], [place] - [address]  [address]  [place] [address]  Phone: [phone] Fax: [phone]    [facility]  [address]  [place] [address]  Phone: [phone] Fax: [phone]      Initial Assessment (most recent)       Adult Discharge Assessment - 01/11/23 1251          Discharge Assessment    Assessment Type Discharge Planning Assessment     Confirmed/corrected address, phone number and insurance Yes     Confirmed Demographics Correct on Facesheet     Source of Information patient;family     Does patient/caregiver understand observation status Yes     Communicated GONZALEZ with patient/caregiver Yes     Reason For Admission Altered Mental Status     People in Home child(sammy), adult     Facility Arrived From: Home     Do you expect to return to your current living situation? Yes     Do you have help at home or someone to help you manage your care at home? Yes     Who are your caregiver(s) and their phone number(s)? Dght Colleen caregivers     Prior to hospitilization  cognitive status: Alert/Oriented;Not Oriented to Person     Current cognitive status: Alert/Oriented;Not Oriented to Person     Walking or Climbing Stairs ambulation difficulty, requires equipment     Equipment Currently Used at Home cane, quad;walker, rolling;wheelchair     Readmission within 30 days? No     Patient currently being followed by outpatient case management? No     Do you currently have service(s) that help you manage your care at home? No     Do you take prescription medications? Yes     Do you have prescription coverage? Yes     Do you have any problems affording any of your prescribed medications? No     Is the patient taking medications as prescribed? yes     Who is going to help you get home at discharge? Family, Hospital     How do you get to doctors appointments? family or friend will provide     Are you on dialysis? No     Do you take coumadin? No     Discharge Plan A Home Health     Discharge Plan B Home with family     DME Needed Upon Discharge  none     Discharge Plan discussed with: Patient;Adult children;Caregiver     Name(s) and Number(s) Justin Soares [facility]      Discharge Barriers Identified None        OTHER    Name(s) of People in Home Colleen Soares

## 2023-01-11 NOTE — ASSESSMENT & PLAN NOTE
Farheen Soares is a 87 y.o. female with h/o hearing loss, type II DM, DDD, meningioma, TIA, and NPH who is s/p elective  shunt placement with Dr. Espinoza on 8/11/22. Hakim valve set at 120. She presents with AMS.    - Admitted to ; infectious workup negative thus far. Mental status vastly improved after lowering shunt from 120 to 100 on 1/11  - Q4h neurochecks.  - CTH showing grossly stable configuration of ventricles.  - X-ray shunt series showing no discontinuities or kinks in shunt catheter.   - No neurosurgical intervention is indicated at this time. If patient declines, will consider shunt tap.   - Please contact NSGY with any decline in neurological exam.    D/w Dr. Espinoza

## 2023-01-11 NOTE — HPI
Mrs. Soares is a 87 yoF with a h/o hearing loss, type II DM, DDD, meningioma, TIA, and NPH s/p  shunt who presents with 2 days of altered mental status.  History obtained from patient's daughter.  Patient was noted to have increased fatigue and AVH to prior to admission.  It is reported that she was talking to people who were not in the room.  The following days no patient had difficulty with balance and able to walk with her walker.  Was also noted to have decreased oral intake, weakness, and constipation.  Denies complaints of pain, dysuria, dyspnea, headache, and nausea.  Given concern for possible complications of  shunt, patient was brought to emergency room for further evaluation.    In the ED, /71, HR 79, afebrile, and on room air.  Labs notable for creatinine 1.4, K 5.1, WBC 15.4, hemoglobin 16, sodium 135, TSH 1.3, troponin negative, COVID negative, flu negative, UA unremarkable, and .  X-ray series of shunt unremarkable.  CT abdomen/pelvis unremarkable.  CT head unremarkable.  Chest x-ray unremarkable. Neurosurgery consulted for further evaluation of  shunt.   shunt adjusted in the emergency room by Neurosurgery and follow-up x-ray obtained.  Patient and mother for further evaluation.

## 2023-01-11 NOTE — PROGRESS NOTES
"[facility]  Neurosurgery  Progress Note    Subjective:     History of Present Illness: Farheen Soares is a 87 y.o. female with h/o hearing loss, type II DM, DDD, meningioma, TIA, and NPH who is s/p elective  shunt placement with Dr. Espinoza on 8/11/22. Hakim valve set at 120. Patient presents as a transfer from [facility] for concern for  shunt malfunction. Per daughter, patient did well after shunt placement. Her baseline is Aox1 but is able to hold conversations easily. The daughter states that on Saturday she noticed the patient was lethargic and having some gait difficulties, requiring more assistance with care. These symptoms progressively worsened. The patient is now sleeping most of the day, confused and unable to hold conversations. She is "speaking to people that aren't there" and "reaching for things that aren't there" per the daughter. Nsgy consulted.      Post-Op Info:  * No surgery found *         Interval History: Mental status vastly improved from this morning's exam. Patient now oriented to person and place which is baseline for her. Daughter at bedside and agrees with improvement. Reports productive cough.     Medications:  Continuous Infusions:  Scheduled Meds:   allopurinoL  300 mg Oral Daily    amLODIPine  10 mg Oral Daily    atorvastatin  10 mg Oral Daily    clopidogreL  75 mg Oral Daily    enoxaparin  40 mg Subcutaneous Daily    metoprolol tartrate  50 mg Oral BID    polyethylene glycol  17 g Oral Daily    senna  8.6 mg Oral Daily    venlafaxine  150 mg Oral QHS     PRN Meds:acetaminophen, dextrose 10%, dextrose 10%, glucagon (human recombinant), glucose, glucose, hydrALAZINE, insulin aspart U-100, melatonin, naloxone, ondansetron, polyethylene glycol, prochlorperazine, sodium chloride 0.9%, sodium chloride 0.9%     Review of Systems  Objective:     Weight: 65.9 kg (145 lb 4.5 oz)  Body mass index is 26.57 kg/m².  Vital Signs (Most Recent):  Temp: 97.8 °F (36.6 °C) (01/11/23 " 1506)  Pulse: 71 (01/11/23 1506)  Resp: 18 (01/11/23 1506)  BP: (!) 142/63 (01/11/23 1506)  SpO2: (!) 94 % (01/11/23 1506)   Vital Signs (24h Range):  Temp:  [96.6 °F (35.9 °C)-98.3 °F (36.8 °C)] 97.8 °F (36.6 °C)  Pulse:  [71-98] 71  Resp:  [16-18] 18  SpO2:  [92 %-98 %] 94 %  BP: (126-193)/(57-91) 142/63     Neurosurgery Physical Exam  General: Well developed, well nourished, not in acute distress.   Head: Normocephalic, atraumatic.  Neurologic: Alert and oriented to person and and place (baseline); though content appropriate. Follows commands   GCS: Motor:6  Verbal:4  Eyes:4   GCS Total: 14  Speech: No dysarthria.  Cranial nerves: Face symmetric, tongue midline, CN II-XII grossly intact.   Eyes: Pupils equal, round, reactive to light with accomodation, EOMI. Unable to appreciate optic disc.   Pulmonary: Normal respirations, no signs of respiratory distress.  Sensory: Intact to light touch throughout.  Motor Strength: Moves all extremities spontaneously.  Vascular: No LE edema.   Previous cranial incision healed.    Significant Labs:  Recent Labs   Lab 01/09/23 2159 01/11/23  0306   * 110   * 135*   K 5.1 4.5    102   CO2 21* 18*   BUN 21 17   CREATININE 1.4 0.9   CALCIUM 10.9* 10.3   MG 1.8 1.9     Recent Labs   Lab 01/09/23 2159 01/09/23  2210 01/11/23  0306   WBC 15.43*  --  9.62   HGB 15.8  --  13.5   HCT 44.1 49 41.3     --  218     No results for input(s): LABPT, INR, APTT in the last 48 hours.  Microbiology Results (last 7 days)       Procedure Component Value Units Date/Time    Blood culture [[id number]] Collected: 01/10/23 1029    Order Status: Completed Specimen: Blood from Peripheral, Hand, Right Updated: 01/11/23 1212     Blood Culture, Routine No growth to date      No Growth to date    Blood culture [[id number]] Collected: 01/10/23 1029    Order Status: Completed Specimen: Blood from Peripheral, Hand, Left Updated: 01/11/23 1212     Blood Culture, Routine No growth to date       No Growth to date          All pertinent labs from the last 24 hours have been reviewed.    Significant Diagnostics:  I have reviewed all pertinent imaging results/findings within the past 24 hours.    Assessment/Plan:     History of ventriculoperitoneal shunting  Farheen Soares is a 87 y.o. female with h/o hearing loss, type II DM, DDD, meningioma, TIA, and NPH who is s/p elective  shunt placement with Dr. Espinoza on 8/11/22. Hakim valve set at 120. She presents with AMS.    - Admitted to ; infectious workup negative thus far. Mental status vastly improved after lowering shunt from 120 to 100 on 1/11  - Q4h neurochecks.  - CTH showing grossly stable configuration of ventricles.  - X-ray shunt series showing no discontinuities or kinks in shunt catheter.   - No neurosurgical intervention is indicated at this time. If patient declines, will consider shunt tap.   - Please contact NSGY with any decline in neurological exam.    D/w Dr. Olga Mckenna PADINORAH  Neurosurgery  [facility] - Med Surg

## 2023-01-11 NOTE — ASSESSMENT & PLAN NOTE
- Continue home amlodipine  - Holding lisinopril given hyperkalemia on admission   - As needed hydralazine for SBP> 160

## 2023-01-12 VITALS
BODY MASS INDEX: 26.74 KG/M2 | TEMPERATURE: 97 F | OXYGEN SATURATION: 92 % | HEIGHT: 62 IN | HEART RATE: 58 BPM | DIASTOLIC BLOOD PRESSURE: 60 MMHG | RESPIRATION RATE: 18 BRPM | WEIGHT: 145.31 LBS | SYSTOLIC BLOOD PRESSURE: 129 MMHG

## 2023-01-12 PROBLEM — R41.82 ALTERED MENTAL STATUS: Status: RESOLVED | Noted: 2023-01-10 | Resolved: 2023-01-12

## 2023-01-12 PROBLEM — N17.9 AKI (ACUTE KIDNEY INJURY): Status: ACTIVE | Noted: 2023-01-12

## 2023-01-12 LAB
ALBUMIN SERPL BCP-MCNC: 3.3 G/DL (ref 3.5–5.2)
ALBUMIN SERPL BCP-MCNC: 3.4 G/DL (ref 3.5–5.2)
ALP SERPL-CCNC: 65 U/L (ref 55–135)
ALT SERPL W/O P-5'-P-CCNC: 7 U/L (ref 10–44)
ANION GAP SERPL CALC-SCNC: 12 MMOL/L (ref 8–16)
ANION GAP SERPL CALC-SCNC: 8 MMOL/L (ref 8–16)
AST SERPL-CCNC: 20 U/L (ref 10–40)
BASOPHILS # BLD AUTO: 0.04 K/UL (ref 0–0.2)
BASOPHILS NFR BLD: 0.7 % (ref 0–1.9)
BILIRUB SERPL-MCNC: 0.6 MG/DL (ref 0.1–1)
BUN SERPL-MCNC: 20 MG/DL (ref 8–23)
BUN SERPL-MCNC: 23 MG/DL (ref 8–23)
CALCIUM SERPL-MCNC: 9.5 MG/DL (ref 8.7–10.5)
CALCIUM SERPL-MCNC: 9.6 MG/DL (ref 8.7–10.5)
CHLORIDE SERPL-SCNC: 101 MMOL/L (ref 95–110)
CHLORIDE SERPL-SCNC: 103 MMOL/L (ref 95–110)
CO2 SERPL-SCNC: 19 MMOL/L (ref 23–29)
CO2 SERPL-SCNC: 21 MMOL/L (ref 23–29)
CREAT SERPL-MCNC: 1 MG/DL (ref 0.5–1.4)
CREAT SERPL-MCNC: 1.2 MG/DL (ref 0.5–1.4)
DIFFERENTIAL METHOD: ABNORMAL
EOSINOPHIL # BLD AUTO: 0.1 K/UL (ref 0–0.5)
EOSINOPHIL NFR BLD: 1.8 % (ref 0–8)
ERYTHROCYTE [DISTWIDTH] IN BLOOD BY AUTOMATED COUNT: 13.8 % (ref 11.5–14.5)
EST. GFR  (NO RACE VARIABLE): 44.4 ML/MIN/1.73 M^2
EST. GFR  (NO RACE VARIABLE): 55.2 ML/MIN/1.73 M^2
GLUCOSE SERPL-MCNC: 108 MG/DL (ref 70–110)
GLUCOSE SERPL-MCNC: 99 MG/DL (ref 70–110)
HCT VFR BLD AUTO: 36 % (ref 37–48.5)
HGB BLD-MCNC: 12.3 G/DL (ref 12–16)
IMM GRANULOCYTES # BLD AUTO: 0.01 K/UL (ref 0–0.04)
IMM GRANULOCYTES NFR BLD AUTO: 0.2 % (ref 0–0.5)
LYMPHOCYTES # BLD AUTO: 1.8 K/UL (ref 1–4.8)
LYMPHOCYTES NFR BLD: 32.4 % (ref 18–48)
MAGNESIUM SERPL-MCNC: 1.9 MG/DL (ref 1.6–2.6)
MCH RBC QN AUTO: 33.3 PG (ref 27–31)
MCHC RBC AUTO-ENTMCNC: 34.2 G/DL (ref 32–36)
MCV RBC AUTO: 98 FL (ref 82–98)
MONOCYTES # BLD AUTO: 0.8 K/UL (ref 0.3–1)
MONOCYTES NFR BLD: 14.9 % (ref 4–15)
NEUTROPHILS # BLD AUTO: 2.7 K/UL (ref 1.8–7.7)
NEUTROPHILS NFR BLD: 50 % (ref 38–73)
NRBC BLD-RTO: 0 /100 WBC
PHOSPHATE SERPL-MCNC: 2.9 MG/DL (ref 2.7–4.5)
PHOSPHATE SERPL-MCNC: 3.2 MG/DL (ref 2.7–4.5)
PLATELET # BLD AUTO: 198 K/UL (ref 150–450)
PMV BLD AUTO: 10.9 FL (ref 9.2–12.9)
POCT GLUCOSE: 106 MG/DL (ref 70–110)
POCT GLUCOSE: 111 MG/DL (ref 70–110)
POCT GLUCOSE: 118 MG/DL (ref 70–110)
POTASSIUM SERPL-SCNC: 4.1 MMOL/L (ref 3.5–5.1)
POTASSIUM SERPL-SCNC: 4.1 MMOL/L (ref 3.5–5.1)
PROT SERPL-MCNC: 7.1 G/DL (ref 6–8.4)
RBC # BLD AUTO: 3.69 M/UL (ref 4–5.4)
SODIUM SERPL-SCNC: 130 MMOL/L (ref 136–145)
SODIUM SERPL-SCNC: 134 MMOL/L (ref 136–145)
WBC # BLD AUTO: 5.44 K/UL (ref 3.9–12.7)

## 2023-01-12 PROCEDURE — G0378 HOSPITAL OBSERVATION PER HR: HCPCS

## 2023-01-12 PROCEDURE — 96372 THER/PROPH/DIAG INJ SC/IM: CPT | Performed by: STUDENT IN AN ORGANIZED HEALTH CARE EDUCATION/TRAINING PROGRAM

## 2023-01-12 PROCEDURE — 36415 COLL VENOUS BLD VENIPUNCTURE: CPT | Performed by: STUDENT IN AN ORGANIZED HEALTH CARE EDUCATION/TRAINING PROGRAM

## 2023-01-12 PROCEDURE — 63600175 PHARM REV CODE 636 W HCPCS: Performed by: STUDENT IN AN ORGANIZED HEALTH CARE EDUCATION/TRAINING PROGRAM

## 2023-01-12 PROCEDURE — 36415 COLL VENOUS BLD VENIPUNCTURE: CPT | Performed by: HOSPITALIST

## 2023-01-12 PROCEDURE — 80069 RENAL FUNCTION PANEL: CPT | Performed by: HOSPITALIST

## 2023-01-12 PROCEDURE — 99233 SBSQ HOSP IP/OBS HIGH 50: CPT | Mod: ,,, | Performed by: PHYSICIAN ASSISTANT

## 2023-01-12 PROCEDURE — 84100 ASSAY OF PHOSPHORUS: CPT | Performed by: STUDENT IN AN ORGANIZED HEALTH CARE EDUCATION/TRAINING PROGRAM

## 2023-01-12 PROCEDURE — 99239 PR HOSPITAL DISCHARGE DAY,>30 MIN: ICD-10-PCS | Mod: ,,, | Performed by: HOSPITALIST

## 2023-01-12 PROCEDURE — 85025 COMPLETE CBC W/AUTO DIFF WBC: CPT | Performed by: STUDENT IN AN ORGANIZED HEALTH CARE EDUCATION/TRAINING PROGRAM

## 2023-01-12 PROCEDURE — 83735 ASSAY OF MAGNESIUM: CPT | Performed by: STUDENT IN AN ORGANIZED HEALTH CARE EDUCATION/TRAINING PROGRAM

## 2023-01-12 PROCEDURE — 63600175 PHARM REV CODE 636 W HCPCS: Performed by: HOSPITALIST

## 2023-01-12 PROCEDURE — 99233 PR SUBSEQUENT HOSPITAL CARE,LEVL III: ICD-10-PCS | Mod: ,,, | Performed by: PHYSICIAN ASSISTANT

## 2023-01-12 PROCEDURE — 80053 COMPREHEN METABOLIC PANEL: CPT | Performed by: STUDENT IN AN ORGANIZED HEALTH CARE EDUCATION/TRAINING PROGRAM

## 2023-01-12 PROCEDURE — 25000003 PHARM REV CODE 250: Performed by: STUDENT IN AN ORGANIZED HEALTH CARE EDUCATION/TRAINING PROGRAM

## 2023-01-12 PROCEDURE — 97161 PT EVAL LOW COMPLEX 20 MIN: CPT

## 2023-01-12 PROCEDURE — 99239 HOSP IP/OBS DSCHRG MGMT >30: CPT | Mod: ,,, | Performed by: HOSPITALIST

## 2023-01-12 RX ORDER — CLOPIDOGREL BISULFATE 75 MG/1
75 TABLET ORAL DAILY
Qty: 90 TABLET | Refills: 3 | Status: SHIPPED | OUTPATIENT
Start: 2023-01-12 | End: 2024-01-12

## 2023-01-12 RX ORDER — SODIUM CHLORIDE, SODIUM LACTATE, POTASSIUM CHLORIDE, CALCIUM CHLORIDE 600; 310; 30; 20 MG/100ML; MG/100ML; MG/100ML; MG/100ML
INJECTION, SOLUTION INTRAVENOUS CONTINUOUS
Status: ACTIVE | OUTPATIENT
Start: 2023-01-12 | End: 2023-01-12

## 2023-01-12 RX ORDER — GUAIFENESIN 600 MG/1
600 TABLET, EXTENDED RELEASE ORAL 2 TIMES DAILY
Qty: 14 TABLET | Refills: 0 | Status: SHIPPED | OUTPATIENT
Start: 2023-01-12 | End: 2023-01-19

## 2023-01-12 RX ADMIN — ENOXAPARIN SODIUM 40 MG: 40 INJECTION SUBCUTANEOUS at 05:01

## 2023-01-12 RX ADMIN — SENNOSIDES 8.6 MG: 8.6 TABLET, FILM COATED ORAL at 06:01

## 2023-01-12 RX ADMIN — SENNOSIDES 8.6 MG: 8.6 TABLET, FILM COATED ORAL at 03:01

## 2023-01-12 RX ADMIN — SODIUM CHLORIDE, POTASSIUM CHLORIDE, SODIUM LACTATE AND CALCIUM CHLORIDE: 600; 310; 30; 20 INJECTION, SOLUTION INTRAVENOUS at 11:01

## 2023-01-12 RX ADMIN — GUAIFENESIN 600 MG: 600 TABLET, EXTENDED RELEASE ORAL at 09:01

## 2023-01-12 RX ADMIN — CLOPIDOGREL BISULFATE 75 MG: 75 TABLET ORAL at 09:01

## 2023-01-12 RX ADMIN — AMLODIPINE BESYLATE 10 MG: 10 TABLET ORAL at 09:01

## 2023-01-12 RX ADMIN — POLYETHYLENE GLYCOL 3350 17 G: 17 POWDER, FOR SOLUTION ORAL at 09:01

## 2023-01-12 RX ADMIN — ATORVASTATIN CALCIUM 10 MG: 10 TABLET, FILM COATED ORAL at 09:01

## 2023-01-12 RX ADMIN — METOPROLOL TARTRATE 50 MG: 50 TABLET, FILM COATED ORAL at 09:01

## 2023-01-12 RX ADMIN — ALLOPURINOL 300 MG: 300 TABLET ORAL at 09:01

## 2023-01-12 NOTE — PLAN OF CARE
[facility] - [facility]      HOME HEALTH ORDERS  FACE TO FACE ENCOUNTER    Patient Name: Farheen Soares  YOB: 1935    PCP: Vonda Chung MD   PCP Address: [address] [facility] [address]  PCP Phone Number: [phone]  PCP Fax: [phone]    Encounter Date: 1/9/23    Admit to Home Health    Diagnoses:  Active Hospital Problems    Diagnosis  POA    *Acute metabolic encephalopathy [G93.41]  Yes    ANAHI (acute kidney injury) [N17.9]  Yes    History of ventriculoperitoneal shunting [Z92.89]  Unknown    Constipation [K59.00]  Unknown    Gout [M10.9]  Yes    Type 2 diabetes mellitus without complication, without long-term current use of insulin [E11.9]  Yes    Essential hypertension [I10]  Yes    Pure hypercholesterolemia [E78.00]  Yes    History of transient ischemic attack (TIA) [Z86.73]  Not Applicable    Recurrent major depressive disorder, in full remission [F33.42]  Yes      Resolved Hospital Problems    Diagnosis Date Resolved POA    Altered mental status [R41.82] 01/12/2023 Unknown       Follow Up Appointments:  Future Appointments   Date Time Provider Department Center   2/14/2023 12:30 PM [facility] OI-CT1 500 LB LIMIT [facility]   2/14/2023  1:30 PM Savannah Mckenna PA-C [facility] NEUROS8 [facility]   2/23/2023  2:30 PM Vonda Chung MD [facility]       Allergies:  Review of patient's allergies indicates:   Allergen Reactions    Baclofen      AMS and distorted dremas       Medications: Review discharge medications with patient and family and provide education.    Current Facility-Administered Medications   Medication Dose Route Frequency Provider Last Rate Last Admin    acetaminophen tablet 650 mg  650 mg Oral Q8H PRN Leroy Morocho MD   650 mg at 01/11/23 2216    allopurinoL tablet 300 mg  300 mg Oral Daily Leroy Morocho MD   300 mg at 01/11/23 0841    amLODIPine tablet 10 mg  10 mg Oral Daily Leroy Morocho MD   10 mg at 01/10/23  1849    atorvastatin tablet 10 mg  10 mg Oral Daily Leroy Morocho MD   10 mg at 01/11/23 0841    bisacodyL suppository 10 mg  10 mg Rectal Daily PRN Leroy Morocho MD   10 mg at 01/11/23 1737    clopidogreL tablet 75 mg  75 mg Oral Daily Leroy Morocho MD   75 mg at 01/11/23 0841    dextrose 10% bolus 125 mL 125 mL  12.5 g Intravenous PRN Leroy Morocho MD        dextrose 10% bolus 250 mL 250 mL  25 g Intravenous PRN Leroy Morocho MD        enoxaparin injection 40 mg  40 mg Subcutaneous Daily Leroy Morocho MD   40 mg at 01/11/23 1737    glucagon (human recombinant) injection 1 mg  1 mg Intramuscular PRN Leroy Morocho MD        glucose chewable tablet 16 g  16 g Oral PRN Leroy Morocho MD        glucose chewable tablet 24 g  24 g Oral PRN Leroy Morocho MD        guaiFENesin 12 hr tablet 600 mg  600 mg Oral BID Leroy Morocho MD   600 mg at 01/11/23 1737    hydrALAZINE tablet 25 mg  25 mg Oral Q8H PRN Leroy Morocho MD   25 mg at 01/10/23 1925    insulin aspart U-100 pen 0-5 Units  0-5 Units Subcutaneous QID (AC + HS) PRBRAULIO Morocho MD        lactated ringers infusion   Intravenous Continuous Bartolo Phipps MD        levalbuterol nebulizer solution 1.25 mg  1.25 mg Nebulization Q8H PRN Leroy Morocho MD        melatonin tablet 6 mg  6 mg Oral Nightly PRN Leroy Morocho MD   6 mg at 01/11/23 2216    metoprolol tartrate (LOPRESSOR) tablet 50 mg  50 mg Oral BID Leroy Morocho MD   50 mg at 01/11/23 2218    naloxone 0.4 mg/mL injection 0.02 mg  0.02 mg Intravenous PRN Leroy Morocoh MD        ondansetron disintegrating tablet 8 mg  8 mg Oral Q8H PRN Leroy Morocho MD        polyethylene glycol packet 17 g  17 g Oral Daily PRN Leroy Morocho MD        polyethylene glycol packet 17 g  17 g Oral Daily Leroy Morocho MD   17 g at 01/11/23 0841    prochlorperazine injection Soln 5 mg  5 mg Intravenous Q6H PRN Leroy Morocho MD         senna tablet 8.6 mg  8.6 mg Oral BID AC Leroy Morocho MD   8.6 mg at 01/12/23 0618    sodium chloride 0.9% flush 10 mL  10 mL Intravenous PRN Killian Webster MD        sodium chloride 0.9% flush 10 mL  10 mL Intravenous Q12H PRN Leroy Morocho MD        venlafaxine 24 hr capsule 150 mg  150 mg Oral QHS Leroy Morocho MD   150 mg at 01/11/23 5316     Current Discharge Medication List        START taking these medications    Details   guaiFENesin (MUCINEX) 600 mg 12 hr tablet Take 1 tablet (600 mg total) by mouth 2 (two) times daily. for 7 days  Qty: 14 tablet, Refills: 0           CONTINUE these medications which have CHANGED    Details   clopidogreL (PLAVIX) 75 mg tablet Take 1 tablet (75 mg total) by mouth once daily.  Qty: 90 tablet, Refills: 3    Associated Diagnoses: Cerebrovascular disease           CONTINUE these medications which have NOT CHANGED    Details   acetaminophen (TYLENOL) 500 MG tablet Take 2 tablets (1,000 mg total) by mouth every 8 (eight) hours as needed (Mild pain).  Refills: 0      alendronate (FOSAMAX) 70 MG tablet Take 1 tablet (70 mg total) by mouth every 7 days.  Qty: 12 tablet, Refills: 0    Associated Diagnoses: Osteoporosis, unspecified osteoporosis type, unspecified pathological fracture presence      allopurinoL (ZYLOPRIM) 300 MG tablet Take 1 tablet (300 mg total) by mouth once daily.  Qty: 90 tablet, Refills: 0    Associated Diagnoses: Gout, unspecified cause, unspecified chronicity, unspecified site      amLODIPine (NORVASC) 10 MG tablet Take 1 tablet (10 mg total) by mouth once daily.  Qty: 90 tablet, Refills: 0    Comments: .  Associated Diagnoses: Hypertension, unspecified type      atorvastatin (LIPITOR) 10 MG tablet Take 1 tablet (10 mg total) by mouth once daily.  Qty: 90 tablet, Refills: 1    Associated Diagnoses: Hyperlipidemia, unspecified hyperlipidemia type      calcium polycarbophil (FIBERCON ORAL) Mixed in 8 ounces of liquid & gave daily as needed for  constipation. (Administered last Tue, Wed & Thurs)      metoprolol succinate (TOPROL-XL) 100 MG 24 hr tablet Take 1 tablet (100 mg total) by mouth once daily.  Qty: 90 tablet, Refills: 0    Comments: .  Associated Diagnoses: Hypertension, unspecified type      polyethylene glycol (MIRALAX) 17 gram PwPk Take 17 g by mouth daily as needed (Constipation).      venlafaxine (EFFEXOR-XR) 150 MG Cp24 TAKE 1 CAPSULE (150 MG TOTAL) BY MOUTH EVERY EVENING.  Qty: 90 capsule, Refills: 2    Associated Diagnoses: Depression, unspecified depression type           STOP taking these medications       lisinopriL (PRINIVIL,ZESTRIL) 40 MG tablet Comments:   Reason for Stopping:         metFORMIN (GLUCOPHAGE-XR) 500 MG ER 24hr tablet Comments:   Reason for Stopping:         HYDROcodone-acetaminophen (NORCO) 5-325 mg per tablet Comments:   Reason for Stopping:                 I have seen and examined this patient within the last 30 days. My clinical findings that support the need for the home health skilled services and home bound status are the following:no   Weakness/numbness causing balance and gait disturbance due to Weakness/Debility making it taxing to leave home.     Diet:   regular diet    Labs:  None needed    Referrals/ Consults  Physical Therapy to evaluate and treat. Evaluate for home safety and equipment needs; Establish/upgrade home exercise program. Perform / instruct on therapeutic exercises, gait training, transfer training, and Range of Motion.  Occupational Therapy to evaluate and treat. Evaluate home environment for safety and equipment needs. Perform/Instruct on transfers, ADL training, ROM, and therapeutic exercises.    Activities:   activity as tolerated    Nursing:   Agency to admit patient within 24 hours of hospital discharge unless specified on physician order or at patient request    SN to complete comprehensive assessment including routine vital signs. Instruct on disease process and s/s of complications to  report to MD. Review/verify medication list sent home with the patient at time of discharge  and instruct patient/caregiver as needed. Frequency may be adjusted depending on start of care date.     Skilled nurse to perform up to 3 visits PRN for symptoms related to diagnosis    Notify MD if SBP > 160 or < 90; DBP > 90 or < 50; HR > 120 or < 50; Temp > 101; O2 < 88%; Other:   n/a    Ok to schedule additional visits based on staff availability and patient request on consecutive days within the home health episode.    When multiple disciplines ordered:    Start of Care occurs on Sunday - Wednesday schedule remaining discipline evaluations as ordered on separate consecutive days following the start of care.    Thursday SOC -schedule subsequent evaluations Friday and Monday the following week.     Friday - Saturday SOC - schedule subsequent discipline evaluations on consecutive days starting Monday of the following week.    For all post-discharge communication and subsequent orders please contact patient's primary care physician. If unable to reach primary care physician or do not receive response within 30 minutes, please contact [facility] for clinical staff order clarification    Miscellaneous   None    Home Health Aide:  Physical Therapy Three times weekly and Occupational Therapy Three times weekly    Wound Care Orders  no    I certify that this patient is confined to her home and needs physical therapy and occupational therapy.

## 2023-01-12 NOTE — PLAN OF CARE
Evaluation completed today. PT goals appropriate.    Patient is safe to perform bed <> chair transfer with assist x 1 with family/nsg staff present to supervise once UIC.    Please continue Progressive Mobility Protocol as appropriate.    Janene Jordan, PT, DPT  2023  Pager: [phone]        Problem: Physical Therapy  Goal: Physical Therapy Goal  Description: Goals to be met by: 2023     Patient will increase functional independence with mobility by performin. Sit to stand transfer with Stand-by Assistance  2. Bed to chair transfer with Contact Guard Assistance using Rolling Walker  3. Gait  x 30 feet with Contact Guard Assistance using Rolling Walker.   4. Stand for 5 minutes with Contact Guard Assistance using RW or LRAD  5. Lower extremity exercise program x30 reps per handout, with assistance as needed    Outcome: Ongoing, Progressing

## 2023-01-12 NOTE — PROGRESS NOTES
"[facility] - [facility] Surg  Neurosurgery  Progress Note    Subjective:     History of Present Illness: Farheen Soares is a 87 y.o. female with h/o hearing loss, type II DM, DDD, meningioma, TIA, and NPH who is s/p elective  shunt placement with Dr. Espinoza on 8/11/22. Hakim valve set at 120. Patient presents as a transfer from [facility] for concern for  shunt malfunction. Per daughter, patient did well after shunt placement. Her baseline is Aox1 but is able to hold conversations easily. The daughter states that on Saturday she noticed the patient was lethargic and having some gait difficulties, requiring more assistance with care. These symptoms progressively worsened. The patient is now sleeping most of the day, confused and unable to hold conversations. She is "speaking to people that aren't there" and "reaching for things that aren't there" per the daughter. Nsgy consulted.      Post-Op Info:  * No surgery found *         Interval History: NAEON. Patient mental status back to baseline.     Medications:  Continuous Infusions:   lactated ringers 75 mL/hr at 01/12/23 1128     Scheduled Meds:   allopurinoL  300 mg Oral Daily    amLODIPine  10 mg Oral Daily    atorvastatin  10 mg Oral Daily    clopidogreL  75 mg Oral Daily    enoxaparin  40 mg Subcutaneous Daily    guaiFENesin  600 mg Oral BID    metoprolol tartrate  50 mg Oral BID    polyethylene glycol  17 g Oral Daily    senna  8.6 mg Oral BID AC    venlafaxine  150 mg Oral QHS     PRN Meds:acetaminophen, bisacodyL, dextrose 10%, dextrose 10%, glucagon (human recombinant), glucose, glucose, hydrALAZINE, insulin aspart U-100, levalbuterol, melatonin, naloxone, ondansetron, polyethylene glycol, prochlorperazine, sodium chloride 0.9%, sodium chloride 0.9%     Review of Systems  Objective:     Weight: 65.9 kg (145 lb 4.5 oz)  Body mass index is 26.57 kg/m².  Vital Signs (Most Recent):  Temp: 97.4 °F (36.3 °C) (01/12/23 1506)  Pulse: (!) 58 (01/12/23 1615)  Resp: " 18 (01/12/23 1506)  BP: 129/60 (01/12/23 1506)  SpO2: (!) 92 % (01/12/23 1506)   Vital Signs (24h Range):  Temp:  [97.3 °F (36.3 °C)-98.1 °F (36.7 °C)] 97.4 °F (36.3 °C)  Pulse:  [] 58  Resp:  [16-20] 18  SpO2:  [91 %-96 %] 92 %  BP: (120-187)/(58-87) 129/60     Neurosurgery Physical Exam  General: Well developed, well nourished, not in acute distress.   Head: Normocephalic, atraumatic.  Neurologic: Alert and oriented to person and and place (baseline); though content appropriate. Follows commands   GCS: Motor:6  Verbal:4  Eyes:4   GCS Total: 14  Speech: No dysarthria.  Cranial nerves: Face symmetric, tongue midline, CN II-XII grossly intact.   Eyes: Pupils equal, round, reactive to light with accomodation, EOMI. Unable to appreciate optic disc.   Pulmonary: Normal respirations, no signs of respiratory distress.  Sensory: Intact to light touch throughout.  Motor Strength: Moves all extremities spontaneously.  Vascular: No LE edema.   Previous cranial incision healed.    Significant Labs:  Recent Labs   Lab 01/11/23  0306 01/12/23  0505 01/12/23  1510    99 108   * 134* 130*   K 4.5 4.1 4.1    101 103   CO2 18* 21* 19*   BUN 17 23 20   CREATININE 0.9 1.2 1.0   CALCIUM 10.3 9.5 9.6   MG 1.9 1.9  --        Recent Labs   Lab 01/11/23  0306 01/12/23  0505   WBC 9.62 5.44   HGB 13.5 12.3   HCT 41.3 36.0*    198       No results for input(s): LABPT, INR, APTT in the last 48 hours.  Microbiology Results (last 7 days)       Procedure Component Value Units Date/Time    Blood culture [[id number]] Collected: 01/10/23 1029    Order Status: Completed Specimen: Blood from Peripheral, Hand, Left Updated: 01/12/23 1212     Blood Culture, Routine No growth to date      No Growth to date      No Growth to date    Blood culture [[id number]] Collected: 01/10/23 1029    Order Status: Completed Specimen: Blood from Peripheral, Hand, Right Updated: 01/12/23 1212     Blood Culture, Routine No growth to date       No Growth to date      No Growth to date          All pertinent labs from the last 24 hours have been reviewed.    Significant Diagnostics:  I have reviewed all pertinent imaging results/findings within the past 24 hours.      Assessment/Plan:     History of ventriculoperitoneal shunting  Farheen Soares is a 87 y.o. female with h/o hearing loss, type II DM, DDD, meningioma, TIA, and NPH who is s/p elective  shunt placement with Dr. Espinoza on 8/11/22. Hakim valve set at 120. She presents with AMS.    - Admitted to ; infectious workup negative thus far. Mental status vastly improved after lowering shunt from 120 to 100 on 1/11  - Q4h neurochecks.  - CTH showing grossly stable configuration of ventricles.  - X-ray shunt series showing no discontinuities or kinks in shunt catheter.   - No neurosurgical intervention is indicated at this time. If patient declines, will consider shunt tap.   - Please contact NSGY with any decline in neurological exam. F/u in outpatient clinic in 1 month with repeat CT head - our office will arrange. NSGY will sign off    D/w Dr. Olga Mckenna PA-C  Neurosurgery  [facility] - Med Surg

## 2023-01-12 NOTE — ASSESSMENT & PLAN NOTE
Patient with acute kidney injury likely due to IVVD/dehydration ANAHI is currently worsening. Labs reviewed- Renal function/electrolytes with Estimated Creatinine Clearance: 30.5 mL/min (based on SCr of 1.2 mg/dL). according to latest data. Monitor urine output and serial BMP and adjust therapy as needed. Avoid nephrotoxins and renally dose meds for GFR listed above.   1/12  Serum BUN/Cr uptrending.  Strict I/O monitor . Obtain urine lytes , renal sonogram  and bladder scans q 6h  Recent Labs   Lab 01/09/23  2159 01/11/23  0306 01/12/23  0505   BUN 21 17 23   CREATININE 1.4 0.9 1.2   1/12 ANAHI cr 0.9-->.1.2 .started on RL hydration

## 2023-01-12 NOTE — ASSESSMENT & PLAN NOTE
Farheen Soares is a 87 y.o. female with h/o hearing loss, type II DM, DDD, meningioma, TIA, and NPH who is s/p elective  shunt placement with Dr. Espinoza on 8/11/22. Hakim valve set at 120. She presents with AMS.    - Admitted to ; infectious workup negative thus far. Mental status vastly improved after lowering shunt from 120 to 100 on 1/11  - Q4h neurochecks.  - CTH showing grossly stable configuration of ventricles.  - X-ray shunt series showing no discontinuities or kinks in shunt catheter.   - No neurosurgical intervention is indicated at this time. If patient declines, will consider shunt tap.   - Please contact NSGY with any decline in neurological exam. F/u in outpatient clinic in 1 month with repeat CT head - our office will arrange. NSGY will sign off    D/w Dr. Espinoza

## 2023-01-12 NOTE — PLAN OF CARE
[facility] Transport set up for 4:45P. JOSE R Cash notified via secure chat.    Opal Ward RN   Case Management  [phone]

## 2023-01-12 NOTE — PROGRESS NOTES
[facility] Medicine  Progress Note    Patient Name: Farheen Soares  MRN: [id number]  Patient Class: OP- Observation   Admission Date: 1/9/2023  Length of Stay: 0 days  Attending Physician: Bartolo Phipps MD  Primary Care Provider: Vonda Chung MD        Subjective:     Principal Problem:Acute metabolic encephalopathy        HPI:  Mrs. Soares is a 87 yoF with a h/o hearing loss, type II DM, DDD, meningioma, TIA, and NPH s/p  shunt who presents with 2 days of altered mental status.  History obtained from patient's daughter.  Patient was noted to have increased fatigue and AVH to prior to admission.  It is reported that she was talking to people who were not in the room.  The following days no patient had difficulty with balance and able to walk with her walker.  Was also noted to have decreased oral intake, weakness, and constipation.  Denies complaints of pain, dysuria, dyspnea, headache, and nausea.  Given concern for possible complications of  shunt, patient was brought to emergency room for further evaluation.    In the ED, /71, HR 79, afebrile, and on room air.  Labs notable for creatinine 1.4, K 5.1, WBC 15.4, hemoglobin 16, sodium 135, TSH 1.3, troponin negative, COVID negative, flu negative, UA unremarkable, and .  X-ray series of shunt unremarkable.  CT abdomen/pelvis unremarkable.  CT head unremarkable.  Chest x-ray unremarkable. Neurosurgery consulted for further evaluation of  shunt.   shunt adjusted in the emergency room by Neurosurgery and follow-up x-ray obtained.  Patient and mother for further evaluation.      Overview/Hospital Course:  1/12 ANAHI cr 0.9-->.1.2 .started on RL hydration  Admitted for AMS.  infectious workup - UA negative   BC x 2 NGTD   shunt adjusted by Neurosurgery . improved mentation -oriented to person (baseline). CTHead -  stable configuration of ventricles. X-ray shunt series showing no discontinuities or kinks in shunt  catheter. No neurosurgical intervention . PT/OT consulted for weakness, imbalance   likely discharge  today with            Review of Systems:   Pain scale:   Constitutional:  fever,  chills, headache, vision loss, hearing loss, weight loss, Generalized weakness, falls, loss of smell, loss of taste, poor appetite,  sore throat  Respiratory: cough, shortness of breath.   Cardiovascular: chest pain, dizziness, palpitations, orthopnea, swelling of feet, syncope  Gastrointestinal: nausea, vomiting, abdominal pain, diarrhea, black stool,  blood in stool, change in bowel habits  Genitourinary: hematuria, dysuria, urgency, frequency  Integument/Breast: rash,  pruritis  Hematologic/Lymphatic: easy bruising, lymphadenopathy  Musculoskeletal: arthralgias , myalgias, back pain, neck pain, knee pain  Neurological: confusion, seizures, tremors, slurred speech  Behavioral/Psych:  depression, anxiety, auditory or visual hallucinations     OBJECTIVE:     Physical Exam:  Body mass index is 26.57 kg/m².    Constitutional: Appears well-developed and well-nourished.   Head: Normocephalic and atraumatic.   Neck: Normal range of motion. Neck supple.   Cardiovascular: Normal heart rate.  Regular heart rhythm.  Pulmonary/Chest: Effort normal.   Abdominal: No distension.  No tenderness  Musculoskeletal: Normal range of motion. No edema.   Neurological: Alert and oriented to person  Skin: Skin is warm and dry.   Psychiatric: Normal mood and affect. Behavior is normal.                  Vital Signs  Temp: 97.3 °F (36.3 °C) (01/12/23 0749)  Pulse: 60 (01/12/23 0749)  Resp: 18 (01/12/23 0749)  BP: 120/71 (01/12/23 0749)  SpO2: 95 % (01/12/23 0749)     24 Hour VS Range    Temp:  [96.6 °F (35.9 °C)-98.1 °F (36.7 °C)]   Pulse:  [58-85]   Resp:  [16-20]   BP: (120-187)/(58-87)   SpO2:  [91 %-96 %]   No intake or output data in the 24 hours ending 01/12/23 0758      I/O This Shift:  No intake/output data recorded.    Wt Readings from Last 3  Encounters:   01/10/23 65.9 kg (145 lb 4.5 oz)   12/02/22 65.8 kg (145 lb)   11/02/22 65.8 kg (145 lb)       I have personally reviewed the vitals and recorded Intake/Output     Laboratory/Diagnostic Data:    CBC/Anemia Labs: Coags:    Recent Labs   Lab 01/09/23 2159 01/09/23 2210 01/11/23  0306 01/12/23  0505   WBC 15.43*  --  9.62 5.44   HGB 15.8  --  13.5 12.3   HCT 44.1 49 41.3 36.0*     --  218 198   MCV 97  --  101* 98   RDW 14.2  --  14.2 13.8    No results for input(s): PT, INR, APTT in the last 168 hours.     Chemistries: ABG:   Recent Labs   Lab 01/09/23 2159 01/11/23 0306 01/12/23  0505   * 135* 134*   K 5.1 4.5 4.1    102 101   CO2 21* 18* 21*   BUN 21 17 23   CREATININE 1.4 0.9 1.2   CALCIUM 10.9* 10.3 9.5   PROT 9.0* 8.1 7.1   BILITOT 0.7 0.8 0.6   ALKPHOS 74 84 65   ALT 12 9* 7*   AST 24 21 20   MG 1.8 1.9 1.9   PHOS  --  2.6* 3.2    Recent Labs   Lab 01/09/23 2210   PH 7.411   PCO2 42.9   PO2 15*   HCO3 27.2   POCSATURATED 19*   BE 3        POCT Glucose: HbA1c:    Recent Labs   Lab 01/10/23  1932 01/11/23  0715 01/11/23  1108 01/11/23  1508 01/11/23  2018 01/12/23  0711   POCTGLUCOSE 118* 116* 148* 130* 126* 106    Hemoglobin A1C   Date Value Ref Range Status   01/10/2023 6.2 (H) 4.0 - 5.6 % Final     Comment:     ADA Screening Guidelines:  5.7-6.4%  Consistent with prediabetes  >or=6.5%  Consistent with diabetes    High levels of fetal hemoglobin interfere with the HbA1C  assay. Heterozygous hemoglobin variants (HbS, HgC, etc)do  not significantly interfere with this assay.   However, presence of multiple variants may affect accuracy.     06/27/2022 7.2 (H) 4.0 - 5.6 % Final     Comment:     ADA Screening Guidelines:  5.7-6.4%  Consistent with prediabetes  >or=6.5%  Consistent with diabetes    High levels of fetal hemoglobin interfere with the HbA1C  assay. Heterozygous hemoglobin variants (HbS, HgC, etc)do  not significantly interfere with this assay.   However, presence of  multiple variants may affect accuracy.     11/12/2021 7.1 (H) 4.0 - 5.6 % Final     Comment:     ADA Screening Guidelines:  5.7-6.4%  Consistent with prediabetes  >or=6.5%  Consistent with diabetes    High levels of fetal hemoglobin interfere with the HbA1C  assay. Heterozygous hemoglobin variants (HbS, HgC, etc)do  not significantly interfere with this assay.   However, presence of multiple variants may affect accuracy.     11/12/2021 7.1 (H) 4.0 - 5.6 % Final     Comment:     ADA Screening Guidelines:  5.7-6.4%  Consistent with prediabetes  >or=6.5%  Consistent with diabetes    High levels of fetal hemoglobin interfere with the HbA1C  assay. Heterozygous hemoglobin variants (HbS, HgC, etc)do  not significantly interfere with this assay.   However, presence of multiple variants may affect accuracy.          Cardiac Enzymes: Ejection Fractions:    Recent Labs     01/09/23 2159   TROPONINI <0.006    No results found for: EF       No results for input(s): COLORU, APPEARANCEUA, PHUR, SPECGRAV, PROTEINUA, GLUCUA, KETONESU, BILIRUBINUA, OCCULTUA, NITRITE, UROBILINOGEN, LEUKOCYTESUR, RBCUA, WBCUA, BACTERIA, SQUAMEPITHEL, HYALINECASTS in the last 48 hours.    Invalid input(s): WRIGHTSUR    Procalcitonin (ng/mL)   Date Value   01/11/2023 0.05   06/27/2022 0.05   06/27/2022 0.04   03/09/2020 0.09     Lactate (Lactic Acid) (mmol/L)   Date Value   06/28/2022 2.2   06/28/2022 2.1   06/27/2022 2.2   06/27/2022 2.4 (H)   03/08/2020 2.2     No results found for: BNP  CRP (mg/L)   Date Value   03/09/2020 77.5 (H)     Sed Rate (mm/Hr)   Date Value   03/09/2020 24 (H)     No results found for: DDIMER  No results found for: FERRITIN  No results found for: LDH  Troponin I (ng/mL)   Date Value   01/09/2023 <0.006   06/27/2022 0.006   03/08/2020 <0.006     CPK (U/L)   Date Value   11/20/2020 76     Results for orders placed or performed in visit on 03/22/21   Vitamin D   Result Value Ref Range    Vit D, 25-Hydroxy 14 (L) 30 - 96 ng/mL    Results for orders placed or performed during the hospital encounter of 11/20/20   Vitamin D   Result Value Ref Range    Vit D, 25-Hydroxy 14 (L) 30 - 96 ng/mL     SARS-CoV-2 RNA, Amplification, Qual (no units)   Date Value   01/10/2023 Negative     POC Rapid COVID (no units)   Date Value   06/27/2022 Negative   06/27/2022 Negative   11/20/2020 Negative       Microbiology labs for the last week  Microbiology Results (last 7 days)     Procedure Component Value Units Date/Time    Blood culture [[id number]] Collected: 01/10/23 1029    Order Status: Completed Specimen: Blood from Peripheral, Hand, Right Updated: 01/11/23 1212     Blood Culture, Routine No growth to date      No Growth to date    Blood culture [[id number]] Collected: 01/10/23 1029    Order Status: Completed Specimen: Blood from Peripheral, Hand, Left Updated: 01/11/23 1212     Blood Culture, Routine No growth to date      No Growth to date          Reviewed and noted in plan where applicable- Please see chart for full lab data.    Lines/Drains:       Imaging  ECG Results          EKG 12-lead (Final result)  Result time 01/10/23 15:45:51    Final result by Interface, Lab In [facility] (01/10/23 15:45:51)             Narrative:    Test Reason : R41.82,    Vent. Rate : 085 BPM     Atrial Rate : 085 BPM     P-R Int : 150 ms          QRS Dur : 094 ms      QT Int : 398 ms       P-R-T Axes : 044 -15 102 degrees     QTc Int : 473 ms    Normal sinus rhythm  Nonspecific ST and T wave abnormality  Prolonged QT  Abnormal ECG    Confirmed by Claudia FIELDS, Dar DALY ([id number]) on 1/10/2023 3:45:37 PM    Referred By: [email or web address]   SELF           Confirmed By:Dar Taylor MD                            No results found for this or any previous visit.      XR Skull Shunt Placement 1 View  Narrative: EXAMINATION:  XR SKULL SHUNT PLACEMENT 1 VIEW    CLINICAL HISTORY:  check shunt setting;    TECHNIQUE:  Single radiograph of the skull was performed for shunt placement and valve  settings.    COMPARISON:  Radiograph from earlier the same date.    FINDINGS:  There is a Codman Hakim programmable valve set to approximately 100 mm H2O.  Impression: As above.    Electronically signed by: Dajuan Watters  Date:    01/10/2023  Time:    18:40  X-Ray Shunt Series  Narrative: EXAMINATION:  XR SHUNT SERIES    CLINICAL HISTORY:   shunt, ataxia, confusion;    TECHNIQUE:  Multiple radiographs are submitted, evaluation of ventriculoperitoneal shunt catheter.    COMPARISON:  December 2, 2022    FINDINGS:  A ventriculoperitoneal shunt catheter is identified, coursing along the right-side of the neck, extending to the midline of the chest, extending inferiorly to the abdomen, extending to the lower lumbar region, looping, extending inferiorly into the pelvis and looping in the lower pelvis.  The ventriculoperitoneal shunt catheter appears intact throughout its visualized course.    Aortic atherosclerotic change noted.  The lungs demonstrate chronic change, appearance of diminished depth of inspiration and atelectatic change.  There is contrast density within the collecting structures of the genitourinary system and urinary bladder, consistent with excretion of contrast from recent CT examination.  Vascular calcifications are noted.  The bowel gas pattern is nonspecific.  The osseous structures demonstrate prominent chronic change with diminished mineralization and degenerative change.  Multilevel extensive chronic change of the spine noted.  Impression: Ventriculoperitoneal shunt catheter is identified, and appears intact radiographically.    Electronically signed by: Yossi Xavier  Date:    01/10/2023  Time:    03:34      Labs, Imaging, EKG and Diagnostic results from 1/12/2023 were reviewed.    Medications:  Medication list was reviewed and changes noted under Assessment/Plan.  No current facility-administered medications on file prior to encounter.     Current Outpatient Medications on File Prior to  Encounter   Medication Sig Dispense Refill    acetaminophen (TYLENOL) 500 MG tablet Take 2 tablets (1,000 mg total) by mouth every 8 (eight) hours as needed (Mild pain).  0    alendronate (FOSAMAX) 70 MG tablet Take 1 tablet (70 mg total) by mouth every 7 days. 12 tablet 0    allopurinoL (ZYLOPRIM) 300 MG tablet Take 1 tablet (300 mg total) by mouth once daily. 90 tablet 0    amLODIPine (NORVASC) 10 MG tablet Take 1 tablet (10 mg total) by mouth once daily. 90 tablet 0    atorvastatin (LIPITOR) 10 MG tablet Take 1 tablet (10 mg total) by mouth once daily. 90 tablet 1    calcium polycarbophil (FIBERCON ORAL) Mixed in 8 ounces of liquid & gave daily as needed for constipation. (Administered last Tue, Wed & Thurs)      clopidogreL (PLAVIX) 75 mg tablet Take 1 tablet (75 mg total) by mouth once daily. 90 tablet 3    lisinopriL (PRINIVIL,ZESTRIL) 40 MG tablet Take 1 tablet (40 mg total) by mouth once daily. 90 tablet 0    metFORMIN (GLUCOPHAGE-XR) 500 MG ER 24hr tablet Take 1 tablet (500 mg total) by mouth once daily. 90 tablet 0    metoprolol succinate (TOPROL-XL) 100 MG 24 hr tablet Take 1 tablet (100 mg total) by mouth once daily. 90 tablet 0    polyethylene glycol (MIRALAX) 17 gram PwPk Take 17 g by mouth daily as needed (Constipation).      venlafaxine (EFFEXOR-XR) 150 MG Cp24 TAKE 1 CAPSULE (150 MG TOTAL) BY MOUTH EVERY EVENING. 90 capsule 2    HYDROcodone-acetaminophen (NORCO) 5-325 mg per tablet Take 1 tablet by mouth every 6 (six) hours as needed for Pain. (Patient not taking: Reported on 11/2/2022) 56 tablet 0    [DISCONTINUED] blood-glucose meter kit To check BG three times daily, to use with insurance preferred meter (Patient not taking: Reported on 5/29/2022) 1 each 0    [DISCONTINUED] calcium carbonate (OS-ELODIA) 500 mg calcium (1,250 mg) tablet Take 1 tablet (500 mg total) by mouth 2 (two) times daily.  0    [DISCONTINUED] fexofenadine (ALLEGRA) 180 MG tablet Take 1 tablet (180 mg total) by  mouth daily as needed. 90 tablet 1     Scheduled Medications:  allopurinoL, 300 mg, Oral, Daily  amLODIPine, 10 mg, Oral, Daily  atorvastatin, 10 mg, Oral, Daily  clopidogreL, 75 mg, Oral, Daily  enoxaparin, 40 mg, Subcutaneous, Daily  guaiFENesin, 600 mg, Oral, BID  metoprolol tartrate, 50 mg, Oral, BID  polyethylene glycol, 17 g, Oral, Daily  senna, 8.6 mg, Oral, BID AC  venlafaxine, 150 mg, Oral, QHS      PRN: acetaminophen, bisacodyL, dextrose 10%, dextrose 10%, glucagon (human recombinant), glucose, glucose, hydrALAZINE, insulin aspart U-100, levalbuterol, melatonin, naloxone, ondansetron, polyethylene glycol, prochlorperazine, sodium chloride 0.9%, sodium chloride 0.9%  Infusions:    lactated ringers       Estimated Creatinine Clearance: 30.5 mL/min (based on SCr of 1.2 mg/dL).    Assessment/Plan:      * Acute metabolic encephalopathy    Patient presenting with 2 days of altered mental status of fatigue, AVH, and weakness.  No infectious symptoms prior to admission.  Concern for possible complications of hydrocephalus.   -  shunt adjusted by Neurosurgery on admission.  - WBC 15.4 on admission, improved without intervetion  - UA unremarkable   - Blood cultures NGTD  - Procalcitonin normal  - Holding antibiotics at this time   - Electrolytes within normal limits   - TSH unremarkable   - PT/OT consulted  - Holding sedating medications  1/12    Admitted for AMS.  infectious workup - UA negative   BC x 2 NGTD   shunt adjusted by Neurosurgery . improved mentation -oriented to person and place (baseline). CTHead -  stable configuration of ventricles. X-ray shunt series showing no discontinuities or kinks in shunt catheter. No neurosurgical intervention . PT/OT consulted for weakness, imbalane.  likely discharge  today with HH       ANAHI (acute kidney injury)  Patient with acute kidney injury likely due to IVVD/dehydration ANAHI is currently worsening. Labs reviewed- Renal function/electrolytes with Estimated  Creatinine Clearance: 30.5 mL/min (based on SCr of 1.2 mg/dL). according to latest data. Monitor urine output and serial BMP and adjust therapy as needed. Avoid nephrotoxins and renally dose meds for GFR listed above.   1/12  Serum BUN/Cr uptrending.  Strict I/O monitor . Obtain urine lytes , renal sonogram  and bladder scans q 6h  Recent Labs   Lab 01/09/23  2159 01/11/23  0306 01/12/23  0505   BUN 21 17 23   CREATININE 1.4 0.9 1.2   1/12 ANAHI cr 0.9-->.1.2 .started on RL hydration      Constipation  - Continue bowel regimen      History of ventriculoperitoneal shunting  - Placed for hydrocephalus  - Neurosurgery consulted   --  shunt adjusted from 120-110  -- Follow-up x-ray ordered  1/12CTHead -  stable configuration of ventricles. X-ray shunt series showing no discontinuities or kinks in shunt catheter. No neurosurgical intervention .        Gout  - Continue home allopurinol      Essential hypertension  - Continue home amlodipine  - Holding lisinopril given hyperkalemia on admission   - As needed hydralazine for SBP> 160      History of transient ischemic attack (TIA)  - Continue home Plavix      Type 2 diabetes mellitus without complication, without long-term current use of insulin  Patient's FSGs are controlled on current medication regimen.  Last A1c reviewed-   Lab Results   Component Value Date    HGBA1C 6.2 (H) 01/10/2023     Most recent fingerstick glucose reviewed-   Recent Labs   Lab 01/11/23  1108 01/11/23  1508 01/11/23  2018 01/12/23  0711   POCTGLUCOSE 148* 130* 126* 106     Current correctional scale  Medium  Maintain anti-hyperglycemic dose as follows-   Antihyperglycemics (From admission, onward)    Start     Stop Route Frequency Ordered    01/10/23 1044  insulin aspart U-100 pen 0-5 Units         -- SubQ Before meals & nightly PRN 01/10/23 0944        Hold Oral hypoglycemics while patient is in the hospital.    Pure hypercholesterolemia  - Continue home atorvastatin      Recurrent major  depressive disorder, in full remission  Patient has persistent depression which is moderate and is currently controlled. Will Continue anti-depressant medications. We will not consult psychiatry at this time. Patient does not display psychosis at this time. Continue to monitor closely and adjust plan of care as needed.    - Continue venlafaxine      VTE Risk Mitigation (From admission, onward)         Ordered     IP VTE HIGH RISK PATIENT  Once         01/10/23 1844     Place sequential compression device  Until discontinued         01/10/23 1844     enoxaparin injection 40 mg  Daily         01/10/23 0943     Place sequential compression device  Until discontinued         01/10/23 0943                Discharge Planning   GONZALEZ: 1/12/2023     Code Status: Full Code   Is the patient medically ready for discharge?: No    Reason for patient still in hospital (select all that apply): Treatment  Discharge Plan A: Home Health                  Bartolo Phipps MD  Department of Hospital Medicine   [facility]

## 2023-01-12 NOTE — DISCHARGE SUMMARY
[facility] Medicine  Discharge Summary      Patient Name: Farheen Soares  MRN: [id number]  VERENA: [id number]  Patient Class: OP- Observation  Admission Date: 1/9/2023  Hospital Length of Stay: 0 days  Discharge Date and Time:  01/12/2023 7:58 AM  Attending Physician: Bartolo Phipps MD   Discharging Provider: Bartolo Phipps MD  Primary Care Provider: Vonda Chung MD  [facility] Medicine Team: [facility] Bartolo Phipps MD  Primary Care Team: [facility]    HPI:   Mrs. Soares is a 87 yoF with a h/o hearing loss, type II DM, DDD, meningioma, TIA, and NPH s/p  shunt who presents with 2 days of altered mental status.  History obtained from patient's daughter.  Patient was noted to have increased fatigue and AVH to prior to admission.  It is reported that she was talking to people who were not in the room.  The following days no patient had difficulty with balance and able to walk with her walker.  Was also noted to have decreased oral intake, weakness, and constipation.  Denies complaints of pain, dysuria, dyspnea, headache, and nausea.  Given concern for possible complications of  shunt, patient was brought to emergency room for further evaluation.    In the ED, /71, HR 79, afebrile, and on room air.  Labs notable for creatinine 1.4, K 5.1, WBC 15.4, hemoglobin 16, sodium 135, TSH 1.3, troponin negative, COVID negative, flu negative, UA unremarkable, and .  X-ray series of shunt unremarkable.  CT abdomen/pelvis unremarkable.  CT head unremarkable.  Chest x-ray unremarkable. Neurosurgery consulted for further evaluation of  shunt.   shunt adjusted in the emergency room by Neurosurgery and follow-up x-ray obtained.  Patient and mother for further evaluation.      * No surgery found *      Hospital Course:   1/12 ANAHI cr 0.9-->.1.2 .started on RL hydration  Admitted for AMS.  infectious workup - UA negative   BC x 2 NGTD   shunt adjusted by Neurosurgery .  improved mentation -oriented to person (baseline). CTHead -  stable configuration of ventricles. X-ray shunt series showing no discontinuities or kinks in shunt catheter. No neurosurgical intervention . PT/OT consulted for weakness, imbalance. lisinopril, metformin and norco discontinued.   discharge  today with         Goals of Care Treatment Preferences:  Code Status: Full Code      Consults:   Consults (From admission, onward)          Status Ordering Provider     Inpatient consult to Neurosurgery  Once        Provider:  (Not yet assigned)    Completed KRZYSZTOF WELLER            Assessment/Plan:      * Acute metabolic encephalopathy     Patient presenting with 2 days of altered mental status of fatigue, AVH, and weakness.  No infectious symptoms prior to admission.  Concern for possible complications of hydrocephalus.   -  shunt adjusted by Neurosurgery on admission.  - WBC 15.4 on admission, improved without intervetion  - UA unremarkable   - Blood cultures NGTD  - Procalcitonin normal  - Holding antibiotics at this time   - Electrolytes within normal limits   - TSH unremarkable   - PT/OT consulted  - Holding sedating medications  1/12    Admitted for AMS.  infectious workup - UA negative   BC x 2 NGTD   shunt adjusted by Neurosurgery . improved mentation -oriented to person and place (baseline). CTHead -  stable configuration of ventricles. X-ray shunt series showing no discontinuities or kinks in shunt catheter. No neurosurgical intervention . PT/OT consulted for weakness, imbalane.  likely discharge  today with          ANAHI (acute kidney injury)  Patient with acute kidney injury likely due to IVVD/dehydration ANAHI is currently worsening. Labs reviewed- Renal function/electrolytes with Estimated Creatinine Clearance: 30.5 mL/min (based on SCr of 1.2 mg/dL). according to latest data. Monitor urine output and serial BMP and adjust therapy as needed. Avoid nephrotoxins and renally dose meds for GFR listed  above.   1/12  Serum BUN/Cr uptrending.  Strict I/O monitor . Obtain urine lytes , renal sonogram  and bladder scans q 6h        Recent Labs   Lab 01/09/23  2159 01/11/23  0306 01/12/23  0505   BUN 21 17 23   CREATININE 1.4 0.9 1.2   1/12 ANAHI cr 0.9-->.1.2 .started on RL hydration       Constipation  - Continue bowel regimen        History of ventriculoperitoneal shunting  - Placed for hydrocephalus  - Neurosurgery consulted   --  shunt adjusted from 120-110  -- Follow-up x-ray ordered  1/12CTHead -  stable configuration of ventricles. X-ray shunt series showing no discontinuities or kinks in shunt catheter. No neurosurgical intervention .           Gout  - Continue home allopurinol        Essential hypertension  - Continue home amlodipine  - Holding lisinopril given hyperkalemia on admission   - As needed hydralazine for SBP> 160        History of transient ischemic attack (TIA)  - Continue home Plavix        Type 2 diabetes mellitus without complication, without long-term current use of insulin  Patient's FSGs are controlled on current medication regimen.  Last A1c reviewed-         Lab Results   Component Value Date     HGBA1C 6.2 (H) 01/10/2023      Most recent fingerstick glucose reviewed-          Recent Labs   Lab 01/11/23  1108 01/11/23  1508 01/11/23  2018 01/12/23  0711   POCTGLUCOSE 148* 130* 126* 106      Current correctional scale  Medium  Maintain anti-hyperglycemic dose as follows-             Antihyperglycemics (From admission, onward)     Start     Stop Route Frequency Ordered     01/10/23 1044   insulin aspart U-100 pen 0-5 Units         -- SubQ Before meals & nightly PRN 01/10/23 0944          Hold Oral hypoglycemics while patient is in the hospital.     Pure hypercholesterolemia  - Continue home atorvastatin        Recurrent major depressive disorder, in full remission  Patient has persistent depression which is moderate and is currently controlled. Will Continue anti-depressant medications. We  will not consult psychiatry at this time. Patient does not display psychosis at this time. Continue to monitor closely and adjust plan of care as needed.     - Continue venlafaxine            Final Active Diagnoses:    Diagnosis Date Noted POA    PRINCIPAL PROBLEM:  Acute metabolic encephalopathy [G93.41] 06/27/2022 Yes    ANAHI (acute kidney injury) [N17.9] 01/12/2023 Yes    History of ventriculoperitoneal shunting [Z92.89] 01/10/2023 Unknown    Constipation [K59.00] 01/10/2023 Unknown    Gout [M10.9] 02/08/2021 Yes    Type 2 diabetes mellitus without complication, without long-term current use of insulin [E11.9] 11/25/2019 Yes    Essential hypertension [I10] 11/25/2019 Yes    Pure hypercholesterolemia [E78.00] 11/25/2019 Yes    History of transient ischemic attack (TIA) [Z86.73] 11/25/2019 Not Applicable    Recurrent major depressive disorder, in full remission [F33.42]  Yes      Problems Resolved During this Admission:    Diagnosis Date Noted Date Resolved POA    Altered mental status [R41.82] 01/10/2023 01/12/2023 Unknown       Medications:  Reconciled Home Medications:      Medication List        Start taking these medications      guaiFENesin 600 mg 12 hr tablet  Commonly known as: MUCINEX  Take 1 tablet (600 mg total) by mouth 2 (two) times daily. for 7 days            Continue taking these medications      acetaminophen 500 MG tablet  Commonly known as: TYLENOL  Take 2 tablets (1,000 mg total) by mouth every 8 (eight) hours as needed (Mild pain).     alendronate 70 MG tablet  Commonly known as: FOSAMAX  Take 1 tablet (70 mg total) by mouth every 7 days.     allopurinoL 300 MG tablet  Commonly known as: ZYLOPRIM  Take 1 tablet (300 mg total) by mouth once daily.     amLODIPine 10 MG tablet  Commonly known as: NORVASC  Take 1 tablet (10 mg total) by mouth once daily.     atorvastatin 10 MG tablet  Commonly known as: LIPITOR  Take 1 tablet (10 mg total) by mouth once daily.     clopidogreL 75 mg tablet  Commonly  known as: PLAVIX  Take 1 tablet (75 mg total) by mouth once daily.     FIBERCON ORAL  Mixed in 8 ounces of liquid & gave daily as needed for constipation. (Administered last Tue, Wed & Thurs)     metoprolol succinate 100 MG 24 hr tablet  Commonly known as: TOPROL-XL  Take 1 tablet (100 mg total) by mouth once daily.     MIRALAX 17 gram Pwpk  Generic drug: polyethylene glycol  Take 17 g by mouth daily as needed (Constipation).     venlafaxine 150 MG Cp24  Commonly known as: EFFEXOR-XR  TAKE 1 CAPSULE (150 MG TOTAL) BY MOUTH EVERY EVENING.            Stop taking these medications      HYDROcodone-acetaminophen 5-325 mg per tablet  Commonly known as: NORCO     lisinopriL 40 MG tablet  Commonly known as: PRINIVIL,ZESTRIL     metFORMIN 500 MG ER 24hr tablet  Commonly known as: GLUCOPHAGE-XR                Discharged Condition: fair    Disposition: Home-Health Care [facility]          Follow Up:     Patient Instructions:   No discharge procedures on file.    Laboratory/Diagnostic Data:    CBC/Anemia Labs: Coags:    Recent Labs   Lab 01/09/23 2159 01/09/23 2210 01/11/23 0306 01/12/23  0505   WBC 15.43*  --  9.62 5.44   HGB 15.8  --  13.5 12.3   HCT 44.1 49 41.3 36.0*     --  218 198   MCV 97  --  101* 98   RDW 14.2  --  14.2 13.8    No results for input(s): PT, INR, APTT in the last 168 hours.     Chemistries: ABG:   Recent Labs   Lab 01/09/23 2159 01/11/23 0306 01/12/23  0505   * 135* 134*   K 5.1 4.5 4.1    102 101   CO2 21* 18* 21*   BUN 21 17 23   CREATININE 1.4 0.9 1.2   CALCIUM 10.9* 10.3 9.5   PROT 9.0* 8.1 7.1   BILITOT 0.7 0.8 0.6   ALKPHOS 74 84 65   ALT 12 9* 7*   AST 24 21 20   MG 1.8 1.9 1.9   PHOS  --  2.6* 3.2    Recent Labs   Lab 01/09/23 2210   PH 7.411   PCO2 42.9   PO2 15*   HCO3 27.2   POCSATURATED 19*   BE 3        POCT Glucose: HbA1c:    Recent Labs   Lab 01/10/23  1932 01/11/23  0715 01/11/23  1108 01/11/23  1508 01/11/23 2018 01/12/23  0711   POCTGLUCOSE 118* 116* 148* 130* 126*  106    Hemoglobin A1C   Date Value Ref Range Status   01/10/2023 6.2 (H) 4.0 - 5.6 % Final     Comment:     ADA Screening Guidelines:  5.7-6.4%  Consistent with prediabetes  >or=6.5%  Consistent with diabetes    High levels of fetal hemoglobin interfere with the HbA1C  assay. Heterozygous hemoglobin variants (HbS, HgC, etc)do  not significantly interfere with this assay.   However, presence of multiple variants may affect accuracy.     06/27/2022 7.2 (H) 4.0 - 5.6 % Final     Comment:     ADA Screening Guidelines:  5.7-6.4%  Consistent with prediabetes  >or=6.5%  Consistent with diabetes    High levels of fetal hemoglobin interfere with the HbA1C  assay. Heterozygous hemoglobin variants (HbS, HgC, etc)do  not significantly interfere with this assay.   However, presence of multiple variants may affect accuracy.     11/12/2021 7.1 (H) 4.0 - 5.6 % Final     Comment:     ADA Screening Guidelines:  5.7-6.4%  Consistent with prediabetes  >or=6.5%  Consistent with diabetes    High levels of fetal hemoglobin interfere with the HbA1C  assay. Heterozygous hemoglobin variants (HbS, HgC, etc)do  not significantly interfere with this assay.   However, presence of multiple variants may affect accuracy.     11/12/2021 7.1 (H) 4.0 - 5.6 % Final     Comment:     ADA Screening Guidelines:  5.7-6.4%  Consistent with prediabetes  >or=6.5%  Consistent with diabetes    High levels of fetal hemoglobin interfere with the HbA1C  assay. Heterozygous hemoglobin variants (HbS, HgC, etc)do  not significantly interfere with this assay.   However, presence of multiple variants may affect accuracy.          Cardiac Enzymes: Ejection Fractions:    Recent Labs     01/09/23  2159   TROPONINI <0.006    No results found for: EF       No results for input(s): COLORU, APPEARANCEUA, PHUR, SPECGRAV, PROTEINUA, GLUCUA, KETONESU, BILIRUBINUA, OCCULTUA, NITRITE, UROBILINOGEN, LEUKOCYTESUR, RBCUA, WBCUA, BACTERIA, SQUAMEPITHEL, HYALINECASTS in the last 48  hours.    Invalid input(s): REJISUR    Procalcitonin (ng/mL)   Date Value   01/11/2023 0.05   06/27/2022 0.05   06/27/2022 0.04   03/09/2020 0.09     Lactate (Lactic Acid) (mmol/L)   Date Value   06/28/2022 2.2   06/28/2022 2.1   06/27/2022 2.2   06/27/2022 2.4 (H)   03/08/2020 2.2     No results found for: BNP  CRP (mg/L)   Date Value   03/09/2020 77.5 (H)     Sed Rate (mm/Hr)   Date Value   03/09/2020 24 (H)     No results found for: DDIMER  No results found for: FERRITIN  No results found for: LDH  Troponin I (ng/mL)   Date Value   01/09/2023 <0.006   06/27/2022 0.006   03/08/2020 <0.006     CPK (U/L)   Date Value   11/20/2020 76     Results for orders placed or performed in visit on 03/22/21   Vitamin D   Result Value Ref Range    Vit D, 25-Hydroxy 14 (L) 30 - 96 ng/mL   Results for orders placed or performed during the hospital encounter of 11/20/20   Vitamin D   Result Value Ref Range    Vit D, 25-Hydroxy 14 (L) 30 - 96 ng/mL     SARS-CoV-2 RNA, Amplification, Qual (no units)   Date Value   01/10/2023 Negative     POC Rapid COVID (no units)   Date Value   06/27/2022 Negative   06/27/2022 Negative   11/20/2020 Negative       Microbiology labs for the last week  Microbiology Results (last 7 days)       Procedure Component Value Units Date/Time    Blood culture [[id number]] Collected: 01/10/23 1029    Order Status: Completed Specimen: Blood from Peripheral, Hand, Right Updated: 01/11/23 1212     Blood Culture, Routine No growth to date      No Growth to date    Blood culture [[id number]] Collected: 01/10/23 1029    Order Status: Completed Specimen: Blood from Peripheral, Hand, Left Updated: 01/11/23 1212     Blood Culture, Routine No growth to date      No Growth to date              Reviewed and noted in plan where applicable- Please see chart for full lab data.    Lines/Drains:       Imaging  ECG Results              EKG 12-lead (Final result)  Result time 01/10/23 15:45:51      Final result by Interface, Lab  In [facility] (01/10/23 15:45:51)               Narrative:    Test Reason : R41.82,    Vent. Rate : 085 BPM     Atrial Rate : 085 BPM     P-R Int : 150 ms          QRS Dur : 094 ms      QT Int : 398 ms       P-R-T Axes : 044 -15 102 degrees     QTc Int : 473 ms    Normal sinus rhythm  Nonspecific ST and T wave abnormality  Prolonged QT  Abnormal ECG    Confirmed by Claudia FIELDS, Dar DALY ([id number]) on 1/10/2023 3:45:37 PM    Referred By: [email or web address]   SELF           Confirmed By:Dar Taylor MD                                  No results found for this or any previous visit.      XR Skull Shunt Placement 1 View  Narrative: EXAMINATION:  XR SKULL SHUNT PLACEMENT 1 VIEW    CLINICAL HISTORY:  check shunt setting;    TECHNIQUE:  Single radiograph of the skull was performed for shunt placement and valve settings.    COMPARISON:  Radiograph from earlier the same date.    FINDINGS:  There is a [employer] programmable valve set to approximately 100 mm H2O.  Impression: As above.    Electronically signed by: Dajuan Watters  Date:    01/10/2023  Time:    18:40  X-Ray Shunt Series  Narrative: EXAMINATION:  XR SHUNT SERIES    CLINICAL HISTORY:   shunt, ataxia, confusion;    TECHNIQUE:  Multiple radiographs are submitted, evaluation of ventriculoperitoneal shunt catheter.    COMPARISON:  December 2, 2022    FINDINGS:  A ventriculoperitoneal shunt catheter is identified, coursing along the right-side of the neck, extending to the midline of the chest, extending inferiorly to the abdomen, extending to the lower lumbar region, looping, extending inferiorly into the pelvis and looping in the lower pelvis.  The ventriculoperitoneal shunt catheter appears intact throughout its visualized course.    Aortic atherosclerotic change noted.  The lungs demonstrate chronic change, appearance of diminished depth of inspiration and atelectatic change.  There is contrast density within the collecting structures of the genitourinary system and urinary  bladder, consistent with excretion of contrast from recent CT examination.  Vascular calcifications are noted.  The bowel gas pattern is nonspecific.  The osseous structures demonstrate prominent chronic change with diminished mineralization and degenerative change.  Multilevel extensive chronic change of the spine noted.  Impression: Ventriculoperitoneal shunt catheter is identified, and appears intact radiographically.    Electronically signed by: Yossi Xavier  Date:    01/10/2023  Time:    03:34      Imaging:  Imaging Results              XR Skull Shunt Placement 1 View (Final result)  Result time 01/10/23 18:40:48   Procedure changed from X-Ray Shunt Series     Final result by Dajuan Watters DO (01/10/23 18:40:48)               Impression:      As above.      Electronically signed by: Dajuan Watters  Date:    01/10/2023  Time:    18:40             Narrative:    EXAMINATION:  XR SKULL SHUNT PLACEMENT 1 VIEW    CLINICAL HISTORY:  check shunt setting;    TECHNIQUE:  Single radiograph of the skull was performed for shunt placement and valve settings.    COMPARISON:  Radiograph from earlier the same date.    FINDINGS:  There is a [employer] programmable valve set to approximately 100 mm H2O.                                     X-Ray Shunt Series (Final result)  Result time 01/10/23 03:34:53      Final result by Yossi Xavier MD (01/10/23 03:34:53)               Impression:      Ventriculoperitoneal shunt catheter is identified, and appears intact radiographically.      Electronically signed by: Yossi Xavier  Date:    01/10/2023  Time:    03:34             Narrative:    EXAMINATION:  XR SHUNT SERIES    CLINICAL HISTORY:   shunt, ataxia, confusion;    TECHNIQUE:  Multiple radiographs are submitted, evaluation of ventriculoperitoneal shunt catheter.    COMPARISON:  December 2, 2022    FINDINGS:  A ventriculoperitoneal shunt catheter is identified, coursing along the right-side of the neck, extending to  the midline of the chest, extending inferiorly to the abdomen, extending to the lower lumbar region, looping, extending inferiorly into the pelvis and looping in the lower pelvis.  The ventriculoperitoneal shunt catheter appears intact throughout its visualized course.    Aortic atherosclerotic change noted.  The lungs demonstrate chronic change, appearance of diminished depth of inspiration and atelectatic change.  There is contrast density within the collecting structures of the genitourinary system and urinary bladder, consistent with excretion of contrast from recent CT examination.  Vascular calcifications are noted.  The bowel gas pattern is nonspecific.  The osseous structures demonstrate prominent chronic change with diminished mineralization and degenerative change.  Multilevel extensive chronic change of the spine noted.                                     CT Abdomen Pelvis With Contrast (Final result)  Result time 01/09/23 23:08:40      Final result by Anselmo Lyle MD (01/09/23 23:08:40)               Impression:      Increased ground-glass opacity in the lung bases likely representing dependent atelectasis.    Diverticulosis without diverticulitis.    No acute findings evident the abdomen or pelvis.      Electronically signed by: Ansemlo Lyle  Date:    01/09/2023  Time:    23:08             Narrative:    EXAMINATION:  CT ABDOMEN PELVIS WITH CONTRAST    CLINICAL HISTORY:  Abdominal pain, acute, nonlocalized;    TECHNIQUE:  Low dose axial images, sagittal and coronal reformations were obtained from the lung bases to the pubic symphysis following the IV administration of 100 mL of Omnipaque 350 .  Oral contrast was not given.    COMPARISON:  03/08/2020 significant spondylosis in the spine and vascular calcifications throughout the aorta and iliac system are noted.    FINDINGS:  Ground-glass opacities in the lung base are gravity dependent suggesting atelectasis. No consolidation is evident. Mild  emphysematous changes present.    Minimal fluid is seen around the right lobe of the liver. Otherwise the liver demonstrates hepatic steatosis. No bile duct dilatation is evident. The gallbladder is contracted.  The spleen, pancreas, kidneys and adrenal glands appear normal with a cyst in the upper pole of the right kidney.  A 2nd cyst is noted in the posterior cortex of the left kidney.    Loops of large and small intestines appear unremarkable with minimal diverticulosis in the descending and sigmoid colon but no features of diverticulitis or obstruction.     shunt tube is present in the anterior abdominal and chest wall residing in the pelvis.  No free fluid collection or mass is seen near the tip.    The uterus is surgically absent. The urinary bladder appears unremarkable.  Orthopedic hardware in the left femur.                                     CT Head Without Contrast (Final result)  Result time 01/09/23 22:51:51      Final result by Hortensia Anderson MD (01/09/23 22:51:51)               Impression:      Right trans parietal ventricular shunt catheter.  No acute intracranial abnormality detected.    Right tentorial meningioma.      Electronically signed by: Hortensia Anderson  Date:    01/09/2023  Time:    22:51             Narrative:    EXAMINATION:  CT OF THE HEAD WITHOUT    CLINICAL HISTORY:  Mental status change, unknown cause;    TECHNIQUE:  5 mm unenhanced axial images were obtained from the skull base to the vertex.    COMPARISON:  12/02/2022    FINDINGS:  There is a right transparietal ventricular shunt catheter with its tip in stable position.  There is stable cerebral atrophy and chronic small vessel ischemic changes.  The ventricular size is not increased.  There is no acute intracranial hemorrhage, territorial infarct or mass effect, or midline shift.  There is a right posterior fossa extra-axial calcified mass adjacent to the tentorium.  The visualized paranasal sinuses and mastoid air cells  are clear.  Advanced vascular calcifications are seen involving the supraclinoid portions of the internal carotid and vertebral arteries.                                     X-Ray Chest 1 View (Final result)  Result time 01/09/23 22:41:12      Final result by Sushil Rosario MD (01/09/23 22:41:12)               Impression:      No acute process.      Electronically signed by: Sushil Rosario MD  Date:    01/09/2023  Time:    22:41             Narrative:    EXAMINATION:  XR CHEST 1 VIEW    CLINICAL HISTORY:  Altered mental status, unspecified    TECHNIQUE:  Single frontal view of the chest was performed.    COMPARISON:  06/27/2022.    FINDINGS:  Monitoring EKG leads are present.  There is a partially visualized  shunt in place.  The trachea is unremarkable.  The cardiomediastinal silhouette is within normal limits.  There is no evidence of free air beneath the hemidiaphragms.  There are no pleural effusions.  There is no evidence of a pneumothorax.  There is no evidence of pneumomediastinum.  No airspace opacity is present.  The osseous structures are unremarkable.                                      Follow Up Instructions:    Follow-up Information       Savannah Mckenna PA-C Follow up in 1 month(s).    Specialty: Neurosurgery  Contact information:  [address]  [facility] [id number]  [phone]                           Future Appointments   Date Time Provider Department Center   2/14/2023 12:30 PM [facility] OIC-CT1 [address] LIMIT [facility]   2/14/2023  1:30 PM Savannah Mckenna PA-C [id number]   2/23/2023  2:30 PM Vonda Chung MD [facility]       Discharge Instructions:  Discharge Procedure Orders   CT Head Without Contrast   Standing Status: Future Standing Exp. Date: 01/12/24     Order Specific Question Answer Comments   May the Radiologist modify the order per protocol to meet the clinical needs of the patient? Yes          Total time spent on discharge 50    minutes     Bartolo Phipps MD  Attending Staff Physician  [facility]

## 2023-01-12 NOTE — PLAN OF CARE
01/12/23 1321   Post-Acute Status   Post-Acute Authorization Home Health     [facility] Phone: [phone]  -Yes, willing to accept patient.    3:54 PM   [employer] called requesting pt's phone number.SW provided agency with both of patient; phone numbers along with daughter's name and phone number.        Isabelle Rico LMSW  PRN - [facility]  [phone]

## 2023-01-12 NOTE — HOSPITAL COURSE
1/12 ANAHI cr 0.9-->.1.2 .started on RL hydration  Admitted for AMS.  infectious workup - UA negative   BC x 2 NGTD   shunt adjusted by Neurosurgery . improved mentation -oriented to person (baseline). CTHead -  stable configuration of ventricles. X-ray shunt series showing no discontinuities or kinks in shunt catheter. No neurosurgical intervention . PT/OT consulted for weakness, imbalance. lisinopril, metformin and norco discontinued.   discharge  today with

## 2023-01-12 NOTE — ASSESSMENT & PLAN NOTE
Patient presenting with 2 days of altered mental status of fatigue, AVH, and weakness.  No infectious symptoms prior to admission.  Concern for possible complications of hydrocephalus.   -  shunt adjusted by Neurosurgery on admission.  - WBC 15.4 on admission, improved without intervetion  - UA unremarkable   - Blood cultures NGTD  - Procalcitonin normal  - Holding antibiotics at this time   - Electrolytes within normal limits   - TSH unremarkable   - PT/OT consulted  - Holding sedating medications  1/12    Admitted for AMS.  infectious workup - UA negative   BC x 2 NGTD   shunt adjusted by Neurosurgery . improved mentation -oriented to person and place (baseline). CTHead -  stable configuration of ventricles. X-ray shunt series showing no discontinuities or kinks in shunt catheter. No neurosurgical intervention . PT/OT consulted for weakness, imbalane.  likely discharge  today with

## 2023-01-12 NOTE — PLAN OF CARE
APPOINTMENT:    Patient Appointment(s) scheduled with Yordan Roman MD, on Thursday Jan 19, 2023 9:30 AM

## 2023-01-12 NOTE — PT/OT/SLP EVAL
Physical Therapy Evaluation    Patient Name:  Farheen Soares   MRN:  [id number]  Admit Date: 1/9/2023  Admitting Diagnosis:  Acute metabolic encephalopathy   Length of Stay: 0 days  Recent Surgery: * No surgery found *      Recommendations:     Discharge Recommendations:  other (see comments)   Discharge Equipment Recommendations: none   Barriers to discharge: None    Plan:     During this hospitalization, patient to be seen 3 x/week to address the identified rehab impairments via gait training, therapeutic activities, therapeutic exercises, neuromuscular re-education and progress towards the established goals.  Plan of Care Expires:  02/11/23  Plan of Care Reviewed with: patient    Assessment:     Farheen Soares is a 87 y.o. female admitted with a medical diagnosis of Acute metabolic encephalopathy. Pt tolerated initial evaluation fairly on this date. Pt presents alert and oriented to self and hospital. Pt able to follow 1 step commands, requiring increased time and occasional cueing to complete. Pt presenting with generalized weakness and deconditioning and required Min A to complete stand and CGA throughout sitting balance for safety. Pt is a poor historian but per chart review from CM notes as well as notes from previous hospital admissions it appears pt is near her current functional level at this time. PT will continue to follow pt to prevent further deconditioning and immobility as well as to maintain current mobility status. Pt will continue to benefit from skilled PT services during this hospital admit to continue to improve transfer ability and efficiency as well as continue to progress pt's ambulation distance and cardiopulmonary endurance to maximize pt's functional independence and return to PLOF. Due to a combination of pt's mental status, safety awareness, and functional level it is recommended that patient returns home with 24/7 assistance     Problem List: weakness, impaired endurance,  "impaired self care skills, impaired functional mobility, gait instability, impaired balance, impaired cognition, decreased upper extremity function, decreased lower extremity function, decreased safety awareness, impaired coordination, impaired cardiopulmonary response to activity.  Rehab Prognosis: Fair; patient would benefit from acute skilled PT services to address these deficits and reach maximum level of function.      Goals:   Multidisciplinary Problems       Physical Therapy Goals          Problem: Physical Therapy    Goal Priority Disciplines Outcome Goal Variances Interventions   Physical Therapy Goal     PT, PT/OT Ongoing, Progressing     Description: Goals to be met by: 2023     Patient will increase functional independence with mobility by performin. Sit to stand transfer with Stand-by Assistance  2. Bed to chair transfer with Contact Guard Assistance using Rolling Walker  3. Gait  x 30 feet with Contact Guard Assistance using Rolling Walker.   4. Stand for 5 minutes with Contact Guard Assistance using RW or LRAD  5. Lower extremity exercise program x30 reps per handout, with assistance as needed                         Subjective     RN notified prior to session. No family/friends present upon PT entrance into room. Patient agreeable to PT evaluation.    Chief Complaint: Altered Mental Status (Pt presents via no ems for AMS. Per daughter pt is more confused than normal. Pt has hx of dementia and is confused at baseline per ems. No other complaints reported at this time. ) and Transfer (Tx for " shunt malfunction")    Patient/Family Comments/goals: "I'm tired" - pt stated after stand  Pain/Comfort:  Pain Rating 1: 0/10 (pt did not rate but appears to be in pain)  Pain Rating Post-Intervention 1: 0/10    Social History: obtained via chart review as pt is unreliable historian   Residence: lives with their family 1-story house with tub/shower combo.  Support available: family  Equipment Used: " "cane, quad, walker, rolling, wheelchair  Equipment Owned (not using): None  Prior level of function: pt was ambulating with RW with close assist from family, required assist with all ADLs  Hand Dominance: right.   Work: Retired.   Drive: no.   Managing Medicines/Managing Home: no.   Hobbies: watching TV    Patient reports they will have assistance from family upon discharge.    Objective:     Additional staff present: Student PT    Patient found HOB elevated with: telemetry, peripheral IV     General Precautions: Standard, Cardiac fall   Orthopedic Precautions:N/A   Braces: N/A   Body mass index is 26.57 kg/m².  Oxygen Device: Room Air  Vitals: /60 (BP Location: Left arm, Patient Position: Lying)   Pulse (!) 58   Temp 97.4 °F (36.3 °C) (Oral)   Resp 18   Ht 5' 2" (1.575 m)   Wt 65.9 kg (145 lb 4.5 oz)   SpO2 (!) 92%   BMI 26.57 kg/m²     Exams:  Cognition:   Alert and Cooperative; pleasantly confused  AxOx2 (person and hospital)  Command following: Follows one-step verbal commands  Fluency: non-verbal  Hearing: pt [place]  Vision:  Intact  Skin Integrity: Visible skin intact  Postural Assessment: slouched posture, rounded shoulders, and forward head  Physical Exam:    Left UE Left LE Right UE Right LE   Edema absent absent absent absent   ROM AROM WFL AROM WFL AROM WFL AROM WFL   Strength adequate ROM, adequate strength ELIEZER formally 2/2 difficulty following multi-step command for manual muscle test; grossly appears ~4/5 from functional mobility adequate ROM, adequate strength ELIEZER formally 2/2 difficulty following multi-step command for manual muscle test; grossly appears ~4/5 from functional mobility   Sensation ELIEZER ELIEZER ELIEZER ELIEZER   Coordination not tested due to strength or pain deficits not tested due to strength or pain deficits not tested due to strength or pain deficits not tested due to strength or pain deficits     Outcome Measures:  AM-PAC 6 CLICK MOBILITY  Turning over in bed (including adjusting " "bedclothes, sheets and blankets)?: 3  Sitting down on and standing up from a chair with arms (e.g., wheelchair, bedside commode, etc.): 3  Moving from lying on back to sitting on the side of the bed?: 3  Moving to and from a bed to a chair (including a wheelchair)?: 2  Need to walk in hospital room?: 2  Climbing 3-5 steps with a railing?: 2  Basic Mobility Total Score: 15     Functional Mobility:    Bed Mobility:   Scooting to HOB via supine bridge: stand by assistance and with side rail  Supine to Sit: stand by assistance; from Lt side of bed  Scooting anteriorly to EOB to have both feet planted on floor: stand by assistance  Sit to Supine: stand by assistance; to Lt side of bed    Sitting Balance at Edge of Bed:  Static Sitting Balance: Fair : able to sit unsupported without balance loss and without UE support  Dynamic Sitting Balance: Fair : minimal weight shifting ipsilaterally or anteriorly. Difficulty crossing midline  Assistance Level Required: Contact Guard Assistance    Transfers:   Sit <> Stand Transfer: minimum assistance with hand-held assist   Stand <> Sit Transfer: minimum assistance with hand-held assist   [id number] from EOB  Increased time to reach full standing    Standing Balance:  Static Standing Balance: Fair- : requires minimal assistance or UE support in order to stand without LOB  Dynamic Standing Balance: n/a  Assistance Level Required: Minimal Assistance  Patient used: hand-held assist   Time: ~10 seconds  Comments: Pt able to tolerate static standing for ~10 seconds, did not report dizziness when asked. Pt with kyphotic posture throughout. Pt stated "I'm tired" and returned to sitting. Pt was then returned to bed per pt request.      Gait: Deferred due to pt's performance with above listed functional mobility    Education:  Time provided for education, counseling and discussion of health disposition in regards to patient's current status  All questions answered within PT scope of practice " and to patient's satisfaction  PT role in POC to address current functional deficits  Pt educated on proper body mechanics, safety techniques, and energy conservation with PT facilitation and cueing throughout session  Call nursing/pct to transfer to chair/use bathroom. Pt stated understanding.  Whiteboard updated with therapist name and pt's current mobility status documented above  Safe to perform step/stand pivot transfer to/from chair/bedside commode assist x 1 and HHA w/ nursing/PCT present    Patient left HOB elevated with all lines intact, call button in reach, and bed alarm on.      History:     Past Medical History:   Diagnosis Date    Age-related osteoporosis with current pathological fracture with routine healing     ANAHI (acute kidney injury) 1/12/2023    Allergic rhinitis     Anemia     Carotid atherosclerosis, bilateral     Chronic gout of multiple sites     Chronic low back pain with bilateral sciatica     Closed impacted fracture of left femoral neck with routine healing s/p percutaneous screw fixation on 11/21/2020 11/20/2020    DDD (degenerative disc disease), lumbar 8/17/2020    Diverticulosis     Essential hypertension 11/25/2019    Facet arthropathy     GERD (gastroesophageal reflux disease) 11/25/2019    Hearing loss     History of transient ischemic attack (TIA) 11/25/2019    IBS (irritable bowel syndrome)     Insomnia     Lumbar radiculopathy 8/17/2020    Lumbar spondylosis 8/17/2020    Lumbar stenosis     Meningioma     Paroxysmal SVT (supraventricular tachycardia)     Primary open angle glaucoma (POAG) of both eyes     Pure hypercholesterolemia 11/25/2019    PVD (peripheral vascular disease)     30% prox stenosis of celiac trunk and 20% stnosis Prox SMA - per Abd/SMA Arteriogram 4/3/08    Recurrent major depressive disorder, in full remission     Type 2 diabetes mellitus without complication, without long-term current use of insulin 11/25/2019    Vitamin D deficiency 11/23/2020       Past  Surgical History:   Procedure Laterality Date    CATARACT EXTRACTION      CAUDAL EPIDURAL STEROID INJECTION N/A 8/27/2020    Procedure: Injection-steroid-epidural-caudal with catheter;  Surgeon: Johnathan Gonzalez Jr., MD;  Location[place] [facility];  Service: Pain Management;  Laterality: N/A;    EPIDURAL STEROID INJECTION INTO LUMBAR SPINE      LUMBAR LAMINECTOMY  2009    PERCUTANEOUS PINNING OF HIP Left 11/21/2020    Procedure: Left- Percutaneous Screws- Large  arm Clock side;  Surgeon: Leandro White MD;  Location: [facility] OR [address] [place] [address];  Service: Orthopedics;  Laterality: Left;    TOTAL ABDOMINAL HYSTERECTOMY W/ BILATERAL SALPINGOOPHORECTOMY         Family History   Problem Relation Age of Onset    Hypertension Mother     Diabetes Mother     Hypertension Father     Stroke Father         60's    Hypertension Daughter     Hypothyroidism Daughter     Hypertension Son     Hyperlipidemia Son     Multiple sclerosis Son        Social History     Socioeconomic History    Marital status:    Tobacco Use    Smoking status: Never    Smokeless tobacco: Never   Substance and Sexual Activity    Alcohol use: Yes     Comment: socially    Drug use: Never    Sexual activity: Not Currently     Social Determinants of Health     Financial Resource Strain: Low Risk     Difficulty of Paying Living Expenses: Not hard at all   Food Insecurity: No Food Insecurity    Worried About Running Out of Food in the Last Year: Never true    Ran Out of Food in the Last Year: Never true   Transportation Needs: No Transportation Needs    Lack of Transportation (Medical): No    Lack of Transportation (Non-Medical): No   Physical Activity: Inactive    Days of Exercise per Week: 0 days    Minutes of Exercise per Session: 0 min   Stress: No Stress Concern Present    Feeling of Stress : Not at all   Social Connections: Socially Isolated    Frequency of Communication with Friends and Family: Twice a week    Frequency of Social Gatherings with Friends  and Family: More than three times a week    Attends [employer] Services: Never    Active Member of Clubs or Organizations: No    Attends Club or Organization Meetings: Never    Marital Status:    Housing Stability: Low Risk     Unable to Pay for Housing in the Last Year: No    Number of Places Lived in the Last Year: 1    Unstable Housing in the Last Year: No       Time Tracking:     PT Received On: 01/12/23  PT Start Time: 1409     PT Stop Time: 1426  PT Total Time (min): 17 min     Billable Minutes: Evaluation 10 minutes    Janene Jordan PT, DPT  1/12/2023  Pager: [phone]

## 2023-01-12 NOTE — PLAN OF CARE
01/12/23 1315   Post-Acute Status   Post-Acute Authorization Placement     [employer] Phone: [phone]  -Yes, willing to accept patient.      Isabelle Rico LMSW  PRN - [facility]  [phone]

## 2023-01-12 NOTE — PT/OT/SLP PROGRESS
Occupational Therapy      Patient Name:  Farheen Soares   MRN:  [id number]    Patient not seen today secondary to discharge orders signed this AM and pt planning to discharge to home with HH. OT to see pt if she remains in hospital after today.    1/12/2023

## 2023-01-12 NOTE — PLAN OF CARE
Met with Patient to review discharge recommendation of -Home Health and is agreeable to plan  SW faxed HH Orders to [facility], [facility] via [employer] for review. SW will continue to follow patient.     Provided list of facilities in-network with patient's payor plan. Notified that blast referral sent to below listed facilities from list based on proximity to home/family support:   1.[facility] Phone: [phone]    2.[facility] Phone: [phone]    3.[facility] Phone: [phone]    4.  5. (can send more than 5)    PHN instructed to identify preference.    Preferred Facility: (if more than 1, listed in order of descending preference)  1.    If an additional preferred facility not listed above is identified, additional referral to be sent. If above facilities unable to accept, will send additional referrals to in-network providers.      10:59 AM   SW hand faxed Home Health Orders and Discharge Summary along with clinicals to [facility] [phone];  [phone] for [facility] and Home Health agency. SW will follow      11:47 AM   Your fax has been successfully sent to [phone] at [phone].  ------------------------------------------------------------    WALKER Salazar - [facility]  [phone]

## 2023-01-12 NOTE — PLAN OF CARE
[facility] - Med Surg  Discharge Final Note    Primary Care Provider: Vonda Chung MD    Expected Discharge Date: 1/12/2023    Final Discharge Note (most recent)       Final Note - 01/12/23 1357          Final Note    Assessment Type Final Discharge Note     Anticipated Discharge Disposition Home-Health Care [facility]     What phone number can be called within the next 1-3 days to see how you are doing after discharge? [phone]     Hospital Resources/Appts/Education Provided Appointments scheduled and added to AVS        Post-Acute Status    Post-Acute Authorization Home Health     Home Health Status Set-up Complete/Auth obtained     Discharge Delays None known at this time                     Important Message from Medicare             Contact Info       Savannah Mckenna PA-C   Specialty: Neurosurgery    [address] [facility] [address]   Phone: [phone]       Next Steps: Follow up in 1 month(s)    Vonda Chung MD   Specialty: Internal Medicine   Relationship: PCP - General    [address] [facility] [address]   Phone: [phone]       Next Steps: Follow up          Patient medically ready for discharge to Home. Family/patient aware of discharge.    Future Appointments   Date Time Provider Department Center   1/19/2023  9:30 AM Yordan Roman MD [facility]   2/14/2023 12:30 PM [facility] OIC-CT1 500 LB LIMIT [facility]   2/14/2023  1:30 PM Savannah Mckenna PA-C [facility] NEUROS8 [facility]   2/23/2023  2:30 PM Vonda hCung MD [facility]         Isabelle Rico LMSW  PRN - [facility]  [phone]

## 2023-01-12 NOTE — SUBJECTIVE & OBJECTIVE
Interval History: NAEON. Patient mental status back to baseline.     Medications:  Continuous Infusions:   lactated ringers 75 mL/hr at 01/12/23 1128     Scheduled Meds:   allopurinoL  300 mg Oral Daily    amLODIPine  10 mg Oral Daily    atorvastatin  10 mg Oral Daily    clopidogreL  75 mg Oral Daily    enoxaparin  40 mg Subcutaneous Daily    guaiFENesin  600 mg Oral BID    metoprolol tartrate  50 mg Oral BID    polyethylene glycol  17 g Oral Daily    senna  8.6 mg Oral BID AC    venlafaxine  150 mg Oral QHS     PRN Meds:acetaminophen, bisacodyL, dextrose 10%, dextrose 10%, glucagon (human recombinant), glucose, glucose, hydrALAZINE, insulin aspart U-100, levalbuterol, melatonin, naloxone, ondansetron, polyethylene glycol, prochlorperazine, sodium chloride 0.9%, sodium chloride 0.9%     Review of Systems  Objective:     Weight: 65.9 kg (145 lb 4.5 oz)  Body mass index is 26.57 kg/m².  Vital Signs (Most Recent):  Temp: 97.4 °F (36.3 °C) (01/12/23 1506)  Pulse: (!) 58 (01/12/23 1615)  Resp: 18 (01/12/23 1506)  BP: 129/60 (01/12/23 1506)  SpO2: (!) 92 % (01/12/23 1506)   Vital Signs (24h Range):  Temp:  [97.3 °F (36.3 °C)-98.1 °F (36.7 °C)] 97.4 °F (36.3 °C)  Pulse:  [] 58  Resp:  [16-20] 18  SpO2:  [91 %-96 %] 92 %  BP: (120-187)/(58-87) 129/60     Neurosurgery Physical Exam  General: Well developed, well nourished, not in acute distress.   Head: Normocephalic, atraumatic.  Neurologic: Alert and oriented to person and and place (baseline); though content appropriate. Follows commands   GCS: Motor:6  Verbal:4  Eyes:4   GCS Total: 14  Speech: No dysarthria.  Cranial nerves: Face symmetric, tongue midline, CN II-XII grossly intact.   Eyes: Pupils equal, round, reactive to light with accomodation, EOMI. Unable to appreciate optic disc.   Pulmonary: Normal respirations, no signs of respiratory distress.  Sensory: Intact to light touch throughout.  Motor Strength: Moves all extremities spontaneously.  Vascular: No  LE edema.   Previous cranial incision healed.    Significant Labs:  Recent Labs   Lab 01/11/23  0306 01/12/23  0505 01/12/23  1510    99 108   * 134* 130*   K 4.5 4.1 4.1    101 103   CO2 18* 21* 19*   BUN 17 23 20   CREATININE 0.9 1.2 1.0   CALCIUM 10.3 9.5 9.6   MG 1.9 1.9  --        Recent Labs   Lab 01/11/23  0306 01/12/23  0505   WBC 9.62 5.44   HGB 13.5 12.3   HCT 41.3 36.0*    198       No results for input(s): LABPT, INR, APTT in the last 48 hours.  Microbiology Results (last 7 days)       Procedure Component Value Units Date/Time    Blood culture [[id number]] Collected: 01/10/23 1029    Order Status: Completed Specimen: Blood from Peripheral, Hand, Left Updated: 01/12/23 1212     Blood Culture, Routine No growth to date      No Growth to date      No Growth to date    Blood culture [[id number]] Collected: 01/10/23 1029    Order Status: Completed Specimen: Blood from Peripheral, Hand, Right Updated: 01/12/23 1212     Blood Culture, Routine No growth to date      No Growth to date      No Growth to date          All pertinent labs from the last 24 hours have been reviewed.    Significant Diagnostics:  I have reviewed all pertinent imaging results/findings within the past 24 hours.

## 2023-01-12 NOTE — ASSESSMENT & PLAN NOTE
- Placed for hydrocephalus  - Neurosurgery consulted   --  shunt adjusted from 120-110  -- Follow-up x-ray ordered  1/12CTHead -  stable configuration of ventricles. X-ray shunt series showing no discontinuities or kinks in shunt catheter. No neurosurgical intervention .

## 2023-01-12 NOTE — ASSESSMENT & PLAN NOTE
Patient's FSGs are controlled on current medication regimen.  Last A1c reviewed-   Lab Results   Component Value Date    HGBA1C 6.2 (H) 01/10/2023     Most recent fingerstick glucose reviewed-   Recent Labs   Lab 01/11/23  1108 01/11/23  1508 01/11/23 2018 01/12/23  0711   POCTGLUCOSE 148* 130* 126* 106     Current correctional scale  Medium  Maintain anti-hyperglycemic dose as follows-   Antihyperglycemics (From admission, onward)    Start     Stop Route Frequency Ordered    01/10/23 1044  insulin aspart U-100 pen 0-5 Units         -- SubQ Before meals & nightly PRN 01/10/23 0944        Hold Oral hypoglycemics while patient is in the hospital.

## 2023-01-13 PROCEDURE — G0180 PR HOME HEALTH MD CERTIFICATION: ICD-10-PCS | Mod: ,,, | Performed by: INTERNAL MEDICINE

## 2023-01-13 PROCEDURE — G0180 MD CERTIFICATION HHA PATIENT: HCPCS | Mod: ,,, | Performed by: INTERNAL MEDICINE

## 2023-01-13 NOTE — NURSING
Pt and daughter received discharge instructions. No concern verbalized. IV discontinued, cath intact, pt tolerated well. Transportation at bedside to transport pt home. Pt discharged with belongings.

## 2023-01-15 LAB
BACTERIA BLD CULT: NORMAL
BACTERIA BLD CULT: NORMAL

## 2023-01-25 ENCOUNTER — PES CALL (OUTPATIENT)
Dept: ADMINISTRATIVE | Facility: CLINIC | Age: 88
End: 2023-01-25
Payer: MEDICARE

## 2023-02-14 ENCOUNTER — HOSPITAL ENCOUNTER (OUTPATIENT)
Dept: RADIOLOGY | Facility: HOSPITAL | Age: 88
Discharge: HOME | End: 2023-02-14
Attending: PHYSICIAN ASSISTANT
Payer: MEDICARE

## 2023-02-14 ENCOUNTER — OFFICE VISIT (OUTPATIENT)
Dept: NEUROSURGERY | Facility: CLINIC | Age: 88
End: 2023-02-14
Payer: MEDICARE

## 2023-02-14 VITALS
BODY MASS INDEX: 26.74 KG/M2 | HEART RATE: 60 BPM | HEIGHT: 62 IN | WEIGHT: 145.31 LBS | DIASTOLIC BLOOD PRESSURE: 78 MMHG | SYSTOLIC BLOOD PRESSURE: 147 MMHG

## 2023-02-14 DIAGNOSIS — G91.2 NPH (NORMAL PRESSURE HYDROCEPHALUS): Primary | ICD-10-CM

## 2023-02-14 DIAGNOSIS — G91.2 NPH (NORMAL PRESSURE HYDROCEPHALUS): ICD-10-CM

## 2023-02-14 PROCEDURE — 3288F FALL RISK ASSESSMENT DOCD: CPT | Mod: CPTII,S$GLB,, | Performed by: PHYSICIAN ASSISTANT

## 2023-02-14 PROCEDURE — 1159F PR MEDICATION LIST DOCUMENTED IN MEDICAL RECORD: ICD-10-PCS | Mod: CPTII,S$GLB,, | Performed by: PHYSICIAN ASSISTANT

## 2023-02-14 PROCEDURE — 3288F PR FALLS RISK ASSESSMENT DOCUMENTED: ICD-10-PCS | Mod: CPTII,S$GLB,, | Performed by: PHYSICIAN ASSISTANT

## 2023-02-14 PROCEDURE — 99999 PR PBB SHADOW E&M-EST. PATIENT-LVL III: ICD-10-PCS | Mod: PBBFAC,,, | Performed by: PHYSICIAN ASSISTANT

## 2023-02-14 PROCEDURE — 3077F SYST BP >= 140 MM HG: CPT | Mod: CPTII,S$GLB,, | Performed by: PHYSICIAN ASSISTANT

## 2023-02-14 PROCEDURE — 3078F DIAST BP <80 MM HG: CPT | Mod: CPTII,S$GLB,, | Performed by: PHYSICIAN ASSISTANT

## 2023-02-14 PROCEDURE — 3077F PR MOST RECENT SYSTOLIC BLOOD PRESSURE >= 140 MM HG: ICD-10-PCS | Mod: CPTII,S$GLB,, | Performed by: PHYSICIAN ASSISTANT

## 2023-02-14 PROCEDURE — 3078F PR MOST RECENT DIASTOLIC BLOOD PRESSURE < 80 MM HG: ICD-10-PCS | Mod: CPTII,S$GLB,, | Performed by: PHYSICIAN ASSISTANT

## 2023-02-14 PROCEDURE — 70450 CT HEAD WITHOUT CONTRAST: ICD-10-PCS | Mod: 26,,, | Performed by: RADIOLOGY

## 2023-02-14 PROCEDURE — 1101F PT FALLS ASSESS-DOCD LE1/YR: CPT | Mod: CPTII,S$GLB,, | Performed by: PHYSICIAN ASSISTANT

## 2023-02-14 PROCEDURE — 99999 PR PBB SHADOW E&M-EST. PATIENT-LVL III: CPT | Mod: PBBFAC,,, | Performed by: PHYSICIAN ASSISTANT

## 2023-02-14 PROCEDURE — 1159F MED LIST DOCD IN RCRD: CPT | Mod: CPTII,S$GLB,, | Performed by: PHYSICIAN ASSISTANT

## 2023-02-14 PROCEDURE — 70450 CT HEAD/BRAIN W/O DYE: CPT | Mod: TC

## 2023-02-14 PROCEDURE — 70450 CT HEAD/BRAIN W/O DYE: CPT | Mod: 26,,, | Performed by: RADIOLOGY

## 2023-02-14 PROCEDURE — 99215 PR OFFICE/OUTPT VISIT, EST, LEVL V, 40-54 MIN: ICD-10-PCS | Mod: S$GLB,,, | Performed by: PHYSICIAN ASSISTANT

## 2023-02-14 PROCEDURE — 1101F PR PT FALLS ASSESS DOC 0-1 FALLS W/OUT INJ PAST YR: ICD-10-PCS | Mod: CPTII,S$GLB,, | Performed by: PHYSICIAN ASSISTANT

## 2023-02-14 PROCEDURE — 99215 OFFICE O/P EST HI 40 MIN: CPT | Mod: S$GLB,,, | Performed by: PHYSICIAN ASSISTANT

## 2023-02-14 NOTE — PROGRESS NOTES
Neurosurgery  Established Patient    SUBJECTIVE:     History of Present Illness:  Farheen Soares is a 87 y.o. female with h/o hearing loss, type II DM, DDD, meningioma, TIA, and NPH who is s/p elective  shunt placement with Dr. Espinoza on 8/11/22 who presents to clinic today for hospital follow up. Patient was admitted to hospital from 1/9/22- 1/12/22 for AMS and lethargy. Patient's CT and XRSS where normal. Negative for UTI. AMS work up negative. Shunt was decreased from 120 -100. Patient mental status improved greatly after shunt reprogram and has continued to do so since discharge per her daughter who is present at today's appointment. No new issues/complaints.     Review of patient's allergies indicates:   Allergen Reactions    Baclofen      AMS and distorted dremas       Current Outpatient Medications   Medication Sig Dispense Refill    acetaminophen (TYLENOL) 500 MG tablet Take 2 tablets (1,000 mg total) by mouth every 8 (eight) hours as needed (Mild pain).  0    alendronate (FOSAMAX) 70 MG tablet Take 1 tablet (70 mg total) by mouth every 7 days. 12 tablet 0    allopurinoL (ZYLOPRIM) 300 MG tablet Take 1 tablet (300 mg total) by mouth once daily. 90 tablet 0    amLODIPine (NORVASC) 10 MG tablet Take 1 tablet (10 mg total) by mouth once daily. 90 tablet 0    atorvastatin (LIPITOR) 10 MG tablet Take 1 tablet (10 mg total) by mouth once daily. 90 tablet 1    calcium polycarbophil (FIBERCON ORAL) Mixed in 8 ounces of liquid & gave daily as needed for constipation. (Administered last Tue, Wed & Thurs)      clopidogreL (PLAVIX) 75 mg tablet Take 1 tablet (75 mg total) by mouth once daily. 90 tablet 3    metoprolol succinate (TOPROL-XL) 100 MG 24 hr tablet Take 1 tablet (100 mg total) by mouth once daily. 90 tablet 0    polyethylene glycol (GLYCOLAX) 17 gram PwPk Take 17 g by mouth daily as needed (Constipation).      venlafaxine (EFFEXOR-XR) 150 MG Cp24 TAKE 1 CAPSULE (150 MG TOTAL) BY MOUTH EVERY EVENING. 90  capsule 2     No current facility-administered medications for this visit.       Past Medical History:   Diagnosis Date    Age-related osteoporosis with current pathological fracture with routine healing     ANAHI (acute kidney injury) 1/12/2023    Allergic rhinitis     Anemia     Carotid atherosclerosis, bilateral     Chronic gout of multiple sites     Chronic low back pain with bilateral sciatica     Closed impacted fracture of left femoral neck with routine healing s/p percutaneous screw fixation on 11/21/2020 11/20/2020    DDD (degenerative disc disease), lumbar 8/17/2020    Diverticulosis     Essential hypertension 11/25/2019    Facet arthropathy     GERD (gastroesophageal reflux disease) 11/25/2019    Hearing loss     History of transient ischemic attack (TIA) 11/25/2019    IBS (irritable bowel syndrome)     Insomnia     Lumbar radiculopathy 8/17/2020    Lumbar spondylosis 8/17/2020    Lumbar stenosis     Meningioma     Paroxysmal SVT (supraventricular tachycardia)     Primary open angle glaucoma (POAG) of both eyes     Pure hypercholesterolemia 11/25/2019    PVD (peripheral vascular disease)     30% prox stenosis of celiac trunk and 20% stnosis Prox SMA - per Abd/SMA Arteriogram 4/3/08    Recurrent major depressive disorder, in full remission     Type 2 diabetes mellitus without complication, without long-term current use of insulin 11/25/2019    Vitamin D deficiency 11/23/2020     Past Surgical History:   Procedure Laterality Date    CATARACT EXTRACTION      CAUDAL EPIDURAL STEROID INJECTION N/A 8/27/2020    Procedure: Injection-steroid-epidural-caudal with catheter;  Surgeon: Johnathan Gonzalez Jr., MD;  Location: [facility] PAIN T;  Service: Pain Management;  Laterality: N/A;    EPIDURAL STEROID INJECTION INTO LUMBAR SPINE      LUMBAR LAMINECTOMY  2009    PERCUTANEOUS PINNING OF HIP Left 11/21/2020    Procedure: Left- Percutaneous Screws- Large  arm Clock side;  Surgeon: Leandro White MD;  Location: [facility] OR  "2ND FLR;  Service: Orthopedics;  Laterality: Left;    TOTAL ABDOMINAL HYSTERECTOMY W/ BILATERAL SALPINGOOPHORECTOMY       Family History       Problem Relation (Age of Onset)    Diabetes Mother    Hyperlipidemia Son    Hypertension Mother, Father, Daughter, Son    Hypothyroidism Daughter    Multiple sclerosis Son    Stroke Father          Social History     Socioeconomic History    Marital status:    Tobacco Use    Smoking status: Never    Smokeless tobacco: Never   Substance and Sexual Activity    Alcohol use: Yes     Comment: socially    Drug use: Never    Sexual activity: Not Currently     Social Determinants of Health     Financial Resource Strain: Low Risk     Difficulty of Paying Living Expenses: Not hard at all   Food Insecurity: No Food Insecurity    Worried About Running Out of Food in the Last Year: Never true    Ran Out of Food in the Last Year: Never true   Transportation Needs: No Transportation Needs    Lack of Transportation (Medical): No    Lack of Transportation (Non-Medical): No   Physical Activity: Inactive    Days of Exercise per Week: 0 days    Minutes of Exercise per Session: 0 min   Stress: No Stress Concern Present    Feeling of Stress : Not at all   Social Connections: Socially Isolated    Frequency of Communication with Friends and Family: Twice a week    Frequency of Social Gatherings with Friends and Family: More than three times a week    Attends L[employer]-day Services: Never    Active Member of Clubs or Organizations: No    Attends Club or Organization Meetings: Never    Marital Status:    Housing Stability: Low Risk     Unable to Pay for Housing in the Last Year: No    Number of Places Lived in the Last Year: 1    Unstable Housing in the Last Year: No       ROS: unable to preform 2/2 dementia     OBJECTIVE:     Vital Signs  Pulse: 60  BP: (!) 147/78  Height: 5' 2" (157.5 cm)  Weight: 65.9 kg (145 lb 4.5 oz)  Body mass index is 26.57 kg/m².    Neurosurgery Physical " Exam  General: Well developed, well nourished, not in acute distress.   Head: Normocephalic, atraumatic.  Neurologic: Alert and oriented to person and and place (baseline); though content appropriate. Follows commands   GCS: Motor:6  Verbal:4  Eyes:4   GCS Total: 14  Speech: No dysarthria.  Cranial nerves: Face symmetric, tongue midline, CN II-XII grossly intact.   Eyes: Pupils equal, round, reactive to light with accomodation, EOMI. Unable to appreciate optic disc.   Pulmonary: Normal respirations, no signs of respiratory distress.  Sensory: Intact to light touch throughout.  Motor Strength: Moves all extremities spontaneously.  Vascular: No LE edema.   Previous cranial incision healed.    Diagnostic Results:  I have personally reviewed imaging and agree with the findings  CT head wo contrast 2/14/23: Stable position right parietal ventricular shunt catheter with stable configuration of ventricular system. Calcified extra-axial mass (presumed meningioma) in the right posterior fossa with mild mass effect on the adjacent parenchyma.  Findings appear stable compared prior exam.No acute intracranial abnormality.    ASSESSMENT/PLAN:     Farheen Soares is a 87 y.o. female with h/o hearing loss, type II DM, DDD, meningioma, TIA, and NPH who is s/p elective  shunt placement with Dr. Espinoza on 8/11/22 who presents to clinic today for hospital follow up. Shunt reprogrammed to 100 during hospital stay due to AMS which has since resolved. CTH stable.     I would like the patient to follow-up in clinic in 3 months (virtual is okay). I have encouraged her to contact the clinic with any questions, concerns, or adverse clinical changes. She verbalized understanding.       Savannah Mckenna PA-C  Neurosurgery     Time spent on this encounter: 46 minutes. This includes face-to-face time and non-face to face time preparing to see the patient (eg, review of tests), obtaining and/or reviewing separately obtained history,  documenting clinical information in the electronic or other health record, independently interpreting results and communicating results to the patient/family/caregiver, or care coordinator.           Note dictated with voice recognition software, please excuse any grammatical errors.

## 2023-02-22 ENCOUNTER — EXTERNAL HOME HEALTH (OUTPATIENT)
Dept: HOME HEALTH SERVICES | Facility: HOSPITAL | Age: 88
End: 2023-02-22
Payer: MEDICARE

## 2023-02-22 NOTE — PROGRESS NOTES
[facility] Note    Chief Complaint      Chief Complaint   Patient presents with    Follow-up       History of Present Illness      Farheen Soares is a 87 y.o.    AAF with DM - II, HTN, osteoposis, Gout, HLD, Hearing loss, Depression, Chronic ow back pain, GERd, Ar, Benign brain tumor, h/o TIA presents to  fu chronic issues. Last visit - 2/10/22    Hosp - 6/27/22 AMS, Acute metabolic encephlopathy  ED visit 12/2/22 - fall  Hosp 1/9/23 - Acute metabolic  - Shunt reprogrammed to 100 during hospital stay due to AMS     NPH - s/p LP - 7/12/21- improved gait. S/P  shunt 8/11/22 Fu by Neuro, Dr. León and NS, Dr. Espinoza.  CT head 2/14/23 - Stable position right parietal ventricular shunt catheter with stable configuration of ventricular system. Calcified extra-axial mass (presumed meningioma) in the right posterior fossa with mild mass effect on the adjacent parenchyma.  Findings appear stable compared prior exam.     Vit D def -  vitamin D was low at 14.  Pt took Ergocalciferol 50,000 units once weekly x 12 wks. she is not taking her OTC Vit D 3 2000 units one daily. Resume Erogcalciferol wkly x 12 wks and then she should start vit D 3 2000 u/d.    Left hip fracture - Hospitalized - 11/20/20-s/p left total hip femoral neck pinning performed by Dr. White on 11/21/2020 due to Left hip fracture. Doing well. She uses a walker to ambulate to the bathroom at home.  She is in a wheelchair today.      Chronic low back pain - Did well on a trial of prn Tramadol 1/2-1 tab BID prn- takes rarely. No complaints at this time.      DM - II - Pt off Metformin  mg/d. She does not check her glc.   H A1c - 6.2 - 1/10/23 -controlled off metformin. + slight microalbuminuria (small protein in your urine from your Diabetes). Pt is already on Lisinopril which helps to prevent this. No further recommendations at this time.  Repeat urine microalbumin/Cr.      HTN - controlled Amlodipine 10 mg/d,  and Toprol XL  "100 mg/d. Lisinopril 40 mg/d was stopped due to near syncope when showering.      Osteoporosis - Pt on Fosamax 70 mg/wk.        Gout - Pt is on Allopurinol 300 mg/d. Uric acid-3.1-within normal limits on allopurinol. No recent gout attacks.      HLD - Pt controlled on Atorvastatin 10 mg QHS.   Cont current. Daughter reports pt has olfactory hallucinations a night and smells something cooking.        Hearing loss - Pt  sometimes wears her Hearing aids.     Depression - Pt is on Venlafaxine  mg QHS.  Stable.      Chronic Low back pain - Fu by Pain Mgnt.  She has had prev Back surgery (Lumbar Laminectomy) and has received epidural inj in past.  Pt on Venlafaxine. "It's better". She takes Tylenol prn and rarely tramadol if up most of the day.      GERD -Rec Reflux prec.      AR - Pt on allegra daily prn.      H/o TIA - Pt on Plavix 75 mg/d.       Acc by rommel -she lives with - Colleen Soares - [phone] (she is a nurse).  I have permission to speak to both. Other daughter -  Sherin Wong - lives in [place] - [phone].      HCM - Flu - admin 2/10/22; Tdap - 2/9/17;  PCV 13 - 12/23/14;  PVX 23 - 1/1/03; Shingrix - ?; Zostavax - 10/9/12; COVID - 19 vaccine (Pfizer) #1 - 1/10/21; #2 - 1/31/21; # 3 12/21/21; # 4 ; MGM - no longer gets;  DEXA - due - will re-order;  C-scope - ?; Prev PCP - Dr. Norman Haddad in [place]; Prev Pain Mgmnt - Dr. Harjinder Rose     Patient Care Team:  Vonda Chung MD as PCP - General (Internal Medicine)  Hanh Elizabeth MA as Care Coordinator  Redd Espinoza MD as Consulting Physician (Neurosurgery)     Health Maintenance:  Immunization History   Administered Date(s) Administered    COVID-19, MRNA, LN-S, PF (Pfizer) (Purple Cap) 01/10/2021, 01/31/2021, 12/21/2021    Influenza (FLUAD) - Quadrivalent - Adjuvanted - PF *Preferred* (65+) 02/10/2022    Influenza - High Dose - PF (65 years and older) 11/25/2019    PPD Test 11/22/2020    Pneumococcal Conjugate - 13 Valent " 12/23/2014    Pneumococcal Polysaccharide - 23 Valent 01/01/2003    Tdap 02/09/2017    Zoster 10/09/2012      Health Maintenance   Topic Date Due    Eye Exam  Never done    DEXA Scan  Never done    Lipid Panel  11/12/2022    Hemoglobin A1c  07/10/2023    TETANUS VACCINE  02/09/2027        Past Medical History:  Past Medical History:   Diagnosis Date    Age-related osteoporosis with current pathological fracture with routine healing     ANAHI (acute kidney injury) 1/12/2023    Allergic rhinitis     Anemia     Carotid atherosclerosis, bilateral     Chronic gout of multiple sites     Chronic low back pain with bilateral sciatica     Closed impacted fracture of left femoral neck with routine healing s/p percutaneous screw fixation on 11/21/2020 11/20/2020    DDD (degenerative disc disease), lumbar 8/17/2020    Diverticulosis     Essential hypertension 11/25/2019    Facet arthropathy     GERD (gastroesophageal reflux disease) 11/25/2019    Hearing loss     History of transient ischemic attack (TIA) 11/25/2019    IBS (irritable bowel syndrome)     Insomnia     Lumbar radiculopathy 8/17/2020    Lumbar spondylosis 8/17/2020    Lumbar stenosis     Meningioma     Paroxysmal SVT (supraventricular tachycardia)     Primary open angle glaucoma (POAG) of both eyes     Pure hypercholesterolemia 11/25/2019    PVD (peripheral vascular disease)     30% prox stenosis of celiac trunk and 20% stnosis Prox SMA - per Abd/SMA Arteriogram 4/3/08    Recurrent major depressive disorder, in full remission     Type 2 diabetes mellitus without complication, without long-term current use of insulin 11/25/2019    Vitamin D deficiency 11/23/2020       Past Surgical History:   has a past surgical history that includes Epidural steroid injection into lumbar spine; Lumbar laminectomy (2009); Total abdominal hysterectomy w/ bilateral salpingoophorectomy; Cataract extraction; Caudal epidural steroid injection (N/A, 08/27/2020); Percutaneous pinning of hip  "(Left, 11/21/2020); and Ventriculoperitoneal shunt.    Family History:  family history includes Diabetes in her mother; Hyperlipidemia in her son; Hypertension in her daughter, father, mother, and son; Hypothyroidism in her daughter; Multiple sclerosis in her son; Stroke in her father.     Social History:  Social History     Tobacco Use    Smoking status: Never    Smokeless tobacco: Never   Substance Use Topics    Alcohol use: Yes     Comment: socially    Drug use: Never       Review of Systems   Constitutional:  Negative for diaphoresis and fever.   HENT:  Negative for rhinorrhea.    Respiratory:  Positive for cough. Negative for shortness of breath.         " A little. Nothing to report".    Cardiovascular:  Negative for chest pain.   Gastrointestinal:  Positive for constipation. Negative for abdominal pain, diarrhea, nausea and vomiting.        She has been taking fibercon daily and miralax prn.    Genitourinary:  Positive for bladder incontinence. Negative for dysuria.   Musculoskeletal:  Negative for arthralgias and myalgias.   Neurological:  Positive for memory loss. Negative for dizziness and headaches.   Psychiatric/Behavioral:  Negative for dysphoric mood. The patient is not nervous/anxious.       Medications:    Current Outpatient Medications:     acetaminophen (TYLENOL) 500 MG tablet, Take 2 tablets (1,000 mg total) by mouth every 8 (eight) hours as needed (Mild pain)., Disp: , Rfl: 0    calcium polycarbophil (FIBERCON ORAL), Mixed in 8 ounces of liquid & gave daily as needed for constipation. (Administered last Tue, Wed & Thurs), Disp: , Rfl:     clopidogreL (PLAVIX) 75 mg tablet, Take 1 tablet (75 mg total) by mouth once daily., Disp: 90 tablet, Rfl: 3    polyethylene glycol (GLYCOLAX) 17 gram PwPk, Take 17 g by mouth daily as needed (Constipation)., Disp: , Rfl:     alendronate (FOSAMAX) 70 MG tablet, Take 1 tablet (70 mg total) by mouth every 7 days., Disp: 12 tablet, Rfl: 1    allopurinoL (ZYLOPRIM) " 300 MG tablet, Take 1 tablet (300 mg total) by mouth once daily., Disp: 90 tablet, Rfl: 1    amLODIPine (NORVASC) 10 MG tablet, Take 1 tablet (10 mg total) by mouth once daily., Disp: 90 tablet, Rfl: 1    atorvastatin (LIPITOR) 10 MG tablet, Take 1 tablet (10 mg total) by mouth once daily., Disp: 90 tablet, Rfl: 1    ergocalciferol (ERGOCALCIFEROL) 50,000 unit Cap, 1 po weekly x 12 weeks and then start OTC vit D3 2000 units daily, Disp: 12 capsule, Rfl: 0    metoprolol succinate (TOPROL-XL) 100 MG 24 hr tablet, Take 1 tablet (100 mg total) by mouth once daily., Disp: 90 tablet, Rfl: 1    venlafaxine (EFFEXOR-XR) 150 MG Cp24, TAKE 1 CAPSULE (150 MG TOTAL) BY MOUTH EVERY EVENING., Disp: 90 capsule, Rfl: 2     Allergies:  Review of patient's allergies indicates:   Allergen Reactions    Baclofen      AMS and distorted dremas       Physical Exam      Vital Signs  Temp: 98.2 °F (36.8 °C)  Temp Source: Oral  Pulse: 68  Resp: 18  SpO2: 99 %  BP: 116/72  BP Location: Left arm  Patient Position: Sitting  Pain Score: 0-No pain  Height and Weight  Weight:  (unable to obtain...pt is in wheelchair and unable to stand)      Patient Position: Sitting      Physical Exam  Vitals reviewed.   Constitutional:       General: She is not in acute distress.     Appearance: Normal appearance. She is not ill-appearing, toxic-appearing or diaphoretic.      Comments: Examined in wheel chair   HENT:      Head: Normocephalic and atraumatic.      Ears:      Comments: Hard of hearing. Left cerumen impaction removed manually  Eyes:      Extraocular Movements: Extraocular movements intact.      Conjunctiva/sclera: Conjunctivae normal.      Pupils: Pupils are equal, round, and reactive to light.   Neck:      Vascular: No carotid bruit.   Cardiovascular:      Rate and Rhythm: Normal rate and regular rhythm.      Pulses: Normal pulses.      Heart sounds: Normal heart sounds.   Pulmonary:      Effort: Pulmonary effort is normal.      Breath sounds:  Normal breath sounds.   Abdominal:      General: Bowel sounds are normal.      Palpations: Abdomen is soft.   Musculoskeletal:      Comments: wheelchair bound    Skin:     General: Skin is warm.   Neurological:      General: No focal deficit present.      Mental Status: She is alert and oriented to person, place, and time.   Psychiatric:         Mood and Affect: Mood normal.         Behavior: Behavior normal.        Laboratory:  CBC:  Recent Labs   Lab 01/09/23 2159 01/09/23  2210 01/11/23  0306 01/12/23  0505   WBC 15.43 H  --  9.62 5.44   RBC 4.54  --  4.10 3.69 L   Hemoglobin 15.8  --  13.5 12.3   POC Hematocrit  --    < >  --   --    Hematocrit 44.1  --  41.3 36.0 L   Platelets 249  --  218 198   MCV 97  --  101 H 98   MCH 34.8 H  --  32.9 H 33.3 H   MCHC 35.8  --  32.7 34.2    < > = values in this interval not displayed.       CMP:  Recent Labs   Lab 01/09/23 2159 01/11/23 0306 01/12/23  0505 01/12/23  1510   Glucose 198 H 110 99 108   Calcium 10.9 H 10.3 9.5 9.6   Albumin 4.2 3.9 3.4 L 3.3 L   Total Protein 9.0 H 8.1 7.1  --    Sodium 135 L 135 L 134 L 130 L   Potassium 5.1 4.5 4.1 4.1   CO2 21 L 18 L 21 L 19 L   Chloride 100 102 101 103   BUN 21 17 23 20   Creatinine 1.4 0.9 1.2 1.0   Alkaline Phosphatase 74 84 65  --    ALT 12 9 L 7 L  --    AST 24 21 20  --    Total Bilirubin 0.7 0.8 0.6  --        URINALYSIS:  Recent Labs   Lab 03/08/20  2031 11/20/20  2122 03/22/21  0958 12/21/21  1446 06/27/22  1638 01/10/23  0041   Color, UA Yellow Yellow   < > Yellow   < > Yellow   Specific Gravity, UA 1.020 1.015   < > 1.020   < > <=1.005 A   pH, UA 6.0 5.0   < > 5.0   < > 6.0   Protein, UA 1+ A 1+ A   < > 2+ A   < > Trace A   Bacteria Rare Occasional  --  Many A  --   --    Nitrite, UA Negative Negative   < > Negative   < > Negative   Leukocytes, UA Negative Negative   < > 3+ A   < > Negative   Urobilinogen, UA Negative  --   --   --    < > 1.0   Hyaline Casts, UA 0 0  --  0  --   --     < > = values in this  interval not displayed.        LIPIDS:  Recent Labs   Lab 03/22/21  1040 11/12/21  1007 06/28/22  1128 01/09/23  2159   TSH  --   --  0.750 1.316   HDL 38 L 34 L  --   --    Cholesterol 139 103 L  --   --    Triglycerides 222 H 136  --   --    LDL Cholesterol 56.6 L 41.8 L  --   --    HDL/Cholesterol Ratio 27.3 33.0  --   --    Non-HDL Cholesterol 101 69  --   --    Total Cholesterol/HDL Ratio 3.7 3.0  --   --        TSH:  Recent Labs   Lab 06/28/22  1128 01/09/23  2159   TSH 0.750 1.316       A1C:  Recent Labs   Lab 11/20/20  2134 11/12/21  1007 06/27/22  2225 01/10/23  1029   Hemoglobin A1C 6.5 H 7.1 H  7.1 H 7.2 H 6.2 H       Urine Microalbumin/Cr:  Recent Labs   Lab 03/22/21  0958   Microalb/Creat Ratio 38.5 H         Other:   Recent Labs   Lab 11/20/20  2134 11/23/20  0648 03/22/21  1040 04/29/21  1614 06/28/22  1128   Vit D, 25-Hydroxy  --  14 L 14 L  --   --    Vitamin B-12  --   --   --    < > 582   Transferrin 299  --   --   --   --     < > = values in this interval not displayed.     Recent Labs   Lab 01/10/23  1029   Hepatitis C Ab Non-reactive       Assessment/Plan     Farheen Soares is a 87 y.o.female with:    Type 2 diabetes mellitus without complication, without long-term current use of insulin  -     Microalbumin/Creatinine Ratio, Urine; Future; Expected date: 02/23/2023  - Controlled.  Cont current.     Hyperlipidemia, unspecified hyperlipidemia type  -     Lipid Panel; Future; Expected date: 02/23/2023  -     atorvastatin (LIPITOR) 10 MG tablet; Take 1 tablet (10 mg total) by mouth once daily.  Dispense: 90 tablet; Refill: 1  - Controlled.  Cont current.     Vitamin D deficiency  -     ergocalciferol (ERGOCALCIFEROL) 50,000 unit Cap; 1 po weekly x 12 weeks and then start OTC vit D3 2000 units daily  Dispense: 12 capsule; Refill: 0  - Stable.  Resume Ergo since has not been taking Vit D.     NPH (normal pressure hydrocephalus)  - Stable.  Cont current regimen.    Depression, unspecified  depression type  -     venlafaxine (EFFEXOR-XR) 150 MG Cp24; TAKE 1 CAPSULE (150 MG TOTAL) BY MOUTH EVERY EVENING.  Dispense: 90 capsule; Refill: 2  - Stable.  Cont current regimen.    Hypertension, unspecified type  -     metoprolol succinate (TOPROL-XL) 100 MG 24 hr tablet; Take 1 tablet (100 mg total) by mouth once daily.  Dispense: 90 tablet; Refill: 1  -     amLODIPine (NORVASC) 10 MG tablet; Take 1 tablet (10 mg total) by mouth once daily.  Dispense: 90 tablet; Refill: 1  - Controlled.  Cont current regimen.    Gout, unspecified cause, unspecified chronicity, unspecified site  -     allopurinoL (ZYLOPRIM) 300 MG tablet; Take 1 tablet (300 mg total) by mouth once daily.  Dispense: 90 tablet; Refill: 1  - Stable.  Cont current regimen.    Osteoporosis, unspecified osteoporosis type, unspecified pathological fracture presence  -     alendronate (FOSAMAX) 70 MG tablet; Take 1 tablet (70 mg total) by mouth every 7 days.  Dispense: 12 tablet; Refill: 1  - Stable.  Cont current regimen.    Postmenopausal  -     DXA Bone Density Axial Skeleton 1 or more sites; Future; Expected date: 02/23/2023    Sensation of foreign body in eye  -     Ambulatory referral/consult to Optometry; Future; Expected date: 03/02/2023  - Refer to Optometry       Chronic conditions status updated as per HPI.  Other than changes above, cont current medications and maintain follow up with specialists.    Follow up in about 4 months (around 6/23/2023) for fu chronic issues or sooner if needed.      Vonda Chung MD  [facility]

## 2023-02-23 ENCOUNTER — OFFICE VISIT (OUTPATIENT)
Dept: PRIMARY CARE CLINIC | Facility: CLINIC | Age: 88
End: 2023-02-23
Payer: MEDICARE

## 2023-02-23 ENCOUNTER — PES CALL (OUTPATIENT)
Dept: ADMINISTRATIVE | Facility: CLINIC | Age: 88
End: 2023-02-23
Payer: MEDICARE

## 2023-02-23 VITALS
SYSTOLIC BLOOD PRESSURE: 116 MMHG | WEIGHT: 134.25 LBS | RESPIRATION RATE: 18 BRPM | OXYGEN SATURATION: 99 % | DIASTOLIC BLOOD PRESSURE: 72 MMHG | TEMPERATURE: 98 F | HEART RATE: 68 BPM | BODY MASS INDEX: 24.56 KG/M2

## 2023-02-23 DIAGNOSIS — F32.A DEPRESSION, UNSPECIFIED DEPRESSION TYPE: ICD-10-CM

## 2023-02-23 DIAGNOSIS — I67.9 CEREBROVASCULAR DISEASE: ICD-10-CM

## 2023-02-23 DIAGNOSIS — I10 HYPERTENSION, UNSPECIFIED TYPE: ICD-10-CM

## 2023-02-23 DIAGNOSIS — M10.9 GOUT, UNSPECIFIED CAUSE, UNSPECIFIED CHRONICITY, UNSPECIFIED SITE: ICD-10-CM

## 2023-02-23 DIAGNOSIS — H57.8A9 SENSATION OF FOREIGN BODY IN EYE: ICD-10-CM

## 2023-02-23 DIAGNOSIS — E78.5 HYPERLIPIDEMIA, UNSPECIFIED HYPERLIPIDEMIA TYPE: ICD-10-CM

## 2023-02-23 DIAGNOSIS — G91.2 NPH (NORMAL PRESSURE HYDROCEPHALUS): ICD-10-CM

## 2023-02-23 DIAGNOSIS — M81.0 OSTEOPOROSIS, UNSPECIFIED OSTEOPOROSIS TYPE, UNSPECIFIED PATHOLOGICAL FRACTURE PRESENCE: ICD-10-CM

## 2023-02-23 DIAGNOSIS — E55.9 VITAMIN D DEFICIENCY: ICD-10-CM

## 2023-02-23 DIAGNOSIS — E11.9 TYPE 2 DIABETES MELLITUS WITHOUT COMPLICATION, WITHOUT LONG-TERM CURRENT USE OF INSULIN: Primary | ICD-10-CM

## 2023-02-23 DIAGNOSIS — Z78.0 POSTMENOPAUSAL: ICD-10-CM

## 2023-02-23 PROCEDURE — 1159F PR MEDICATION LIST DOCUMENTED IN MEDICAL RECORD: ICD-10-PCS | Mod: CPTII,S$GLB,, | Performed by: INTERNAL MEDICINE

## 2023-02-23 PROCEDURE — 3074F SYST BP LT 130 MM HG: CPT | Mod: CPTII,S$GLB,, | Performed by: INTERNAL MEDICINE

## 2023-02-23 PROCEDURE — 99999 PR PBB SHADOW E&M-EST. PATIENT-LVL V: CPT | Mod: PBBFAC,,, | Performed by: INTERNAL MEDICINE

## 2023-02-23 PROCEDURE — 1160F PR REVIEW ALL MEDS BY PRESCRIBER/CLIN PHARMACIST DOCUMENTED: ICD-10-PCS | Mod: CPTII,S$GLB,, | Performed by: INTERNAL MEDICINE

## 2023-02-23 PROCEDURE — 1100F PTFALLS ASSESS-DOCD GE2>/YR: CPT | Mod: CPTII,S$GLB,, | Performed by: INTERNAL MEDICINE

## 2023-02-23 PROCEDURE — 1159F MED LIST DOCD IN RCRD: CPT | Mod: CPTII,S$GLB,, | Performed by: INTERNAL MEDICINE

## 2023-02-23 PROCEDURE — 3288F PR FALLS RISK ASSESSMENT DOCUMENTED: ICD-10-PCS | Mod: CPTII,S$GLB,, | Performed by: INTERNAL MEDICINE

## 2023-02-23 PROCEDURE — 1100F PR PT FALLS ASSESS DOC 2+ FALLS/FALL W/INJURY/YR: ICD-10-PCS | Mod: CPTII,S$GLB,, | Performed by: INTERNAL MEDICINE

## 2023-02-23 PROCEDURE — 1126F AMNT PAIN NOTED NONE PRSNT: CPT | Mod: CPTII,S$GLB,, | Performed by: INTERNAL MEDICINE

## 2023-02-23 PROCEDURE — 99214 OFFICE O/P EST MOD 30 MIN: CPT | Mod: S$GLB,,, | Performed by: INTERNAL MEDICINE

## 2023-02-23 PROCEDURE — 99999 PR PBB SHADOW E&M-EST. PATIENT-LVL V: ICD-10-PCS | Mod: PBBFAC,,, | Performed by: INTERNAL MEDICINE

## 2023-02-23 PROCEDURE — 1126F PR PAIN SEVERITY QUANTIFIED, NO PAIN PRESENT: ICD-10-PCS | Mod: CPTII,S$GLB,, | Performed by: INTERNAL MEDICINE

## 2023-02-23 PROCEDURE — 1160F RVW MEDS BY RX/DR IN RCRD: CPT | Mod: CPTII,S$GLB,, | Performed by: INTERNAL MEDICINE

## 2023-02-23 PROCEDURE — 3078F DIAST BP <80 MM HG: CPT | Mod: CPTII,S$GLB,, | Performed by: INTERNAL MEDICINE

## 2023-02-23 PROCEDURE — 3078F PR MOST RECENT DIASTOLIC BLOOD PRESSURE < 80 MM HG: ICD-10-PCS | Mod: CPTII,S$GLB,, | Performed by: INTERNAL MEDICINE

## 2023-02-23 PROCEDURE — 3288F FALL RISK ASSESSMENT DOCD: CPT | Mod: CPTII,S$GLB,, | Performed by: INTERNAL MEDICINE

## 2023-02-23 PROCEDURE — 3074F PR MOST RECENT SYSTOLIC BLOOD PRESSURE < 130 MM HG: ICD-10-PCS | Mod: CPTII,S$GLB,, | Performed by: INTERNAL MEDICINE

## 2023-02-23 PROCEDURE — 99214 PR OFFICE/OUTPT VISIT, EST, LEVL IV, 30-39 MIN: ICD-10-PCS | Mod: S$GLB,,, | Performed by: INTERNAL MEDICINE

## 2023-02-23 RX ORDER — VENLAFAXINE HYDROCHLORIDE 150 MG/1
150 CAPSULE, EXTENDED RELEASE ORAL NIGHTLY
Qty: 90 CAPSULE | Refills: 2 | Status: SHIPPED | OUTPATIENT
Start: 2023-02-23

## 2023-02-23 RX ORDER — ALENDRONATE SODIUM 70 MG/1
70 TABLET ORAL
Qty: 12 TABLET | Refills: 1 | Status: SHIPPED | OUTPATIENT
Start: 2023-02-23 | End: 2023-09-20 | Stop reason: SDUPTHER

## 2023-02-23 RX ORDER — ATORVASTATIN CALCIUM 10 MG/1
10 TABLET, FILM COATED ORAL DAILY
Qty: 90 TABLET | Refills: 1 | Status: SHIPPED | OUTPATIENT
Start: 2023-02-23 | End: 2023-09-20 | Stop reason: SDUPTHER

## 2023-02-23 RX ORDER — METOPROLOL SUCCINATE 100 MG/1
100 TABLET, EXTENDED RELEASE ORAL DAILY
Qty: 90 TABLET | Refills: 1 | Status: SHIPPED | OUTPATIENT
Start: 2023-02-23 | End: 2023-09-20 | Stop reason: SDUPTHER

## 2023-02-23 RX ORDER — ERGOCALCIFEROL 1.25 MG/1
CAPSULE ORAL
Qty: 12 CAPSULE | Refills: 0 | Status: SHIPPED | OUTPATIENT
Start: 2023-02-23

## 2023-02-23 RX ORDER — ALLOPURINOL 300 MG/1
300 TABLET ORAL DAILY
Qty: 90 TABLET | Refills: 1 | Status: SHIPPED | OUTPATIENT
Start: 2023-02-23 | End: 2023-09-20 | Stop reason: SDUPTHER

## 2023-02-23 RX ORDER — AMLODIPINE BESYLATE 10 MG/1
10 TABLET ORAL DAILY
Qty: 90 TABLET | Refills: 1 | Status: SHIPPED | OUTPATIENT
Start: 2023-02-23 | End: 2023-09-20 | Stop reason: SDUPTHER

## 2023-02-24 ENCOUNTER — TELEPHONE (OUTPATIENT)
Dept: OPTOMETRY | Facility: CLINIC | Age: 88
End: 2023-02-24
Payer: MEDICARE

## 2023-02-24 NOTE — TELEPHONE ENCOUNTER
----- Message from Dipti Andrade sent at 2/23/2023  4:48 PM CST -----  Please schedule with Dr. Blum   ----- Message -----  From: Natalya Ramirez  Sent: 2/23/2023   3:32 PM CST  To: [facility] Clinical Support Staff    Dr. Vonda Chung has put in a referral for Consult to Optometry. Please assist in scheduling.    Sensation of foreign body in eye [H57.9]    Thanks

## 2023-03-03 ENCOUNTER — DOCUMENT SCAN (OUTPATIENT)
Dept: HOME HEALTH SERVICES | Facility: HOSPITAL | Age: 88
End: 2023-03-03
Payer: MEDICARE

## 2023-03-06 ENCOUNTER — DOCUMENT SCAN (OUTPATIENT)
Dept: HOME HEALTH SERVICES | Facility: HOSPITAL | Age: 88
End: 2023-03-06
Payer: MEDICARE

## 2023-03-31 ENCOUNTER — PES CALL (OUTPATIENT)
Dept: ADMINISTRATIVE | Facility: CLINIC | Age: 88
End: 2023-03-31
Payer: MEDICARE

## 2023-04-17 PROBLEM — N17.9 AKI (ACUTE KIDNEY INJURY): Status: RESOLVED | Noted: 2023-01-12 | Resolved: 2023-04-17

## 2023-05-05 ENCOUNTER — PES CALL (OUTPATIENT)
Dept: ADMINISTRATIVE | Facility: CLINIC | Age: 88
End: 2023-05-05
Payer: MEDICARE

## 2023-05-30 ENCOUNTER — OFFICE VISIT (OUTPATIENT)
Dept: PODIATRY | Facility: CLINIC | Age: 88
End: 2023-05-30
Payer: MEDICARE

## 2023-05-30 VITALS
SYSTOLIC BLOOD PRESSURE: 116 MMHG | BODY MASS INDEX: 24.7 KG/M2 | HEART RATE: 69 BPM | HEIGHT: 62 IN | WEIGHT: 134.25 LBS | DIASTOLIC BLOOD PRESSURE: 72 MMHG

## 2023-05-30 DIAGNOSIS — R26.9 GAIT DISTURBANCE: ICD-10-CM

## 2023-05-30 DIAGNOSIS — E11.9 TYPE 2 DIABETES MELLITUS WITHOUT COMPLICATION, WITHOUT LONG-TERM CURRENT USE OF INSULIN: Primary | ICD-10-CM

## 2023-05-30 DIAGNOSIS — L84 CORN OR CALLUS: ICD-10-CM

## 2023-05-30 DIAGNOSIS — B35.1 ONYCHOMYCOSIS: ICD-10-CM

## 2023-05-30 PROCEDURE — 1125F PR PAIN SEVERITY QUANTIFIED, PAIN PRESENT: ICD-10-PCS | Mod: CPTII,S$GLB,, | Performed by: PODIATRIST

## 2023-05-30 PROCEDURE — 11056 PARNG/CUTG B9 HYPRKR LES 2-4: CPT | Mod: Q9,S$GLB,, | Performed by: PODIATRIST

## 2023-05-30 PROCEDURE — 1125F AMNT PAIN NOTED PAIN PRSNT: CPT | Mod: CPTII,S$GLB,, | Performed by: PODIATRIST

## 2023-05-30 PROCEDURE — 1159F PR MEDICATION LIST DOCUMENTED IN MEDICAL RECORD: ICD-10-PCS | Mod: CPTII,S$GLB,, | Performed by: PODIATRIST

## 2023-05-30 PROCEDURE — 11721 DEBRIDE NAIL 6 OR MORE: CPT | Mod: Q9,59,S$GLB, | Performed by: PODIATRIST

## 2023-05-30 PROCEDURE — 1160F RVW MEDS BY RX/DR IN RCRD: CPT | Mod: CPTII,S$GLB,, | Performed by: PODIATRIST

## 2023-05-30 PROCEDURE — 11056 PR TRIM BENIGN HYPERKERATOTIC SKIN LESION,2-4: ICD-10-PCS | Mod: Q9,S$GLB,, | Performed by: PODIATRIST

## 2023-05-30 PROCEDURE — 1160F PR REVIEW ALL MEDS BY PRESCRIBER/CLIN PHARMACIST DOCUMENTED: ICD-10-PCS | Mod: CPTII,S$GLB,, | Performed by: PODIATRIST

## 2023-05-30 PROCEDURE — 99999 PR PBB SHADOW E&M-EST. PATIENT-LVL III: CPT | Mod: PBBFAC,,, | Performed by: PODIATRIST

## 2023-05-30 PROCEDURE — 1159F MED LIST DOCD IN RCRD: CPT | Mod: CPTII,S$GLB,, | Performed by: PODIATRIST

## 2023-05-30 PROCEDURE — 99999 PR PBB SHADOW E&M-EST. PATIENT-LVL III: ICD-10-PCS | Mod: PBBFAC,,, | Performed by: PODIATRIST

## 2023-05-30 PROCEDURE — 99203 OFFICE O/P NEW LOW 30 MIN: CPT | Mod: 25,S$GLB,, | Performed by: PODIATRIST

## 2023-05-30 PROCEDURE — 99203 PR OFFICE/OUTPT VISIT, NEW, LEVL III, 30-44 MIN: ICD-10-PCS | Mod: 25,S$GLB,, | Performed by: PODIATRIST

## 2023-05-30 PROCEDURE — 11721 PR DEBRIDEMENT OF NAILS, 6 OR MORE: ICD-10-PCS | Mod: Q9,59,S$GLB, | Performed by: PODIATRIST

## 2023-06-10 NOTE — PROGRESS NOTES
Subjective:      Patient ID: Farheen Soares is a 88 y.o. female.    Chief Complaint:   Nail Problem (Bilateral great toe skin lesion /Ingrown ), Diabetes Mellitus (PCP-Vonda Chung MD-2/23/2023), and Heel Pain (Discoloration that come and goes right foot )    Farheen is a 88 y.o. female who presents to the clinic for evaluation and treatment of high risk feet. Farheen has a past medical history of Age-related osteoporosis with current pathological fracture with routine healing, ANAHI (acute kidney injury) (1/12/2023), Allergic rhinitis, Anemia, Carotid atherosclerosis, bilateral, Chronic gout of multiple sites, Chronic low back pain with bilateral sciatica, Closed impacted fracture of left femoral neck with routine healing s/p percutaneous screw fixation on 11/21/2020 (11/20/2020), DDD (degenerative disc disease), lumbar (8/17/2020), Diverticulosis, Essential hypertension (11/25/2019), Facet arthropathy, GERD (gastroesophageal reflux disease) (11/25/2019), Hearing loss, History of transient ischemic attack (TIA) (11/25/2019), IBS (irritable bowel syndrome), Insomnia, Lumbar radiculopathy (8/17/2020), Lumbar spondylosis (8/17/2020), Lumbar stenosis, Meningioma, Paroxysmal SVT (supraventricular tachycardia), Primary open angle glaucoma (POAG) of both eyes, Pure hypercholesterolemia (11/25/2019), PVD (peripheral vascular disease), Recurrent major depressive disorder, in full remission, Type 2 diabetes mellitus without complication, without long-term current use of insulin (11/25/2019), and Vitamin D deficiency (11/23/2020). The patient's chief complaint is long, thick toenails. This patient has documented high risk feet requiring routine maintenance secondary to diabetes mellitis and those secondary complications of diabetes, as mentioned..    PCP: Vonda Chung MD    Date Last Seen by PCP:   Chief Complaint   Patient presents with    Nail Problem     Bilateral great toe skin lesion   Ingrown      Diabetes Mellitus     PCP-Vonda Chung MD-2/23/2023    Heel Pain     Discoloration that come and goes right foot          Current shoe gear:  Affected Foot: Casual shoes     Unaffected Foot: Casual shoes    Hemoglobin A1C   Date Value Ref Range Status   01/10/2023 6.2 (H) 4.0 - 5.6 % Final     Comment:     ADA Screening Guidelines:  5.7-6.4%  Consistent with prediabetes  >or=6.5%  Consistent with diabetes    High levels of fetal hemoglobin interfere with the HbA1C  assay. Heterozygous hemoglobin variants (HbS, HgC, etc)do  not significantly interfere with this assay.   However, presence of multiple variants may affect accuracy.     06/27/2022 7.2 (H) 4.0 - 5.6 % Final     Comment:     ADA Screening Guidelines:  5.7-6.4%  Consistent with prediabetes  >or=6.5%  Consistent with diabetes    High levels of fetal hemoglobin interfere with the HbA1C  assay. Heterozygous hemoglobin variants (HbS, HgC, etc)do  not significantly interfere with this assay.   However, presence of multiple variants may affect accuracy.     11/12/2021 7.1 (H) 4.0 - 5.6 % Final     Comment:     ADA Screening Guidelines:  5.7-6.4%  Consistent with prediabetes  >or=6.5%  Consistent with diabetes    High levels of fetal hemoglobin interfere with the HbA1C  assay. Heterozygous hemoglobin variants (HbS, HgC, etc)do  not significantly interfere with this assay.   However, presence of multiple variants may affect accuracy.     11/12/2021 7.1 (H) 4.0 - 5.6 % Final     Comment:     ADA Screening Guidelines:  5.7-6.4%  Consistent with prediabetes  >or=6.5%  Consistent with diabetes    High levels of fetal hemoglobin interfere with the HbA1C  assay. Heterozygous hemoglobin variants (HbS, HgC, etc)do  not significantly interfere with this assay.   However, presence of multiple variants may affect accuracy.         Review of Systems   Constitutional: Positive for decreased appetite. Negative for chills, fever and night sweats.   HENT:  Negative  for congestion, ear discharge, nosebleeds and tinnitus.    Eyes:  Negative for double vision, pain and visual disturbance.   Cardiovascular:  Negative for chest pain, claudication, cyanosis and palpitations.   Respiratory:  Negative for cough, hemoptysis, shortness of breath and wheezing.    Endocrine: Negative for cold intolerance and heat intolerance.   Hematologic/Lymphatic: Negative for adenopathy and bleeding problem.   Skin:  Positive for color change, dry skin, nail changes and unusual hair distribution.   Musculoskeletal:  Positive for arthritis, joint pain and stiffness. Negative for myalgias.   Gastrointestinal:  Negative for abdominal pain, dysphagia, nausea and vomiting.   Genitourinary:  Negative for dysuria, flank pain, hematuria and pelvic pain.   Neurological:  Positive for disturbances in coordination, numbness, paresthesias and sensory change.   Psychiatric/Behavioral:  Negative for altered mental status, hallucinations and suicidal ideas. The patient does not have insomnia.    Allergic/Immunologic: Negative for environmental allergies and persistent infections.         Objective:      Physical Exam  Vitals reviewed.   Constitutional:       Appearance: She is well-developed.   HENT:      Head: Normocephalic and atraumatic.   Cardiovascular:      Pulses:           Dorsalis pedis pulses are 1+ on the right side and 1+ on the left side.        Posterior tibial pulses are 1+ on the right side and 1+ on the left side.   Pulmonary:      Effort: Pulmonary effort is normal.   Musculoskeletal:         General: Normal range of motion.      Comments: Anterior, lateral, and posterior muscle groups bilateral lower extremities show strength 4 over 5 symmetrically. Inspection and palpation of the joints and bones reveal no crepitus or joint effusion. No tenderness upon palpation. Mild plantar flexor contractures noted to digits 2 through 5 bilaterally.  Angle and base of gait are normal.   Feet:      Right foot:       Skin integrity: Callus and dry skin present.      Left foot:      Skin integrity: Callus and dry skin present.   Skin:     General: Skin is warm and dry.      Capillary Refill: Capillary refill takes 2 to 3 seconds.      Coloration: Skin is pale.      Comments: Skin bilateral lower extremities noted to be thin, dry, and atrophic.  Toenails thickened, discolored, with subungual fungal debris and tenderness noted.  Hyperkeratotic lesions noted to both feet plantarly with tenderness.   Neurological:      Mental Status: She is alert and oriented to person, place, and time.      Sensory: Sensory deficit present.      Motor: Atrophy present.      Deep Tendon Reflexes: Reflexes abnormal.      Reflex Scores:       Patellar reflexes are 1+ on the right side and 1+ on the left side.       Achilles reflexes are 1+ on the right side and 1+ on the left side.     Comments: Sharp, dull, light touch, and vibratory sensation are diminished bilaterally. Proprioceptive sensation is intact to both lower extremities. Holladay Alida monofilament exam shows loss of protective sensation to plantar toes 1 through 5 bilaterally. Deep tendon reflexes to the patellar tendons is 1 over 4 bilaterally symmetrical. Deep tendon reflexes to the Achilles tendon is 0 over 4 bilaterally symmetrical. No ankle clonus or Babinski reflex noted bilaterally. Coordination is fair to both lower extremities.  Patient admits to intermittent burning and tingling in the feet.   Psychiatric:         Behavior: Behavior normal.           Assessment:       Encounter Diagnoses   Name Primary?    Type 2 diabetes mellitus without complication, without long-term current use of insulin Yes    Gait disturbance     Corn or callus     Onychomycosis      Independent visualization of imaging was performed.  Results were reviewed in detail with patient.       Plan:       Farheen was seen today for nail problem, diabetes mellitus and heel pain.    Diagnoses and all  orders for this visit:    Type 2 diabetes mellitus without complication, without long-term current use of insulin    Gait disturbance    Corn or callus    Onychomycosis      I counseled the patient on her conditions, their implications and medical management.    The nature of the condition, options for management, as well as potential risks and complications were discussed in detail with patient. Patient was amenable to my recommendations and left my office fully informed and will follow up as instructed or sooner if necessary.      Routine Foot Care    Performed by:  Yossi Jonas. DPM  Authorized by:  Patient     Consent Done?:  Yes (Verbal)     Nail Care Type:  Debride  Location(s): All  (Left 1st Toe, Left 3rd Toe, Left 2nd Toe, Left 4th Toe, Left 5th Toe, Right 1st Toe, Right 2nd Toe, Right 3rd Toe, Right 4th Toe and Right 5th Toe)  Patient tolerance:  Patient tolerated the procedure well with no immediate complications     With patient's permission, the toenails mentioned above were aggressively reduced and debrided using a nail nipper, removing all offending nail and debris. The patient will continue to monitor the areas daily, inspect the feet, wear protective shoe gear when ambulatory, and moisturizer to maintain skin integrity.      Callus Care Type: Debride    With patient's permission, the calluses/hyperkeratotic lesions mentioned above were aggressively reduced and debrided using a number 15 blade. The patient will continue to monitor the areas daily, inspect the feet, wear protective shoe gear when ambulatory, and moisturizer to maintain skin integrity.       Shoe inspection. Diabetic Foot Education. Patient reminded of the importance of good nutrition and blood sugar control to help prevent podiatric complications of diabetes. Patient instructed on proper foot hygeine. We discussed wearing proper shoe gear, daily foot inspections and Diabetic foot education in detail.    Return to clinic in 3-6  months or sooner if problems arise

## 2023-06-28 ENCOUNTER — PES CALL (OUTPATIENT)
Dept: ADMINISTRATIVE | Facility: CLINIC | Age: 88
End: 2023-06-28
Payer: MEDICARE

## 2023-10-11 ENCOUNTER — PATIENT MESSAGE (OUTPATIENT)
Dept: ADMINISTRATIVE | Facility: HOSPITAL | Age: 88
End: 2023-10-11
Payer: MEDICARE

## 2023-12-20 NOTE — DISCHARGE SUMMARY
Problem: Breathing Pattern Ineffective  Goal: Air exchange is effective, demonstrated by Sp02 sat of greater then or = 92% (or as ordered)  Outcome: Outcome Not Met, Continue to Monitor  Goal: Respiratory pattern is quiet and regular without report of SOB  Outcome: Outcome Not Met, Continue to Monitor  Goal: Breathing pattern demonstrates minimal apnea during sleep with appropriate use of airway pressure support devices  Outcome: Outcome Not Met, Continue to Monitor  Goal: Verbalizes/demonstrates effective breathing management strategies  Description: Document education using the patient education activity.   Outcome: Outcome Not Met, Continue to Monitor  Goal: Minimize respiratory effort related to dyspnea/shortness of breath (Hospice)  Outcome: Outcome Not Met, Continue to Monitor     Problem: Pain  Goal: #Acceptable pain level achieved/maintained at rest using NRS/Faces  Description: This goal is used for patients who can self-report.  Acceptable means the level is at or below the identified comfort/function goal.  Outcome: Outcome Not Met, Continue to Monitor  Goal: # Acceptable pain level achieved/maintained at rest using NRS/Faces without oversedation (opioid naive or PCA/Epidural infusion)  Description: This goal is used if Opioid-naïve or on PCA/Epidural Infusion.  Outcome: Outcome Not Met, Continue to Monitor  Goal: # Acceptable pain level achieved/maintained with activity using NRS/Faces  Description: This goal is used for patients who can self-report and are not achieving acceptable pain control during activity.  Outcome: Outcome Not Met, Continue to Monitor  Goal: Acceptable pain/comfort level is achieved/maintained at rest (based on Pain Behaviors Scale)  Description: This goal is used for patients who are not able to self-report pain and are assessed for pain using the Pain Behaviors Scale  Outcome: Outcome Not Met, Continue to Monitor  Goal: Acceptable pain/comfort level is achieved/maintained at  [facility] Hospital Medicine  Discharge Summary      Patient Name: Farheen Soares  MRN: [id number]  Admission Date: 3/8/2020  Hospital Length of Stay: 0 days  Discharge Date and Time: 3/13/2020     Attending Physician: Shannan att. providers found   Discharging Provider: Kvng Jacobs MD  Primary Care Provider: Vonda Chung MD      HPI:   Wilbert Teague PA:  84 y.o.  female with PNH with DMII, HTN, osteoposis, Gout, HLD, Hearing loss, Depression, Chronic low back pain, GERD, h/o TIA presents to to ED with c/o fever associated with weakness, HA, generalized body aches, N/V for the last week. Patient reports too weak to eat. Per ED, daughter states patient slumped to the floor as she was too weak to walk to the bathroom. Patient denies cough, sore throat, SOB, abdominal pain, new back pain, numbness, urinary symptoms, neck stiffness, recent travel or toney loss. ED evaluation elevated WBC, febrile, flu negative, otherwise labs unremarkable. CT A/P and CXR unrevealing for an acute process. Patient placed in Observation for further evaluation.     Procedure(s) (LRB):  LUMBAR PUNCTURE (N/A)      Hospital Course:   LP performed today.  Infectious Disease consulted.  Etiology of symptomatology appears to be viral.  Patient was initially treated with intraventous ceftriaxone.  Infectious Disease consulted and agree with transition to oral antibiotics given clinical improvement.  The patient was started on doxycyline 100 mg twice daily for now.  Physical and occupational therapy recommended skilled nursing.  Daughter made aware and appreciate case management involvement of for discharge planning.        Consults:   Consults (From admission, onward)        Status Ordering Provider     Inpatient consult to Infectious Diseases  Once     Provider:  EZEQUIEL Castillo SHARON R.     Inpatient consult to Interventional Radiology  Once     Provider:  Mitch Tillman MD     Completed CAITLYN DORMAN new Assessment & Plan notes have been filed under this hospital service since the last note was generated.  Service: Hospital Medicine    Final Active Diagnoses:    Diagnosis Date Noted POA    PRINCIPAL PROBLEM:  Viral illness [B34.9] 03/11/2020 Yes    Type 2 diabetes mellitus without complication, without long-term current use of insulin [E11.9] 11/25/2019 Yes    Hyperlipidemia [E78.5] 11/25/2019 Yes    Hypertension [I10] 11/25/2019 Yes    History of transient ischemic attack (TIA) [Z86.73] 11/25/2019 Not Applicable    Chronic low back pain with bilateral sciatica [M54.41, M54.42, G89.29]  Yes      Problems Resolved During this Admission:    Diagnosis Date Noted Date Resolved POA    Fever [R50.9] 03/08/2020 03/12/2020 Yes       Discharged Condition: good    Disposition: Home or Self Care    Follow Up:  Follow-up Information     [facility].    Specialty:  Home Health Services  Why:  Home Health  Contact information:   [address]  [facility] [id number] [phone]                 Patient Instructions:      Diet Adult Regular     Activity as tolerated       Significant Diagnostic Studies:     Neg HSV PCR  Neg viral panel      Microbiology:   Blood Culture   Lab Results   Component Value Date    LABBLOO No growth after 5 days. 03/08/2020       Pending Diagnostic Studies:     None         Medications:  Reconciled Home Medications:      Medication List      CONTINUE taking these medications    alendronate 70 MG tablet  Commonly known as:  FOSAMAX  Take 1 tablet (70 mg total) by mouth every 7 days.     allopurinoL 300 MG tablet  Commonly known as:  ZYLOPRIM  Take 1 tablet (300 mg total) by mouth once daily.     amLODIPine 10 MG tablet  Commonly known as:  NORVASC  Take 1 tablet (10 mg total) by mouth once daily.     atorvastatin 10 MG tablet  Commonly known as:  LIPITOR  Take 1 tablet (10 mg total) by mouth once daily.     blood sugar diagnostic Strp  To check  rest based on PAINAID scale (Dementia)  Description: This goal is used for patients who are not able to self-report pain, have dementia, and assessed using the PAINAD scale.  Outcome: Outcome Not Met, Continue to Monitor  Goal: Acceptable pain/comfort level is achieved/maintained at rest (based on pediatric behavior tool: NIPS, NPASS, or FLACC)  Description: This goal is used for pediatric patients who are not able to self report pain.  Outcome: Outcome Not Met, Continue to Monitor  Goal: # Verbalizes understanding of pain management  Description: Documented in Patient Education Activity  Outcome: Outcome Not Met, Continue to Monitor  Goal: Verbalizes understanding and effective use of Patient Controlled Analgesia (PCA)  Description: Documented in Patient Education Activity  This goal is used for patients with PCA  Outcome: Outcome Not Met, Continue to Monitor  Goal: Maximum comfort achieved/maintained at end of life (Hospice)  Outcome: Outcome Not Met, Continue to Monitor      BG three times daily, to use with insurance preferred meter     blood-glucose meter kit  To check BG three times daily, to use with insurance preferred meter     clopidogreL 75 mg tablet  Commonly known as:  PLAVIX  Take 1 tablet (75 mg total) by mouth once daily.     fexofenadine 180 MG tablet  Commonly known as:  ALLEGRA  Take 180 mg by mouth daily as needed.     fluticasone propionate 50 mcg/actuation nasal spray  Commonly known as:  FLONASE  1 spray by Each Nare route once daily.     HYDROcodone-acetaminophen 5-325 mg per tablet  Commonly known as:  NORCO  Take 1 tablet by mouth every 6 (six) hours as needed for Pain.     lancets Misc  To check BG three times daily, to use with insurance preferred meter     lisinopriL 40 MG tablet  Commonly known as:  PRINIVIL,ZESTRIL  Take 1 tablet (40 mg total) by mouth once daily.     metFORMIN 500 MG XR 24hr tablet  Commonly known as:  GLUCOPHAGE-XR  Take 1 tablet (500 mg total) by mouth daily with breakfast.     metoprolol succinate 100 MG 24 hr tablet  Commonly known as:  TOPROL-XL  Take 1 tablet (100 mg total) by mouth once daily. Take 1 1/2 daily     ranitidine 150 MG tablet  Commonly known as:  ZANTAC  Take 150 mg by mouth 2 (two) times daily.     venlafaxine 75 MG tablet  Commonly known as:  EFFEXOR  2 tabs po QAM and 1 po QHS        ASK your doctor about these medications    doxycycline 100 MG tablet  Commonly known as:  VIBRA-TABS  Take 1 tablet (100 mg total) by mouth every 12 (twelve) hours. for 7 days  Ask about: Should I take this medication?            Indwelling Lines/Drains at time of discharge:   Lines/Drains/Airways     None                 Time spent on the discharge of patient: 30  minutes  Patient was seen and examined on the date of discharge and determined to be suitable for discharge.         Kvng Jacobs MD  Department of Hospital Medicine  [facility]

## 2024-01-10 ENCOUNTER — PATIENT MESSAGE (OUTPATIENT)
Dept: ADMINISTRATIVE | Facility: HOSPITAL | Age: 89
End: 2024-01-10

## 2024-03-12 ENCOUNTER — TELEPHONE (OUTPATIENT)
Dept: PRIMARY CARE CLINIC | Facility: CLINIC | Age: 89
End: 2024-03-12

## 2024-03-12 NOTE — TELEPHONE ENCOUNTER
----- Message from Cherise Gaspar sent at 3/12/2024  3:49 PM CDT -----  Contact: Daughter/Colleen [phone]  Pt is currently established with Dr Chung but wants to switch over to Dr Edwards based on her son's recommendation. There are no appts available so daughter would like a call to discuss her being seen. Please call Colleen. Thanks

## 2024-03-12 NOTE — TELEPHONE ENCOUNTER
Left a voicemail for the patient's daughter. Returning call about establishing care with Dr. Edwards

## 2024-06-19 ENCOUNTER — TELEPHONE (OUTPATIENT)
Dept: PRIMARY CARE CLINIC | Facility: CLINIC | Age: 89
End: 2024-06-19

## 2024-06-19 NOTE — LETTER
June 19, 2024    Farheen Soares  [address]  [address]  [place] [address]      Dear Ms. Soares:    Please contact the office to schedule an appointment to establish care with Dr. Edwards.    You may contact our office Monday - Friday, 8am - 5pm. You can call [phone] or message us in the portal. We are looking forward to seeing you.      Sincerely,    [facility] [place]

## 2024-06-19 NOTE — TELEPHONE ENCOUNTER
I left 2 vms for pt daughter, Colleen Soares, to let her know I am trying to help her mother get appt to est care with no response. I attempted to call pt home phone but number is out of service. I have sent a letter in the mail for pt and and email to pt's daughter to hopefully get a response.

## 2025-01-13 NOTE — TELEPHONE ENCOUNTER
----- Message from Dipti Craig MA sent at 8/19/2020  2:07 PM CDT -----  Contact patient to set up f/u appt after her procedure     nadeem

## 2025-01-31 NOTE — NURSING
AAOx4. Plan of care reviewed. Patient complains of a headache - tylenol given. Hypertensive - normal saline rate changed to 50 ml/hr and 0900 Amlodipine administered early. Strep A culture collected , laboratory unsure if MD wanted a rapid or culture done. Pure wick & linen changed. Vitals available in flow sheet. Cardiac monitor in place. Bed locked/lowest position, side rails up x 2, nonskid socks when out of bed, call light within reach. Patient encouraged to call for assistance when needed.   Inital visit: Patient is a 69-year-old female with a past medical history of dislocation, hiatal hernia, Garcia's esophagus, and gastroparesis who presents to reestablish care as well as discuss epigastric pain. She for years has suffered from intermittent epigastric burning and a chronic cough. She will sometimes take some OTC Prilosec for a few days with relief. She does feel a lot of tightening in her upper abdominal area. She distantly had Garcia's around 2001, did not show this but the last [place] EGDs which all predate 2010. She is status postcholecystectomy. She had moved to [place] and followed with a Dr. Ty there. Records have been requested. She did have a CT abdomen and pelvis in 2022 which she brings with her. GI findings pertinent for small to moderate hiatal hernia. She does not take any anticoagulation. Sb   5/22/2024 Jazmine returns for follow-up of endoscopy on 3/7/2024 which was within normal limits with little bit of gastritis on biopsy and normal appearing Niesen fundoplication. No evidence of Garcia's. This can hard to get a read. It sounds like she is doing better and she is off of PPI and using Reglan sparingly. She does endorse that her last colonoscopy was about 12 years ago. She still having a lot of issues with sinus drainage. Sb

## 2025-02-28 ENCOUNTER — LAB VISIT (OUTPATIENT)
Dept: LAB | Facility: HOSPITAL | Age: 89
End: 2025-02-28
Attending: STUDENT IN AN ORGANIZED HEALTH CARE EDUCATION/TRAINING PROGRAM
Payer: MEDICARE

## 2025-02-28 ENCOUNTER — OFFICE VISIT (OUTPATIENT)
Dept: PRIMARY CARE CLINIC | Facility: CLINIC | Age: 89
End: 2025-02-28
Payer: MEDICARE

## 2025-02-28 VITALS
BODY MASS INDEX: 24.56 KG/M2 | HEART RATE: 96 BPM | SYSTOLIC BLOOD PRESSURE: 164 MMHG | OXYGEN SATURATION: 96 % | DIASTOLIC BLOOD PRESSURE: 98 MMHG | HEIGHT: 62 IN

## 2025-02-28 DIAGNOSIS — E78.5 HYPERLIPIDEMIA, UNSPECIFIED HYPERLIPIDEMIA TYPE: ICD-10-CM

## 2025-02-28 DIAGNOSIS — F03.90 DEMENTIA, UNSPECIFIED DEMENTIA SEVERITY, UNSPECIFIED DEMENTIA TYPE, UNSPECIFIED WHETHER BEHAVIORAL, PSYCHOTIC, OR MOOD DISTURBANCE OR ANXIETY: ICD-10-CM

## 2025-02-28 DIAGNOSIS — I48.91 ATRIAL FIBRILLATION, UNSPECIFIED TYPE: ICD-10-CM

## 2025-02-28 DIAGNOSIS — Z00.00 ANNUAL PHYSICAL EXAM: Primary | ICD-10-CM

## 2025-02-28 DIAGNOSIS — Z13.6 ENCOUNTER FOR SCREENING FOR CARDIOVASCULAR DISORDERS: ICD-10-CM

## 2025-02-28 DIAGNOSIS — Z00.00 ANNUAL PHYSICAL EXAM: ICD-10-CM

## 2025-02-28 DIAGNOSIS — Z87.39 HISTORY OF GOUT: ICD-10-CM

## 2025-02-28 DIAGNOSIS — Z74.01 BEDRIDDEN: ICD-10-CM

## 2025-02-28 DIAGNOSIS — I10 HYPERTENSION, UNSPECIFIED TYPE: ICD-10-CM

## 2025-02-28 DIAGNOSIS — G91.2 NPH (NORMAL PRESSURE HYDROCEPHALUS): ICD-10-CM

## 2025-02-28 DIAGNOSIS — E11.51 TYPE 2 DIABETES MELLITUS WITH DIABETIC PERIPHERAL ANGIOPATHY WITHOUT GANGRENE, WITHOUT LONG-TERM CURRENT USE OF INSULIN: ICD-10-CM

## 2025-02-28 DIAGNOSIS — D32.9 MENINGIOMA: ICD-10-CM

## 2025-02-28 DIAGNOSIS — R53.81 DEBILITY: ICD-10-CM

## 2025-02-28 DIAGNOSIS — R53.1 WEAKNESS: ICD-10-CM

## 2025-02-28 DIAGNOSIS — H91.90 HEARING LOSS, UNSPECIFIED HEARING LOSS TYPE, UNSPECIFIED LATERALITY: ICD-10-CM

## 2025-02-28 PROBLEM — G45.9 TIA (TRANSIENT ISCHEMIC ATTACK): Status: ACTIVE | Noted: 2019-11-25

## 2025-02-28 LAB
ALBUMIN SERPL BCP-MCNC: 3.8 G/DL (ref 3.5–5.2)
ALP SERPL-CCNC: 73 U/L (ref 40–150)
ALT SERPL W/O P-5'-P-CCNC: 8 U/L (ref 10–44)
ANION GAP SERPL CALC-SCNC: 11 MMOL/L (ref 8–16)
AST SERPL-CCNC: 37 U/L (ref 10–40)
BASOPHILS # BLD AUTO: 0.06 K/UL (ref 0–0.2)
BASOPHILS NFR BLD: 0.6 % (ref 0–1.9)
BILIRUB SERPL-MCNC: 0.5 MG/DL (ref 0.1–1)
BUN SERPL-MCNC: 17 MG/DL (ref 8–23)
CALCIUM SERPL-MCNC: 9.9 MG/DL (ref 8.7–10.5)
CHLORIDE SERPL-SCNC: 105 MMOL/L (ref 95–110)
CHOLEST SERPL-MCNC: 128 MG/DL (ref 120–199)
CHOLEST/HDLC SERPL: 3.9 {RATIO} (ref 2–5)
CO2 SERPL-SCNC: 20 MMOL/L (ref 23–29)
CREAT SERPL-MCNC: 0.8 MG/DL (ref 0.5–1.4)
DIFFERENTIAL METHOD BLD: ABNORMAL
EOSINOPHIL # BLD AUTO: 0.1 K/UL (ref 0–0.5)
EOSINOPHIL NFR BLD: 0.6 % (ref 0–8)
ERYTHROCYTE [DISTWIDTH] IN BLOOD BY AUTOMATED COUNT: 14.3 % (ref 11.5–14.5)
EST. GFR  (NO RACE VARIABLE): >60 ML/MIN/1.73 M^2
ESTIMATED AVG GLUCOSE: 105 MG/DL (ref 68–131)
GLUCOSE SERPL-MCNC: 146 MG/DL (ref 70–110)
HBA1C MFR BLD: 5.3 % (ref 4–5.6)
HCT VFR BLD AUTO: 45.4 % (ref 37–48.5)
HDLC SERPL-MCNC: 33 MG/DL (ref 40–75)
HDLC SERPL: 25.8 % (ref 20–50)
HGB BLD-MCNC: 14.8 G/DL (ref 12–16)
IMM GRANULOCYTES # BLD AUTO: 0.08 K/UL (ref 0–0.04)
IMM GRANULOCYTES NFR BLD AUTO: 0.7 % (ref 0–0.5)
LDLC SERPL CALC-MCNC: 72 MG/DL (ref 63–159)
LYMPHOCYTES # BLD AUTO: 1.8 K/UL (ref 1–4.8)
LYMPHOCYTES NFR BLD: 16.7 % (ref 18–48)
MCH RBC QN AUTO: 32.3 PG (ref 27–31)
MCHC RBC AUTO-ENTMCNC: 32.6 G/DL (ref 32–36)
MCV RBC AUTO: 99 FL (ref 82–98)
MONOCYTES # BLD AUTO: 0.7 K/UL (ref 0.3–1)
MONOCYTES NFR BLD: 6.2 % (ref 4–15)
NEUTROPHILS # BLD AUTO: 8.2 K/UL (ref 1.8–7.7)
NEUTROPHILS NFR BLD: 75.2 % (ref 38–73)
NONHDLC SERPL-MCNC: 95 MG/DL
NRBC BLD-RTO: 0 /100 WBC
PLATELET # BLD AUTO: 351 K/UL (ref 150–450)
PMV BLD AUTO: 10.6 FL (ref 9.2–12.9)
POTASSIUM SERPL-SCNC: 4.2 MMOL/L (ref 3.5–5.1)
PROT SERPL-MCNC: 9.4 G/DL (ref 6–8.4)
RBC # BLD AUTO: 4.58 M/UL (ref 4–5.4)
SODIUM SERPL-SCNC: 136 MMOL/L (ref 136–145)
TRIGL SERPL-MCNC: 115 MG/DL (ref 30–150)
URATE SERPL-MCNC: 8.1 MG/DL (ref 2.4–5.7)
WBC # BLD AUTO: 10.9 K/UL (ref 3.9–12.7)

## 2025-02-28 PROCEDURE — 85025 COMPLETE CBC W/AUTO DIFF WBC: CPT | Performed by: STUDENT IN AN ORGANIZED HEALTH CARE EDUCATION/TRAINING PROGRAM

## 2025-02-28 PROCEDURE — 80061 LIPID PANEL: CPT | Performed by: STUDENT IN AN ORGANIZED HEALTH CARE EDUCATION/TRAINING PROGRAM

## 2025-02-28 PROCEDURE — 99999 PR PBB SHADOW E&M-EST. PATIENT-LVL V: CPT | Mod: PBBFAC,,, | Performed by: STUDENT IN AN ORGANIZED HEALTH CARE EDUCATION/TRAINING PROGRAM

## 2025-02-28 PROCEDURE — 83036 HEMOGLOBIN GLYCOSYLATED A1C: CPT | Performed by: STUDENT IN AN ORGANIZED HEALTH CARE EDUCATION/TRAINING PROGRAM

## 2025-02-28 PROCEDURE — 84443 ASSAY THYROID STIM HORMONE: CPT | Performed by: STUDENT IN AN ORGANIZED HEALTH CARE EDUCATION/TRAINING PROGRAM

## 2025-02-28 PROCEDURE — 36415 COLL VENOUS BLD VENIPUNCTURE: CPT | Mod: PN | Performed by: STUDENT IN AN ORGANIZED HEALTH CARE EDUCATION/TRAINING PROGRAM

## 2025-02-28 PROCEDURE — 84550 ASSAY OF BLOOD/URIC ACID: CPT | Performed by: STUDENT IN AN ORGANIZED HEALTH CARE EDUCATION/TRAINING PROGRAM

## 2025-02-28 PROCEDURE — 80053 COMPREHEN METABOLIC PANEL: CPT | Performed by: STUDENT IN AN ORGANIZED HEALTH CARE EDUCATION/TRAINING PROGRAM

## 2025-02-28 RX ORDER — METOPROLOL SUCCINATE 100 MG/1
100 TABLET, EXTENDED RELEASE ORAL DAILY
Qty: 90 TABLET | Refills: 1 | Status: SHIPPED | OUTPATIENT
Start: 2025-02-28

## 2025-02-28 RX ORDER — VENLAFAXINE HYDROCHLORIDE 75 MG/1
75 CAPSULE, EXTENDED RELEASE ORAL DAILY
Qty: 90 CAPSULE | Refills: 1 | Status: SHIPPED | OUTPATIENT
Start: 2025-02-28

## 2025-02-28 RX ORDER — AMLODIPINE BESYLATE 10 MG/1
10 TABLET ORAL DAILY
Qty: 90 TABLET | Refills: 1 | Status: SHIPPED | OUTPATIENT
Start: 2025-02-28

## 2025-02-28 RX ORDER — ATORVASTATIN CALCIUM 10 MG/1
10 TABLET, FILM COATED ORAL DAILY
Qty: 90 TABLET | Refills: 1 | Status: SHIPPED | OUTPATIENT
Start: 2025-02-28

## 2025-02-28 NOTE — PROGRESS NOTES
Office visit  Patient: Farheen Soares   2/28/2025       Assessment/Plan:       1. Annual physical exam  -     Lipid Panel; Future; Expected date: 02/28/2025        -patient has aged out of preventative screening    2. Type 2 diabetes mellitus with diabetic peripheral angiopathy without gangrene, without long-term current use of insulin  -     Hemoglobin A1C; Future; Expected date: 02/28/2025  -     Ambulatory referral/consult to Outpatient Case Management        -will check A1c; currently diet-controlled    3. Atrial fibrillation, unspecified type        -unclear if this is a current problem or if it was paroxysmal; patient not on full anticoagulation due to fall risk    4. Meningioma       -stable, continue to monitor    5. NPH (normal pressure hydrocephalus)      -s/p  shunt    6. Dementia, unspecified dementia severity, unspecified dementia type, unspecified whether behavioral, psychotic, or mood disturbance or anxiety  -     Ambulatory referral/consult to Home Health; Future; Expected date: 03/01/2025  -     TSH; Future; Expected date: 02/28/2025  -     Ambulatory referral/consult to Outpatient Case Management        -limited interaction with me during the exam, although a good portion of that may be due to patient's hearing loss    7. Hearing loss, unspecified hearing loss type, unspecified laterality  -     Ambulatory referral/consult to Audiology; Future; Expected date: 03/07/2025    8. Hypertension, unspecified type  -     amLODIPine (NORVASC) 10 MG tablet; Take 1 tablet (10 mg total) by mouth once daily.  Dispense: 90 tablet; Refill: 1  -     metoprolol succinate (TOPROL-XL) 100 MG 24 hr tablet; Take 1 tablet (100 mg total) by mouth once daily.  Dispense: 90 tablet; Refill: 1  -     Comprehensive Metabolic Panel; Future; Expected date: 02/28/2025  -     CBC Auto Differential; Future; Expected date: 02/28/2025        -BP elevated in office; patient's daughter to check BP at home    9. Hyperlipidemia,  unspecified hyperlipidemia type  -     atorvastatin (LIPITOR) 10 MG tablet; Take 1 tablet (10 mg total) by mouth once daily.  Dispense: 90 tablet; Refill: 1    10. Weakness    11. Debility  -     Ambulatory referral/consult to Home Health; Future; Expected date: 2025    12. Bedridden  -     Ambulatory referral/consult to Home Health; Future; Expected date: 2025  -     Ambulatory referral/consult to Outpatient Case Management    13. History of gout  -     Uric Acid; Future; Expected date: 2025    14. Encounter for screening for cardiovascular disorders  -     Lipid Panel; Future; Expected date: 2025    Other orders  -     venlafaxine (EFFEXOR-XR) 75 MG 24 hr capsule; Take 1 capsule (75 mg total) by mouth once daily.  Dispense: 90 capsule; Refill: 1      Return to clinic in 6 months or sooner as needed.  Understandably, it is challenging for patient to make it to the clinic due to her bedridden status. If it is not possible for her to come in 6 months, then she could wait a year. However, would like her to come sooner than every 2 years, as she had this time.          CHIEF COMPLAINT: [facility]    HPI: Farheen Soares is a 89 y.o. female who presents to establish care. Her last annual was in 2023 with Dr. Chung.  She has been out of her blood pressure meds for a week.  She has been bed-ridden for several years.  She lives with her son.  Visiting [place] comes for 4 hours a day.    She had a lower GI bleed while in [place], about 2017.    She has a history of chronic back pain.    She has started staying up for 48 hours and then sleeping for 48 hours.  Her dementia waxes and wanes.  Slowly progressing.  She is able to feed herself and drink.  She can brush her teeth.  She needs assistance dressing, toileting, bathing.  She moved here from [place] when her daughter .    Her daughter Colleen Soares, son,  Nathan Rodger, and sister Sherin Marvin.        Current  Outpatient Medications   Medication Instructions    acetaminophen (TYLENOL) 1,000 mg, Oral, Every 8 hours PRN    alendronate (FOSAMAX) 70 mg, Oral, Every 7 days    amLODIPine (NORVASC) 10 mg, Oral, Daily    atorvastatin (LIPITOR) 10 mg, Oral, Daily    calcium polycarbophil (FIBERCON ORAL) Mixed in 8 ounces of liquid & gave daily as needed for constipation. (Administered last Tue, Wed & Thurs)    clopidogreL (PLAVIX) 75 mg, Oral, Daily    ergocalciferol (ERGOCALCIFEROL) 50,000 unit Cap Take 1 capsule by mouth once weekly x 12 weeks and then start OTC vit D3 2000 units daily    metoprolol succinate (TOPROL-XL) 100 mg, Oral, Daily    polyethylene glycol (GLYCOLAX) 17 g, Daily PRN    venlafaxine (EFFEXOR-XR) 75 mg, Oral, Daily       Lab Results   Component Value Date    HGBA1C 5.3 02/28/2025    HGBA1C 6.2 (H) 01/10/2023    HGBA1C 7.2 (H) 06/27/2022     Lab Results   Component Value Date    MICALBCREAT 38.5 (H) 03/22/2021     Lab Results   Component Value Date    LDLCALC 72.0 02/28/2025    LDLCALC 41.8 (L) 11/12/2021    CHOL 128 02/28/2025    HDL 33 (L) 02/28/2025    TRIG 115 02/28/2025       Lab Results   Component Value Date     02/28/2025    K 4.2 02/28/2025     02/28/2025    CO2 20 (L) 02/28/2025     (H) 02/28/2025    BUN 17 02/28/2025    CREATININE 0.8 02/28/2025    CALCIUM 9.9 02/28/2025    PROT 9.4 (H) 02/28/2025    ALBUMIN 3.8 02/28/2025    BILITOT 0.5 02/28/2025    ALKPHOS 73 02/28/2025    AST 37 02/28/2025    ALT 8 (L) 02/28/2025    ANIONGAP 11 02/28/2025    ESTGFRAFRICA >60 06/29/2022    EGFRNONAA >60 06/29/2022    WBC 10.90 02/28/2025    HGB 14.8 02/28/2025    HGB 12.3 01/12/2023    HCT 45.4 02/28/2025    MCV 99 (H) 02/28/2025     02/28/2025    TSH 2.557 02/28/2025    HEPCAB Non-reactive 01/10/2023       Lab Results   Component Value Date    SJUWKUNP04AE 14 (L) 03/22/2021    JRRDPRWS58VT 14 (L) 11/23/2020    BUVGCCSC69 582 06/28/2022    TRANSFERRIN 299 11/20/2020          Past Medical History:   Diagnosis Date    Age-related osteoporosis with current pathological fracture with routine healing     ANAHI (acute kidney injury) 1/12/2023    Allergic rhinitis     Anemia     Carotid atherosclerosis, bilateral     Chronic gout of multiple sites     Chronic low back pain with bilateral sciatica     Closed impacted fracture of left femoral neck with routine healing s/p percutaneous screw fixation on 11/21/2020 11/20/2020    DDD (degenerative disc disease), lumbar 8/17/2020    Diverticulosis     Essential hypertension 11/25/2019    Facet arthropathy     GERD (gastroesophageal reflux disease) 11/25/2019    Hearing loss     History of transient ischemic attack (TIA) 11/25/2019    IBS (irritable bowel syndrome)     Insomnia     Lumbar radiculopathy 8/17/2020    Lumbar spondylosis 8/17/2020    Lumbar stenosis     Meningioma     Paroxysmal SVT (supraventricular tachycardia)     Primary open angle glaucoma (POAG) of both eyes     Pure hypercholesterolemia 11/25/2019    PVD (peripheral vascular disease)     30% prox stenosis of celiac trunk and 20% stnosis Prox SMA - per Abd/SMA Arteriogram 4/3/08    Recurrent major depressive disorder, in full remission     Type 2 diabetes mellitus without complication, without long-term current use of insulin 11/25/2019    Vitamin D deficiency 11/23/2020     Past Surgical History:   Procedure Laterality Date    CATARACT EXTRACTION      CAUDAL EPIDURAL STEROID INJECTION N/A 08/27/2020    Procedure: Injection-steroid-epidural-caudal with catheter;  Surgeon: Johnathan Gonzalez Jr., MD;  [facility];  Service: Pain Management;  Laterality: N/A;    EPIDURAL STEROID INJECTION INTO LUMBAR SPINE      LUMBAR LAMINECTOMY  2009    PERCUTANEOUS PINNING OF HIP Left 11/21/2020    Procedure: Left- Percutaneous Screws- Large  arm Clock side;  Surgeon: Leandro White MD;  Location: [facility] [address] [place] [address];  Service: Orthopedics;   "Laterality: Left;    TOTAL ABDOMINAL HYSTERECTOMY W/ BILATERAL SALPINGOOPHORECTOMY      VENTRICULOPERITONEAL SHUNT         Social History[1]    family history includes Diabetes in her mother; Hyperlipidemia in her son; Hypertension in her daughter, father, mother, and son; Hypothyroidism in her daughter; Multiple sclerosis in her son; Stroke in her father.    Vitals:    02/28/25 1326   BP: (!) 164/98   Pulse: 96   TempSrc: Oral   SpO2: 96%   Height: 5' 2" (1.575 m)   PainSc: 0-No pain       Body mass index is 24.56 kg/m².      Objective:   Physical Exam  Vitals reviewed.   Constitutional:       General: She is not in acute distress.     Appearance: She is well-developed.      Comments: Very thin and frail-appearing; appears to be stated age; in wheelchair   HENT:      Head: Normocephalic and atraumatic.      Right Ear: External ear normal.      Left Ear: External ear normal.      Nose: Nose normal.      Mouth/Throat:      Mouth: Mucous membranes are moist.   Eyes:      General: No scleral icterus.        Right eye: No discharge.         Left eye: No discharge.      Conjunctiva/sclera: Conjunctivae normal.   Cardiovascular:      Rate and Rhythm: Normal rate and regular rhythm.      Heart sounds: Normal heart sounds. No murmur heard.     No friction rub. No gallop.   Pulmonary:      Effort: Pulmonary effort is normal. No respiratory distress.      Breath sounds: Normal breath sounds. No wheezing, rhonchi or rales.   Musculoskeletal:         General: No deformity.      Right lower leg: No edema.      Left lower leg: No edema.   Skin:     General: Skin is warm and dry.   Neurological:      General: No focal deficit present.      Mental Status: She is alert and oriented to person, place, and time.   Psychiatric:         Mood and Affect: Mood normal.         Behavior: Behavior normal.         Thought Content: Thought content normal.         Kathleen Jackson MD  Internal Medicine and " Pediatrics                                   [1]  Social History  Tobacco Use    Smoking status: Never    Smokeless tobacco: Never   Substance Use Topics    Alcohol use: Yes     Comment: socially    Drug use: Never

## 2025-03-01 LAB — TSH SERPL DL<=0.005 MIU/L-ACNC: 2.56 UIU/ML (ref 0.4–4)

## 2025-03-03 ENCOUNTER — TELEPHONE (OUTPATIENT)
Dept: AUDIOLOGY | Facility: CLINIC | Age: 89
End: 2025-03-03
Payer: MEDICARE

## 2025-03-03 ENCOUNTER — RESULTS FOLLOW-UP (OUTPATIENT)
Dept: PRIMARY CARE CLINIC | Facility: CLINIC | Age: 89
End: 2025-03-03

## 2025-03-03 NOTE — TELEPHONE ENCOUNTER
----- Message from Med Assistant America sent at 3/3/2025  8:44 AM CST -----  Good morning Melany, can you schedule this patient for a hearing test they only have a referral for audiology Thanks America  ----- Message -----  From: America Trevino MA  Sent: 2/28/2025   4:22 PM CST  To: America Trevino MA      ----- Message -----  From: Natalya Ramirez  Sent: 2/28/2025   2:31 PM CST  To: [facility] Clinical Staff    Dr. Kathleen Jackson has put in a referral for Consult to Audiology. Please assist in scheduling. Hearing loss, unspecified hearing loss type, unspecified laterality [H91.90]Thanks

## 2025-03-06 ENCOUNTER — TELEPHONE (OUTPATIENT)
Dept: PRIMARY CARE CLINIC | Facility: CLINIC | Age: 89
End: 2025-03-06
Payer: MEDICARE

## 2025-03-06 NOTE — TELEPHONE ENCOUNTER
The referral was placed. Below is the message I received from [facility]. Please let patient's daughter know. Thank you.    Good afternoon,     Thank you for your referral to [facility] we have received the referral and are processing. We are currently able to admit with nursing on the week of MARCH 23RD or sooner if possible.      Thanks,   Melinda TAYLOR LPN   [facility]

## 2025-03-06 NOTE — TELEPHONE ENCOUNTER
Pt daughter looking for PT referral but it does not appear that it was discussed during visit on 02/28/25. Is that the purpose of refs to HH and Outpatient CM?

## 2025-03-06 NOTE — TELEPHONE ENCOUNTER
----- Message from Anuradha sent at 3/6/2025  8:20 AM CST -----  Contact: [phone]  Ms. Duff  Patient is returning a phone call.Who left a message for the patient: N/ADoes patient know what this is regarding:  N/AWould you like a call back, or a response through your [facility] portal?:   CallComments: Patients daughter Ms. Duff said that she put in an order for a Referral for Out patient Therapy and Physical Therapy for her mother, but she said that the referrals or not in an she would like to speak with you.

## 2025-03-07 ENCOUNTER — PATIENT OUTREACH (OUTPATIENT)
Dept: ADMINISTRATIVE | Facility: OTHER | Age: 89
End: 2025-03-07
Payer: MEDICARE

## 2025-03-07 NOTE — PROGRESS NOTES
CHW - Outreach Attempt    Community Health Worker left a voicemail message for 1st attempt to contact patient regarding:   Transportation   Caregiver support   Mental Health   Prescription Assistance/Medication     Community Health Worker to attempt to contact patient on: 3/7/25     Letter mailed out regarding pt's overdue Pap smear.

## 2025-03-11 ENCOUNTER — PATIENT OUTREACH (OUTPATIENT)
Dept: ADMINISTRATIVE | Facility: OTHER | Age: 89
End: 2025-03-11
Payer: MEDICARE

## 2025-03-11 NOTE — PROGRESS NOTES
CHW - Outreach Attempt    Community Health Worker left a voicemail message for 2nd attempt to contact patient regarding: Transportation   Caregiver support   Mental Health   Prescription Assistance/Medication     Community Health Worker to attempt to contact patient on: 3/11/25

## 2025-03-18 ENCOUNTER — PATIENT OUTREACH (OUTPATIENT)
Dept: ADMINISTRATIVE | Facility: OTHER | Age: 89
End: 2025-03-18
Payer: MEDICARE

## 2025-03-18 NOTE — PROGRESS NOTES
CHW - Unable to Contact    Community Health Worker to close episode at this time due to three missed attempts for patient contact.    Transportation   Caregiver support   Mental Health   Prescription Assistance/Medication

## 2025-03-24 DIAGNOSIS — Z00.00 ENCOUNTER FOR MEDICARE ANNUAL WELLNESS EXAM: ICD-10-CM

## 2025-03-25 ENCOUNTER — TELEPHONE (OUTPATIENT)
Dept: AUDIOLOGY | Facility: CLINIC | Age: 89
End: 2025-03-25
Payer: MEDICARE

## 2025-03-26 ENCOUNTER — HOSPITAL ENCOUNTER (INPATIENT)
Facility: OTHER | Age: 89
LOS: 1 days | Discharge: HOME HEALTH | DRG: 071 | End: 2025-03-28
Attending: EMERGENCY MEDICINE | Admitting: HOSPITALIST
Payer: MEDICARE

## 2025-03-26 DIAGNOSIS — G93.89 CEREBRAL VENTRICULOMEGALY: ICD-10-CM

## 2025-03-26 DIAGNOSIS — F03.90 DEMENTIA, UNSPECIFIED DEMENTIA SEVERITY, UNSPECIFIED DEMENTIA TYPE, UNSPECIFIED WHETHER BEHAVIORAL, PSYCHOTIC, OR MOOD DISTURBANCE OR ANXIETY: ICD-10-CM

## 2025-03-26 DIAGNOSIS — G93.41 ACUTE METABOLIC ENCEPHALOPATHY: ICD-10-CM

## 2025-03-26 DIAGNOSIS — G93.40 ENCEPHALOPATHY: Primary | ICD-10-CM
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
## 2025-03-26 DIAGNOSIS — K59.00 CONSTIPATION, UNSPECIFIED CONSTIPATION TYPE: ICD-10-CM

## 2025-03-26 DIAGNOSIS — R41.82 AMS (ALTERED MENTAL STATUS): ICD-10-CM

## 2025-03-26 DIAGNOSIS — R07.9 CHEST PAIN: ICD-10-CM

## 2025-03-26 LAB
ABSOLUTE EOSINOPHIL (OHS): 0.06 K/UL
ABSOLUTE MONOCYTE (OHS): 0.98 K/UL (ref 0.3–1)
ABSOLUTE NEUTROPHIL COUNT (OHS): 9.96 K/UL (ref 1.8–7.7)
ALBUMIN SERPL BCP-MCNC: 3.3 G/DL (ref 3.5–5.2)
ALP SERPL-CCNC: 65 UNIT/L (ref 40–150)
ALT SERPL W/O P-5'-P-CCNC: 10 UNIT/L (ref 10–44)
ANION GAP (OHS): 12 MMOL/L (ref 8–16)
AST SERPL-CCNC: 20 UNIT/L (ref 11–45)
BASOPHILS # BLD AUTO: 0.04 K/UL
BASOPHILS NFR BLD AUTO: 0.3 %
BILIRUB SERPL-MCNC: 0.3 MG/DL (ref 0.1–1)
BILIRUB UR QL STRIP.AUTO: NEGATIVE
BUN SERPL-MCNC: 16 MG/DL (ref 8–23)
CALCIUM SERPL-MCNC: 8.5 MG/DL (ref 8.7–10.5)
CHLORIDE SERPL-SCNC: 112 MMOL/L (ref 95–110)
CLARITY UR: CLEAR
CO2 SERPL-SCNC: 14 MMOL/L (ref 23–29)
COLOR UR AUTO: YELLOW
CREAT SERPL-MCNC: 0.7 MG/DL (ref 0.5–1.4)
ERYTHROCYTE [DISTWIDTH] IN BLOOD BY AUTOMATED COUNT: 13.6 % (ref 11.5–14.5)
GFR SERPLBLD CREATININE-BSD FMLA CKD-EPI: >60 ML/MIN/1.73/M2
GLUCOSE SERPL-MCNC: 142 MG/DL (ref 70–110)
GLUCOSE UR QL STRIP: NEGATIVE
HCT VFR BLD AUTO: 46.4 % (ref 37–48.5)
HCV AB SERPL QL IA: NEGATIVE
HGB BLD-MCNC: 15.6 GM/DL (ref 12–16)
HGB UR QL STRIP: NEGATIVE
HIV 1+2 AB+HIV1 P24 AG SERPL QL IA: NEGATIVE
HOLD SPECIMEN: NORMAL
IMM GRANULOCYTES # BLD AUTO: 0.03 K/UL (ref 0–0.04)
IMM GRANULOCYTES NFR BLD AUTO: 0.2 % (ref 0–0.5)
KETONES UR QL STRIP: NEGATIVE
LDH SERPL L TO P-CCNC: 3.04 MMOL/L (ref 0.5–2.2)
LEUKOCYTE ESTERASE UR QL STRIP: NEGATIVE
LYMPHOCYTES # BLD AUTO: 1.68 K/UL (ref 1–4.8)
MCH RBC QN AUTO: 32.2 PG (ref 27–50)
MCHC RBC AUTO-ENTMCNC: 33.6 G/DL (ref 32–36)
MCV RBC AUTO: 96 FL (ref 82–98)
NITRITE UR QL STRIP: NEGATIVE
NUCLEATED RBC (/100WBC) (OHS): 0 /100 WBC
PH UR STRIP: 6 [PH]
PLATELET # BLD AUTO: 312 K/UL (ref 150–450)
PMV BLD AUTO: 10 FL (ref 9.2–12.9)
POCT GLUCOSE: 205 MG/DL (ref 70–110)
POTASSIUM SERPL-SCNC: 3.6 MMOL/L (ref 3.5–5.1)
PROT SERPL-MCNC: 8.2 GM/DL (ref 6–8.4)
PROT UR QL STRIP: ABNORMAL
RBC # BLD AUTO: 4.85 M/UL (ref 4–5.4)
RELATIVE EOSINOPHIL (OHS): 0.5 %
RELATIVE LYMPHOCYTE (OHS): 13.2 % (ref 18–48)
RELATIVE MONOCYTE (OHS): 7.7 % (ref 4–15)
RELATIVE NEUTROPHIL (OHS): 78.1 % (ref 38–73)
SAMPLE: ABNORMAL
SODIUM SERPL-SCNC: 138 MMOL/L (ref 136–145)
SP GR UR STRIP: 1.01
UROBILINOGEN UR STRIP-ACNC: NEGATIVE EU/DL
WBC # BLD AUTO: 12.75 K/UL (ref 3.9–12.7)

## 2025-03-26 PROCEDURE — 85025 COMPLETE CBC W/AUTO DIFF WBC: CPT

## 2025-03-26 PROCEDURE — 99900035 HC TECH TIME PER 15 MIN (STAT)

## 2025-03-26 PROCEDURE — 87389 HIV-1 AG W/HIV-1&-2 AB AG IA: CPT

## 2025-03-26 PROCEDURE — 25000003 PHARM REV CODE 250

## 2025-03-26 PROCEDURE — 82962 GLUCOSE BLOOD TEST: CPT

## 2025-03-26 PROCEDURE — 80053 COMPREHEN METABOLIC PANEL: CPT

## 2025-03-26 PROCEDURE — 93005 ELECTROCARDIOGRAM TRACING: CPT

## 2025-03-26 PROCEDURE — 86803 HEPATITIS C AB TEST: CPT

## 2025-03-26 PROCEDURE — 99285 EMERGENCY DEPT VISIT HI MDM: CPT | Mod: 25

## 2025-03-26 PROCEDURE — 93010 ELECTROCARDIOGRAM REPORT: CPT | Mod: ,,, | Performed by: INTERNAL MEDICINE

## 2025-03-26 PROCEDURE — 51701 INSERT BLADDER CATHETER: CPT

## 2025-03-26 PROCEDURE — 83605 ASSAY OF LACTIC ACID: CPT

## 2025-03-26 PROCEDURE — 81003 URINALYSIS AUTO W/O SCOPE: CPT

## 2025-03-26 PROCEDURE — 36415 COLL VENOUS BLD VENIPUNCTURE: CPT

## 2025-03-26 RX ORDER — METOPROLOL TARTRATE 50 MG/1
100 TABLET ORAL
Status: COMPLETED | OUTPATIENT
Start: 2025-03-26 | End: 2025-03-26

## 2025-03-26 RX ADMIN — METOPROLOL TARTRATE 100 MG: 50 TABLET, FILM COATED ORAL at 10:03

## 2025-03-26 NOTE — Clinical Note
Diagnosis: Fall, initial encounter [[id number]]   Future Attending Provider: NALLELY ARTEAGA [[id number]]   Is the patient being sent to ED Observation?: No

## 2025-03-27 PROBLEM — Z71.89 ACP (ADVANCE CARE PLANNING): Status: ACTIVE | Noted: 2025-03-27

## 2025-03-27 PROBLEM — G93.40 ACUTE ENCEPHALOPATHY: Status: ACTIVE | Noted: 2025-03-27

## 2025-03-27 PROBLEM — E86.0 MILD DEHYDRATION: Status: ACTIVE | Noted: 2025-03-27

## 2025-03-27 LAB
ABSOLUTE EOSINOPHIL (OHS): 0.07 K/UL
ABSOLUTE MONOCYTE (OHS): 0.75 K/UL (ref 0.3–1)
ABSOLUTE NEUTROPHIL COUNT (OHS): 5.45 K/UL (ref 1.8–7.7)
ALBUMIN SERPL BCP-MCNC: 3.6 G/DL (ref 3.5–5.2)
ALP SERPL-CCNC: 75 UNIT/L (ref 40–150)
ALT SERPL W/O P-5'-P-CCNC: 10 UNIT/L (ref 10–44)
AMPHET UR QL SCN: NEGATIVE
ANION GAP (OHS): 11 MMOL/L (ref 8–16)
AST SERPL-CCNC: 17 UNIT/L (ref 11–45)
BARBITURATE SCN PRESENT UR: NEGATIVE
BASOPHILS # BLD AUTO: 0.04 K/UL
BASOPHILS NFR BLD AUTO: 0.5 %
BENZODIAZ UR QL SCN: NEGATIVE
BILIRUB SERPL-MCNC: 0.4 MG/DL (ref 0.1–1)
BNP SERPL-MCNC: 35 PG/ML (ref 0–99)
BUN SERPL-MCNC: 18 MG/DL (ref 8–23)
CALCIUM SERPL-MCNC: 9.8 MG/DL (ref 8.7–10.5)
CANNABINOIDS UR QL SCN: NEGATIVE
CHLORIDE SERPL-SCNC: 107 MMOL/L (ref 95–110)
CO2 SERPL-SCNC: 17 MMOL/L (ref 23–29)
COCAINE UR QL SCN: NEGATIVE
CREAT SERPL-MCNC: 0.7 MG/DL (ref 0.5–1.4)
CREAT UR-MCNC: 72.4 MG/DL (ref 15–325)
ERYTHROCYTE [DISTWIDTH] IN BLOOD BY AUTOMATED COUNT: 13.7 % (ref 11.5–14.5)
ETHANOL UR-MCNC: <10 MG/DL
FOLATE SERPL-MCNC: 12.5 NG/ML (ref 4–24)
GFR SERPLBLD CREATININE-BSD FMLA CKD-EPI: >60 ML/MIN/1.73/M2
GLUCOSE SERPL-MCNC: 147 MG/DL (ref 70–110)
HCT VFR BLD AUTO: 41.9 % (ref 37–48.5)
HGB BLD-MCNC: 14 GM/DL (ref 12–16)
IMM GRANULOCYTES # BLD AUTO: 0.02 K/UL (ref 0–0.04)
IMM GRANULOCYTES NFR BLD AUTO: 0.2 % (ref 0–0.5)
LACTATE SERPL-SCNC: 2.1 MMOL/L (ref 0.5–2.2)
LYMPHOCYTES # BLD AUTO: 2.11 K/UL (ref 1–4.8)
MAGNESIUM SERPL-MCNC: 1.9 MG/DL (ref 1.6–2.6)
MCH RBC QN AUTO: 32.3 PG (ref 27–50)
MCHC RBC AUTO-ENTMCNC: 33.4 G/DL (ref 32–36)
MCV RBC AUTO: 97 FL (ref 82–98)
METHADONE UR QL SCN: NEGATIVE
NUCLEATED RBC (/100WBC) (OHS): 0 /100 WBC
OHS QRS DURATION: 88 MS
OHS QTC CALCULATION: 419 MS
OPIATES UR QL SCN: NEGATIVE
PCP UR QL: NEGATIVE
PLATELET # BLD AUTO: 273 K/UL (ref 150–450)
PMV BLD AUTO: 9.7 FL (ref 9.2–12.9)
POCT GLUCOSE: 133 MG/DL (ref 70–110)
POCT GLUCOSE: 153 MG/DL (ref 70–110)
POTASSIUM SERPL-SCNC: 3.9 MMOL/L (ref 3.5–5.1)
PROCALCITONIN SERPL-MCNC: 0.05 NG/ML
PROT SERPL-MCNC: 8.6 GM/DL (ref 6–8.4)
RBC # BLD AUTO: 4.34 M/UL (ref 4–5.4)
RELATIVE EOSINOPHIL (OHS): 0.8 %
RELATIVE LYMPHOCYTE (OHS): 25 % (ref 18–48)
RELATIVE MONOCYTE (OHS): 8.9 % (ref 4–15)
RELATIVE NEUTROPHIL (OHS): 64.6 % (ref 38–73)
SODIUM SERPL-SCNC: 135 MMOL/L (ref 136–145)
T4 FREE SERPL-MCNC: NORMAL NG/DL
TSH SERPL-ACNC: 1.58 UIU/ML (ref 0.4–4)
VIT B12 SERPL-MCNC: 578 PG/ML (ref 210–950)
WBC # BLD AUTO: 8.44 K/UL (ref 3.9–12.7)

## 2025-03-27 PROCEDURE — 97162 PT EVAL MOD COMPLEX 30 MIN: CPT

## 2025-03-27 PROCEDURE — 84443 ASSAY THYROID STIM HORMONE: CPT | Performed by: HOSPITALIST

## 2025-03-27 PROCEDURE — 99205 OFFICE O/P NEW HI 60 MIN: CPT | Mod: ,,, | Performed by: FAMILY MEDICINE

## 2025-03-27 PROCEDURE — 96372 THER/PROPH/DIAG INJ SC/IM: CPT | Performed by: HOSPITALIST

## 2025-03-27 PROCEDURE — 11000001 HC ACUTE MED/SURG PRIVATE ROOM

## 2025-03-27 PROCEDURE — 96361 HYDRATE IV INFUSION ADD-ON: CPT

## 2025-03-27 PROCEDURE — 12000002 HC ACUTE/MED SURGE SEMI-PRIVATE ROOM

## 2025-03-27 PROCEDURE — 63600175 PHARM REV CODE 636 W HCPCS: Performed by: HOSPITALIST

## 2025-03-27 PROCEDURE — 82607 VITAMIN B-12: CPT | Performed by: HOSPITALIST

## 2025-03-27 PROCEDURE — 36415 COLL VENOUS BLD VENIPUNCTURE: CPT | Performed by: EMERGENCY MEDICINE

## 2025-03-27 PROCEDURE — 85025 COMPLETE CBC W/AUTO DIFF WBC: CPT | Performed by: HOSPITALIST

## 2025-03-27 PROCEDURE — 82746 ASSAY OF FOLIC ACID SERUM: CPT | Performed by: HOSPITALIST

## 2025-03-27 PROCEDURE — 25000003 PHARM REV CODE 250: Performed by: HOSPITALIST

## 2025-03-27 PROCEDURE — 63600175 PHARM REV CODE 636 W HCPCS: Performed by: EMERGENCY MEDICINE

## 2025-03-27 PROCEDURE — 84145 PROCALCITONIN (PCT): CPT | Performed by: HOSPITALIST

## 2025-03-27 PROCEDURE — 83605 ASSAY OF LACTIC ACID: CPT | Performed by: EMERGENCY MEDICINE

## 2025-03-27 PROCEDURE — 80307 DRUG TEST PRSMV CHEM ANLYZR: CPT | Performed by: HOSPITALIST

## 2025-03-27 PROCEDURE — 80053 COMPREHEN METABOLIC PANEL: CPT | Performed by: HOSPITALIST

## 2025-03-27 PROCEDURE — 25000003 PHARM REV CODE 250: Performed by: EMERGENCY MEDICINE

## 2025-03-27 PROCEDURE — 82962 GLUCOSE BLOOD TEST: CPT

## 2025-03-27 PROCEDURE — 83880 ASSAY OF NATRIURETIC PEPTIDE: CPT | Performed by: HOSPITALIST

## 2025-03-27 PROCEDURE — 36415 COLL VENOUS BLD VENIPUNCTURE: CPT | Mod: 59 | Performed by: HOSPITALIST

## 2025-03-27 PROCEDURE — 83735 ASSAY OF MAGNESIUM: CPT | Performed by: HOSPITALIST

## 2025-03-27 PROCEDURE — 96365 THER/PROPH/DIAG IV INF INIT: CPT

## 2025-03-27 PROCEDURE — 97165 OT EVAL LOW COMPLEX 30 MIN: CPT

## 2025-03-27 RX ORDER — ACETAMINOPHEN 325 MG/1
650 TABLET ORAL EVERY 6 HOURS PRN
Status: DISCONTINUED | OUTPATIENT
Start: 2025-03-27 | End: 2025-03-28 | Stop reason: HOSPADM

## 2025-03-27 RX ORDER — LOPERAMIDE HYDROCHLORIDE 2 MG/1
2 CAPSULE ORAL 4 TIMES DAILY PRN
Status: DISCONTINUED | OUTPATIENT
Start: 2025-03-27 | End: 2025-03-28 | Stop reason: HOSPADM

## 2025-03-27 RX ORDER — ATORVASTATIN CALCIUM 10 MG/1
10 TABLET, FILM COATED ORAL DAILY
Status: DISCONTINUED | OUTPATIENT
Start: 2025-03-27 | End: 2025-03-28 | Stop reason: HOSPADM

## 2025-03-27 RX ORDER — CIPROFLOXACIN 2 MG/ML
400 INJECTION, SOLUTION INTRAVENOUS
Status: COMPLETED | OUTPATIENT
Start: 2025-03-27 | End: 2025-03-27

## 2025-03-27 RX ORDER — HYDRALAZINE HYDROCHLORIDE 20 MG/ML
15 INJECTION INTRAMUSCULAR; INTRAVENOUS EVERY 4 HOURS PRN
Status: DISCONTINUED | OUTPATIENT
Start: 2025-03-27 | End: 2025-03-28 | Stop reason: HOSPADM

## 2025-03-27 RX ORDER — SODIUM CHLORIDE, SODIUM LACTATE, POTASSIUM CHLORIDE, CALCIUM CHLORIDE 600; 310; 30; 20 MG/100ML; MG/100ML; MG/100ML; MG/100ML
INJECTION, SOLUTION INTRAVENOUS CONTINUOUS
Status: DISCONTINUED | OUTPATIENT
Start: 2025-03-27 | End: 2025-03-28 | Stop reason: HOSPADM

## 2025-03-27 RX ORDER — NALOXONE HCL 0.4 MG/ML
0.02 VIAL (ML) INJECTION
Status: DISCONTINUED | OUTPATIENT
Start: 2025-03-27 | End: 2025-03-28 | Stop reason: HOSPADM

## 2025-03-27 RX ORDER — IBUPROFEN 200 MG
16 TABLET ORAL
Status: DISCONTINUED | OUTPATIENT
Start: 2025-03-27 | End: 2025-03-28 | Stop reason: HOSPADM

## 2025-03-27 RX ORDER — ENOXAPARIN SODIUM 100 MG/ML
40 INJECTION SUBCUTANEOUS EVERY 24 HOURS
Status: DISCONTINUED | OUTPATIENT
Start: 2025-03-27 | End: 2025-03-28 | Stop reason: HOSPADM

## 2025-03-27 RX ORDER — VENLAFAXINE HYDROCHLORIDE 37.5 MG/1
75 CAPSULE, EXTENDED RELEASE ORAL DAILY
Status: DISCONTINUED | OUTPATIENT
Start: 2025-03-27 | End: 2025-03-28 | Stop reason: HOSPADM

## 2025-03-27 RX ORDER — METOPROLOL SUCCINATE 50 MG/1
100 TABLET, EXTENDED RELEASE ORAL DAILY
Status: DISCONTINUED | OUTPATIENT
Start: 2025-03-27 | End: 2025-03-28 | Stop reason: HOSPADM

## 2025-03-27 RX ORDER — AMLODIPINE BESYLATE 5 MG/1
10 TABLET ORAL DAILY
Status: DISCONTINUED | OUTPATIENT
Start: 2025-03-27 | End: 2025-03-28 | Stop reason: HOSPADM

## 2025-03-27 RX ORDER — POLYETHYLENE GLYCOL 3350 17 G/17G
17 POWDER, FOR SOLUTION ORAL DAILY
Status: DISCONTINUED | OUTPATIENT
Start: 2025-03-27 | End: 2025-03-28 | Stop reason: HOSPADM

## 2025-03-27 RX ORDER — POLYETHYLENE GLYCOL 3350 17 G/17G
17 POWDER, FOR SOLUTION ORAL
Status: COMPLETED | OUTPATIENT
Start: 2025-03-27 | End: 2025-03-27

## 2025-03-27 RX ORDER — ONDANSETRON HYDROCHLORIDE 2 MG/ML
4 INJECTION, SOLUTION INTRAVENOUS EVERY 4 HOURS PRN
Status: DISCONTINUED | OUTPATIENT
Start: 2025-03-27 | End: 2025-03-28 | Stop reason: HOSPADM

## 2025-03-27 RX ORDER — DOCUSATE SODIUM 100 MG/1
100 CAPSULE, LIQUID FILLED ORAL 2 TIMES DAILY
Status: DISCONTINUED | OUTPATIENT
Start: 2025-03-27 | End: 2025-03-28 | Stop reason: HOSPADM

## 2025-03-27 RX ORDER — INSULIN ASPART 100 [IU]/ML
0-10 INJECTION, SOLUTION INTRAVENOUS; SUBCUTANEOUS
Status: DISCONTINUED | OUTPATIENT
Start: 2025-03-27 | End: 2025-03-28 | Stop reason: HOSPADM

## 2025-03-27 RX ORDER — GLUCAGON 1 MG
1 KIT INJECTION
Status: DISCONTINUED | OUTPATIENT
Start: 2025-03-27 | End: 2025-03-28 | Stop reason: HOSPADM

## 2025-03-27 RX ORDER — METRONIDAZOLE 500 MG/100ML
500 INJECTION, SOLUTION INTRAVENOUS
Status: DISCONTINUED | OUTPATIENT
Start: 2025-03-27 | End: 2025-03-27

## 2025-03-27 RX ORDER — IBUPROFEN 200 MG
24 TABLET ORAL
Status: DISCONTINUED | OUTPATIENT
Start: 2025-03-27 | End: 2025-03-28 | Stop reason: HOSPADM

## 2025-03-27 RX ORDER — SODIUM CHLORIDE 0.9 % (FLUSH) 0.9 %
10 SYRINGE (ML) INJECTION
Status: DISCONTINUED | OUTPATIENT
Start: 2025-03-27 | End: 2025-03-28 | Stop reason: HOSPADM

## 2025-03-27 RX ORDER — TALC
6 POWDER (GRAM) TOPICAL NIGHTLY PRN
Status: DISCONTINUED | OUTPATIENT
Start: 2025-03-27 | End: 2025-03-28 | Stop reason: HOSPADM

## 2025-03-27 RX ADMIN — SODIUM CHLORIDE, POTASSIUM CHLORIDE, SODIUM LACTATE AND CALCIUM CHLORIDE: 600; 310; 30; 20 INJECTION, SOLUTION INTRAVENOUS at 10:03

## 2025-03-27 RX ADMIN — ATORVASTATIN CALCIUM 10 MG: 10 TABLET, FILM COATED ORAL at 08:03

## 2025-03-27 RX ADMIN — POLYETHYLENE GLYCOL 3350 17 G: 17 POWDER, FOR SOLUTION ORAL at 08:03

## 2025-03-27 RX ADMIN — SODIUM CHLORIDE, POTASSIUM CHLORIDE, SODIUM LACTATE AND CALCIUM CHLORIDE: 600; 310; 30; 20 INJECTION, SOLUTION INTRAVENOUS at 11:03

## 2025-03-27 RX ADMIN — VENLAFAXINE HYDROCHLORIDE 75 MG: 75 CAPSULE, EXTENDED RELEASE ORAL at 08:03

## 2025-03-27 RX ADMIN — CIPROFLOXACIN 400 MG: 2 INJECTION, SOLUTION INTRAVENOUS at 06:03

## 2025-03-27 RX ADMIN — ENOXAPARIN SODIUM 40 MG: 40 INJECTION SUBCUTANEOUS at 05:03

## 2025-03-27 RX ADMIN — DOCUSATE SODIUM 100 MG: 100 CAPSULE, LIQUID FILLED ORAL at 09:03

## 2025-03-27 RX ADMIN — SODIUM CHLORIDE, POTASSIUM CHLORIDE, SODIUM LACTATE AND CALCIUM CHLORIDE: 600; 310; 30; 20 INJECTION, SOLUTION INTRAVENOUS at 08:03

## 2025-03-27 RX ADMIN — AMLODIPINE BESYLATE 10 MG: 5 TABLET ORAL at 08:03

## 2025-03-27 RX ADMIN — DOCUSATE SODIUM 100 MG: 100 CAPSULE, LIQUID FILLED ORAL at 08:03

## 2025-03-27 RX ADMIN — METOPROLOL SUCCINATE ER TABLETS 100 MG: 50 TABLET, FILM COATED, EXTENDED RELEASE ORAL at 08:03

## 2025-03-27 RX ADMIN — Medication 1 ENEMA: at 09:03

## 2025-03-27 RX ADMIN — POLYETHYLENE GLYCOL 3350 17 G: 17 POWDER, FOR SOLUTION ORAL at 06:03

## 2025-03-27 NOTE — CONSULTS
"[facility] Emergency Dept  Palliative Medicine  Consult Note    Patient Name: Farheen Soares  MRN: [id number]  Admission Date: 3/26/2025  Hospital Length of Stay: 0 days  Code Status: Full Code   Attending Provider: Norm West MD  Consulting Provider: Renuka Jolley DNP  Primary Care Physician: Vonda Chung MD  Principal Problem:Acute encephalopathy    Patient information was obtained from patient, past medical records, and primary team.      Inpatient consult to Palliative Care  Consult performed by: Renuka Jolley DNP  Consult ordered by: Norm West MD        Assessment/Plan:     Neuro  Dementia  - Noted; patient oriented to person, knows she is in the hospital, but thinks she is in [place], [place]  - Unsure about patients ambulatory status  - Likely FAST 6D-7C    NPH (normal pressure hydrocephalus)  - Noted; management per primary team  - s/p  shunt      GI  Constipation  - CT reviewed; patient was given enema and disimpacted by ED RNs  - Appears she has Miralax ordered for home use     Orthopedic  Fall  - Patient presents with fall out of her bed     Palliative Care  ACP (advance care planning)  - Consult for advance care planning/ goals of care in chronically ill patient admitted with dementia.  - Along with Bernie Valenzuela RN, visited with Mrs. Soares in the ED. She was very pleasant, sitting in bed, reports feeling cold. She is oriented to self and that she is at the hospital. She stated she is feeling okay and denies any complaints. She stated she lives with her daughter, Chelsey. She stated she has 5 children. Updated her on reason for admission.  - Attempted to call her emergency contact. Attempted Colleen, no answer. Attempted Sherin, no answer. Attempted Nathan, no answer. Voicemail left.   - Of note, patient does not have advance directives on file.   - Per last visit with her PCP on 2/28/25: "limited interaction with me during the exam, although a good portion of " "that may be due to patient's hearing loss"  - Per PCP visit: "She has been bed-ridden for several years.  She lives with her son.  Visiting Rashida comes for 4 hours a day.  She has started staying up for 48 hours and then sleeping for 48 hours.  Her dementia waxes and wanes.  Slowly progressing.  She is able to feed herself and drink.  She can brush her teeth.  She needs assistance dressing, toileting, bathing.  She moved here from [place] when her daughter .  Her daughter Colleen Soares, son, Dr. Alexandranaila Zaragozaey, and sister Sherin Wong."  - Will continue to reach out to family         Thank you for your consult.     Subjective:     HPI:   Per ED doctor: "Farheen Soares is a 89 y.o. female who has a past medical history of Age-related osteoporosis with current pathological fracture with routine healing, ANAHI (acute kidney injury) (2023), Allergic rhinitis, Anemia, Carotid atherosclerosis, bilateral, Chronic gout of multiple sites, Chronic low back pain with bilateral sciatica, Closed impacted fracture of left femoral neck with routine healing s/p percutaneous screw fixation on 2020 (2020), DDD (degenerative disc disease), lumbar (2020), Diverticulosis, Essential hypertension (2019), Facet arthropathy, GERD (gastroesophageal reflux disease) (2019), Hearing loss, History of transient ischemic attack (TIA) (2019), IBS (irritable bowel syndrome), Insomnia, Lumbar radiculopathy (2020), Lumbar spondylosis (2020), Lumbar stenosis, Meningioma, Paroxysmal SVT (supraventricular tachycardia), Primary open angle glaucoma (POAG) of both eyes, Pure hypercholesterolemia (2019), PVD (peripheral vascular disease), Recurrent major depressive disorder, in full remission, Type 2 diabetes mellitus without complication, without long-term current use of insulin (2019), and Vitamin D deficiency (2020).     The patient presents to the ED due to fall from her " "bed and AMS.   Patient's family reports that she has waxing and waning alertness and at time talks to others that are not present in the room. They recall no subjective fever or recent sicknesses. They are sole care takers for her and several hours after taking her to bed they heard a loud sound and found the patient on the ground, conscious. There was no obvious injuries before EMS was called to bring her to the ER. Patient is A&O to person and occasionally place but not time or event. She anne painful injuries, n/v/d, dysuria, abdominal pain, CP, SOB but does endorse weakness globally."    At time of initial consult, patient seen lying in bed in the ED, she is chronically ill appearing. Palliative medicine consulted for goals of care/ advance care planning.     Hospital Course:  No notes on file    Interval History: Patient very pleasant, seen in the ED. Denies any complaints.     Past Medical History:   Diagnosis Date    Age-related osteoporosis with current pathological fracture with routine healing     ANAHI (acute kidney injury) 1/12/2023    Allergic rhinitis     Anemia     Carotid atherosclerosis, bilateral     Chronic gout of multiple sites     Chronic low back pain with bilateral sciatica     Closed impacted fracture of left femoral neck with routine healing s/p percutaneous screw fixation on 11/21/2020 11/20/2020    DDD (degenerative disc disease), lumbar 8/17/2020    Diverticulosis     Essential hypertension 11/25/2019    Facet arthropathy     GERD (gastroesophageal reflux disease) 11/25/2019    Hearing loss     History of transient ischemic attack (TIA) 11/25/2019    IBS (irritable bowel syndrome)     Insomnia     Lumbar radiculopathy 8/17/2020    Lumbar spondylosis 8/17/2020    Lumbar stenosis     Meningioma     Paroxysmal SVT (supraventricular tachycardia)     Primary open angle glaucoma (POAG) of both eyes     Pure hypercholesterolemia 11/25/2019    PVD (peripheral vascular disease)     30% prox " stenosis of celiac trunk and 20% stnosis Prox SMA - per Abd/SMA Arteriogram 4/3/08    Recurrent major depressive disorder, in full remission     Type 2 diabetes mellitus without complication, without long-term current use of insulin 11/25/2019    Vitamin D deficiency 11/23/2020       Past Surgical History:   Procedure Laterality Date    CATARACT EXTRACTION      CAUDAL EPIDURAL STEROID INJECTION N/A 08/27/2020    Procedure: Injection-steroid-epidural-caudal with catheter;  Surgeon: Johnathan Gonzalez Jr., MD;  Location: [facility];  Service: Pain Management;  Laterality: N/A;    EPIDURAL STEROID INJECTION INTO LUMBAR SPINE      LUMBAR LAMINECTOMY  2009    PERCUTANEOUS PINNING OF HIP Left 11/21/2020    Procedure: Left- Percutaneous Screws- Large  arm Clock side;  Surgeon: Leandro White MD;  Location: [facility] OR [address] [place] [address];  Service: Orthopedics;  Laterality: Left;    TOTAL ABDOMINAL HYSTERECTOMY W/ BILATERAL SALPINGOOPHORECTOMY      VENTRICULOPERITONEAL SHUNT         Review of patient's allergies indicates:   Allergen Reactions    Baclofen      AMS and distorted dremas       Medications:  Continuous Infusions:   lactated ringers   Intravenous Continuous 100 mL/hr at 03/27/25 0842 New Bag at 03/27/25 0842     Scheduled Meds:   amLODIPine  10 mg Oral Daily    atorvastatin  10 mg Oral Daily    docusate sodium  100 mg Oral BID    enoxparin  40 mg Subcutaneous Daily    metoprolol succinate  100 mg Oral Daily    polyethylene glycol  17 g Oral Daily    venlafaxine  75 mg Oral Daily     PRN Meds:  Current Facility-Administered Medications:     acetaminophen, 650 mg, Oral, Q6H PRN    dextrose 50%, 12.5 g, Intravenous, PRN    dextrose 50%, 25 g, Intravenous, PRN    glucagon (human recombinant), 1 mg, Intramuscular, PRN    glucose, 16 g, Oral, PRN    glucose, 24 g, Oral, PRN    hydrALAZINE, 15 mg, Intravenous, Q4H PRN    insulin aspart U-100, 0-10 Units, Subcutaneous, QID (AC + HS) PRN    loperamide, 2 mg, Oral, QID PRN     melatonin, 6 mg, Oral, Nightly PRN    naloxone, 0.02 mg, Intravenous, PRN    ondansetron, 4 mg, Intravenous, Q4H PRN    sodium chloride 0.9%, 10 mL, Intravenous, PRN    Family History       Problem Relation (Age of Onset)    Diabetes Mother    Hyperlipidemia Son    Hypertension Mother, Father, Daughter, Son    Hypothyroidism Daughter    Multiple sclerosis Son    Stroke Father          Tobacco Use    Smoking status: Never    Smokeless tobacco: Never   Substance and Sexual Activity    Alcohol use: Yes     Comment: socially    Drug use: Never    Sexual activity: Not Currently       Review of Systems   Unable to perform ROS: Dementia     Objective:     Vital Signs (Most Recent):  Temp: 98.1 °F (36.7 °C) (03/27/25 0534)  Pulse: 62 (03/27/25 0812)  Resp: 20 (03/27/25 0703)  BP: 139/65 (03/27/25 0812)  SpO2: 96 % (03/27/25 0703) Vital Signs (24h Range):  Temp:  [98.1 °F (36.7 °C)-98.8 °F (37.1 °C)] 98.1 °F (36.7 °C)  Pulse:  [60-78] 62  Resp:  [15-24] 20  SpO2:  [95 %-99 %] 96 %  BP: (139-227)/() 139/65        There is no height or weight on file to calculate BMI.       Physical Exam  Constitutional:       Appearance: She is ill-appearing.      Comments: Elderly appearing female  Mild temporal lobe wasting    Cardiovascular:      Rate and Rhythm: Normal rate.   Pulmonary:      Effort: No respiratory distress.   Neurological:      Mental Status: She is alert. She is disoriented.      Comments: Oriented to person, knows she is in the hospital, thinks she is in [place]            Review of Symptoms      Symptom Assessment (ESAS 0-10 Scale)  Unable to complete assessment due to Dementia         Pain Assessment in Advanced Demential Scale (PAINAD)   Breathing - Independent of vocalization:  0  Negative vocalization:  0  Facial expression:  0  Body language:  0  Consolability:  0  Total:  0    Living Arrangements:  Lives with family    Psychosocial/Cultural:   See Palliative Psychosocial Note: Yes  - Patient reports she  lives with her daughter, Chelsey  - Reports she has 5 children   **Primary  to Follow**  Palliative Care  Consult: No        Advance Care Planning  Advance Care Planning       Significant Labs: All pertinent labs within the past 24 hours have been reviewed.  CBC:   Recent Labs   Lab 03/27/25  0848   WBC 8.44   HGB 14.0   HCT 41.9   MCV 97        BMP:  Recent Labs   Lab 03/27/25  0848   *   K 3.9      CO2 17*   BUN 18   CREATININE 0.7   CALCIUM 9.8   MG 1.9     LFT:  Lab Results   Component Value Date    AST 17 03/27/2025    ALKPHOS 75 03/27/2025    BILITOT 0.4 03/27/2025     Albumin:   Albumin   Date Value Ref Range Status   03/27/2025 3.6 3.5 - 5.2 g/dL Final   02/28/2025 3.8 3.5 - 5.2 g/dL Final     Protein:   Total Protein   Date Value Ref Range Status   02/28/2025 9.4 (H) 6.0 - 8.4 g/dL Final     Lactic acid:   Lab Results   Component Value Date    LACTATE 2.1 03/27/2025    LACTATE 2.2 06/28/2022       Significant Imaging: I have reviewed all pertinent imaging results/findings within the past 24 hours.      Discussed with Dr. West    I spent a total of 70 minutes on the day of the visit. This includes face to face time in discussion of goals of care, symptom assessment, coordination of care and emotional support.  This also includes non-face to face time preparing to see the patient (eg, review of tests/imaging), obtaining and/or reviewing separately obtained history, documenting clinical information in the electronic or other health record, independently interpreting results and communicating results to the patient/family/caregiver, or care coordinator.    Renuka Jolley, JESSICA  Palliative Medicine  [facility] - Emergency Dept

## 2025-03-27 NOTE — PLAN OF CARE
Problem: Physical Therapy  Goal: Physical Therapy Goal  Description: Goals to be met by: 4/10/25    Patient will increase functional independence with mobility by performin. Supine<>sit with Breezy and appropriate use of HB features.   2. Sit<>stand with Breezy and LRAD.  3. Transfer bed<>chair or Bsc with Breezy and LRAD.   4. Gait x5 ft with Breezy and LRAD.  Outcome: Progressing

## 2025-03-27 NOTE — HPI
"Per ED doctor: "Farheen Soares is a 89 y.o. female who has a past medical history of Age-related osteoporosis with current pathological fracture with routine healing, ANAHI (acute kidney injury) (1/12/2023), Allergic rhinitis, Anemia, Carotid atherosclerosis, bilateral, Chronic gout of multiple sites, Chronic low back pain with bilateral sciatica, Closed impacted fracture of left femoral neck with routine healing s/p percutaneous screw fixation on 11/21/2020 (11/20/2020), DDD (degenerative disc disease), lumbar (8/17/2020), Diverticulosis, Essential hypertension (11/25/2019), Facet arthropathy, GERD (gastroesophageal reflux disease) (11/25/2019), Hearing loss, History of transient ischemic attack (TIA) (11/25/2019), IBS (irritable bowel syndrome), Insomnia, Lumbar radiculopathy (8/17/2020), Lumbar spondylosis (8/17/2020), Lumbar stenosis, Meningioma, Paroxysmal SVT (supraventricular tachycardia), Primary open angle glaucoma (POAG) of both eyes, Pure hypercholesterolemia (11/25/2019), PVD (peripheral vascular disease), Recurrent major depressive disorder, in full remission, Type 2 diabetes mellitus without complication, without long-term current use of insulin (11/25/2019), and Vitamin D deficiency (11/23/2020).     The patient presents to the ED due to fall from her bed and AMS.   Patient's family reports that she has waxing and waning alertness and at time talks to others that are not present in the room. They recall no subjective fever or recent sicknesses. They are sole care takers for her and several hours after taking her to bed they heard a loud sound and found the patient on the ground, conscious. There was no obvious injuries before EMS was called to bring her to the ER. Patient is A&O to person and occasionally place but not time or event. She anne painful injuries, n/v/d, dysuria, abdominal pain, CP, SOB but does endorse weakness globally."    At time of initial consult, patient seen lying in bed in " the ED, she is chronically ill appearing. Palliative medicine consulted for goals of care/ advance care planning.

## 2025-03-27 NOTE — PLAN OF CARE
Inpatient Upgrade Note    Farheen Soares has warranted treatment spanning two or more midnights of hospital level care for the management of 89 year old woman with dementia, significant hearing loss, NPH s/p  shunt, hypertension, diabetes mellitus, bed-bound for about 1 year, gout, IBS, MDD, PSVT, IBS, diverticulosis and chronic low back pain who presented for evaluation of altered mental status . She continues to require IV fluids, daily labs, monitoring of vital signs, and medication adjustments. Her condition is also complicated by the following comorbidities: Diabetes and NPH s/p  shunt, hypertension, diabetes mellitus, bed-bound for about 1 year, gout, IBS, MDD, PSVT, IBS, diverticulosis and chronic low back  .

## 2025-03-27 NOTE — ED NOTES
Pt sleeping quietly on stretcher; left undisturbed at this time.  Pt remains on continuous cardiac and pulse ox monitoring with non-invasive blood pressure to cycle every 60 minutes. Pt is hypertensive; VS otherwise stable; NSR noted. Pt appears comfortable; respirations are spontaneous, even, and non-labored; no signs of acute distress or discomfort observed.  Pt remains on waffle mattress. Bed locked in lowest position; side rails up and locked x 2; call light, bedside table, and personal belongings within reach. Room assessed for safety measures and cleanliness; no action needed at this time; monitoring ongoing.

## 2025-03-27 NOTE — PLAN OF CARE
Problem: Occupational Therapy  Goal: Occupational Therapy Goal  Description: Goals to be met by: 4/9/2025     Patient will increase functional independence with ADLs by performing:    Grooming while seated with Contact Guard Assistance.  Toileting from bedside commode with Maximum Assistance for hygiene and clothing management.   Sitting at edge of bed x 5 minutes with Contact Guard Assistance.  Rolling to Bilateral with Maximum Assistance.   Toilet transfer to bedside commode with Maximum Assistance.    Outcome: Progressing     OT evaluation complete and POC established.  Pt requires 24/7 assist.  Low intensity therapy is recommended upon d/c from acute care to further address deficits and help pt improve overall functional independence.  It is anticipated that pt will perform better in familiar environment.    TORI Lama  3/27/2025

## 2025-03-27 NOTE — PT/OT/SLP EVAL
Physical Therapy Evaluation    Patient Name:  Farheen Soares   MRN:  [id number]    Recommendations:     Discharge Recommendations:  (LIT, requires 24/7 assistance and anticipate pt will perform better in familiar environement)   Discharge Equipment Recommendations: bedside commode, hospital bed   Barriers to discharge:  increased caregiver burden    Assessment:     Farheen Soares is a 89 y.o. female admitted with a medical diagnosis of Acute encephalopathy. Pmhx pertinent for chronic low back pain with B sciatica, HTN, IBS, glaucoma, [place], PVD,  dementia, NPH s/p  shunt.  She presents with the following impairments/functional limitations: impaired endurance, weakness, impaired self care skills, impaired functional mobility, gait instability, impaired balance, impaired cognition, decreased upper extremity function, decreased lower extremity function, decreased safety awareness, pain, decreased ROM.    Patient evaluated by PT and goals established. Patient with difficulty with reporting PLOF and understanding purpose of OOB mobility suspect 2/2 impaired cognition, performed bed mobility with assistance but strongly resistant to remaining sitting/performing transfer. Appears to have sufficient strength in LE to stand but not agreeable to performing at this time. PT will continue to follow and progress as tolerated. Rec for dc to Low Intensity Therapy - suspect will need ongoing 24/7 supervision/assistance.     Rehab Prognosis:  Guarded ; patient would benefit from acute skilled PT services to address these deficits and reach maximum level of function.    Recent Surgery: * No surgery found *      Plan:     During this hospitalization, patient to be seen 2 x/week to address the identified rehab impairments via gait training, therapeutic activities, therapeutic exercises, neuromuscular re-education and progress toward the following goals:    Plan of Care Expires:  04/10/25    Subjective     Chief Complaint:  Pain in rectum, worsened with sitting  Patient/Family Comments/goals: Goal to remain laying in bed  Pain/Comfort:  Pain Rating 1:  (un rated but severe rectal pain)  Pain Rating Post-Intervention 1:  (waxing and waning rectal pain)    Patients cultural, spiritual, Adventist conflicts given the current situation: no    Living Environment: Pt unreliable history  Per EMR, lives with her family/dtr in her house.  Prior to admission, patients level of function was mostly bed bound but able to transfer when needing to toilet.  Equipment used at home:  (ELIEZER).   Upon discharge, patient will have assistance from family.    Objective:     Communicated with RN prior to session.  Patient found HOB elevated with peripheral IV, PureWick, telemetry, blood pressure cuff, pulse ox (continuous)  upon PT entry to room.    General Precautions: Standard, fall (dementia)  Orthopedic Precautions:N/A   Braces: N/A  Respiratory Status: Room air    Patient donned non slip socks and gait belt for OOB mobility.    Exams:  Cognitive Exam:  Patient is oriented to Person  Gross Motor Coordination:  WFL  Postural Exam:  Patient presented with the following abnormalities:    -       Rounded shoulders  -       Forward head  -       Posterior pelvic tilt  RLE ROM: WFL, lacking 5 deg DF  RLE Strength: Deficits: DF 2/5, otherwise at least 3/5 strength  LLE ROM: WFL  LLE Strength: Deficits: 3/5 DF, otherwise at least 3/5 strength    Functional Mobility:  Bed Mobility:     Bridging: moderate assistance, blocking of B feet and able to bridge with encouragement  Supine to Sit: maximal assistance, pt not wanting to sit up 2/2 rectal pain  Sit to Supine: moderate assistance      AM-PAC 6 CLICK MOBILITY  Total Score:10       Treatment & Education:  PT educated patient re:   PT plan of care/role of PT  Safety with OOB mobility  Importance of early mobility  Discharge disposition    Pt without verbalized understanding       Patient left HOB elevated with all  lines intact, call button in reach, and RN notified.    GOALS:   Multidisciplinary Problems       Physical Therapy Goals          Problem: Physical Therapy    Goal Priority Disciplines Outcome Interventions   Physical Therapy Goal     PT, PT/OT Progressing    Description: Goals to be met by: 4/10/25    Patient will increase functional independence with mobility by performin. Supine<>sit with Breezy and appropriate use of HB features.   2. Sit<>stand with Breezy and LRAD.  3. Transfer bed<>chair or Bsc with Breezy and LRAD.   4. Gait x5 ft with Breezy and LRAD.                       DME Justifications:  No DME recommended requiring DME justifications    History:     Past Medical History:   Diagnosis Date    Age-related osteoporosis with current pathological fracture with routine healing     ANAHI (acute kidney injury) 2023    Allergic rhinitis     Anemia     Carotid atherosclerosis, bilateral     Chronic gout of multiple sites     Chronic low back pain with bilateral sciatica     Closed impacted fracture of left femoral neck with routine healing s/p percutaneous screw fixation on 2020    DDD (degenerative disc disease), lumbar 2020    Diverticulosis     Essential hypertension 2019    Facet arthropathy     GERD (gastroesophageal reflux disease) 2019    Hearing loss     History of transient ischemic attack (TIA) 2019    IBS (irritable bowel syndrome)     Insomnia     Lumbar radiculopathy 2020    Lumbar spondylosis 2020    Lumbar stenosis     Meningioma     Paroxysmal SVT (supraventricular tachycardia)     Primary open angle glaucoma (POAG) of both eyes     Pure hypercholesterolemia 2019    PVD (peripheral vascular disease)     30% prox stenosis of celiac trunk and 20% stnosis Prox SMA - per Abd/SMA Arteriogram 4/3/08    Recurrent major depressive disorder, in full remission     Type 2 diabetes mellitus without complication, without long-term current use of  insulin 11/25/2019    Vitamin D deficiency 11/23/2020       Past Surgical History:   Procedure Laterality Date    CATARACT EXTRACTION      CAUDAL EPIDURAL STEROID INJECTION N/A 08/27/2020    Procedure: Injection-steroid-epidural-caudal with catheter;  Surgeon: Johnathan Gonzalez Jr., MD;  Location: [facility];  Service: Pain Management;  Laterality: N/A;    EPIDURAL STEROID INJECTION INTO LUMBAR SPINE      LUMBAR LAMINECTOMY  2009    PERCUTANEOUS PINNING OF HIP Left 11/21/2020    Procedure: Left- Percutaneous Screws- Large  arm Clock side;  Surgeon: Leandro White MD;  Location: [facility] OR [address] [place] [address];  Service: Orthopedics;  Laterality: Left;    TOTAL ABDOMINAL HYSTERECTOMY W/ BILATERAL SALPINGOOPHORECTOMY      VENTRICULOPERITONEAL SHUNT         Time Tracking:     PT Received On: 03/27/25  PT Start Time: 1024     PT Stop Time: 1040  PT Total Time (min): 16 min     Overlap with OT for portions of session due to complex nature of patient, tolerance for therapeutic modalities, and safety with mobility to decrease fall risk for patient and caregiver injury requiring two skilled therapists to provide interventions.    Billable Minutes: Evaluation 16      03/27/2025

## 2025-03-27 NOTE — ED NOTES
Farheen Soares, a 89 y.o. female presents to the ED after unwitnessed fall from bed complaining of pain to R elbow and R knee. Pt is bedbound, oriented x1. Pt's daughter at bedside, state's pt attempts to get out of bed rarely, usually when she is constipated. Pt's last BM 3 days ago. No pain to head, not on blood thinners.     Pt resting comfortably. Connected to cardiac monitor, BP cuff, and pulse oximeter. ED workup in progress. Call light within reach. Safety measures in place. Denies further needs. Plan of care ongoing.      Chief Complaint   Patient presents with    Fall     Rolled out of bed, landing on her right side. C/o right elbow pain, full ROM. Oriented to baseline. Not witnessed, daughter heard her fall and did not have any LOC. No blood thinners. Emesis x1 en route.     Review of patient's allergies indicates:   Allergen Reactions    Baclofen      AMS and distorted dremas     Past Medical History:   Diagnosis Date    Age-related osteoporosis with current pathological fracture with routine healing     ANAHI (acute kidney injury) 1/12/2023    Allergic rhinitis     Anemia     Carotid atherosclerosis, bilateral     Chronic gout of multiple sites     Chronic low back pain with bilateral sciatica     Closed impacted fracture of left femoral neck with routine healing s/p percutaneous screw fixation on 11/21/2020 11/20/2020    DDD (degenerative disc disease), lumbar 8/17/2020    Diverticulosis     Essential hypertension 11/25/2019    Facet arthropathy     GERD (gastroesophageal reflux disease) 11/25/2019    Hearing loss     History of transient ischemic attack (TIA) 11/25/2019    IBS (irritable bowel syndrome)     Insomnia     Lumbar radiculopathy 8/17/2020    Lumbar spondylosis 8/17/2020    Lumbar stenosis     Meningioma     Paroxysmal SVT (supraventricular tachycardia)     Primary open angle glaucoma (POAG) of both eyes     Pure hypercholesterolemia 11/25/2019    PVD (peripheral vascular disease)      30% prox stenosis of celiac trunk and 20% stnosis Prox SMA - per Abd/SMA Arteriogram 4/3/08    Recurrent major depressive disorder, in full remission     Type 2 diabetes mellitus without complication, without long-term current use of insulin 11/25/2019    Vitamin D deficiency 11/23/2020     Past Surgical History:   Procedure Laterality Date    CATARACT EXTRACTION      CAUDAL EPIDURAL STEROID INJECTION N/A 08/27/2020    Procedure: Injection-steroid-epidural-caudal with catheter;  Surgeon: Johnathan Gonzalez Jr., MD;  [place] [facility] [place] [facility] [place]  Service: Pain Management;  Laterality: N/A;    EPIDURAL STEROID INJECTION INTO LUMBAR SPINE      LUMBAR LAMINECTOMY  2009    PERCUTANEOUS PINNING OF HIP Left 11/21/2020    Procedure: Left- Percutaneous Screws- Large  arm Clock side;  Surgeon: Leandro White MD;  Location: [facility] OR [address] [place] [address];  Service: Orthopedics;  Laterality: Left;    TOTAL ABDOMINAL HYSTERECTOMY W/ BILATERAL SALPINGOOPHORECTOMY      VENTRICULOPERITONEAL SHUNT

## 2025-03-27 NOTE — HPI
"Ms. Soares is an 89 year old woman with dementia, significant hearing loss, NPH s/p  shunt, hypertension, diabetes mellitus, bed-bound for about 1 year, gout, IBS, MDD, PSVT, IBS, diverticulosis and chronic low back pain who presented for evaluation of altered mental status after having slid out of her bed.  The history is obtained from interview of her two surviving daughters, the patient and review of patient's chart.  The patient is charming and conversant, albeit quite hard of hearing.  She recalls "falling" out of bed and hitting her left thigh and buttock.  She states she was getting ready for the doctor to visit.  Her only complaint is of rectal discomfort, after having just received an enema and disimpaction.  She denies fevers, chills, chest pain, abdominal pain, back pain, shortness of breath, nausea, vomiting and diarrhea.  Her daughters note that every 2-3 months the patient develops constipation and worsening of her mental status when that happens.  They report she has been bed bound for a bit over a year.  She has been eating well and no recent significant issues before yesterday.  "

## 2025-03-27 NOTE — ED NOTES
Pt lying in bed resting at this time. Pt denies any pain at this time. VSS obtain. Pt side rails up x 2. WCTM

## 2025-03-27 NOTE — PT/OT/SLP EVAL
Occupational Therapy   Evaluation    Name: Farheen Soares  MRN: [id number]  Admitting Diagnosis: Acute encephalopathy  Recent Surgery: * No surgery found *      Recommendations:     Discharge Recommendations: Low Intensity Therapy (LIT, requires 24/7 assistance and anticipate pt will perform better in familiar environement)  Discharge Equipment Recommendations:  bedside commode, hospital bed, wheelchair  Barriers to discharge:   (current functional level)    Assessment:     Farheen Soares is a 89 y.o. female with a medical diagnosis of Acute encephalopathy.  She presents with severe rectal pain. Performance deficits affecting function: weakness, impaired endurance, impaired self care skills, impaired balance, decreased upper extremity function, decreased lower extremity function, decreased coordination, gait instability, impaired functional mobility, decreased safety awareness, impaired cognition, decreased ROM, pain, impaired fine motor.  With encouragement pt agreeable to participating in therapy upon arrival to room.  Pt demonstrates strength and ROM in (B) UE needed for ADLs that is WFL; however, overall deconditioning noted.  Pt with hearing loss and dementia so unable to provide full living environment or PLOF.  Max A required for supine <> sit transfer with Mod A required for sit <> supine. Max A required for UB dressing.    Pt impacted significantly by rectal pain this date.  Pt tolerated sitting EOB for less than one minute before initiating sit <> supine transfer.   Per palliative care note pt able to feed herself and brush teeth, but requires assist for dressing, bathing, and toileting.  Pt has primarily been bed bound last several years.  Pt would benefit from skilled OT services to address problems listed above and increase independence with ADLs.  Pt requires 24/7 assist and supervision.  Low intensity therapy in familiar environment is recommended upon d/c from acute care to further  address deficits and help pt improve overall functional independence.         Rehab Prognosis: fair-Poor; patient would benefit from acute skilled OT services to address these deficits and reach maximum level of function.       Plan:     Patient to be seen 2 x/week to address the above listed problems via self-care/home management, therapeutic activities, therapeutic exercises  Plan of Care Expires: 04/10/25  Plan of Care Reviewed with: patient    Subjective     Chief Complaint: rectal pain  Patient/Family Comments/goals: did not state    Occupational Profile: Information obtained from palliative care note dated 3/27 2* pt unable to provide information  Living Environment: Pt lives with daughter  Previous level of function:   ADLs: Can feed herself and brush teeth; requires assist for other ADLs  Mobility: Has been bed bound for last several years  Roles and Routines: Mother, mostly stays in bed  Equipment Used at Home:  (Unable to assess)  Assistance upon Discharge: unsure level of assist available    Pain/Comfort:  Pain Rating 1:  (pt reports severe rectal pain)  Pain Rating Post-Intervention 2:  (rectal pain remained severe)    Patients cultural, spiritual, Latter day conflicts given the current situation:      Objective:     Communicated with: RN (Maryam) prior to session.  Patient found HOB elevated with peripheral IV, PureWick, telemetry, blood pressure cuff, pulse ox (continuous) upon OT entry to room.  *ED room    General Precautions: Standard, fall (dementia)  Orthopedic Precautions: N/A  Braces: N/A  Respiratory Status: Room air    Occupational Performance:    Bed Mobility:    Supine <> sit: Max A  Sit <> supine: Mod A  Scooting: Total A    Functional Mobility/Transfers:  Toilet transfer to BSC: Unable to assess  Pt declined participation in activity and requested to return to supine position    Activities of Daily Living:  Upper Body Dressing: maximal assistance for donning gown while supine with HOB  elevated.    Cognitive/Visual Perceptual:  Cognitive/Psychosocial Skills:    -       Oriented to: Person   -       Follows Commands/attention: Follows one step commands inconsistently   -       Communication: clear/fluent  -       Memory: impaired; dementia  -       Safety awareness/insight to disability: impaired   -       Mood/Affect/Coping skills/emotional control: cooperative, upset 2* increased rectal pain, limited participation 2* pain  -       Hearing: Very hard of hearing    Physical Exam:  Postural examination/scapula alignment:   -       Rounded shoulders  -       Forward head  Skin integrity: Visible skin intact  Edema:  none noted  Sensation: Unable to assess  Motor Planning: WFL  Dominant hand: Right  Upper Extremity Range of Motion:    -       Right Upper Extremity: WFL  -       Left Upper Extremity: WFL  Upper Extremity Strength: Able to push/pull against hand of therapist with adequate force  -       Right Upper Extremity: WFL  -       Left Upper Extremity: WFL   Strength: WFL  Fine Motor Coordination: WFL  Gross motor coordination: Impaired  Balance: Sitting- CGA regressing to Mod A    AMPAC 6 Click ADL:  AMPAC Total Score: 8    Treatment & Education:  *Pt educated on role of OT in acute care setting    Patient left HOB elevated with all lines intact, call button in reach, and RN notified    GOALS:   Multidisciplinary Problems       Occupational Therapy Goals          Problem: Occupational Therapy    Goal Priority Disciplines Outcome Interventions   Occupational Therapy Goal     OT, PT/OT Progressing    Description: Goals to be met by: 4/9/2025     Patient will increase functional independence with ADLs by performing:    Grooming while seated with Contact Guard Assistance.  Toileting from bedside commode with Maximum Assistance for hygiene and clothing management.   Sitting at edge of bed x 5 minutes with Contact Guard Assistance.  Rolling to Bilateral with Maximum Assistance.   Toilet transfer  to bedside commode with Maximum Assistance.                         DME Justifications:  No DME recommended requiring DME justifications    History:     Past Medical History:   Diagnosis Date    Age-related osteoporosis with current pathological fracture with routine healing     ANAHI (acute kidney injury) 1/12/2023    Allergic rhinitis     Anemia     Carotid atherosclerosis, bilateral     Chronic gout of multiple sites     Chronic low back pain with bilateral sciatica     Closed impacted fracture of left femoral neck with routine healing s/p percutaneous screw fixation on 11/21/2020 11/20/2020    DDD (degenerative disc disease), lumbar 8/17/2020    Diverticulosis     Essential hypertension 11/25/2019    Facet arthropathy     GERD (gastroesophageal reflux disease) 11/25/2019    Hearing loss     History of transient ischemic attack (TIA) 11/25/2019    IBS (irritable bowel syndrome)     Insomnia     Lumbar radiculopathy 8/17/2020    Lumbar spondylosis 8/17/2020    Lumbar stenosis     Meningioma     Paroxysmal SVT (supraventricular tachycardia)     Primary open angle glaucoma (POAG) of both eyes     Pure hypercholesterolemia 11/25/2019    PVD (peripheral vascular disease)     30% prox stenosis of celiac trunk and 20% stnosis Prox SMA - per Abd/SMA Arteriogram 4/3/08    Recurrent major depressive disorder, in full remission     Type 2 diabetes mellitus without complication, without long-term current use of insulin 11/25/2019    Vitamin D deficiency 11/23/2020         Past Surgical History:   Procedure Laterality Date    CATARACT EXTRACTION      CAUDAL EPIDURAL STEROID INJECTION N/A 08/27/2020    Procedure: Injection-steroid-epidural-caudal with catheter;  Surgeon: Johnathan Gonzalez Jr., MD;  Location: [facility];  Service: Pain Management;  Laterality: N/A;    EPIDURAL STEROID INJECTION INTO LUMBAR SPINE      LUMBAR LAMINECTOMY  2009    PERCUTANEOUS PINNING OF HIP Left 11/21/2020    Procedure: Left- Percutaneous  Screws- Large  arm Clock side;  Surgeon: Leandro White MD;  Location: [facility] OR [address] [place] [address];  Service: Orthopedics;  Laterality: Left;    TOTAL ABDOMINAL HYSTERECTOMY W/ BILATERAL SALPINGOOPHORECTOMY      VENTRICULOPERITONEAL SHUNT         Time Tracking:     OT Date of Treatment: 03/27/25  OT Start Time: 1024  OT Stop Time: 1040  OT Total Time (min): 16 min    Billable Minutes:Evaluation 16    *completed with PT    TORI Lama  3/27/2025

## 2025-03-27 NOTE — ED NOTES
Pt placed on waffle mattress to help reduce pressure related injury. Pt also has a foam pad placed on bottom. Fresh linen and pads applied to bed. VSS. In NAD.

## 2025-03-27 NOTE — ASSESSMENT & PLAN NOTE
-Placed in observation status  -Presented for evaluation of altered mental status after falling out of bed.  Daughters report baseline dementia which worsens every 2-3 months when she develops constipation.  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  -Shunt series showed stable Codman-Hakim ventriculostomy shunt catheter settings with no evidence of shunt catheter discontinuity.  -CT abdomen pelvis showed a large amount of stool in the rectum and sigmoid colon with mild colonic wall thickening   -UA has no signs of infection.  CXR showed no signs of pneumonia or acute findings  -TSH, BNP, PCT and UDS are normal  -Labs suggest mild dehydration.  -Suspect this is a mild encephalopathy due to her baseline dementia, mild dehydration and significant constipation - as with prior occurrences.  -In ED treated with IV fluids, cipro and flagyl.  Do not think we need to continue antibiotics at this time, but will monitor closely.  -Gave enema and disimpaction in ED.  -Continue gentle IV fluids and bowel regimen.    -Delirium precautions ordered

## 2025-03-27 NOTE — H&P
"  [facility] Medicine  History & Physical    Patient Name: Farheen Soares  MRN: [id number]  Patient Class: OP- Observation  Admission Date: 3/26/2025  Attending Physician: Norm West MD  Primary Care Provider: Vonda Chung MD         Patient information was obtained from patient, relative(s), past medical records, and ER records.     Subjective:     Principal Problem:Acute encephalopathy    Chief Complaint:   Chief Complaint   Patient presents with    Fall     Rolled out of bed, landing on her right side. C/o right elbow pain, full ROM. Oriented to baseline. Not witnessed, daughter heard her fall and did not have any LOC. No blood thinners. Emesis x1 en route.        HPI: Ms. Soares is an 89 year old woman with dementia, significant hearing loss, NPH s/p  shunt, hypertension, diabetes mellitus, bed-bound for about 1 year, gout, IBS, MDD, PSVT, IBS, diverticulosis and chronic low back pain who presented for evaluation of altered mental status after having slid out of her bed.  The history is obtained from interview of her two surviving daughters, the patient and review of patient's chart.  The patient is charming and conversant, albeit quite hard of hearing.  She recalls "falling" out of bed and hitting her left thigh and buttock.  She states she was getting ready for the doctor to visit.  Her only complaint is of rectal discomfort, after having just received an enema and disimpaction.  She denies fevers, chills, chest pain, abdominal pain, back pain, shortness of breath, nausea, vomiting and diarrhea.  Her daughters note that every 2-3 months the patient develops constipation and worsening of her mental status when that happens.  They report she has been bed bound for a bit over a year.  She has been eating well and no recent significant issues before yesterday.    Past Medical History:   Diagnosis Date    Age-related osteoporosis with current pathological fracture with " routine healing     ANAHI (acute kidney injury) 1/12/2023    Allergic rhinitis     Anemia     Carotid atherosclerosis, bilateral     Chronic gout of multiple sites     Chronic low back pain with bilateral sciatica     Closed impacted fracture of left femoral neck with routine healing s/p percutaneous screw fixation on 11/21/2020 11/20/2020    DDD (degenerative disc disease), lumbar 8/17/2020    Diverticulosis     Essential hypertension 11/25/2019    Facet arthropathy     GERD (gastroesophageal reflux disease) 11/25/2019    Hearing loss     History of transient ischemic attack (TIA) 11/25/2019    IBS (irritable bowel syndrome)     Insomnia     Lumbar radiculopathy 8/17/2020    Lumbar spondylosis 8/17/2020    Lumbar stenosis     Meningioma     Paroxysmal SVT (supraventricular tachycardia)     Primary open angle glaucoma (POAG) of both eyes     Pure hypercholesterolemia 11/25/2019    PVD (peripheral vascular disease)     30% prox stenosis of celiac trunk and 20% stnosis Prox SMA - per Abd/SMA Arteriogram 4/3/08    Recurrent major depressive disorder, in full remission     Type 2 diabetes mellitus without complication, without long-term current use of insulin 11/25/2019    Vitamin D deficiency 11/23/2020       Past Surgical History:   Procedure Laterality Date    CATARACT EXTRACTION      CAUDAL EPIDURAL STEROID INJECTION N/A 08/27/2020    Procedure: Injection-steroid-epidural-caudal with catheter;  Surgeon: Johnathan Gonzalez Jr., MD;  Location: [facility];  Service: Pain Management;  Laterality: N/A;    EPIDURAL STEROID INJECTION INTO LUMBAR SPINE      LUMBAR LAMINECTOMY  2009    PERCUTANEOUS PINNING OF HIP Left 11/21/2020    Procedure: Left- Percutaneous Screws- Large  arm Clock side;  Surgeon: Leandro White MD;  Location: [address];  Service: Orthopedics;  Laterality: Left;    TOTAL ABDOMINAL HYSTERECTOMY W/ BILATERAL SALPINGOOPHORECTOMY      VENTRICULOPERITONEAL SHUNT         Review of patient's  allergies indicates:   Allergen Reactions    Baclofen      AMS and distorted dremas       No current facility-administered medications on file prior to encounter.     Current Outpatient Medications on File Prior to Encounter   Medication Sig    acetaminophen (TYLENOL) 500 MG tablet Take 2 tablets (1,000 mg total) by mouth every 8 (eight) hours as needed (Mild pain).    amLODIPine (NORVASC) 10 MG tablet Take 1 tablet (10 mg total) by mouth once daily.    metoprolol succinate (TOPROL-XL) 100 MG 24 hr tablet Take 1 tablet (100 mg total) by mouth once daily.    polyethylene glycol (GLYCOLAX) 17 gram PwPk Take 17 g by mouth daily as needed (Constipation).    venlafaxine (EFFEXOR-XR) 75 MG 24 hr capsule Take 1 capsule (75 mg total) by mouth once daily.    alendronate (FOSAMAX) 70 MG tablet Take 1 tablet (70 mg total) by mouth every 7 days. (Patient not taking: Reported on 2/28/2025)    atorvastatin (LIPITOR) 10 MG tablet Take 1 tablet (10 mg total) by mouth once daily.    calcium polycarbophil (FIBERCON ORAL) Mixed in 8 ounces of liquid & gave daily as needed for constipation. (Administered last Tue, Wed & Thurs)    clopidogreL (PLAVIX) 75 mg tablet Take 1 tablet (75 mg total) by mouth once daily. (Patient not taking: Reported on 2/28/2025)    ergocalciferol (ERGOCALCIFEROL) 50,000 unit Cap Take 1 capsule by mouth once weekly x 12 weeks and then start OTC vit D3 2000 units daily    [DISCONTINUED] blood-glucose meter kit To check BG three times daily, to use with insurance preferred meter (Patient not taking: Reported on 5/29/2022)    [DISCONTINUED] calcium carbonate (OS-ELODIA) 500 mg calcium (1,250 mg) tablet Take 1 tablet (500 mg total) by mouth 2 (two) times daily.    [DISCONTINUED] fexofenadine (ALLEGRA) 180 MG tablet Take 1 tablet (180 mg total) by mouth daily as needed.     Family History       Problem Relation (Age of Onset)    Diabetes Mother    Hyperlipidemia Son    Hypertension Mother, Father, Daughter, Son     Hypothyroidism Daughter    Multiple sclerosis Son    Stroke Father          Tobacco Use    Smoking status: Never    Smokeless tobacco: Never   Substance and Sexual Activity    Alcohol use: Yes     Comment: socially    Drug use: Never    Sexual activity: Not Currently     Review of Systems   All other systems reviewed and are negative.    Objective:     Vital Signs (Most Recent):  Temp: 98.1 °F (36.7 °C) (03/27/25 0534)  Pulse: 62 (03/27/25 0812)  Resp: 20 (03/27/25 0703)  BP: 139/65 (03/27/25 0812)  SpO2: 96 % (03/27/25 0703) Vital Signs (24h Range):  Temp:  [98.1 °F (36.7 °C)-98.8 °F (37.1 °C)] 98.1 °F (36.7 °C)  Pulse:  [60-78] 62  Resp:  [15-24] 20  SpO2:  [95 %-99 %] 96 %  BP: (139-227)/() 139/65        There is no height or weight on file to calculate BMI.     Physical Exam  Vitals and nursing note reviewed.   Constitutional:       General: She is not in acute distress.     Appearance: She is well-developed. She is not ill-appearing, toxic-appearing or diaphoretic.      Comments: Frail in appearance   HENT:      Head: Normocephalic and atraumatic.      Right Ear: External ear normal.      Left Ear: External ear normal.      Ears:      Comments: Very hard of hearing, but with raised voice we communicate without difficulty     Nose: Nose normal.      Mouth/Throat:      Mouth: Mucous membranes are dry.   Eyes:      Extraocular Movements: Extraocular movements intact.      Conjunctiva/sclera: Conjunctivae normal.   Cardiovascular:      Rate and Rhythm: Normal rate and regular rhythm.      Heart sounds: Normal heart sounds.   Pulmonary:      Effort: Pulmonary effort is normal. No respiratory distress.      Breath sounds: Normal breath sounds.   Abdominal:      General: Bowel sounds are normal. There is no distension.      Palpations: Abdomen is soft.      Tenderness: There is no abdominal tenderness.   Musculoskeletal:         General: Normal range of motion.      Cervical back: Normal range of motion. No  "rigidity.   Skin:     General: Skin is warm and dry.   Neurological:      Mental Status: She is alert.      Cranial Nerves: No cranial nerve deficit.      Coordination: Coordination normal.      Comments: Alert and oriented to self and "hospital" ([facility]), but not year (1986), city ([place]) or president.  Knows that she fell out of the bed yesterday. Hard of hearing.  Fluent speech. Significant diffuse weakness.  Charming demeanor and personality.   Psychiatric:         Mood and Affect: Mood normal.                Significant Labs: All pertinent labs within the past 24 hours have been reviewed.    Significant Imaging: I have reviewed all pertinent imaging results/findings within the past 24 hours.  Assessment/Plan:     * Acute encephalopathy  -Placed in observation status  -Presented for evaluation of altered mental status after falling out of bed.  Daughters report baseline dementia which worsens every 2-3 months when she develops constipation.  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  -Shunt series showed stable Codman-Hakim ventriculostomy shunt catheter settings with no evidence of shunt catheter discontinuity.  -CT abdomen pelvis showed a large amount of stool in the rectum and sigmoid colon with mild colonic wall thickening   -UA has no signs of infection.  CXR showed no signs of pneumonia or acute findings  -TSH, BNP, PCT and UDS are normal  -Labs suggest mild dehydration.  -Suspect this is a mild encephalopathy due to her baseline dementia, mild dehydration and significant constipation - as with prior occurrences.  -In ED treated with IV fluids, cipro and " flagyl.  Do not think we need to continue antibiotics at this time, but will monitor closely.  -Gave enema and disimpaction in ED.  -Continue gentle IV fluids and bowel regimen.    -Delirium precautions ordered    Dementia  -Treatment as above    NPH (normal pressure hydrocephalus)  -Noted history  -Shunt appears stable as above    Meningioma  -Noted history and appears stable on present imaging    Fall  -Patient bed bound for a bit over 1 year  -Fall precautions ordered  -Consult PT/OT    Constipation  -Treat as above    Mild dehydration  -Treat with IV fluids as above  -Repeat labs in AM    Essential hypertension  -BP normal to mildly elevated  -Continue home norvasc and toprol  -PRN hydralazine available if SBP > 180    GERD (gastroesophageal reflux disease)  -Noted history and not on treatment at home  -Monitor and treat if needed    Hyperlipidemia  -Continue home statin    Type 2 diabetes mellitus with diabetic peripheral angiopathy without gangrene, without long-term current use of insulin  -A1c 5.3  -Diet controlled at home  -Monitor accu-checks ac/hs and if needed give ISS    ACP (advance care planning)  -Palliative care consulted and input appreciated  -Presently patient is full code    Depression  -Appears well controlled  -Continue home venlafaxine    Chronic pain  -Noted history  -Denies pain presently.    Chronic gout of multiple sites  -Noted history  -No acute flair      VTE Risk Mitigation (From admission, onward)           Ordered     enoxaparin injection 40 mg  Daily         03/27/25 0730     IP VTE HIGH RISK PATIENT  Once         03/27/25 0730     Place sequential compression device  Until discontinued         03/27/25 0730                       On 03/27/2025, patient should be placed in hospital observation services under my care.             Norm West MD  Department of Hospital Medicine  [facility] - Emergency Dept

## 2025-03-27 NOTE — ASSESSMENT & PLAN NOTE
- Noted; patient oriented to person, knows she is in the hospital, but thinks she is in [place], [place]  - Unsure about patients ambulatory status  - Likely FAST 6D-7C

## 2025-03-27 NOTE — ED PROVIDER NOTES
Encounter Date: 3/26/2025       History     Chief Complaint   Patient presents with    Fall     Rolled out of bed, landing on her right side. C/o right elbow pain, full ROM. Oriented to baseline. Not witnessed, daughter heard her fall and did not have any LOC. No blood thinners. Emesis x1 en route.     Farheen Soares is a 89 y.o. female who has a past medical history of Age-related osteoporosis with current pathological fracture with routine healing, ANAHI (acute kidney injury) (1/12/2023), Allergic rhinitis, Anemia, Carotid atherosclerosis, bilateral, Chronic gout of multiple sites, Chronic low back pain with bilateral sciatica, Closed impacted fracture of left femoral neck with routine healing s/p percutaneous screw fixation on 11/21/2020 (11/20/2020), DDD (degenerative disc disease), lumbar (8/17/2020), Diverticulosis, Essential hypertension (11/25/2019), Facet arthropathy, GERD (gastroesophageal reflux disease) (11/25/2019), Hearing loss, History of transient ischemic attack (TIA) (11/25/2019), IBS (irritable bowel syndrome), Insomnia, Lumbar radiculopathy (8/17/2020), Lumbar spondylosis (8/17/2020), Lumbar stenosis, Meningioma, Paroxysmal SVT (supraventricular tachycardia), Primary open angle glaucoma (POAG) of both eyes, Pure hypercholesterolemia (11/25/2019), PVD (peripheral vascular disease), Recurrent major depressive disorder, in full remission, Type 2 diabetes mellitus without complication, without long-term current use of insulin (11/25/2019), and Vitamin D deficiency (11/23/2020).    The patient presents to the ED due to fall from her bed and AMS.   Patient's family reports that she has waxing and waning alertness and at time talks to others that are not present in the room. They recall no subjective fever or recent sicknesses. They are sole care takers for her and several hours after taking her to bed they heard a loud sound and found the patient on the ground, conscious. There was no obvious  injuries before EMS was called to bring her to the ER. Patient is A&O to person and occasionally place but not time or event. She anne painful injuries, n/v/d, dysuria, abdominal pain, CP, SOB but does endorse weakness globally.     The history is provided by the patient, medical records, the EMS personnel, a caregiver and a relative. The history is limited by the condition of the patient. No  was used.     Review of patient's allergies indicates:   Allergen Reactions    Baclofen      AMS and distorted dremas     Past Medical History:   Diagnosis Date    Age-related osteoporosis with current pathological fracture with routine healing     ANAHI (acute kidney injury) 1/12/2023    Allergic rhinitis     Anemia     Carotid atherosclerosis, bilateral     Chronic gout of multiple sites     Chronic low back pain with bilateral sciatica     Closed impacted fracture of left femoral neck with routine healing s/p percutaneous screw fixation on 11/21/2020 11/20/2020    DDD (degenerative disc disease), lumbar 8/17/2020    Diverticulosis     Essential hypertension 11/25/2019    Facet arthropathy     GERD (gastroesophageal reflux disease) 11/25/2019    Hearing loss     History of transient ischemic attack (TIA) 11/25/2019    IBS (irritable bowel syndrome)     Insomnia     Lumbar radiculopathy 8/17/2020    Lumbar spondylosis 8/17/2020    Lumbar stenosis     Meningioma     Paroxysmal SVT (supraventricular tachycardia)     Primary open angle glaucoma (POAG) of both eyes     Pure hypercholesterolemia 11/25/2019    PVD (peripheral vascular disease)     30% prox stenosis of celiac trunk and 20% stnosis Prox SMA - per Abd/SMA Arteriogram 4/3/08    Recurrent major depressive disorder, in full remission     Type 2 diabetes mellitus without complication, without long-term current use of insulin 11/25/2019    Vitamin D deficiency 11/23/2020     Past Surgical History:   Procedure Laterality Date    CATARACT EXTRACTION       CAUDAL EPIDURAL STEROID INJECTION N/A 08/27/2020    Procedure: Injection-steroid-epidural-caudal with catheter;  Surgeon: Johnathan Gonzalez Jr., MD;  Location: [facility];  Service: Pain Management;  Laterality: N/A;    EPIDURAL STEROID INJECTION INTO LUMBAR SPINE      LUMBAR LAMINECTOMY  2009    PERCUTANEOUS PINNING OF HIP Left 11/21/2020    Procedure: Left- Percutaneous Screws- Large  arm Clock side;  Surgeon: Leandro White MD;  Location: [facility] OR [address] [place] [address];  Service: Orthopedics;  Laterality: Left;    TOTAL ABDOMINAL HYSTERECTOMY W/ BILATERAL SALPINGOOPHORECTOMY      VENTRICULOPERITONEAL SHUNT       Family History   Problem Relation Name Age of Onset    Hypertension Mother      Diabetes Mother      Hypertension Father      Stroke Father          60's    Hypertension Daughter x2     Hypothyroidism Daughter x2     Hypertension Son x2     Hyperlipidemia Son x2     Multiple sclerosis Son x2      Social History[1]  Review of Systems   All other systems reviewed and are negative.      Physical Exam     Initial Vitals   BP Pulse Resp Temp SpO2   03/26/25 1903 03/26/25 1903 03/26/25 1903 03/26/25 1903 03/26/25 1859   (!) 178/77 74 (!) 24 98.1 °F (36.7 °C) 97 %      MAP       --                Physical Exam    Nursing note and vitals reviewed.  Constitutional: She appears well-developed and well-nourished. She appears distressed.   Confused and disoriented.  Fowl smelling body odder   HENT:   Head: Normocephalic and atraumatic.   Eyes: Conjunctivae and EOM are normal. Pupils are equal, round, and reactive to light.   Neck: Neck supple.   Normal range of motion.  Cardiovascular:  Normal rate, normal heart sounds and intact distal pulses.           Pulmonary/Chest: Breath sounds normal.   Abdominal: Abdomen is soft. There is no abdominal tenderness. There is no rebound and no guarding.   Musculoskeletal:      Cervical back: Normal range of motion and neck supple.     Neurological: She is alert.   Skin: Skin is warm  and dry. Capillary refill takes less than 2 seconds.         ED Course   Procedures  Labs Reviewed   CBC WITH DIFFERENTIAL - Abnormal       Result Value    WBC 12.75 (*)     RBC 4.85      HGB 15.6      HCT 46.4      MCV 96      MCH 32.2      MCHC 33.6      RDW 13.6      Platelet Count 312      MPV 10.0      Nucleated RBC 0      Neut % 78.1 (*)     Lymph % 13.2 (*)     Mono % 7.7      Eos % 0.5      Basophil % 0.3      Imm Grans % 0.2      Neut # 9.96 (*)     Lymph # 1.68      Mono # 0.98      Eos # 0.06      Baso # 0.04      Imm Grans # 0.03     POCT GLUCOSE - Abnormal    POCT Glucose 205 (*)    ISTAT LACTATE - Abnormal    POC Lactate 3.04 (*)     Sample VENOUS     HEPATITIS C ANTIBODY - Normal    Hep C Ab Interp Negative     HIV 1 / 2 ANTIBODY - Normal    HIV 1/2 Ag/Ab Negative     CBC W/ AUTO DIFFERENTIAL    Narrative:     The following orders were created for panel order CBC auto differential.  Procedure                               Abnormality         Status                     ---------                               -----------         ------                     CBC with Differential[[id number]]       Abnormal            Final result                 Please view results for these tests on the individual orders.   EXTRA TUBES    Narrative:     The following orders were created for panel order EXTRA TUBES.  Procedure                               Abnormality         Status                     ---------                               -----------         ------                     Light Blue Top Hold[[id number]]                             Final result                 Please view results for these tests on the individual orders.   LIGHT BLUE TOP HOLD    Extra Tube Hold for add-ons.     COMPREHENSIVE METABOLIC PANEL   URINALYSIS, REFLEX TO URINE CULTURE   LACTIC ACID, PLASMA   POCT GLUCOSE MONITORING CONTINUOUS     EKG Readings: (Independently Interpreted)   Initial Reading: No STEMI.       Imaging Results               X-Ray Shunt Series (XPD) (In process)                      CT Head Without Contrast (Final result)  Result time 03/26/25 21:20:15      Final result by Hortensia Anderson MD (03/26/25 21:20:15)                   Impression:      Right transparietal ventricular shunt catheter.  Stable ventricular enlargement.    Heavily calcified right cerebellar mass adjacent to the tentorial leaflet, which has not increased in size.    Interval development of right maxillary and right ethmoid sinus disease.    Interval development of bilateral partial mastoid air cell opacification.      Electronically signed by: Hortensia Anderson  Date:    03/26/2025  Time:    21:20               Narrative:    EXAMINATION:  CT OF THE HEAD WITHOUT    CLINICAL HISTORY:  Mental status change, unknown cause;    TECHNIQUE:  5 mm unenhanced axial images were obtained from the skull base to the vertex.    COMPARISON:  02/14/2023 and MRI brain 06/28/2022    FINDINGS:  There is a right transparietal shunt catheter with its tip at the anterior falx between in the ventricles.  There is stable moderate enlargement of the ventricles.  Chronic small vessel ischemic changes are present.  There is a heavily calcified mass, presumably a meningioma, in the right cerebellum measuring approximately 2.2 x 2.7 cm, which is not increased in size (axial image 11).  There is no acute intracranial hemorrhage, territorial infarct, or midline shift.  Remote lacunar infarcts are seen in the left basal ganglia.  In the visualized paranasal sinuses, there is partial opacification of the right ethmoid air cells and right maxillary sinus.  Partial opacification of bilateral mastoid air cells are seen.                                       X-Ray Chest AP Portable (Final result)  Result time 03/26/25 21:32:11      Final result by Hortensia Anderson MD (03/26/25 21:32:11)                   Impression:      No acute intrathoracic abnormality detected.  Mild bibasilar  atelectasis.      Electronically signed by: Hortensia Anderson  Date:    03/26/2025  Time:    21:32               Narrative:    EXAMINATION:  AP PORTABLE CHEST    CLINICAL HISTORY:  fall from bed;    TECHNIQUE:  AP portable chest radiograph was submitted.    COMPARISON:  01/09/2023    FINDINGS:  A right ventriculoperitoneal shunt catheter is present.  AP portable chest radiograph demonstrates a cardiac silhouette within normal limits.  Vascular calcification is seen at the aortic knob.  There is no focal consolidation, pneumothorax, or pleural effusion. Mild bibasilar atelectatic changes are present.                                       Medications   metoprolol tartrate (LOPRESSOR) tablet 100 mg (has no administration in time range)     Medical Decision Making  89-year-old female with history of  shunt for NPH, diabetes, hypertension, paroxysmal SVT, history of TIA who presents with altered mental status.    Initial differential includes sepsis, UTI, over shunting, stroke, hyperglycemia, DKA, dehydration.    Labs and imaging ordered to investigate the aforementioned Ddx.    EKG Interpretation: I independently reviewed and interpreted initial EKG.  It shows normal sinus rhythm at 70 beats per minute, no STEMI, no significant acute ST/T abnormalities, normal intervals.  No acute change compared to prior tracing. , . Normal axes.     Patient signed out to oncoming provider with remainder of labs to be resulted, mainly UA. Prelim dispo, admit for sepsis r/o or treatment from likely UTI.      Please see ED course for further care updates.       Amount and/or Complexity of Data Reviewed  Labs: ordered. Decision-making details documented in ED Course.  Radiology: ordered. Decision-making details documented in ED Course.    Risk  Prescription drug management.               ED Course as of 03/26/25 2142   Wed Mar 26, 2025   2042 WBC(!): 12.75  Moderate leukocytosis, barely above threshold with mild left shift.   Possible early infection the mushroom SIRS criteria.  Given Altered mental status, likely suspicious for infectious process versus over  shunting versus metabolic or electrolyte derangement. [JL]   2044 POCT Glucose(!): 205 [JL]   2128 POC Lactate(!): 3.04 [JL]   2135 CT Head Without Contrast  Stable head C when compared to prior.  Known creasing ventricular size to indicate increased ICP. [JL]   2136 X-Ray Chest AP Portable  No intrathoracic processes to indicate pulmonary infection versus fluid overload status versus traumatic chest. [JL]   2136 At this point, with remainder of workup complete.  Higher suspicion for infectious process, likely urinary source.  Given high-risk category unimproved clinical studies, patient will be admitted for observation treatment with likely antibiotic regimen. [JL]      ED Course User Index  [JL] Bette Charles MD                           Clinical Impression:  Final diagnoses:  [R41.82] AMS (altered mental status)                   [1]   Social History  Tobacco Use    Smoking status: Never    Smokeless tobacco: Never   Substance Use Topics    Alcohol use: Yes     Comment: socially    Drug use: Never        Bette Charles MD  Resident  03/26/25 1447

## 2025-03-27 NOTE — ED NOTES
PT stated pt has good strength but is resisent to getting up and sitting on the commode to have BM. Pt placed safely back in bed. Provider notified.

## 2025-03-27 NOTE — ASSESSMENT & PLAN NOTE
-BP normal to mildly elevated  -Continue home norvasc and toprol  -PRN hydralazine available if SBP > 180

## 2025-03-27 NOTE — ED NOTES
Pt sleeping quietly on stretcher; awakens to verbal stimuli. Pt remains on continuous cardiac and pulse ox monitoring with non-invasive blood pressure to cycle every 60 minutes. VS stable; NSR noted. Pt denies pain at this time; no acute distress or discomfort reported or observed.  Pt denies restroom needs at this time; remains on Pure Wick external catheter to continuous low wall suction; remains clean and dry. Pt repositioned on stretcher; remains on waffle mattress. Bed locked in lowest position; side rails up and locked x 2; call light, bedside table, and personal belongings within reach. Room assessed for safety measures and cleanliness; no action needed at this time. Plan of care discussed; awaiting hospital bed assignment. Pt instructed to alert nurse for assistance and before attempting to get out of bed; verbalizes understanding. Meal tray at bedside. Pt states she does not wish to eat at this time. Pt denies needs or complaints at this time; monitoring ongoing.

## 2025-03-27 NOTE — PROVIDER PROGRESS NOTES - EMERGENCY DEPT.
Encounter Date: 3/26/2025    ED Physician Progress Notes        Physician Note:   89-year-old female with multiple comorbidities including HTN, dementia, NPH s/p  shunt initially seen by Dr. Watters after she fell out of bed, with AMS.  Patient signed out to me pending reassessment and lactate results.  She already had CT head and shunt series that showed  shunt in place, chest x-ray that showed no evidence for pneumonia or other abnormality.  Lactate was slightly elevated but patient with elevated blood pressure, no fever or tachycardia.  UA without any sign of UTI, so CT abdomen done for further evaluation that shows signs of constipation and possible stercoral colitis.  Repeat lactate was normal without any aggressive fluid resuscitation.  Patient observed overnight with persistent AMS, unclear whether it this is her baseline, no family at bedside for additional information.  I was able to get in touch with the patient's daughter/caretaker who states that patient is mostly bed-bound, but tried to get out of bed when she is constipated and has been doing so in the past day, and that she is definitely more altered than usual.  Daughter does not feel comfortable taking patient home until she is closer to her baseline.  Patient treated with MiraLax and case discussed with Dr. Guillaume, admitting hospitalist, who recommends starting Cipro/Flagyl for possible colitis and will admit to Dr. Neetu jackson.

## 2025-03-27 NOTE — SUBJECTIVE & OBJECTIVE
Past Medical History:   Diagnosis Date    Age-related osteoporosis with current pathological fracture with routine healing     ANAHI (acute kidney injury) 1/12/2023    Allergic rhinitis     Anemia     Carotid atherosclerosis, bilateral     Chronic gout of multiple sites     Chronic low back pain with bilateral sciatica     Closed impacted fracture of left femoral neck with routine healing s/p percutaneous screw fixation on 11/21/2020 11/20/2020    DDD (degenerative disc disease), lumbar 8/17/2020    Diverticulosis     Essential hypertension 11/25/2019    Facet arthropathy     GERD (gastroesophageal reflux disease) 11/25/2019    Hearing loss     History of transient ischemic attack (TIA) 11/25/2019    IBS (irritable bowel syndrome)     Insomnia     Lumbar radiculopathy 8/17/2020    Lumbar spondylosis 8/17/2020    Lumbar stenosis     Meningioma     Paroxysmal SVT (supraventricular tachycardia)     Primary open angle glaucoma (POAG) of both eyes     Pure hypercholesterolemia 11/25/2019    PVD (peripheral vascular disease)     30% prox stenosis of celiac trunk and 20% stnosis Prox SMA - per Abd/SMA Arteriogram 4/3/08    Recurrent major depressive disorder, in full remission     Type 2 diabetes mellitus without complication, without long-term current use of insulin 11/25/2019    Vitamin D deficiency 11/23/2020       Past Surgical History:   Procedure Laterality Date    CATARACT EXTRACTION      CAUDAL EPIDURAL STEROID INJECTION N/A 08/27/2020    Procedure: Injection-steroid-epidural-caudal with catheter;  Surgeon: Johnathan Gonzalez Jr., MD;  Location: [facility];  Service: Pain Management;  Laterality: N/A;    EPIDURAL STEROID INJECTION INTO LUMBAR SPINE      LUMBAR LAMINECTOMY  2009    PERCUTANEOUS PINNING OF HIP Left 11/21/2020    Procedure: Left- Percutaneous Screws- Large  arm Clock side;  Surgeon: Leandro White MD;  Location: [facility] OR [address] [place] [address];  Service: Orthopedics;  Laterality: Left;    TOTAL ABDOMINAL  HYSTERECTOMY W/ BILATERAL SALPINGOOPHORECTOMY      VENTRICULOPERITONEAL SHUNT         Review of patient's allergies indicates:   Allergen Reactions    Baclofen      AMS and distorted dremas       No current facility-administered medications on file prior to encounter.     Current Outpatient Medications on File Prior to Encounter   Medication Sig    acetaminophen (TYLENOL) 500 MG tablet Take 2 tablets (1,000 mg total) by mouth every 8 (eight) hours as needed (Mild pain).    amLODIPine (NORVASC) 10 MG tablet Take 1 tablet (10 mg total) by mouth once daily.    metoprolol succinate (TOPROL-XL) 100 MG 24 hr tablet Take 1 tablet (100 mg total) by mouth once daily.    polyethylene glycol (GLYCOLAX) 17 gram PwPk Take 17 g by mouth daily as needed (Constipation).    venlafaxine (EFFEXOR-XR) 75 MG 24 hr capsule Take 1 capsule (75 mg total) by mouth once daily.    alendronate (FOSAMAX) 70 MG tablet Take 1 tablet (70 mg total) by mouth every 7 days. (Patient not taking: Reported on 2/28/2025)    atorvastatin (LIPITOR) 10 MG tablet Take 1 tablet (10 mg total) by mouth once daily.    calcium polycarbophil (FIBERCON ORAL) Mixed in 8 ounces of liquid & gave daily as needed for constipation. (Administered last Tue, Wed & Thurs)    clopidogreL (PLAVIX) 75 mg tablet Take 1 tablet (75 mg total) by mouth once daily. (Patient not taking: Reported on 2/28/2025)    ergocalciferol (ERGOCALCIFEROL) 50,000 unit Cap Take 1 capsule by mouth once weekly x 12 weeks and then start OTC vit D3 2000 units daily    [DISCONTINUED] blood-glucose meter kit To check BG three times daily, to use with insurance preferred meter (Patient not taking: Reported on 5/29/2022)    [DISCONTINUED] calcium carbonate (OS-ELODIA) 500 mg calcium (1,250 mg) tablet Take 1 tablet (500 mg total) by mouth 2 (two) times daily.    [DISCONTINUED] fexofenadine (ALLEGRA) 180 MG tablet Take 1 tablet (180 mg total) by mouth daily as needed.     Family History       Problem Relation  (Age of Onset)    Diabetes Mother    Hyperlipidemia Son    Hypertension Mother, Father, Daughter, Son    Hypothyroidism Daughter    Multiple sclerosis Son    Stroke Father          Tobacco Use    Smoking status: Never    Smokeless tobacco: Never   Substance and Sexual Activity    Alcohol use: Yes     Comment: socially    Drug use: Never    Sexual activity: Not Currently     Review of Systems   All other systems reviewed and are negative.    Objective:     Vital Signs (Most Recent):  Temp: 98.1 °F (36.7 °C) (03/27/25 0534)  Pulse: 62 (03/27/25 0812)  Resp: 20 (03/27/25 0703)  BP: 139/65 (03/27/25 0812)  SpO2: 96 % (03/27/25 0703) Vital Signs (24h Range):  Temp:  [98.1 °F (36.7 °C)-98.8 °F (37.1 °C)] 98.1 °F (36.7 °C)  Pulse:  [60-78] 62  Resp:  [15-24] 20  SpO2:  [95 %-99 %] 96 %  BP: (139-227)/() 139/65        There is no height or weight on file to calculate BMI.     Physical Exam  Vitals and nursing note reviewed.   Constitutional:       General: She is not in acute distress.     Appearance: She is well-developed. She is not ill-appearing, toxic-appearing or diaphoretic.      Comments: Frail in appearance   HENT:      Head: Normocephalic and atraumatic.      Right Ear: External ear normal.      Left Ear: External ear normal.      Ears:      Comments: Very hard of hearing, but with raised voice we communicate without difficulty     Nose: Nose normal.      Mouth/Throat:      Mouth: Mucous membranes are dry.   Eyes:      Extraocular Movements: Extraocular movements intact.      Conjunctiva/sclera: Conjunctivae normal.   Cardiovascular:      Rate and Rhythm: Normal rate and regular rhythm.      Heart sounds: Normal heart sounds.   Pulmonary:      Effort: Pulmonary effort is normal. No respiratory distress.      Breath sounds: Normal breath sounds.   Abdominal:      General: Bowel sounds are normal. There is no distension.      Palpations: Abdomen is soft.      Tenderness: There is no abdominal tenderness.  "  Musculoskeletal:         General: Normal range of motion.      Cervical back: Normal range of motion. No rigidity.   Skin:     General: Skin is warm and dry.   Neurological:      Mental Status: She is alert.      Cranial Nerves: No cranial nerve deficit.      Coordination: Coordination normal.      Comments: Alert and oriented to self and "hospital" ([facility]), but not year (1986), city ([place]) or president.  Knows that she fell out of the bed yesterday. Hard of hearing.  Fluent speech. Significant diffuse weakness.  Charming demeanor and personality.   Psychiatric:         Mood and Affect: Mood normal.                Significant Labs: All pertinent labs within the past 24 hours have been reviewed.    Significant Imaging: I have reviewed all pertinent imaging results/findings within the past 24 hours.  "

## 2025-03-27 NOTE — SUBJECTIVE & OBJECTIVE
Interval History: Patient very pleasant, seen in the ED. Denies any complaints.     Past Medical History:   Diagnosis Date    Age-related osteoporosis with current pathological fracture with routine healing     ANAHI (acute kidney injury) 1/12/2023    Allergic rhinitis     Anemia     Carotid atherosclerosis, bilateral     Chronic gout of multiple sites     Chronic low back pain with bilateral sciatica     Closed impacted fracture of left femoral neck with routine healing s/p percutaneous screw fixation on 11/21/2020 11/20/2020    DDD (degenerative disc disease), lumbar 8/17/2020    Diverticulosis     Essential hypertension 11/25/2019    Facet arthropathy     GERD (gastroesophageal reflux disease) 11/25/2019    Hearing loss     History of transient ischemic attack (TIA) 11/25/2019    IBS (irritable bowel syndrome)     Insomnia     Lumbar radiculopathy 8/17/2020    Lumbar spondylosis 8/17/2020    Lumbar stenosis     Meningioma     Paroxysmal SVT (supraventricular tachycardia)     Primary open angle glaucoma (POAG) of both eyes     Pure hypercholesterolemia 11/25/2019    PVD (peripheral vascular disease)     30% prox stenosis of celiac trunk and 20% stnosis Prox SMA - per Abd/SMA Arteriogram 4/3/08    Recurrent major depressive disorder, in full remission     Type 2 diabetes mellitus without complication, without long-term current use of insulin 11/25/2019    Vitamin D deficiency 11/23/2020       Past Surgical History:   Procedure Laterality Date    CATARACT EXTRACTION      CAUDAL EPIDURAL STEROID INJECTION N/A 08/27/2020    Procedure: Injection-steroid-epidural-caudal with catheter;  Surgeon: Johnathan Gonzalez Jr., MD;  Location: [facility] PAIN MGT;  Service: Pain Management;  Laterality: N/A;    EPIDURAL STEROID INJECTION INTO LUMBAR SPINE      LUMBAR LAMINECTOMY  2009    PERCUTANEOUS PINNING OF HIP Left 11/21/2020    Procedure: Left- Percutaneous Screws- Large  arm Clock side;  Surgeon: Leandro White MD;  Location:  [facility] OR 2ND FLR;  Service: Orthopedics;  Laterality: Left;    TOTAL ABDOMINAL HYSTERECTOMY W/ BILATERAL SALPINGOOPHORECTOMY      VENTRICULOPERITONEAL SHUNT         Review of patient's allergies indicates:   Allergen Reactions    Baclofen      AMS and distorted dremas       Medications:  Continuous Infusions:   lactated ringers   Intravenous Continuous 100 mL/hr at 03/27/25 0842 New Bag at 03/27/25 0842     Scheduled Meds:   amLODIPine  10 mg Oral Daily    atorvastatin  10 mg Oral Daily    docusate sodium  100 mg Oral BID    enoxparin  40 mg Subcutaneous Daily    metoprolol succinate  100 mg Oral Daily    polyethylene glycol  17 g Oral Daily    venlafaxine  75 mg Oral Daily     PRN Meds:  Current Facility-Administered Medications:     acetaminophen, 650 mg, Oral, Q6H PRN    dextrose 50%, 12.5 g, Intravenous, PRN    dextrose 50%, 25 g, Intravenous, PRN    glucagon (human recombinant), 1 mg, Intramuscular, PRN    glucose, 16 g, Oral, PRN    glucose, 24 g, Oral, PRN    hydrALAZINE, 15 mg, Intravenous, Q4H PRN    insulin aspart U-100, 0-10 Units, Subcutaneous, QID (AC + HS) PRN    loperamide, 2 mg, Oral, QID PRN    melatonin, 6 mg, Oral, Nightly PRN    naloxone, 0.02 mg, Intravenous, PRN    ondansetron, 4 mg, Intravenous, Q4H PRN    sodium chloride 0.9%, 10 mL, Intravenous, PRN    Family History       Problem Relation (Age of Onset)    Diabetes Mother    Hyperlipidemia Son    Hypertension Mother, Father, Daughter, Son    Hypothyroidism Daughter    Multiple sclerosis Son    Stroke Father          Tobacco Use    Smoking status: Never    Smokeless tobacco: Never   Substance and Sexual Activity    Alcohol use: Yes     Comment: socially    Drug use: Never    Sexual activity: Not Currently       Review of Systems   Unable to perform ROS: Dementia     Objective:     Vital Signs (Most Recent):  Temp: 98.1 °F (36.7 °C) (03/27/25 0534)  Pulse: 62 (03/27/25 0812)  Resp: 20 (03/27/25 0703)  BP: 139/65 (03/27/25 0812)  SpO2: 96 %  (03/27/25 0703) Vital Signs (24h Range):  Temp:  [98.1 °F (36.7 °C)-98.8 °F (37.1 °C)] 98.1 °F (36.7 °C)  Pulse:  [60-78] 62  Resp:  [15-24] 20  SpO2:  [95 %-99 %] 96 %  BP: (139-227)/() 139/65        There is no height or weight on file to calculate BMI.       Physical Exam  Constitutional:       Appearance: She is ill-appearing.      Comments: Elderly appearing female  Mild temporal lobe wasting    Cardiovascular:      Rate and Rhythm: Normal rate.   Pulmonary:      Effort: No respiratory distress.   Neurological:      Mental Status: She is alert. She is disoriented.      Comments: Oriented to person, knows she is in the hospital, thinks she is in [place]            Review of Symptoms      Symptom Assessment (ESAS 0-10 Scale)  Unable to complete assessment due to Dementia         Pain Assessment in Advanced Demential Scale (PAINAD)   Breathing - Independent of vocalization:  0  Negative vocalization:  0  Facial expression:  0  Body language:  0  Consolability:  0  Total:  0    Living Arrangements:  Lives with family    Psychosocial/Cultural:   See Palliative Psychosocial Note: Yes  - Patient reports she lives with her daughter, Chelsey  - Reports she has 5 children   **Primary  to Follow**  Palliative Care  Consult: No        Advance Care Planning   Advance Care Planning       Significant Labs: All pertinent labs within the past 24 hours have been reviewed.  CBC:   Recent Labs   Lab 03/27/25  0848   WBC 8.44   HGB 14.0   HCT 41.9   MCV 97        BMP:  Recent Labs   Lab 03/27/25  0848   *   K 3.9      CO2 17*   BUN 18   CREATININE 0.7   CALCIUM 9.8   MG 1.9     LFT:  Lab Results   Component Value Date    AST 17 03/27/2025    ALKPHOS 75 03/27/2025    BILITOT 0.4 03/27/2025     Albumin:   Albumin   Date Value Ref Range Status   03/27/2025 3.6 3.5 - 5.2 g/dL Final   02/28/2025 3.8 3.5 - 5.2 g/dL Final     Protein:   Total Protein   Date Value Ref Range Status    02/28/2025 9.4 (H) 6.0 - 8.4 g/dL Final     Lactic acid:   Lab Results   Component Value Date    LACTATE 2.1 03/27/2025    LACTATE 2.2 06/28/2022       Significant Imaging: I have reviewed all pertinent imaging results/findings within the past 24 hours.

## 2025-03-27 NOTE — ASSESSMENT & PLAN NOTE
-A1c 5.3  -Diet controlled at home  -Monitor accu-checks ac/hs and if needed give ISS   Called [employer] to check on the prior authorization for the 40mg Humira, per the rep it was approved until 03/16/2022.    Marleni

## 2025-03-27 NOTE — ASSESSMENT & PLAN NOTE
"- Consult for advance care planning/ goals of care in chronically ill patient admitted with dementia.  - Along with Bernie Valenzuela RN, visited with Mrs. Soares in the ED. She was very pleasant, sitting in bed, reports feeling cold. She is oriented to self and that she is at the hospital. She stated she is feeling okay and denies any complaints. She stated she lives with her daughter, Chelsey. She stated she has 5 children. Updated her on reason for admission.  - Attempted to call her emergency contact. Attempted Colleen, no answer. Attempted Sherin, no answer. Attempted Nathan, no answer. Voicemail left.   - Of note, patient does not have advance directives on file.   - Per last visit with her PCP on 25: "limited interaction with me during the exam, although a good portion of that may be due to patient's hearing loss"  - Per PCP visit: "She has been bed-ridden for several years.  She lives with her son.  Visiting Nipomo comes for 4 hours a day.  She has started staying up for 48 hours and then sleeping for 48 hours.  Her dementia waxes and wanes.  Slowly progressing.  She is able to feed herself and drink.  She can brush her teeth.  She needs assistance dressing, toileting, bathing.  She moved here from [place] when her daughter .  Her daughter Colleen Soares, son, Dr. Nathan Soares, and sister Sherin RodgerTierra."  - Will continue to reach out to family   "

## 2025-03-27 NOTE — ASSESSMENT & PLAN NOTE
- CT reviewed; patient was given enema and disimpacted by ED RNs  - Appears she has Miralax ordered for home use

## 2025-03-28 VITALS
OXYGEN SATURATION: 98 % | TEMPERATURE: 98 F | WEIGHT: 134.25 LBS | HEART RATE: 65 BPM | RESPIRATION RATE: 20 BRPM | HEIGHT: 62 IN | SYSTOLIC BLOOD PRESSURE: 163 MMHG | BODY MASS INDEX: 24.7 KG/M2 | DIASTOLIC BLOOD PRESSURE: 72 MMHG

## 2025-03-28 LAB
ABSOLUTE EOSINOPHIL (OHS): 0.07 K/UL
ABSOLUTE MONOCYTE (OHS): 1.27 K/UL (ref 0.3–1)
ABSOLUTE NEUTROPHIL COUNT (OHS): 6.05 K/UL (ref 1.8–7.7)
ANION GAP (OHS): 13 MMOL/L (ref 8–16)
BASOPHILS # BLD AUTO: 0.04 K/UL
BASOPHILS NFR BLD AUTO: 0.4 %
BUN SERPL-MCNC: 9 MG/DL (ref 8–23)
CALCIUM SERPL-MCNC: 9.6 MG/DL (ref 8.7–10.5)
CHLORIDE SERPL-SCNC: 104 MMOL/L (ref 95–110)
CO2 SERPL-SCNC: 18 MMOL/L (ref 23–29)
CREAT SERPL-MCNC: 0.7 MG/DL (ref 0.5–1.4)
ERYTHROCYTE [DISTWIDTH] IN BLOOD BY AUTOMATED COUNT: 13.6 % (ref 11.5–14.5)
GFR SERPLBLD CREATININE-BSD FMLA CKD-EPI: >60 ML/MIN/1.73/M2
GLUCOSE SERPL-MCNC: 109 MG/DL (ref 70–110)
HCT VFR BLD AUTO: 40.5 % (ref 37–48.5)
HGB BLD-MCNC: 13.6 GM/DL (ref 12–16)
IMM GRANULOCYTES # BLD AUTO: 0.02 K/UL (ref 0–0.04)
IMM GRANULOCYTES NFR BLD AUTO: 0.2 % (ref 0–0.5)
LYMPHOCYTES # BLD AUTO: 2.85 K/UL (ref 1–4.8)
MAGNESIUM SERPL-MCNC: 1.6 MG/DL (ref 1.6–2.6)
MCH RBC QN AUTO: 31.9 PG (ref 27–50)
MCHC RBC AUTO-ENTMCNC: 33.6 G/DL (ref 32–36)
MCV RBC AUTO: 95 FL (ref 82–98)
NUCLEATED RBC (/100WBC) (OHS): 0 /100 WBC
PLATELET # BLD AUTO: 263 K/UL (ref 150–450)
PMV BLD AUTO: 10.2 FL (ref 9.2–12.9)
POCT GLUCOSE: 134 MG/DL (ref 70–110)
POTASSIUM SERPL-SCNC: 3.6 MMOL/L (ref 3.5–5.1)
RBC # BLD AUTO: 4.26 M/UL (ref 4–5.4)
RELATIVE EOSINOPHIL (OHS): 0.7 %
RELATIVE LYMPHOCYTE (OHS): 27.7 % (ref 18–48)
RELATIVE MONOCYTE (OHS): 12.3 % (ref 4–15)
RELATIVE NEUTROPHIL (OHS): 58.7 % (ref 38–73)
SODIUM SERPL-SCNC: 135 MMOL/L (ref 136–145)
WBC # BLD AUTO: 10.3 K/UL (ref 3.9–12.7)

## 2025-03-28 PROCEDURE — 25000003 PHARM REV CODE 250: Performed by: HOSPITALIST

## 2025-03-28 PROCEDURE — 80048 BASIC METABOLIC PNL TOTAL CA: CPT | Performed by: HOSPITALIST

## 2025-03-28 PROCEDURE — 83735 ASSAY OF MAGNESIUM: CPT | Performed by: HOSPITALIST

## 2025-03-28 PROCEDURE — 36415 COLL VENOUS BLD VENIPUNCTURE: CPT | Performed by: HOSPITALIST

## 2025-03-28 PROCEDURE — 85025 COMPLETE CBC W/AUTO DIFF WBC: CPT | Performed by: HOSPITALIST

## 2025-03-28 RX ORDER — DOCUSATE SODIUM 100 MG/1
100 CAPSULE, LIQUID FILLED ORAL 2 TIMES DAILY
Qty: 60 CAPSULE | Refills: 1 | Status: SHIPPED | OUTPATIENT
Start: 2025-03-28

## 2025-03-28 RX ORDER — POLYETHYLENE GLYCOL 3350 17 G/17G
17 POWDER, FOR SOLUTION ORAL DAILY
Qty: 510 G | Refills: 1 | Status: SHIPPED | OUTPATIENT
Start: 2025-03-28

## 2025-03-28 RX ADMIN — AMLODIPINE BESYLATE 10 MG: 5 TABLET ORAL at 10:03

## 2025-03-28 RX ADMIN — VENLAFAXINE HYDROCHLORIDE 75 MG: 75 CAPSULE, EXTENDED RELEASE ORAL at 10:03

## 2025-03-28 RX ADMIN — POLYETHYLENE GLYCOL 3350 17 G: 17 POWDER, FOR SOLUTION ORAL at 10:03

## 2025-03-28 RX ADMIN — ATORVASTATIN CALCIUM 10 MG: 10 TABLET, FILM COATED ORAL at 10:03

## 2025-03-28 RX ADMIN — METOPROLOL SUCCINATE ER TABLETS 100 MG: 50 TABLET, FILM COATED, EXTENDED RELEASE ORAL at 10:03

## 2025-03-28 RX ADMIN — DOCUSATE SODIUM 100 MG: 100 CAPSULE, LIQUID FILLED ORAL at 10:03

## 2025-03-28 NOTE — DISCHARGE SUMMARY
"[facility] Surg [address] Medicine  Discharge Summary      Patient Name: Farheen Soares  MRN: [id number]  VERENA: [id number]  Patient Class: IP- Inpatient  Admission Date: 3/26/2025  Hospital Length of Stay: 1 days  Discharge Date and Time: No discharge date for patient encounter.  Attending Physician: Nallely Arteaga MD   Discharging Provider: Nallely Arteaga MD  Primary Care Provider: Vonda Chung MD    Primary Care Team: Networked reference to record PCT     HPI:   Ms. Soares is an 89 year old woman with dementia, significant hearing loss, NPH s/p  shunt, hypertension, diabetes mellitus, bed-bound for about 1 year, gout, IBS, MDD, PSVT, IBS, diverticulosis and chronic low back pain who presented for evaluation of altered mental status after having slid out of her bed.  The history is obtained from interview of her two surviving daughters, the patient and review of patient's chart.  The patient is charming and conversant, albeit quite hard of hearing.  She recalls "falling" out of bed and hitting her left thigh and buttock.  She states she was getting ready for the doctor to visit.  Her only complaint is of rectal discomfort, after having just received an enema and disimpaction.  She denies fevers, chills, chest pain, abdominal pain, back pain, shortness of breath, nausea, vomiting and diarrhea.  Her daughters note that every 2-3 months the patient develops constipation and worsening of her mental status when that happens.  They report she has been bed bound for a bit over a year.  She has been eating well and no recent significant issues before yesterday.    Goals of Care Treatment Preferences:  Code Status: Full Code         Consults:   Consults (From admission, onward)          Status Ordering Provider     Inpatient consult to Palliative Care  Once        Provider:  (Not yet assigned)    Completed NALLELY ARTEAGA          Hospital Course By Problem:   Assessment & Plan  Acute " encephalopathy  -Admitted to inpatient status  -Presented for evaluation of altered mental status after falling out of bed.  Daughters report baseline dementia which worsens every 2-3 months when she develops constipation.  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  -Shunt series showed stable Codman-Hakim ventriculostomy shunt catheter settings with no evidence of shunt catheter discontinuity.  -CT abdomen pelvis showed a large amount of stool in the rectum and sigmoid colon with mild colonic wall thickening   -UA has no signs of infection.  CXR showed no signs of pneumonia or acute findings  -TSH, BNP, PCT and UDS are normal  -Labs suggested mild dehydration.  -Suspect this is a mild encephalopathy due to her baseline dementia, mild dehydration and significant constipation - as with prior occurrences per her daughters.  -In ED treated with IV fluids, cipro and flagyl.  Do not think we need to continue antibiotics at this time  -She was treated with IV fluids, colace and miralax.  She has had two bowel movements.  She was seen and examined today and states she feels great and is eager to go home.  I feel discharge is medically appropriate now.  I have called her daughter Colleen and left a VM with my cellphone number if she has any questions.  Recommend they focus on maintaining good oral hydration and continue with colace and miralax daily.  Follow up with pcp within 1 week.  We have ordered a home health service as well.  Offered hospital bed and bedside commode for at home.  They already have BSC and are happy with their present bed.  Meningioma  -Noted history and  appears stable on present imaging  NPH (normal pressure hydrocephalus)  -Noted history  -Shunt appears stable as above  Dementia  -Treatment as above  Fall  -Patient bed bound for a bit over 1 year  -Fall precautions ordered  -Consulted PT/OT who recommend home health  Constipation  -Treatment as above  Mild dehydration  -Treated with IV fluids as above  -Labs are stable  -Encourage continued efforts to maintain good oral hydration at home, which can be challenging with patients who have dementia  Essential hypertension  -BP mildly to moderately elevated - but not pursuing tight control given her advanced age.  -Continue home norvasc and toprol  GERD (gastroesophageal reflux disease)  -Noted history and not on treatment at home  -Monitored and no complaints made  Hyperlipidemia  -Continue home statin  Type 2 diabetes mellitus with diabetic peripheral angiopathy without gangrene, without long-term current use of insulin  -A1c 5.3  -Diet controlled at home  -Monitor accu-checks ac/hs and if needed give ISS.  Continue diet control at home  Chronic gout of multiple sites  -Noted history  -No acute flair  Chronic pain  -Noted history  -Denies pain throughout hospitalization.  Depression  -Appears well controlled  -Continue home venlafaxine  ACP (advance care planning)  -Palliative care consulted and input appreciated  -Presently patient is full code  Final Active Diagnoses:    Diagnosis Date Noted POA    PRINCIPAL PROBLEM:  Acute encephalopathy [G93.40] 03/27/2025 Yes    Dementia [F03.90] 02/28/2025 Yes    NPH (normal pressure hydrocephalus) [G91.2] 07/12/2021 Yes    Meningioma [D32.9] 11/22/2020 Yes    Fall [W19.XXXA] 11/20/2020 Yes    Constipation [K59.00] 01/10/2023 Yes    Mild dehydration [E86.0] 03/27/2025 Yes    Essential hypertension [I10] 11/25/2019 Yes    GERD (gastroesophageal reflux disease) [K21.9] 11/25/2019 Yes    Hyperlipidemia [E78.5] 02/08/2021 Yes    Type 2 diabetes mellitus with diabetic peripheral  "angiopathy without gangrene, without long-term current use of insulin [E11.51] 02/28/2025 Yes    ACP (advance care planning) [Z71.89] 03/27/2025 Not Applicable    Depression [F32.A] 02/08/2021 Yes    Chronic pain [G89.29] 08/27/2020 Yes    Chronic gout of multiple sites [M1A.09X0]  Yes      Problems Resolved During this Admission:       Discharged Condition: stable    Disposition: Home-Health Care [facility]    Follow Up:    Patient Instructions:      HOSPITAL BED FOR HOME USE     Order Specific Question Answer Comments   Type: Manual    Length of need (1-99 months): 3    Does patient have medical equipment at home? walker, rolling    Does patient have medical equipment at home? cane, quad    Does patient have medical equipment at home? wheelchair    Height: 5' 2" (1.575 m)    Weight: 60.9 kg (134 lb 4.2 oz)    Please check all that apply: Patient requires positioning of the body in ways not feasible in an ordinary bed due to a medical condition which is expected to last at least one month.    Please check all that apply: Patient requires, for the alleviation of pain, positioning of the body in ways not feasible in an ordinary bed.    Please check all that apply: Patient requires a bed height different than a fixed height hospital bed to permit transfers to chair, wheelchair, or standing.      COMMODE FOR HOME USE     Order Specific Question Answer Comments   Type: Standard    Height: 5' 2" (1.575 m)    Weight: 60.9 kg (134 lb 4.2 oz)    Does patient have medical equipment at home? walker, rolling    Does patient have medical equipment at home? cane, quad    Does patient have medical equipment at home? wheelchair    Length of need (1-99 months): 3      Diet Cardiac     Diet diabetic     Notify your health care provider if you experience any of the following:  increased confusion or weakness     Notify your health care provider if you experience any of the following:  persistent dizziness, light-headedness, or visual " "disturbances     Notify your health care provider if you experience any of the following:  worsening rash     Notify your health care provider if you experience any of the following:  severe persistent headache     Notify your health care provider if you experience any of the following:  difficulty breathing or increased cough     Notify your health care provider if you experience any of the following:  severe uncontrolled pain     Notify your health care provider if you experience any of the following:  persistent nausea and vomiting or diarrhea     Notify your health care provider if you experience any of the following:  temperature >100.4     Activity as tolerated       Significant Diagnostic Studies: Labs: BMP:   Recent Labs   Lab 03/26/25 2110 03/27/25  0848 03/28/25  0453    135* 135*   K 3.6 3.9 3.6   * 107 104   CO2 14* 17* 18*   BUN 16 18 9   CREATININE 0.7 0.7 0.7   CALCIUM 8.5* 9.8 9.6   MG  --  1.9 1.6   , CMP   Recent Labs   Lab 03/26/25 2110 03/27/25  0848 03/28/25  0453    135* 135*   K 3.6 3.9 3.6   * 107 104   CO2 14* 17* 18*   BUN 16 18 9   CREATININE 0.7 0.7 0.7   CALCIUM 8.5* 9.8 9.6   ALBUMIN 3.3* 3.6  --    BILITOT 0.3 0.4  --    ALKPHOS 65 75  --    AST 20 17  --    ALT 10 10  --    ANIONGAP 12 11 13   , CBC   Recent Labs   Lab 03/26/25 1944 03/27/25  0848 03/28/25  0453   WBC 12.75* 8.44 10.30   HGB 15.6 14.0 13.6   HCT 46.4 41.9 40.5    273 263   , INR   Lab Results   Component Value Date    INR 1.0 08/01/2022    INR 1.0 07/12/2021    INR 1.0 11/20/2020   , Lipid Panel   Lab Results   Component Value Date    CHOL 128 02/28/2025    HDL 33 (L) 02/28/2025    LDLCALC 72.0 02/28/2025    TRIG 115 02/28/2025    CHOLHDL 25.8 02/28/2025   , Troponin No results for input(s): "TROPONINI" in the last 168 hours., and A1C:   Recent Labs   Lab 02/28/25  1430   HGBA1C 5.3       Pending Diagnostic Studies:       None           Medications:  Reconciled Home Medications:    "   Medication List        START taking these medications      docusate sodium 100 MG capsule  Commonly known as: COLACE  Take 1 capsule (100 mg total) by mouth 2 (two) times daily.            CHANGE how you take these medications      polyethylene glycol 17 gram Pwpk  Commonly known as: GLYCOLAX  Take 17 g by mouth once daily.  What changed:   when to take this  reasons to take this            CONTINUE taking these medications      acetaminophen 500 MG tablet  Commonly known as: TYLENOL  Take 2 tablets (1,000 mg total) by mouth every 8 (eight) hours as needed (Mild pain).     amLODIPine 10 MG tablet  Commonly known as: NORVASC  Take 1 tablet (10 mg total) by mouth once daily.     atorvastatin 10 MG tablet  Commonly known as: LIPITOR  Take 1 tablet (10 mg total) by mouth once daily.     metoprolol succinate 100 MG 24 hr tablet  Commonly known as: TOPROL-XL  Take 1 tablet (100 mg total) by mouth once daily.     venlafaxine 75 MG 24 hr capsule  Commonly known as: EFFEXOR-XR  Take 1 capsule (75 mg total) by mouth once daily.            STOP taking these medications      alendronate 70 MG tablet  Commonly known as: FOSAMAX     clopidogreL 75 mg tablet  Commonly known as: PLAVIX     FIBERCON ORAL     VITAMIN D2 1,250 mcg (50,000 unit) capsule  Generic drug: ergocalciferol              Indwelling Lines/Drains at time of discharge:   Lines/Drains/Airways       None                   Time spent on the discharge of patient: 35 minutes         Norm West MD  Department of Hospital Medicine  [facility] Surg ([address])

## 2025-03-28 NOTE — ASSESSMENT & PLAN NOTE
-Treated with IV fluids as above  -Labs are stable  -Encourage continued efforts to maintain good oral hydration at home, which can be challenging with patients who have dementia

## 2025-03-28 NOTE — PLAN OF CARE
Case Management Final Discharge Note    Discharge Disposition: Home    New DME ordered / company name: N/A    Relevant SDOH / Transition of Care Barriers:  Mobility    Person available to provide assistance at home when needed and their contact information: Colleen, Daughter    Scheduled followup appointment: PCP on AVS    Referrals placed: Home Health - [facility]    Transportation: ADT30 - Ambulance  time set for 1600.     Patient and family educated on discharge services and updated on DC plan. Bedside RN notified, patient clear to discharge from Case Management Perspective.     [facility]urg ([address])  Discharge Final Note    Primary Care Provider: Vonda Chung MD    Expected Discharge Date: 3/28/2025    Final Discharge Note (most recent)       Final Note - 03/28/25 1309          Final Note    Assessment Type Final Discharge Note (P)      Anticipated Discharge Disposition Home-Health Care Svc (P)      Hospital Resources/Appts/Education Provided Provided patient/caregiver with written discharge plan information;Appointments scheduled and added to AVS (P)         Post-Acute Status    Discharge Delays None known at this time (P)                      Important Message from Medicare  Important Message from Medicare regarding Discharge Appeal Rights: Explained to patient/caregiver, Signed/date by patient/caregiver     Date IMM was signed: 03/27/25  Time IMM was signed: 2123     Follow-up providers       Vonda Chung MD   Specialty: Internal Medicine   Relationship: PCP - General    [address]  [facility] [address]   Phone: [phone]       Next Steps: Follow up on 4/9/2025    Instructions: Appointment time at 10:30 AM              After-discharge care                Home Medical Care       *[facility]   Service: Home Nursing    [address]  [address]  [facility] [address]   Phone: [phone]

## 2025-03-28 NOTE — PLAN OF CARE
Case Management Assessment     PCP: Vonda Chung MD  Pharmacy: Bedside    Patient Arrived From: Home  Existing Help at Home: DaughterColleen [phone]    Barriers to Discharge: Dementia, mobility    Discharge Plan:    A. SNF   B. Home with family    Initial discharge assessment completed with health record; patient disoriented; Uses wheelchair, walker, and cane at home to support mobility and at times needs assistance with ADL's; Family may provide transport at discharge or transportation my be needed.     [facility] ([address])  Initial Discharge Assessment       Primary Care Provider: Vonda Chung MD    Admission Diagnosis: Encephalopathy [G93.40]  Chest pain [R07.9]  Fall, initial encounter [W19.XXXA]  Constipation, unspecified constipation type [K59.00]  AMS (altered mental status) [R41.82]    Admission Date: 3/26/2025  Expected Discharge Date:     Transition of Care Barriers: (P) Other (see comments) (Dementia)    Payor: [employer] [facility] / Plan: [employer] CHOICES / Product Type: Medicare Advantage /     Extended Emergency Contact Information  Primary Emergency Contact: Colleen Soares  Mobile Phone: [phone]  Relation: Daughter  Secondary Emergency Contact: RodgerChenEugeneSherin  Mobile Phone: [phone]  Relation: Daughter    Discharge Plan A: (P) Skilled Nursing Facility  Discharge Plan B: (P) Home with family      [facility]  [address] [place] [address]  Phone: [phone] Fax: [phone]    [facility]/pharmacy #[id number] - [place], [place] - [address]  [address]  [place] [address]  Phone: [phone] Fax: [phone]    [facility]  [address]  [place] [address]  Phone: [phone] Fax: [phone]    [facility]  [address] [place] [address]  Phone: [phone] Fax: [phone]      Initial Assessment (most recent)       Adult  Discharge Assessment - 03/28/25 0753          Discharge Assessment    Assessment Type Discharge Planning Assessment     Confirmed/corrected address, phone number and insurance Yes     Confirmed Demographics Correct on Facesheet     Source of Information health record     Reason For Admission Acute encephalopathy     People in Home child(sammy), adult     Facility Arrived From: Home     Do you expect to return to your current living situation? Yes     Do you have help at home or someone to help you manage your care at home? Yes     Who are your caregiver(s) and their phone number(s)? Colleen Soarse (Daughter)  [phone] (Mobile)     Prior to hospitilization cognitive status: Unable to Assess     Current cognitive status: Unable to Assess     Walking or Climbing Stairs Difficulty yes     Walking or Climbing Stairs ambulation difficulty, requires equipment     Mobility Management cane, walker, wheelchair     Home Accessibility wheelchair accessible     Equipment Currently Used at Home walker, rolling;cane, quad;wheelchair     Readmission within 30 days? No (P)      Patient currently being followed by outpatient case management? No (P)      Do you currently have service(s) that help you manage your care at home? No (P)      Do you take prescription medications? Yes (P)      Do you have prescription coverage? Yes (P)      Coverage [facility] CHOICES (P)      Do you have any problems affording any of your prescribed medications? TBD (P)      Is the patient taking medications as prescribed? yes (P)      Who is going to help you get home at discharge? Colleen Soares (Daughter)  [phone] (Mobile) (P)      How do you get to doctors appointments? family or friend will provide (P)      Are you on dialysis? No (P)      Do you take coumadin? No (P)      Discharge Plan A Skilled Nursing Facility (P)      Discharge Plan B Home with family (P)      DME Needed Upon Discharge  none (P)       Transition of Care Barriers Other (see comments) (P)    Dementia

## 2025-03-28 NOTE — PLAN OF CARE
Problem: Adult Inpatient Plan of Care  Goal: Plan of Care Review  3/28/2025 1659 by Larissa Jackson RN  Outcome: Progressing  3/28/2025 0938 by Larissa Jackson RN  Outcome: Progressing     Problem: Skin Injury Risk Increased  Goal: Skin Health and Integrity  3/28/2025 1659 by Larissa Jackson RN  Outcome: Progressing  3/28/2025 0938 by Larissa Jackson RN  Outcome: Progressing     Problem: Skin Injury Risk Increased  Goal: Skin Health and Integrity  3/28/2025 0938 by Larissa Jackson RN  Outcome: Progressing     Problem: Adult Inpatient Plan of Care  Goal: Patient-Specific Goal (Individualized)  3/28/2025 1659 by Larissa Jackson RN  Outcome: Progressing  3/28/2025 0938 by Larissa Jackson RN  Outcome: Progressing     Problem: Adult Inpatient Plan of Care  Goal: Optimal Comfort and Wellbeing  3/28/2025 1659 by Larissa Jackson RN  Outcome: Progressing  3/28/2025 0938 by Larissa Jackson RN  Outcome: Progressing     Problem: Coping Ineffective  Goal: Effective Coping  3/28/2025 1659 by Larissa Jackson RN  Outcome: Progressing  3/28/2025 0938 by Larissa Jackson RN  Outcome: Progressing  Oriented to person only.  Slept all day. Good urine output. Poor appetite.  No other distress reported. Afebrile this shift. Poor appetite. Turned in bed per two hours. Good UOP. Free from injuries or skin breakdown. Fall precautions maintained. Call light within reach. Purposeful hourly rounding completed this shift. Discharging to home. Ambulance .

## 2025-03-28 NOTE — DISCHARGE INSTRUCTIONS
Take all medications as prescribed.  Eat a strict low salt cardiac diet.  Follow up with your physicians as scheduled - pcp within 1 week.  We are ordering a home health service for you as well.  Thank you for trusting [facility] and Dr. West with your care.  We are honored that you entrusted us with your healthcare needs. Your satisfaction is very important to us and we hope you have been very pleased with your experience at [facility]. After your discharge you may receive a survey asking you to rate your hospital experience. We encourage you to take the time to complete the survey as your feedback allows us to identify areas for improvement as well as recognize our staff.   We hope that you have received the very best care possible during your hospitalization at [facility], as your satisfaction is our top priority.

## 2025-03-28 NOTE — PLAN OF CARE
Medicare Message     Important Message from Medicare regarding Discharge Appeal Rights Explained to patient/caregiver; Signed/date by patient/caregiver   Date IMM was signed 3/27/2025   Time IMM was signed 2123

## 2025-03-28 NOTE — ED NOTES
Pt soiled of urine and stool; cleaned. New brief, gown, and linens provided. Pt repositioned on stretcher for comfort. Pt replaced on Pure wick external catheter to continuous low wall suction. Bed locked in lowest position; side rails up and locked x 2; call light, bedside table, and personal belongings within reach. Pt instructed to alert nurse for assistance and before attempting to get out of bed; verbalizes understanding; monitoring ongonig.

## 2025-03-28 NOTE — ASSESSMENT & PLAN NOTE
-BP mildly to moderately elevated - but not pursuing tight control given her advanced age.  -Continue home norvasc and toprol

## 2025-03-28 NOTE — PLAN OF CARE
[facility] ([address])      HOME HEALTH ORDERS  FACE TO FACE ENCOUNTER    Patient Name: Farheen Soares  YOB: 1935    PCP: Vonda Chung MD   [facility] [address] [facility] [address]  PCP Phone Number: [phone]  PCP Fax: [phone]    Encounter Date: 3/26/25    Admit to Home Health    Diagnoses:  Active Hospital Problems    Diagnosis  POA    *Acute encephalopathy [G93.40]  Yes     Priority: 1 - High    Dementia [F03.90]  Yes     Priority: 1 - High    NPH (normal pressure hydrocephalus) [G91.2]  Yes     Priority: 1 - High    Meningioma [D32.9]  Yes     Priority: 1 - High    Fall [W19.XXXA]  Yes     Priority: 2     Constipation [K59.00]  Yes     Priority: 3     Mild dehydration [E86.0]  Yes     Priority: 4     Essential hypertension [I10]  Yes     Priority: 5     GERD (gastroesophageal reflux disease) [K21.9]  Yes     Priority: 6     Hyperlipidemia [E78.5]  Yes     Priority: 7     Type 2 diabetes mellitus with diabetic peripheral angiopathy without gangrene, without long-term current use of insulin [E11.51]  Yes     Priority: 8     ACP (advance care planning) [Z71.89]  Not Applicable    Depression [F32.A]  Yes    Chronic pain [G89.29]  Yes    Chronic gout of multiple sites [M1A.09X0]  Yes      Resolved Hospital Problems   No resolved problems to display.       Follow Up Appointments:  No future appointments.    Allergies:  Review of patient's allergies indicates:   Allergen Reactions    Baclofen      AMS and distorted dremas       Medications: Review discharge medications with patient and family and provide education.    Current Medications[1]     Medication List        START taking these medications      docusate sodium 100 MG capsule  Commonly known as: COLACE  Take 1 capsule (100 mg total) by mouth 2 (two) times daily.            CHANGE how you take these medications      polyethylene glycol 17 gram Pwpk  Commonly known as: GLYCOLAX  Take 17 g by  mouth once daily.  What changed:   when to take this  reasons to take this            CONTINUE taking these medications      acetaminophen 500 MG tablet  Commonly known as: TYLENOL  Take 2 tablets (1,000 mg total) by mouth every 8 (eight) hours as needed (Mild pain).     amLODIPine 10 MG tablet  Commonly known as: NORVASC  Take 1 tablet (10 mg total) by mouth once daily.     atorvastatin 10 MG tablet  Commonly known as: LIPITOR  Take 1 tablet (10 mg total) by mouth once daily.     metoprolol succinate 100 MG 24 hr tablet  Commonly known as: TOPROL-XL  Take 1 tablet (100 mg total) by mouth once daily.     venlafaxine 75 MG 24 hr capsule  Commonly known as: EFFEXOR-XR  Take 1 capsule (75 mg total) by mouth once daily.            STOP taking these medications      alendronate 70 MG tablet  Commonly known as: FOSAMAX     clopidogreL 75 mg tablet  Commonly known as: PLAVIX     FIBERCON ORAL     VITAMIN D2 1,250 mcg (50,000 unit) capsule  Generic drug: ergocalciferol                I have seen and examined this patient within the last 30 days. My clinical findings that support the need for the home health skilled services and home bound status are the following:no   Weakness/numbness causing balance and gait disturbance due to Weakness/Debility and Dehydration making it taxing to leave home.  Requiring assistive device to leave home due to unsteady gait caused by  Weakness/Debility and Dehydration.  Mental confusion making it unsafe for patient to leave home alone due to  Dementia and Acute Delirium.     Diet:   cardiac diet, diabetic diet 1800 calorie, and encourage fluids    Referrals/ Consults  Physical Therapy to evaluate and treat. Evaluate for home safety and equipment needs; Establish/upgrade home exercise program. Perform / instruct on therapeutic exercises, gait training, transfer training, and Range of Motion.  Occupational Therapy to evaluate and treat. Evaluate home environment for safety and equipment needs.  Perform/Instruct on transfers, ADL training, ROM, and therapeutic exercises.  Aide to provide assistance with personal care, ADLs, and vital signs.    Activities:   ambulate in house with assistance    Nursing:   Agency to admit patient within 24 hours of hospital discharge unless specified on physician order or at patient request    SN to complete comprehensive assessment including routine vital signs. Instruct on disease process and s/s of complications to report to MD. Review/verify medication list sent home with the patient at time of discharge  and instruct patient/caregiver as needed. Frequency may be adjusted depending on start of care date.     Skilled nurse to perform up to 3 visits PRN for symptoms related to diagnosis    Notify MD if SBP > 160 or < 90; DBP > 90 or < 50; HR > 120 or < 50; Temp > 101; O2 < 88%; Other:       Ok to schedule additional visits based on staff availability and patient request on consecutive days within the home health episode.    When multiple disciplines ordered:    Start of Care occurs on Sunday - Wednesday schedule remaining discipline evaluations as ordered on separate consecutive days following the start of care.    Thursday SOC -schedule subsequent evaluations Friday and Monday the following week.     Friday - Saturday SOC - schedule subsequent discipline evaluations on consecutive days starting Monday of the following week.    For all post-discharge communication and subsequent orders please contact patient's primary care physician.     Miscellaneous   Routine Skin for Bedridden Patients: Instruct patient/caregiver to apply moisture barrier cream to all skin folds and wet areas in perineal area daily and after baths and all bowel movements.    Home Health Aide:  Nursing Weekly, Physical Therapy Three times weekly, Occupational Therapy Three times weekly, and Home Health Aide Twice weekly    I certify that this patient is confined to her home and needs intermittent skilled  nursing care, physical therapy, and occupational therapy.               [1]   Current Facility-Administered Medications   Medication Dose Route Frequency Provider Last Rate Last Admin    acetaminophen tablet 650 mg  650 mg Oral Q6H PRN Jenna, Norm DALY MD        amLODIPine tablet 10 mg  10 mg Oral Daily Marker, Norm DALY MD   10 mg at 03/28/25 1037    atorvastatin tablet 10 mg  10 mg Oral Daily Jenan, Norm DALY MD   10 mg at 03/28/25 1037    dextrose 50% injection 12.5 g  12.5 g Intravenous PRN Marker, Norm DALY MD        dextrose 50% injection 25 g  25 g Intravenous PRN Marker, Norm DALY MD        docusate sodium capsule 100 mg  100 mg Oral BID Marker, Norm DALY MD   100 mg at 03/28/25 1037    enoxaparin injection 40 mg  40 mg Subcutaneous Daily Marker, Norm DALY MD   40 mg at 03/27/25 1721    glucagon (human recombinant) injection 1 mg  1 mg Intramuscular PRN Marker, Norm DALY MD        glucose chewable tablet 16 g  16 g Oral PRN Marker, Norm DALY MD        glucose chewable tablet 24 g  24 g Oral PRN Marker, Norm DALY MD        hydrALAZINE injection 15 mg  15 mg Intravenous Q4H PRN Marker, Norm DALY MD        insulin aspart U-100 pen 0-10 Units  0-10 Units Subcutaneous QID (AC + HS) PRN Marker, Norm DALY MD        lactated ringers infusion   Intravenous Continuous Marker, Norm DALY MD   Stopped at 03/27/25 2350    loperamide capsule 2 mg  2 mg Oral QID PRN Marker, Norm DALY MD        melatonin tablet 6 mg  6 mg Oral Nightly PRN Marker, Norm DALY MD        metoprolol succinate (TOPROL-XL) 24 hr tablet 100 mg  100 mg Oral Daily Marker, Nomr DALY MD   100 mg at 03/28/25 1036    naloxone 0.4 mg/mL injection 0.02 mg  0.02 mg Intravenous PRN Jenna, Norm DALY MD        ondansetron injection 4 mg  4 mg Intravenous Q4H PRN Jenna, Norm DALY MD        polyethylene glycol packet 17 g  17 g Oral Daily Marker, Norm DALY MD   17 g at 03/28/25 1037    sodium chloride 0.9% flush 10 mL  10 mL Intravenous PRN Jenna, Norm DALY MD        venlafaxine  24 hr capsule 75 mg  75 mg Oral Daily Jenna, Norm DALY MD   75 mg at 03/28/25 1037

## 2025-03-28 NOTE — ASSESSMENT & PLAN NOTE
-Patient bed bound for a bit over 1 year  -Fall precautions ordered  -Consulted PT/OT who recommend home health

## 2025-03-28 NOTE — ASSESSMENT & PLAN NOTE
-Admitted to inpatient status  -Presented for evaluation of altered mental status after falling out of bed.  Daughters report baseline dementia which worsens every 2-3 months when she develops constipation.  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  -Shunt series showed stable Codman-Hakim ventriculostomy shunt catheter settings with no evidence of shunt catheter discontinuity.  -CT abdomen pelvis showed a large amount of stool in the rectum and sigmoid colon with mild colonic wall thickening   -UA has no signs of infection.  CXR showed no signs of pneumonia or acute findings  -TSH, BNP, PCT and UDS are normal  -Labs suggested mild dehydration.  -Suspect this is a mild encephalopathy due to her baseline dementia, mild dehydration and significant constipation - as with prior occurrences per her daughters.  -In ED treated with IV fluids, cipro and flagyl.  Do not think we need to continue antibiotics at this time  -She was treated with IV fluids, colace and miralax.  She has had two bowel movements.  She was seen and examined today and states she feels great and is eager to go home.  I feel discharge is medically appropriate now.  I have called her daughter Colleen and left a VM with my cellphone number if she has any questions.  Recommend they focus on maintaining good oral hydration and continue with colace and miralax daily.  Follow up with pcp within 1 week.  We have ordered a home health service as well.  Offered hospital bed and bedside commode for at home.  They already have BSC and are happy with their present bed.

## 2025-03-28 NOTE — ASSESSMENT & PLAN NOTE
-A1c 5.3  -Diet controlled at home  -Monitor accu-checks ac/hs and if needed give ISS.  Continue diet control at home

## 2025-03-28 NOTE — PROGRESS NOTES
03/28/25 1427   Discharge Delays   Discharge Delays (!) Ambulance Transport/Facility Transport

## 2025-03-31 ENCOUNTER — PATIENT OUTREACH (OUTPATIENT)
Dept: ADMINISTRATIVE | Facility: CLINIC | Age: 89
End: 2025-03-31
Payer: MEDICARE

## 2025-03-31 NOTE — PROGRESS NOTES
C3 nurse attempted to contact Farheen Soares for a TCC post hospital discharge follow up call. No answer. Left voicemail with callback information. The patient has a scheduled HOSFU appointment with Vonda Chung MD on 04/09/25 @ 4253.

## 2025-04-01 NOTE — PROGRESS NOTES
3rd Attempt made to reach patient for TCC call. Left voicemail please call [phone] leave first name, last name, and  for Andrea.  I will return your call.

## 2025-04-01 NOTE — PROGRESS NOTES
C3 nurse spoke with Farheen Soares's daughter Sherin for a TCC post hospital discharge follow up call. Scheduled hospital follow up with Cassi Wolfe NP on 04/07/25 @ 0800. Pt's daughter informed that this is a placeholder time and she would be contacted a day or so prior with a visit time. Verbalized understanding.

## 2025-04-01 NOTE — PROGRESS NOTES
2nd attempt made to reach patient for TCC call. Left voicemail please call [phone] and leave first name, last name and  for Andrea. I will return your call.

## 2025-04-03 ENCOUNTER — TELEPHONE (OUTPATIENT)
Dept: ADMINISTRATIVE | Facility: CLINIC | Age: 89
End: 2025-04-03
Payer: MEDICARE

## 2025-04-03 NOTE — TELEPHONE ENCOUNTER
Pt's daughter Sherin called stating she has not heard anything from [facility] about therapy. Called [facility] on behalf of pt's daughter and nurse visits to start tomorrow along with occupational therapy. Sherin informed of this and was appreciative of the call.

## 2025-04-07 ENCOUNTER — OFFICE VISIT (OUTPATIENT)
Dept: HOME HEALTH SERVICES | Facility: CLINIC | Age: 89
End: 2025-04-07
Payer: MEDICARE

## 2025-04-07 DIAGNOSIS — E11.51 TYPE 2 DIABETES MELLITUS WITH DIABETIC PERIPHERAL ANGIOPATHY WITHOUT GANGRENE, WITHOUT LONG-TERM CURRENT USE OF INSULIN: ICD-10-CM

## 2025-04-07 DIAGNOSIS — I10 ESSENTIAL HYPERTENSION: ICD-10-CM

## 2025-04-07 DIAGNOSIS — E86.0 MILD DEHYDRATION: ICD-10-CM

## 2025-04-07 DIAGNOSIS — K59.00 CONSTIPATION, UNSPECIFIED CONSTIPATION TYPE: ICD-10-CM

## 2025-04-07 DIAGNOSIS — I48.91 ATRIAL FIBRILLATION, UNSPECIFIED TYPE: Primary | ICD-10-CM

## 2025-04-07 DIAGNOSIS — F03.90 DEMENTIA, UNSPECIFIED DEMENTIA SEVERITY, UNSPECIFIED DEMENTIA TYPE, UNSPECIFIED WHETHER BEHAVIORAL, PSYCHOTIC, OR MOOD DISTURBANCE OR ANXIETY: ICD-10-CM

## 2025-04-07 PROCEDURE — 1111F DSCHRG MED/CURRENT MED MERGE: CPT | Mod: CPTII,S$GLB,, | Performed by: NURSE PRACTITIONER

## 2025-04-07 PROCEDURE — 3288F FALL RISK ASSESSMENT DOCD: CPT | Mod: CPTII,S$GLB,, | Performed by: NURSE PRACTITIONER

## 2025-04-07 PROCEDURE — 99350 HOME/RES VST EST HIGH MDM 60: CPT | Mod: S$GLB,,, | Performed by: NURSE PRACTITIONER

## 2025-04-07 PROCEDURE — 1101F PT FALLS ASSESS-DOCD LE1/YR: CPT | Mod: CPTII,S$GLB,, | Performed by: NURSE PRACTITIONER

## 2025-04-07 PROCEDURE — 1126F AMNT PAIN NOTED NONE PRSNT: CPT | Mod: CPTII,S$GLB,, | Performed by: NURSE PRACTITIONER

## 2025-04-08 VITALS
RESPIRATION RATE: 20 BRPM | OXYGEN SATURATION: 98 % | TEMPERATURE: 98 F | SYSTOLIC BLOOD PRESSURE: 122 MMHG | HEART RATE: 73 BPM | DIASTOLIC BLOOD PRESSURE: 75 MMHG

## 2025-04-08 PROBLEM — G93.40 ACUTE ENCEPHALOPATHY: Status: RESOLVED | Noted: 2025-03-27 | Resolved: 2025-04-08

## 2025-04-08 PROBLEM — G93.41 ACUTE METABOLIC ENCEPHALOPATHY: Status: RESOLVED | Noted: 2022-06-27 | Resolved: 2025-04-08

## 2025-04-09 NOTE — PROGRESS NOTES
"[facility]  Medical Chronic Care Home Visit    Encounter Provider: Cassi Wolfe   PCP: Vonda Chung MD  Consult Requested By: No ref. provider found    HISTORY OF PRESENT ILLNESS      Patient ID: Farheen Soares is a 89 y.o. female is being seen in the home due to physical debility that presents a taxing effort to leave the home, to mitigate high risk of hospital readmission and/or due to the limited availability of reliable or safe options for transportation to the point of access to the provider. Prior to treatment on this visit the chart was reviewed and patient verbal consent was obtained.    HPI:   Ms. Soares is an 89 year old woman with dementia, significant hearing loss, NPH s/p  shunt, hypertension, diabetes mellitus, bed-bound for about 1 year, gout, IBS, MDD, PSVT, IBS, diverticulosis and chronic low back pain who presented for evaluation of altered mental status after having slid out of her bed.  The history is obtained from interview of her two surviving daughters, the patient and review of patient's chart.  The patient is charming and conversant, albeit quite hard of hearing.  She recalls "falling" out of bed and hitting her left thigh and buttock.  She states she was getting ready for the doctor to visit.  Her only complaint is of rectal discomfort, after having just received an enema and disimpaction.  She denies fevers, chills, chest pain, abdominal pain, back pain, shortness of breath, nausea, vomiting and diarrhea.  Her daughters note that every 2-3 months the patient develops constipation and worsening of her mental status when that happens.  They report she has been bed bound for a bit over a year.  She has been eating well and no recent significant issues before yesterday.          Hospital Course By Problem:   Assessment & Plan  Acute encephalopathy  -Admitted to inpatient status  -Presented for evaluation of altered mental status after falling out of bed.  Daughters " report baseline dementia which worsens every 2-3 months when she develops constipation.  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  -Shunt series showed stable Codman-Hakim ventriculostomy shunt catheter settings with no evidence of shunt catheter discontinuity.  -CT abdomen pelvis showed a large amount of stool in the rectum and sigmoid colon with mild colonic wall thickening   -UA has no signs of infection.  CXR showed no signs of pneumonia or acute findings  -TSH, BNP, PCT and UDS are normal  -Labs suggested mild dehydration.  -Suspect this is a mild encephalopathy due to her baseline dementia, mild dehydration and significant constipation - as with prior occurrences per her daughters.  -In ED treated with IV fluids, cipro and flagyl.  Do not think we need to continue antibiotics at this time  -She was treated with IV fluids, colace and miralax.  She has had two bowel movements.  She was seen and examined today and states she feels great and is eager to go home.  I feel discharge is medically appropriate now.  I have called her daughter Colleen and left a VM with my cellphone number if she has any questions.  Recommend they focus on maintaining good oral hydration and continue with colace and miralax daily.  Follow up with pcp within 1 week.  We have ordered a home health service as well.  Offered hospital bed and bedside commode for at home.  They already have BSC and are happy with their present bed.  Meningioma  -Noted history and appears stable on present imaging  NPH (normal pressure hydrocephalus)  -Noted history  -Shunt appears stable as  above  Dementia  -Treatment as above  Fall  -Patient bed bound for a bit over 1 year  -Fall precautions ordered  -Consulted PT/OT who recommend home health  Constipation  -Treatment as above  Mild dehydration  -Treated with IV fluids as above  -Labs are stable  -Encourage continued efforts to maintain good oral hydration at home, which can be challenging with patients who have dementia  Essential hypertension  -BP mildly to moderately elevated - but not pursuing tight control given her advanced age.  -Continue home norvasc and toprol  GERD (gastroesophageal reflux disease)  -Noted history and not on treatment at home  -Monitored and no complaints made  Hyperlipidemia  -Continue home statin  Type 2 diabetes mellitus with diabetic peripheral angiopathy without gangrene, without long-term current use of insulin  -A1c 5.3  -Diet controlled at home  -Monitor accu-checks ac/hs and if needed give ISS.  Continue diet control at home  Chronic gout of multiple sites  -Noted history  -No acute flair  Chronic pain  -Noted history  -Denies pain throughout hospitalization.  Depression  -Appears well controlled  -Continue home venlafaxine  ACP (advance care planning)  -Palliative care consulted and input appreciated  -Presently patient is full code      Today:  Patient found resting in bed, no acute issues.  She is AAOx1 at baseline.  She has 24 hour caregiver support in home.  Caregiver reports good bm pattern, sleep, appetite.  Reports taking medication as prescribed.  Caregiver is aware of all upcoming appts.     DECISION MAKING TODAY       Assessment & Plan:  1. Atrial fibrillation, unspecified type  Assessment & Plan:  Asymptomatic  Continue meds as prescribed       2. Constipation, unspecified constipation type  Assessment & Plan:  Caregiver reports no current issue  Continue miralax and colace       3. Essential hypertension  Assessment & Plan:  Normotensive  Continue meds as prescribed       4. Dementia, unspecified  dementia severity, unspecified dementia type, unspecified whether behavioral, psychotic, or mood disturbance or anxiety  Assessment & Plan:  At baseline, AAOx1  Continue caregiver support       5. Type 2 diabetes mellitus with diabetic peripheral angiopathy without gangrene, without long-term current use of insulin  Assessment & Plan:  Diet controlled  Continue diabetic diet       6. Mild dehydration  Assessment & Plan:  Caregiver reports adequate po fluid intake              ENVIRONMENT OF CARE      Family and/or Caregiver present at visit?  Yes  Name of Caregiver: Sitter  History provided by: caregiver    4Ms for Medical Decision-Making in Older Adults    Last Completed EAWV: 2022    MEDICATIONS:  High Risk Medications:  Total Active Medications: 1  venlafaxine - 75 MG    MOBILITY:  Activities of Daily Livin/27/2022    11:17 AM   Activities of Daily Living   Ambulation Assistance Required   Ambulation Assistance Walker (wheeled)   Dressing Assistance Required   Dressing Assistance Moderate   Transfers Assistance Required   Transfers Assistance Moderate   Toileting Incontinent of bowel;Incontinent of bladder   Toileting Assistance Wears Briefs   Feeding Independent   Cleaning home/Chores Assistance Required   Telephone use Assistance Required   Shopping Assistance Required   Paying bills Assistance Required   Taking meds Assistance Required   If required, who assists the patient with ADLs? daughter and caregiver     Fall Risk:      2025     8:00 AM 2025     1:40 PM 2023     2:30 PM   Fall Risk Assessment - Outpatient   Mobility Status Stretcher Wheelchair Bound Wheelchair Bound   Number of falls 0 0 2+   Identified as fall risk True True True     Disability Status:      2022    11:20 AM   Disability Status   Are you deaf or do you have serious difficulty hearing? Y   Are you blind or do you have serious difficulty seeing, even when wearing glasses? N   Because of a physical, mental,  or emotional condition, do you have serious difficulty concentrating, remembering, or making decisions? Y   Do you have serious difficulty walking or climbing stairs? Y   Do you have difficulty dressing or bathing? Y   Because of a physical, mental, or emotional condition, do you have difficulty doing errands alone such as visiting a doctor's office or shopping? Y     Nutrition Screenin/27/2022    11:17 AM   Nutrition Screening   Has food intake declined over the past three months due to loss of appetite, digestive problems, chewing or swallowing difficulties? No decrease in food intake   Involuntary weight loss during the last 3 months? No weight loss   Mobility? Goes out   Has the patient suffered psychological stress or acute disease in the past three months? No   Neuropsychological problems? No psychological problems   Body Mass Index (BMI)?  BMI 23 or greater   Screening Score 14    Screening Score: 0-7 Malnourished, 8-11 At Risk, 12-14 Normal  Get Up and Go:      2022    11:20 AM   Get Up and Go   Trial 1 16 seconds     Whisper Test:      2022    11:22 AM   Whisper Test   Whisper Test N/A- Hearing Impairment         MENTATION:   Has Dementia Dx: Yes  Has Anxiety Dx: No    Depression Patient Health Questionnaire:      2025     1:33 PM   Depression Patient Health Questionnaire   Over the last two weeks how often have you been bothered by little interest or pleasure in doing things Not at all   Over the last two weeks how often have you been bothered by feeling down, depressed or hopeless Not at all   PHQ-2 Total Score 0     Cognitive Function Screenin/27/2022     9:16 PM   Cognitive Function Screening   Clock Drawing Test --        Data saved with a previous flowsheet row definition         WHAT MATTERS MOST:  Advance Care Planning   ACP Status:   Patient has had an ACP conversation  Living Will: No  Power of : No  [place]: No    What is most important right now is to  focus on avoiding the hospital    Accordingly, we have decided that the best plan to meet the patient's goals includes continuing with treatment          Is patient hospice appropriate: No  (If needed, use PPS <30 or FAST score >7)  Was referral to hospice placed: No    Impression upon entering the home:  Physical Dwelling: apartment/condo   Appearance of home environment: cleaniness: clean  Functional Status: max assistance  Mobility: bedbound  Nutritional access: adequate intake and access      Diagnostic tests reviewed/disposition: No diagnosic tests pending after this hospitalization.  Disease/illness education:  signs and symptoms to report   Establishment or re-establishment of referral orders for community resources: No other necessary community resources.   Discussion with other health care providers: No discussion with other health care providers necessary.   Does patient have a PCP at [facility]? Yes   Repatriation plan with PCP? follow-up with PCP within 30d   Does patient have an ostomy (ileostomy, colostomy, suprapubic catheter, nephrostomy tube, tracheostomy, PEG tube, pleurex catheter, cholecystostomy, etc)? No  Were BPAs reviewed? No    Social History     Socioeconomic History    Marital status:    Tobacco Use    Smoking status: Never    Smokeless tobacco: Never   Substance and Sexual Activity    Alcohol use: Yes     Comment: socially    Drug use: Never    Sexual activity: Not Currently     Social Drivers of Health     Financial Resource Strain: Low Risk  (5/27/2022)    Overall Financial Resource Strain (CARDIA)     Difficulty of Paying Living Expenses: Not hard at all   Food Insecurity: No Food Insecurity (5/27/2022)    Hunger Vital Sign     Worried About Running Out of Food in the Last Year: Never true     Ran Out of Food in the Last Year: Never true   Transportation Needs: No Transportation Needs (5/27/2022)    PRAPARE - Transportation     Lack of Transportation (Medical): No     Lack of  Transportation (Non-Medical): No   Physical Activity: Inactive (5/27/2022)    Exercise Vital Sign     Days of Exercise per Week: 0 days     Minutes of Exercise per Session: 0 min   Stress: No Stress Concern Present (5/27/2022)    [employer] - Occupational Stress Questionnaire     Feeling of Stress : Not at all   Housing Stability: Low Risk  (5/27/2022)    Housing Stability Vital Sign     Unable to Pay for Housing in the Last Year: No     Number of Places Lived in the Last Year: 1     Unstable Housing in the Last Year: No         OBJECTIVE:     Vital Signs:  Vitals:    04/07/25 1200   BP: 122/75   Pulse: 73   Resp: 20   Temp: 98 °F (36.7 °C)       Review of Systems   Unable to perform ROS: Dementia       Physical Exam  HENT:      Head: Normocephalic and atraumatic.      Nose: No congestion or rhinorrhea.   Cardiovascular:      Rate and Rhythm: Normal rate.      Pulses: Normal pulses.      Heart sounds: Normal heart sounds.   Pulmonary:      Effort: No respiratory distress.      Breath sounds: Normal breath sounds.   Abdominal:      General: Bowel sounds are normal.      Palpations: Abdomen is soft.      Tenderness: There is no abdominal tenderness.   Musculoskeletal:      Cervical back: Normal range of motion and neck supple.      Right lower leg: No edema.      Left lower leg: No edema.   Skin:     General: Skin is warm and dry.   Neurological:      Mental Status: She is alert. Mental status is at baseline.       INSTRUCTIONS FOR PATIENT:     Scheduled Follow-up, Appts Reviewed with Modifications if Needed: Yes  No future appointments.    Current Medications[1]    Medication Reconciliation:  Were medications changed during this appointment? No  Were medications in the home? Yes  Is the patient taking the medications as directed? Yes  Does the patient/caregiver understand the medications and changes? Yes  Does updated med list accurately reflects meds patient is currently taking?  Yes    Signature: Cassi Wolfe NP     Total face-to-face time was 60 min, >50% of this was spent on counseling and coordination of care. The following issues were discussed: primary and secondary diagnoses, co-morbidities, prescribed medications, treatment modalities, importance of compliance with medical advice and directives for follow-up care.           [1]   Current Outpatient Medications:     acetaminophen (TYLENOL) 500 MG tablet, Take 2 tablets (1,000 mg total) by mouth every 8 (eight) hours as needed (Mild pain)., Disp: , Rfl: 0    amLODIPine (NORVASC) 10 MG tablet, Take 1 tablet (10 mg total) by mouth once daily., Disp: 90 tablet, Rfl: 1    atorvastatin (LIPITOR) 10 MG tablet, Take 1 tablet (10 mg total) by mouth once daily. (Patient not taking: Reported on 4/1/2025), Disp: 90 tablet, Rfl: 1    docusate sodium (COLACE) 100 MG capsule, Take 1 capsule (100 mg total) by mouth 2 (two) times daily., Disp: 60 capsule, Rfl: 1    metoprolol succinate (TOPROL-XL) 100 MG 24 hr tablet, Take 1 tablet (100 mg total) by mouth once daily., Disp: 90 tablet, Rfl: 1    polyethylene glycol (GLYCOLAX) 17 gram/dose powder, Mix and take 17 g by mouth once daily., Disp: 510 g, Rfl: 1    venlafaxine (EFFEXOR-XR) 75 MG 24 hr capsule, Take 1 capsule (75 mg total) by mouth once daily., Disp: 90 capsule, Rfl: 1

## 2025-05-21 ENCOUNTER — EXTERNAL HOME HEALTH (OUTPATIENT)
Dept: HOME HEALTH SERVICES | Facility: HOSPITAL | Age: 89
End: 2025-05-21
Payer: MEDICARE

## 2025-05-22 PROCEDURE — G0179 MD RECERTIFICATION HHA PT: HCPCS | Mod: ,,, | Performed by: STUDENT IN AN ORGANIZED HEALTH CARE EDUCATION/TRAINING PROGRAM

## 2025-05-23 ENCOUNTER — TELEPHONE (OUTPATIENT)
Dept: PRIMARY CARE CLINIC | Facility: CLINIC | Age: 89
End: 2025-05-23
Payer: MEDICARE

## 2025-05-23 DIAGNOSIS — I10 HYPERTENSION, UNSPECIFIED TYPE: ICD-10-CM

## 2025-05-23 DIAGNOSIS — F03.90 DEMENTIA, UNSPECIFIED DEMENTIA SEVERITY, UNSPECIFIED DEMENTIA TYPE, UNSPECIFIED WHETHER BEHAVIORAL, PSYCHOTIC, OR MOOD DISTURBANCE OR ANXIETY: ICD-10-CM

## 2025-05-23 DIAGNOSIS — E11.51 TYPE 2 DIABETES MELLITUS WITH DIABETIC PERIPHERAL ANGIOPATHY WITHOUT GANGRENE, WITHOUT LONG-TERM CURRENT USE OF INSULIN: Primary | ICD-10-CM

## 2025-06-10 ENCOUNTER — TELEPHONE (OUTPATIENT)
Dept: PRIMARY CARE CLINIC | Facility: CLINIC | Age: 89
End: 2025-06-10
Payer: MEDICARE

## 2025-06-10 NOTE — TELEPHONE ENCOUNTER
I called and sw pt daughter Colleen, she states that pt other daughter Sherin is the one who called. I left two VMs for deborah Duff.

## 2025-06-10 NOTE — TELEPHONE ENCOUNTER
LOV 02/28/25. I called and sw pt daughter Sherin. She states that pt's dementia is worsening, she is having more episodes of anxiety/ agitation/ aggression. Daughter Sherin is wondering if there is anything you would recommend for this (Rx or natural supplement) to help calm pt.     Sherin states that pt is experiencing more constipation as she is completely bedridden and not moving around, she is wondering what you recommend. Pt has been taking Miralax and eating prunes.     Sherin states that it is very difficult to get pt to appointment and she is wondering if you would be okay with appts once a year or a home NP visit in addition to a one year visit. Last time Ms. Soares visited us she had extreme motion sickness and was holding an emesis bag to her mouth for the entire visit.

## 2025-06-10 NOTE — TELEPHONE ENCOUNTER
Copied from CRM #[id number]. Topic: General Inquiry - Patient Advice  >> Frank 10, 2025  1:51 PM Laura wrote:  .1MEDICALADVICE     Patient is calling for Medical Advice regarding:pt daughter is calling in regards to some concerns and needs someone to call as soon as possible    Patient wants a call back or thru [facility]:call back    Comments:    Please advise patient replies from provider may take up to 48 hours.

## 2025-06-13 NOTE — TELEPHONE ENCOUNTER
Please schedule virtual visit with me so we can discuss. Can be flexible with times so daughter Sherin can be present.

## 2025-06-13 NOTE — TELEPHONE ENCOUNTER
Attempted to call pt daughter Sherin twice per Dr. Jackson's request. Video or audio visit required to discuss concerns/ possible sedative (video preferred). I left detailed voicemail.

## 2025-06-25 ENCOUNTER — TELEPHONE (OUTPATIENT)
Dept: PRIMARY CARE CLINIC | Facility: CLINIC | Age: 89
End: 2025-06-25
Payer: MEDICARE

## 2025-06-25 NOTE — TELEPHONE ENCOUNTER
I called and sw pt daughter, they scheduled virtual w Dr. Jackson to talk about constipation and possible sedative 07/02/25 at 11:20 am.

## 2025-06-25 NOTE — TELEPHONE ENCOUNTER
Copied from CRM #[id number]. Topic: Appointments - Appointment Access  >> Jun 25, 2025 12:17 PM Ludy wrote:  ATTN: RIGO    .1MEDICALADVICE     Patient is calling for Medical Advice regarding: Sherin at [phone] calling for Pt to schedule virtual appt specifically with PCP Renetta - please review and advise.    How long has patient had these symptoms:    Pharmacy name and phone#:    Patient wants a call back or thru [facility], provide patient's call back phone number:call back to daughter Sherin at [phone]    Comments:    Please advise patient replies from provider may take up to 48 hours.

## 2025-07-02 ENCOUNTER — PATIENT MESSAGE (OUTPATIENT)
Dept: PRIMARY CARE CLINIC | Facility: CLINIC | Age: 89
End: 2025-07-02

## 2025-07-02 ENCOUNTER — OFFICE VISIT (OUTPATIENT)
Dept: PRIMARY CARE CLINIC | Facility: CLINIC | Age: 89
End: 2025-07-02
Payer: MEDICARE

## 2025-07-02 DIAGNOSIS — F02.B11 MODERATE ALZHEIMER'S DEMENTIA WITH AGITATION, UNSPECIFIED TIMING OF DEMENTIA ONSET: ICD-10-CM

## 2025-07-02 DIAGNOSIS — K59.00 CONSTIPATION, UNSPECIFIED CONSTIPATION TYPE: Primary | ICD-10-CM

## 2025-07-02 DIAGNOSIS — Z71.89 COUNSELING REGARDING ADVANCE CARE PLANNING AND GOALS OF CARE: ICD-10-CM

## 2025-07-02 DIAGNOSIS — G30.9 MODERATE ALZHEIMER'S DEMENTIA WITH AGITATION, UNSPECIFIED TIMING OF DEMENTIA ONSET: ICD-10-CM

## 2025-07-02 PROCEDURE — 98006 SYNCH AUDIO-VIDEO EST MOD 30: CPT | Mod: 95,,, | Performed by: STUDENT IN AN ORGANIZED HEALTH CARE EDUCATION/TRAINING PROGRAM

## 2025-07-02 PROCEDURE — 1160F RVW MEDS BY RX/DR IN RCRD: CPT | Mod: CPTII,95,, | Performed by: STUDENT IN AN ORGANIZED HEALTH CARE EDUCATION/TRAINING PROGRAM

## 2025-07-02 PROCEDURE — 1159F MED LIST DOCD IN RCRD: CPT | Mod: CPTII,95,, | Performed by: STUDENT IN AN ORGANIZED HEALTH CARE EDUCATION/TRAINING PROGRAM

## 2025-07-02 RX ORDER — BREXPIPRAZOLE 0.25 MG/1
0.25 TABLET ORAL DAILY
Qty: 30 TABLET | Refills: 5 | Status: SHIPPED | OUTPATIENT
Start: 2025-07-02

## 2025-07-02 NOTE — PATIENT INSTRUCTIONS
Have your mother start taking Miralax twice a day.  If she is still not having bowel movements regularly, then try Miralax three times a day.  If she is still having trouble, try milk of magnesia.

## 2025-07-02 NOTE — PROGRESS NOTES
Office visit  Patient: Farheen Soares   7/2/2025       Assessment/Plan:       1. Constipation, unspecified constipation type        -continue Glycolax and Colace        -likely due to immobility    2. Counseling regarding advance care planning and goals of care       -discussed with daughter; she will discuss with her family and get back to me    3. Moderate Alzheimer's dementia with agitation, unspecified timing of dementia onset  -     brexpiprazole (REXULTI) 0.25 mg Tab; Take 1 tablet (0.25 mg total) by mouth once daily.  Dispense: 30 tablet; Refill: 5        -if agitation worsens, start Rexulti      Return to clinic in 3 months for a virtual visit or sooner as needed.        Visit type: audiovisual    Patient's Location is: [place]    Face to Face time with patient: 26 minutes  28 minutes of total time spent on the encounter, which includes face to face time and non-face to face time preparing to see the patient (eg, review of tests), Obtaining and/or reviewing separately obtained history, Documenting clinical information in the electronic or other health record, Independently interpreting results (not separately reported) and communicating results to the patient/family/caregiver, or Care coordination (not separately reported).         Each patient to whom he or she provides medical services by telemedicine is:  (1) informed of the relationship between the physician and patient and the respective role of any other health care provider with respect to management of the patient; and (2) notified that he or she may decline to receive medical services by telemedicine and may withdraw from such care at any time.      CHIEF COMPLAINT: constipation    HPI: Farheen Soares is a 89 y.o. female who presents for a virtual visit.  She is accompanied by her daughter, Colleen, who provides most of the history.    Yesterday, she had a painful and dry BM.  She has been giving her the Miralax daily.  The Colace has not  helped.  She has been struggling with constipation for the past few years, but has been worsening over the past 6 months.    For the past 2 years, she has been sundowning.  Recently, it has gotten worse over the past 2-3 months.  Sometimes she has rage and agitation for about an hour but then comes back to herself.  When she is agitated, she is very mobile and convinces herself that she can walk.    She has a caretaker every week day for 4 hours.  Patient's son lives with her, but he has MS.  A home health nurse is coming once a week.  PT told her they do not know the end goal so they stopped coming to the house.    She was in the hospital in 2025 for a fall and altered mental status.  Sherin states that when she is constipated she becomes altered.    Colleen's goal are to get her mother as mobile as she can.  She thinks that patient limits her movement due to back pain.  Historically, she has had back pain with movement.  Patient is still feeding herself and brushing her teeth.  Patient likes to read books or the newspaper.  She watches a lot of television.  Patient expresses a desire to go shopping - very future oriented.  She still thinks she is living in [place] - not oriented to place or time.  She knows her name. Does think that some of her  relatives are still living.  Patient's son and daughter are physicians.    Review of Systems   HENT:  Positive for hearing loss.    Eyes:  Negative for discharge.   Respiratory:  Negative for wheezing.    Cardiovascular:  Negative for chest pain and palpitations.   Gastrointestinal:  Positive for constipation. Negative for blood in stool, diarrhea and vomiting.   Genitourinary:  Negative for dysuria and hematuria.   Musculoskeletal:  Negative for neck pain.   Neurological:  Negative for weakness and headaches.   Endo/Heme/Allergies:  Negative for polydipsia.     Answers submitted by the patient for this visit:  Review of Systems Questionnaire (Submitted on  7/2/2025)  activity change: No  unexpected weight change: No  rhinorrhea: No  trouble swallowing: No  visual disturbance: No  chest tightness: No  polyuria: No  difficulty urinating: No  menstrual problem: No  joint swelling: No  arthralgias: No  confusion: Yes  dysphoric mood: Yes    Current Outpatient Medications   Medication Instructions    acetaminophen (TYLENOL) 1,000 mg, Oral, Every 8 hours PRN    amLODIPine (NORVASC) 10 mg, Oral, Daily    atorvastatin (LIPITOR) 10 mg, Oral, Daily    docusate sodium (COLACE) 100 mg, Oral, 2 times daily    metoprolol succinate (TOPROL-XL) 100 mg, Oral, Daily    polyethylene glycol (GLYCOLAX) 17 gram/dose powder Mix and take 17 g by mouth once daily.    REXULTI 0.25 mg, Oral, Daily    venlafaxine (EFFEXOR-XR) 75 mg, Oral, Daily       Lab Results   Component Value Date    HGBA1C 5.3 02/28/2025    HGBA1C 6.2 (H) 01/10/2023    HGBA1C 7.2 (H) 06/27/2022     Lab Results   Component Value Date    MICALBCREAT 38.5 (H) 03/22/2021     Lab Results   Component Value Date    LDLCALC 72.0 02/28/2025    LDLCALC 41.8 (L) 11/12/2021    CHOL 128 02/28/2025    HDL 33 (L) 02/28/2025    TRIG 115 02/28/2025       Lab Results   Component Value Date     (L) 03/28/2025    K 3.6 03/28/2025     03/28/2025    CO2 18 (L) 03/28/2025     03/28/2025    BUN 9 03/28/2025    CREATININE 0.7 03/28/2025    CALCIUM 9.6 03/28/2025    PROT 8.6 (H) 03/27/2025    ALBUMIN 3.6 03/27/2025    BILITOT 0.4 03/27/2025    ALKPHOS 75 03/27/2025    AST 17 03/27/2025    ALT 10 03/27/2025    ANIONGAP 13 03/28/2025    ESTGFRAFRICA >60 06/29/2022    EGFRNONAA >60 06/29/2022    WBC 10.30 03/28/2025    HGB 13.6 03/28/2025    HGB 14.0 03/27/2025    HCT 40.5 03/28/2025    MCV 95 03/28/2025     03/28/2025    TSH 1.576 03/27/2025    HEPCAB Negative 03/26/2025       Lab Results   Component Value Date    LJMHJPKZ99KM 14 (L) 03/22/2021    HBXDRTPQ24DO 14 (L) 11/23/2020    DMQBSPGH85 578 03/27/2025    TRANSFERRIN 299  11/20/2020         Past Medical History:   Diagnosis Date    Age-related osteoporosis with current pathological fracture with routine healing     ANAHI (acute kidney injury) 1/12/2023    Allergic rhinitis     Anemia     Carotid atherosclerosis, bilateral     Chronic gout of multiple sites     Chronic low back pain with bilateral sciatica     Closed impacted fracture of left femoral neck with routine healing s/p percutaneous screw fixation on 11/21/2020 11/20/2020    DDD (degenerative disc disease), lumbar 8/17/2020    Diverticulosis     Essential hypertension 11/25/2019    Facet arthropathy     GERD (gastroesophageal reflux disease) 11/25/2019    Hearing loss     History of transient ischemic attack (TIA) 11/25/2019    IBS (irritable bowel syndrome)     Insomnia     Lumbar radiculopathy 8/17/2020    Lumbar spondylosis 8/17/2020    Lumbar stenosis     Meningioma     Paroxysmal SVT (supraventricular tachycardia)     Primary open angle glaucoma (POAG) of both eyes     Pure hypercholesterolemia 11/25/2019    PVD (peripheral vascular disease)     30% prox stenosis of celiac trunk and 20% stnosis Prox SMA - per Abd/SMA Arteriogram 4/3/08    Recurrent major depressive disorder, in full remission     Type 2 diabetes mellitus without complication, without long-term current use of insulin 11/25/2019    Vitamin D deficiency 11/23/2020     Past Surgical History:   Procedure Laterality Date    CATARACT EXTRACTION      CAUDAL EPIDURAL STEROID INJECTION N/A 08/27/2020    Procedure: Injection-steroid-epidural-caudal with catheter;  Surgeon: Johnathan Gonzalez Jr., MD;  Location: [facility];  Service: Pain Management;  Laterality: N/A;    EPIDURAL STEROID INJECTION INTO LUMBAR SPINE      LUMBAR LAMINECTOMY  2009    PERCUTANEOUS PINNING OF HIP Left 11/21/2020    Procedure: Left- Percutaneous Screws- Large  arm Clock side;  Surgeon: Leandro White MD;  Location: [facility] OR [address] [place] [address];  Service: Orthopedics;  Laterality: Left;    TOTAL  ABDOMINAL HYSTERECTOMY W/ BILATERAL SALPINGOOPHORECTOMY      VENTRICULOPERITONEAL SHUNT         Social History[1]    family history includes Diabetes in her mother; Hyperlipidemia in her son; Hypertension in her daughter, father, mother, and son; Hypothyroidism in her daughter; Multiple sclerosis in her son; Stroke in her father.    There were no vitals filed for this visit.    There is no height or weight on file to calculate BMI.      Objective:   Physical Exam  Constitutional:       General: She is not in acute distress.     Appearance: She is not diaphoretic.      Comments: cachectic   HENT:      Head: Normocephalic.      Right Ear: External ear normal.      Left Ear: External ear normal.   Eyes:      General: No scleral icterus.     Conjunctiva/sclera: Conjunctivae normal.   Cardiovascular:      Comments: Appears well-perfused  Pulmonary:      Effort: Pulmonary effort is normal. No respiratory distress.   Musculoskeletal:         General: Normal range of motion.      Cervical back: Normal range of motion.   Skin:     Findings: No lesion or rash.   Neurological:      Comments: Minimally interactive but does respond to direct questions; bedbound             Kathleen Jackson MD  Internal Medicine and Pediatrics                           [1]   Social History  Tobacco Use    Smoking status: Never    Smokeless tobacco: Never   Substance Use Topics    Alcohol use: Yes     Comment: socially    Drug use: Never

## 2025-08-08 ENCOUNTER — EXTERNAL HOME HEALTH (OUTPATIENT)
Dept: HOME HEALTH SERVICES | Facility: HOSPITAL | Age: 89
End: 2025-08-08
Payer: MEDICARE

## 2025-09-02 ENCOUNTER — TELEPHONE (OUTPATIENT)
Dept: ADMINISTRATIVE | Facility: CLINIC | Age: 89
End: 2025-09-02
Payer: MEDICARE

## (undated) DEVICE — DRAPE C-ARMOR EQUIPMENT COVER

## (undated) DEVICE — DRESSING XEROFORM FOIL PK 1X8

## (undated) DEVICE — STYLET AXIEM 23CM SINGLE COIL

## (undated) DEVICE — NDL SPINAL SPINOCAN 22GX3.5

## (undated) DEVICE — SUT 4/0 18IN NUROLON BLK B

## (undated) DEVICE — DRESSING TELFA N ADH 3X8

## (undated) DEVICE — TRAY LUMBAR PUNCTURE ADULT

## (undated) DEVICE — SEE MEDLINE ITEM 156905

## (undated) DEVICE — ELECTRODE REM PLYHSV RETURN 9

## (undated) DEVICE — ADHESIVE MASTISOL VIAL 48/BX

## (undated) DEVICE — TRAY LUMBAR PUNCT ADT 20GX3.5

## (undated) DEVICE — DURAPREP SURG SCRUB 26ML

## (undated) DEVICE — DRAPE EENT SPLIT STERILE

## (undated) DEVICE — BLADE SURG CARBON STEEL SZ11

## (undated) DEVICE — CATH PASSER DISPOSABLE

## (undated) DEVICE — ADHESIVE DERMABOND ADVANCED

## (undated) DEVICE — CARTRIDGE OIL

## (undated) DEVICE — TROCAR ENDOPATH XCEL 5MM 7.5CM

## (undated) DEVICE — DIFFUSER

## (undated) DEVICE — TUBE FRAZIER 5MM 2FT SOFT TIP

## (undated) DEVICE — DRAPE C ARM 42 X 120 10/BX

## (undated) DEVICE — CORD BIPOLAR 12 FOOT

## (undated) DEVICE — BIT DRILL CANN QC 5X300X130MM

## (undated) DEVICE — BLADE CLIPPER NEURO / MDL 4411

## (undated) DEVICE — TRAY CATH FOL SIL URIMTR 16FR

## (undated) DEVICE — SET PNEUMOCLEAR TUBE HIGH-FLOW

## (undated) DEVICE — SPONGE DERMACEA GAUZE 4X4

## (undated) DEVICE — BANDAGE ADHESIVE PLAS STRL 1X3

## (undated) DEVICE — MARKER SKIN STND TIP BLUE BARR

## (undated) DEVICE — TRAY MINOR ORTHO

## (undated) DEVICE — STAPLER SKIN PROXIMATE WIDE

## (undated) DEVICE — CHLORAPREP 10.5 ML APPLICATOR

## (undated) DEVICE — BURR ACORN 7.5MM

## (undated) DEVICE — SUT GUT PL. 4-0 27 FS-2

## (undated) DEVICE — POINTER AXIEM CRANIAL TRACER

## (undated) DEVICE — SUT MONOCRYL 4-0 PS-1 UND

## (undated) DEVICE — TAPE MEDIPORE 4IN X 2YDS

## (undated) DEVICE — DRAPE SURGICAL STERI IRRG PCH

## (undated) DEVICE — TRACKER PATIENT NON INVASIVE

## (undated) DEVICE — DRESSING AQUACEL AG ADV 3.5X6

## (undated) DEVICE — DRESSING LEUKOPLAST FLEX 1X3IN

## (undated) DEVICE — SEE MEDLINE ITEM 157128

## (undated) DEVICE — SPRAY MASTISOL

## (undated) DEVICE — SUT D SPECIAL VICRYL 2-0

## (undated) DEVICE — TAPE SURG DURAPORE 2 X10YD

## (undated) DEVICE — DRAPE INCISE IOBAN 2 23X17IN

## (undated) DEVICE — GLOVE BIOGEL SKINSENSE PI 7.5

## (undated) DEVICE — TAPE SURG MEDIPORE 6X72IN

## (undated) DEVICE — DRAPE STERI

## (undated) DEVICE — SUT 2-0 12-18IN SILK

## (undated) DEVICE — SUT SILK BLK BR. 2 2-60

## (undated) DEVICE — SPONGE LAP 18X18 PREWASHED

## (undated) DEVICE — NDL FILTER 19GA X 1-1/2

## (undated) DEVICE — DRAPE STERI INSTRUMENT 1018

## (undated) DEVICE — GOWN SMART IMP BREATHABLE XXLG